# Patient Record
Sex: FEMALE | Race: OTHER | HISPANIC OR LATINO | Employment: FULL TIME | URBAN - METROPOLITAN AREA
[De-identification: names, ages, dates, MRNs, and addresses within clinical notes are randomized per-mention and may not be internally consistent; named-entity substitution may affect disease eponyms.]

---
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
## 2017-04-26 LAB
EXTERNAL HIV CONFIRMATION: NORMAL
EXTERNAL HIV SCREEN: NORMAL

## 2018-02-18 ENCOUNTER — HOSPITAL ENCOUNTER (EMERGENCY)
Facility: HOSPITAL | Age: 23
Discharge: HOME | End: 2018-02-18
Payer: COMMERCIAL

## 2018-02-18 ENCOUNTER — APPOINTMENT (EMERGENCY)
Dept: RADIOLOGY | Facility: HOSPITAL | Age: 23
End: 2018-02-18
Payer: COMMERCIAL

## 2018-02-18 VITALS
HEART RATE: 101 BPM | RESPIRATION RATE: 18 BRPM | TEMPERATURE: 100.3 F | DIASTOLIC BLOOD PRESSURE: 58 MMHG | SYSTOLIC BLOOD PRESSURE: 116 MMHG | OXYGEN SATURATION: 98 % | WEIGHT: 210 LBS

## 2018-02-18 DIAGNOSIS — J02.0 STREP PHARYNGITIS: Primary | ICD-10-CM

## 2018-02-18 DIAGNOSIS — R05.9 COUGH: ICD-10-CM

## 2018-02-18 LAB
ANION GAP SERPL CALCULATED.3IONS-SCNC: 9 MMOL/L (ref 4–13)
BACTERIA UR QL AUTO: NORMAL /HPF
BASOPHILS # BLD AUTO: 0.01 THOUSANDS/ΜL (ref 0–0.1)
BASOPHILS NFR BLD AUTO: 0 % (ref 0–1)
BILIRUB UR QL STRIP: NEGATIVE
BUN SERPL-MCNC: 15 MG/DL (ref 5–25)
CALCIUM SERPL-MCNC: 9.4 MG/DL (ref 8.3–10.1)
CHLORIDE SERPL-SCNC: 102 MMOL/L (ref 100–108)
CLARITY UR: ABNORMAL
CO2 SERPL-SCNC: 28 MMOL/L (ref 21–32)
COLOR UR: YELLOW
CREAT SERPL-MCNC: 0.98 MG/DL (ref 0.6–1.3)
EOSINOPHIL # BLD AUTO: 0.01 THOUSAND/ΜL (ref 0–0.61)
EOSINOPHIL NFR BLD AUTO: 0 % (ref 0–6)
ERYTHROCYTE [DISTWIDTH] IN BLOOD BY AUTOMATED COUNT: 14.2 % (ref 11.6–15.1)
EXT PREG TEST URINE: NEGATIVE
GFR SERPL CREATININE-BSD FRML MDRD: 83 ML/MIN/1.73SQ M
GLUCOSE SERPL-MCNC: 98 MG/DL (ref 65–140)
GLUCOSE UR STRIP-MCNC: NEGATIVE MG/DL
HCT VFR BLD AUTO: 43.3 % (ref 34.8–46.1)
HGB BLD-MCNC: 14.2 G/DL (ref 11.5–15.4)
HGB UR QL STRIP.AUTO: NEGATIVE
KETONES UR STRIP-MCNC: NEGATIVE MG/DL
LACTATE SERPL-SCNC: 0.8 MMOL/L (ref 0.5–2)
LEUKOCYTE ESTERASE UR QL STRIP: NEGATIVE
LYMPHOCYTES # BLD AUTO: 1.19 THOUSANDS/ΜL (ref 0.6–4.47)
LYMPHOCYTES NFR BLD AUTO: 12 % (ref 14–44)
MCH RBC QN AUTO: 27.3 PG (ref 26.8–34.3)
MCHC RBC AUTO-ENTMCNC: 32.8 G/DL (ref 31.4–37.4)
MCV RBC AUTO: 83 FL (ref 82–98)
MONOCYTES # BLD AUTO: 0.57 THOUSAND/ΜL (ref 0.17–1.22)
MONOCYTES NFR BLD AUTO: 6 % (ref 4–12)
NEUTROPHILS # BLD AUTO: 8.6 THOUSANDS/ΜL (ref 1.85–7.62)
NEUTS SEG NFR BLD AUTO: 82 % (ref 43–75)
NITRITE UR QL STRIP: NEGATIVE
NON-SQ EPI CELLS URNS QL MICRO: NORMAL /HPF
NRBC BLD AUTO-RTO: 0 /100 WBCS
PH UR STRIP.AUTO: 8.5 [PH] (ref 4.5–8)
PLATELET # BLD AUTO: 230 THOUSANDS/UL (ref 149–390)
PMV BLD AUTO: 10.8 FL (ref 8.9–12.7)
POTASSIUM SERPL-SCNC: 4 MMOL/L (ref 3.5–5.3)
PROT UR STRIP-MCNC: ABNORMAL MG/DL
RBC # BLD AUTO: 5.21 MILLION/UL (ref 3.81–5.12)
RBC #/AREA URNS AUTO: NORMAL /HPF
S PYO AG THROAT QL: POSITIVE
SODIUM SERPL-SCNC: 139 MMOL/L (ref 136–145)
SP GR UR STRIP.AUTO: 1.02 (ref 1–1.03)
UROBILINOGEN UR QL STRIP.AUTO: 1 E.U./DL
WBC # BLD AUTO: 10.38 THOUSAND/UL (ref 4.31–10.16)
WBC #/AREA URNS AUTO: NORMAL /HPF

## 2018-02-18 PROCEDURE — 87430 STREP A AG IA: CPT | Performed by: PHYSICIAN ASSISTANT

## 2018-02-18 PROCEDURE — 85025 COMPLETE CBC W/AUTO DIFF WBC: CPT | Performed by: PHYSICIAN ASSISTANT

## 2018-02-18 PROCEDURE — 83605 ASSAY OF LACTIC ACID: CPT | Performed by: PHYSICIAN ASSISTANT

## 2018-02-18 PROCEDURE — 81001 URINALYSIS AUTO W/SCOPE: CPT

## 2018-02-18 PROCEDURE — 36415 COLL VENOUS BLD VENIPUNCTURE: CPT | Performed by: PHYSICIAN ASSISTANT

## 2018-02-18 PROCEDURE — 71046 X-RAY EXAM CHEST 2 VIEWS: CPT

## 2018-02-18 PROCEDURE — 80048 BASIC METABOLIC PNL TOTAL CA: CPT | Performed by: PHYSICIAN ASSISTANT

## 2018-02-18 PROCEDURE — 96360 HYDRATION IV INFUSION INIT: CPT

## 2018-02-18 PROCEDURE — 81025 URINE PREGNANCY TEST: CPT | Performed by: PHYSICIAN ASSISTANT

## 2018-02-18 PROCEDURE — 99284 EMERGENCY DEPT VISIT MOD MDM: CPT

## 2018-02-18 RX ORDER — BENZONATATE 100 MG/1
100 CAPSULE ORAL 3 TIMES DAILY PRN
Qty: 30 CAPSULE | Refills: 0 | Status: SHIPPED | OUTPATIENT
Start: 2018-02-18 | End: 2018-02-22

## 2018-02-18 RX ORDER — ACETAMINOPHEN 325 MG/1
650 TABLET ORAL ONCE
Status: COMPLETED | OUTPATIENT
Start: 2018-02-18 | End: 2018-02-18

## 2018-02-18 RX ORDER — AMOXICILLIN 500 MG/1
500 TABLET, FILM COATED ORAL 2 TIMES DAILY
Qty: 20 TABLET | Refills: 0 | Status: SHIPPED | OUTPATIENT
Start: 2018-02-18 | End: 2018-02-28

## 2018-02-18 RX ORDER — ONDANSETRON 4 MG/1
4 TABLET, ORALLY DISINTEGRATING ORAL ONCE
Status: COMPLETED | OUTPATIENT
Start: 2018-02-18 | End: 2018-02-18

## 2018-02-18 RX ADMIN — ACETAMINOPHEN 650 MG: 325 TABLET, FILM COATED ORAL at 09:36

## 2018-02-18 RX ADMIN — SODIUM CHLORIDE 1000 ML: 0.9 INJECTION, SOLUTION INTRAVENOUS at 09:49

## 2018-02-18 RX ADMIN — ONDANSETRON 4 MG: 4 TABLET, ORALLY DISINTEGRATING ORAL at 09:36

## 2018-02-18 NOTE — DISCHARGE INSTRUCTIONS
Acute Cough   WHAT YOU NEED TO KNOW:   An acute cough can last up to 3 weeks  Common causes of an acute cough include a cold, allergies, or a lung infection  DISCHARGE INSTRUCTIONS:   Return to the emergency department if:   · You have trouble breathing or feel short of breath  · You cough up blood, or you see blood in your mucus  · You faint or feel weak or dizzy  · You have chest pain when you cough or take a deep breath  · You have new wheezing  Contact your healthcare provider if:   · You have a fever  · Your cough lasts longer than 4 weeks  · Your symptoms do not improve with treatment  · You have questions or concerns about your condition or care  Medicines:   · Medicines  may be needed to stop the cough, decrease swelling in your airways, or help open your airways  Medicine may also be given to help you cough up mucus  Ask your healthcare provider what over-the-counter medicines you can take  If you have an infection caused by bacteria, you may need antibiotics  · Take your medicine as directed  Contact your healthcare provider if you think your medicine is not helping or if you have side effects  Tell him or her if you are allergic to any medicine  Keep a list of the medicines, vitamins, and herbs you take  Include the amounts, and when and why you take them  Bring the list or the pill bottles to follow-up visits  Carry your medicine list with you in case of an emergency  Manage your symptoms:   · Do not smoke and stay away from others who smoke  Nicotine and other chemicals in cigarettes and cigars can cause lung damage and make your cough worse  Ask your healthcare provider for information if you currently smoke and need help to quit  E-cigarettes or smokeless tobacco still contain nicotine  Talk to your healthcare provider before you use these products  · Drink extra liquids as directed  Liquids will help thin and loosen mucus so you can cough it up   Liquids will also help prevent dehydration  Examples of good liquids to drink include water, fruit juice, and broth  Do not drink liquids that contain caffeine  Caffeine can increase your risk for dehydration  Ask your healthcare provider how much liquid to drink each day  · Rest as directed  Do not do activities that make your cough worse, such as exercise  · Use a humidifier or vaporizer  Use a cool mist humidifier or a vaporizer to increase air moisture in your home  This may make it easier for you to breathe and help decrease your cough  · Eat 2 to 5 mL of honey 2 times each day  Honey can help thin mucus and decrease your cough  · Use cough drops or lozenges  These can help decrease throat irritation and your cough  Follow up with your healthcare provider as directed:  Write down your questions so you remember to ask them during your visits  © 2017 [address] [employer] Information is for End User's use only and may not be sold, redistributed or otherwise used for commercial purposes  All illustrations and images included in CareNotes® are the copyrighted property of A D [employer]  or [employer]él[employer]  The above information is an  only  It is not intended as medical advice for individual conditions or treatments  Talk to your doctor, nurse or pharmacist before following any medical regimen to see if it is safe and effective for you  Strep Throat   WHAT YOU NEED TO KNOW:   Strep throat is a throat infection caused by bacteria  It is easily spread from person to person  DISCHARGE INSTRUCTIONS:   Call 911 for any of the following:   · You have trouble breathing  Return to the emergency department if:   · You have new symptoms like a bad headache, stiff neck, chest pain, or vomiting  · You are drooling because you cannot swallow your spit  Contact your healthcare provider if:   · You have a fever  · You have a rash or ear pain      · You have green, yellow-brown, or bloody mucus when you cough or blow your nose  · You are unable to drink anything  · You have questions or concerns about your condition or care  Medicines:   · Antibiotics  help treat your strep throat  You should feel better within 2 to 3 days after you start antibiotics  · Take your medicine as directed  Contact your healthcare provider if you think your medicine is not helping or if you have side effects  Tell him or her if you are allergic to any medicine  Keep a list of the medicines, vitamins, and herbs you take  Include the amounts, and when and why you take them  Bring the list or the pill bottles to follow-up visits  Carry your medicine list with you in case of an emergency  Manage your symptoms:   · Use lozenges, ice, soft foods, or popsicles  to soothe your throat  · Drink juice, milk shakes, or soup  if your throat is too sore to eat solid food  Drinking liquids can also help prevent dehydration  · Gargle with salt water  Mix ¼ teaspoon salt in a glass of warm water and gargle  This may help reduce swelling in your throat  · Do not smoke  Nicotine and other chemicals in cigarettes and cigars can cause lung damage and make your symptoms worse  Ask your healthcare provider for information if you currently smoke and need help to quit  E-cigarettes or smokeless tobacco still contain nicotine  Talk to your healthcare provider before you use these products  Return to work or school  24 hours after you start antibiotic medicine  Prevent the spread of strep throat:   · Wash your hands often  Use soap and water  Wash your hands after you use the bathroom, change a child's diapers, or sneeze  Wash your hands before you prepare or eat food  · Do not share food or drinks  Replace your toothbrush after you have taken antibiotics for 24 hours  Follow up with your healthcare provider as directed:  Write down your questions so you remember to ask them during your visits  © 2017 [facility] [facility] Information is for End User's use only and may not be sold, redistributed or otherwise used for commercial purposes  All illustrations and images included in CareNotes® are the copyrighted property of [employer]  or [address]  The above information is an  only  It is not intended as medical advice for individual conditions or treatments  Talk to your doctor, nurse or pharmacist before following any medical regimen to see if it is safe and effective for you  DISCHARGE INSTRUCTIONS:    FOLLOW UP WITH YOUR PRIMARY CARE PROVIDER OR THE [address] [place] [address]  MAKE AN APPOINTMENT TO BE SEEN  TAKE TYLENOL OR MOTRIN FOR PAIN OR FEVER  TAKE AMOXICILLIN AS PRESCRIBED FOR STREP THROAT  IF RASH, SHORTNESS OF BREATH OR TROUBLE SWALLOWING, STOP TAKING THE MEDICATION AND BE SEEN  TAKE TESSALON AS PRESCRIBED FOR COUGH  IF RASH, SHORTNESS OF BREATH OR TROUBLE SWALLOWING, STOP TAKING THE MEDICATION AND BE SEEN  REST AND DRINK PLENTY OF FLUIDS  IF SYMPTOMS WORSEN OR NEW SYMPTOMS ARISE, RETURN TO THE ER TO BE SEEN

## 2018-02-18 NOTE — ED NOTES
Patient reports taking thera flu and Vitamin C at home, last dose at [facility]  02/18/18 3797

## 2018-02-18 NOTE — ED PROVIDER NOTES
History  Chief Complaint   Patient presents with    Fever - 9 weeks to 74 years     Patient states that she has been sick for 1 week with URI symptoms  Patient states she also feels weakness and fatigue  L ear bothering patient   Patient reports having fevers highest 102  Patient c/o nausea      22y  o female with no significant PMH presents to the ER for cough, left ear pain, fever (max 102), sore throat, rhinorrhea and nausea for 4 days  Patient has been taking Theraflu, Vitamin C and Tylenol for symptoms  She describes her cough as dry and sore throat as sharp  Patient denies radiation of pain  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 Taking? UNKNOWN TO PATIENT   Yes Yes   Sig: Take 1 tablet by mouth daily Patient on birth control, unsure of name      Facility-Administered Medications: None       Past Medical History:   Diagnosis Date    Seasonal allergies        History reviewed  No pertinent surgical history  History reviewed  No pertinent family history  I have reviewed and agree with the history as documented  Social History   Substance Use Topics    Smoking status: Never Smoker    Smokeless tobacco: Never Used    Alcohol use Yes      Comment: occasional        Review of Systems   Constitutional: Positive for fever  Negative for activity change, appetite change and chills  HENT: Positive for congestion, ear pain, rhinorrhea and sore throat  Negative for drooling, ear discharge and facial swelling  Eyes: Negative for redness  Respiratory: Positive for cough  Negative for shortness of breath  Cardiovascular: Negative for chest pain  Gastrointestinal: Positive for nausea   Negative for abdominal pain, diarrhea and vomiting  Musculoskeletal: Negative for neck stiffness  Skin: Negative for rash  Allergic/Immunologic: Negative for food allergies  Neurological: Negative for weakness and numbness  Physical Exam  ED Triage Vitals [02/18/18 0849]   Temperature Pulse Respirations Blood Pressure SpO2   (!) 101 7 °F (38 7 °C) (!) 130 18 119/57 97 %      Temp Source Heart Rate Source Patient Position - Orthostatic VS BP Location FiO2 (%)   Oral Monitor Sitting Right arm --      Pain Score       5           Orthostatic Vital Signs  Vitals:    02/18/18 0849 02/18/18 1026   BP: 119/57 116/58   Pulse: (!) 130 101   Patient Position - Orthostatic VS: Sitting Lying       Physical Exam   Constitutional:  Non-toxic appearance  No distress  HENT:   Head: Normocephalic and atraumatic  Right Ear: Tympanic membrane, external ear and ear canal normal  No drainage, swelling or tenderness  No foreign bodies  Tympanic membrane is not erythematous  No hemotympanum  Left Ear: Tympanic membrane, external ear and ear canal normal  No drainage, swelling or tenderness  No foreign bodies  Tympanic membrane is not erythematous  No hemotympanum  Nose: Nose normal    Mouth/Throat: Uvula is midline, oropharynx is clear and moist and mucous membranes are normal  No uvula swelling  No posterior oropharyngeal edema, posterior oropharyngeal erythema or tonsillar abscesses  No tonsillar exudate  Neck: Normal range of motion and phonation normal  Neck supple  No tracheal deviation present  Cardiovascular: Normal rate, regular rhythm, S1 normal, S2 normal and normal heart sounds  Exam reveals no gallop and no friction rub  No murmur heard  Pulmonary/Chest: Effort normal and breath sounds normal  No respiratory distress  She has no decreased breath sounds  She has no wheezes  She has no rhonchi  She has no rales  She exhibits no tenderness  Abdominal: Soft  Bowel sounds are normal  There is no tenderness  There is no rebound and no guarding  Neurological: She is alert  GCS eye subscore is 4  GCS verbal subscore is 5  GCS motor subscore is 6  Skin: Skin is warm and dry  No rash noted  Psychiatric: She has a normal mood and affect  Nursing note and vitals reviewed  ED Medications  Medications   acetaminophen (TYLENOL) tablet 650 mg (650 mg Oral Given 2/18/18 0936)   sodium chloride 0 9 % bolus 1,000 mL (0 mL Intravenous Stopped 2/18/18 1106)   ondansetron (ZOFRAN-ODT) dispersible tablet 4 mg (4 mg Oral Given 2/18/18 0936)       Diagnostic Studies  Results Reviewed     Procedure Component Value Units Date/Time    Urine Microscopic [[id number]]  (Normal) Collected:  02/18/18 8980    Lab Status:  Final result Specimen:  Urine from Urine, Clean Catch Updated:  02/18/18 1100     RBC, UA None Seen /hpf      WBC, UA None Seen /hpf      Epithelial Cells Occasional /hpf      Bacteria, UA None Seen /hpf     Rapid Beta strep screen [[id number]]  (Abnormal) Collected:  02/18/18 0954    Lab Status:  Final result Specimen:  Throat from Throat Updated:  02/18/18 1025     Rapid Strep A Screen Positive (A)    Lactic acid, plasma [[id number]]  (Normal) Collected:  02/18/18 0949    Lab Status:  Final result Specimen:  Blood from Arm, Right Updated:  02/18/18 1021     LACTIC ACID 0 8 mmol/L     Narrative:         Result may be elevated if tourniquet was used during collection      Basic metabolic panel [[id number]] Collected:  02/18/18 0949    Lab Status:  Final result Specimen:  Blood from Arm, Right Updated:  02/18/18 1011     Sodium 139 mmol/L      Potassium 4 0 mmol/L      Chloride 102 mmol/L      CO2 28 mmol/L      Anion Gap 9 mmol/L      BUN 15 mg/dL      Creatinine 0 98 mg/dL      Glucose 98 mg/dL      Calcium 9 4 mg/dL      eGFR 83 ml/min/1 73sq m     Narrative:         National Kidney Disease Education Program recommendations are as follows:  GFR calculation is accurate only with a steady state creatinine  Chronic Kidney disease less than 60 ml/min/1 73 sq  meters  Kidney failure less than 15 ml/min/1 73 sq  meters  CBC and differential [[id number]]  (Abnormal) Collected:  02/18/18 0949    Lab Status:  Final result Specimen:  Blood from Arm, Right Updated:  02/18/18 1001     WBC 10 38 (H) Thousand/uL      RBC 5 21 (H) Million/uL      Hemoglobin 14 2 g/dL      Hematocrit 43 3 %      MCV 83 fL      MCH 27 3 pg      MCHC 32 8 g/dL      RDW 14 2 %      MPV 10 8 fL      Platelets 244 Thousands/uL      nRBC 0 /100 WBCs      Neutrophils Relative 82 (H) %      Lymphocytes Relative 12 (L) %      Monocytes Relative 6 %      Eosinophils Relative 0 %      Basophils Relative 0 %      Neutrophils Absolute 8 60 (H) Thousands/µL      Lymphocytes Absolute 1 19 Thousands/µL      Monocytes Absolute 0 57 Thousand/µL      Eosinophils Absolute 0 01 Thousand/µL      Basophils Absolute 0 01 Thousands/µL     POCT pregnancy, urine [[id number]]  (Normal) Resulted:  02/18/18 0952    Lab Status:  Final result Updated:  02/18/18 0952     EXT PREG TEST UR (Ref: Negative) NEGATIVE    ED Urine Macroscopic [[id number]]  (Abnormal) Collected:  02/18/18 0937    Lab Status:  Final result Specimen:  Urine Updated:  02/18/18 0936     Color, UA Yellow     Clarity, UA Slightly Cloudy     pH, UA 8 5 (H)     Leukocytes, UA Negative     Nitrite, UA Negative     Protein, UA 30 (1+) (A) mg/dl      Glucose, UA Negative mg/dl      Ketones, UA Negative mg/dl      Urobilinogen, UA 1 0 E U /dl      Bilirubin, UA Negative     Blood, UA Negative     Specific Gravity, UA 1 020    Narrative:       CLINITEK RESULT                 XR chest 2 views   ED Interpretation by Tuan Moscoso PA-C (02/18 1027)   No acute consolidation seen by me at this time  Final Result by Sanam Guzman MD (02/18 1012)      No active pulmonary disease           Workstation performed: [id number]                    Procedures  Procedures       Phone Contacts  ED Phone Contact    ED Course  ED Course as of Feb 18 2332   Sun Feb 18, 2018   1056 Informed patient of lab and imaging findings  Will discharge  Initial Sepsis Screening     Row Name 02/18/18 1024                Is the patient's history suggestive of a new or worsening infection? (!)  Yes (Proceed)  -AR        Suspected source of infection suspect infection, source unknown  -AR        Are two or more of the following signs & symptoms of infection both present and new to the patient? (!)  Yes (Proceed)  -AR        Indicate SIRS criteria Hyperthemia > 38 3C (100 9F); Tachycardia > 90 bpm  -AR        If the answer is yes to both questions, suspicion of sepsis is present  --        If severe sepsis is present AND tissue hypoperfusion perists in the hour after fluid resuscitation or lactate > 4, the patient meets criteria for SEPTIC SHOCK  --        Are any of the following organ dysfunction criteria present within 6 hours of suspected infection and SIRS criteria that are NOT considered to be chronic conditions? (!)  Yes  -AR        Organ dysfunction  --        Date of presentation of severe sepsis  --        Time of presentation of severe sepsis  --        Tissue hypoperfusion persists in the hour after crystalloid fluid administration, evidenced, by either:  --        Was hypotension present within one hour of the conclusion of crystalloid fluid administration?  --        Date of presentation of septic shock  --        Time of presentation of septic shock  --          User Key  (r) = Recorded By, (t) = Taken By, (c) = Cosigned By    [address] Name Provider Type    SHAE Varghese PA-C Physician Assistant                  MDM  Number of Diagnoses or Management Options  Cough: new and requires workup  Strep pharyngitis: new and requires workup  Diagnosis management comments: DDX consists of but not limited to: viral syndrome, flu, pneumonia, bronchitis, otitis media, otitis externa, strep, abscess, mono, sepsis    Will check CXR and strep  Will check CBC, BMP, pregnancy and lactic  Will give fluids, zofran and Tylenol  At discharge, I instructed the patient to:  -follow up with pcp  -take Tylenol or Motrin for pain or fever  -take Amoxicillin as prescribed for strep throat  -take Tessalon as prescribed for cough  -rest and drink plenty of fluids  -return to the ER if symptoms worsened or new symptoms arose  Patient agreed to this plan and was stable at time of discharge  Amount and/or Complexity of Data Reviewed  Clinical lab tests: ordered and reviewed  Tests in the radiology section of CPT®: ordered and reviewed  Independent visualization of images, tracings, or specimens: yes    Patient Progress  Patient progress: stable    CritCare Time    Disposition  Final diagnoses:   Strep pharyngitis   Cough     Time reflects when diagnosis was documented in both MDM as applicable and the Disposition within this note     Time User Action Codes Description Comment    2/18/2018 10:57 AM Morene Quill A Add [J02 0] Strep pharyngitis     2/18/2018 10:57 AM Morene Quill A Add [R05] Cough       ED Disposition     ED Disposition Condition Comment    Discharge  Yvon Ruth discharge to home/self care      Condition at discharge: Stable        Follow-up Information     Follow up With Specialties Details Why [facility] Schedule an appointment as soon as possible for a visit in 1 day  [address] [place] [address]  [id number] [phone]        Discharge Medication List as of 2/18/2018 11:00 AM      START taking these medications    Details   amoxicillin (AMOXIL) 500 MG tablet Take 1 tablet (500 mg total) by mouth 2 (two) times a day for 10 days, Starting Sun 2/18/2018, Until Wed 2/28/2018, Print      benzonatate (TESSALON PERLES) 100 mg capsule Take 1 capsule (100 mg total) by mouth 3 (three) times a day as needed for cough, Starting Sun 2/18/2018, Print         CONTINUE these medications which have NOT CHANGED    Details   UNKNOWN TO PATIENT Take 1 tablet by mouth daily Patient on birth control, unsure of name, Historical Med           No discharge procedures on file      ED Provider  Electronically Signed by           Sonu Dejesus PA-C  02/18/18 4002

## 2018-02-18 NOTE — ED NOTES
Galina aware of vitals at triage  Vitals trending down s/p fluids and Tylenol  Lactic drawn results WNL, no sepsis protocol at this time       [address] Kieran, RN  02/18/18 1035

## 2018-02-22 ENCOUNTER — OFFICE VISIT (OUTPATIENT)
Dept: FAMILY MEDICINE CLINIC | Facility: CLINIC | Age: 23
End: 2018-02-22
Payer: COMMERCIAL

## 2018-02-22 VITALS
SYSTOLIC BLOOD PRESSURE: 104 MMHG | DIASTOLIC BLOOD PRESSURE: 68 MMHG | RESPIRATION RATE: 18 BRPM | WEIGHT: 212.9 LBS | OXYGEN SATURATION: 96 % | TEMPERATURE: 97.5 F | BODY MASS INDEX: 39.18 KG/M2 | HEIGHT: 62 IN | HEART RATE: 109 BPM

## 2018-02-22 DIAGNOSIS — J02.0 STREP PHARYNGITIS: ICD-10-CM

## 2018-02-22 DIAGNOSIS — K29.50 CHRONIC GASTRITIS, PRESENCE OF BLEEDING UNSPECIFIED, UNSPECIFIED GASTRITIS TYPE: Primary | ICD-10-CM

## 2018-02-22 DIAGNOSIS — B37.3 VAGINAL CANDIDIASIS: ICD-10-CM

## 2018-02-22 PROBLEM — K29.70 GASTRITIS: Status: ACTIVE | Noted: 2018-02-22

## 2018-02-22 PROBLEM — K42.9 UMBILICAL HERNIA WITHOUT OBSTRUCTION AND WITHOUT GANGRENE: Status: ACTIVE | Noted: 2018-02-22

## 2018-02-22 PROBLEM — B37.31 VAGINAL CANDIDIASIS: Status: ACTIVE | Noted: 2018-02-22

## 2018-02-22 PROBLEM — Z91.013 SHELLFISH ALLERGY: Status: ACTIVE | Noted: 2018-02-22

## 2018-02-22 PROBLEM — N28.1 ACQUIRED RENAL CYST OF LEFT KIDNEY: Status: ACTIVE | Noted: 2018-02-22

## 2018-02-22 PROCEDURE — 99204 OFFICE O/P NEW MOD 45 MIN: CPT | Performed by: PHYSICIAN ASSISTANT

## 2018-02-22 RX ORDER — FEXOFENADINE HCL 180 MG/1
TABLET ORAL AS NEEDED
COMMUNITY
Start: 2017-09-15 | End: 2022-01-12

## 2018-02-22 RX ORDER — FLUCONAZOLE 150 MG/1
150 TABLET ORAL ONCE
Qty: 1 TABLET | Refills: 0 | Status: SHIPPED | OUTPATIENT
Start: 2018-02-22 | End: 2018-02-22

## 2018-02-22 RX ORDER — NICOTINE POLACRILEX 4 MG/1
1 GUM, CHEWING ORAL
COMMUNITY
Start: 2016-11-02 | End: 2018-02-22 | Stop reason: SDUPTHER

## 2018-02-22 RX ORDER — NORGESTIMATE AND ETHINYL ESTRADIOL 0.25-0.035
1 KIT ORAL
COMMUNITY
Start: 2017-05-10 | End: 2018-03-20 | Stop reason: SDUPTHER

## 2018-02-22 RX ORDER — NICOTINE POLACRILEX 4 MG/1
1 GUM, CHEWING ORAL DAILY
Qty: 30 TABLET | Refills: 1 | Status: SHIPPED | OUTPATIENT
Start: 2018-02-22 | End: 2018-04-26

## 2018-02-22 RX ORDER — EPINEPHRINE 0.3 MG/.3ML
0.3 INJECTION SUBCUTANEOUS
COMMUNITY
End: 2019-03-19 | Stop reason: SDUPTHER

## 2018-02-22 NOTE — PATIENT INSTRUCTIONS
Take omeprazole as needed on an empty stomach which she stated only once or twice a week  Follow up if any symptoms increase  Finish course of amoxicillin for strep throat recently treated by the urgent care center  Fluconazole at the end of the course of the antibiotic for vaginal candidiasis  Follow up with your gynecologist as scheduled in 2 weeks

## 2018-02-22 NOTE — PROGRESS NOTES
Assessment/Plan:    Patient Instructions   Take omeprazole as needed on an empty stomach which she stated only once or twice a week  Follow up if any symptoms increase  Finish course of amoxicillin for strep throat recently treated by the urgent care center  Fluconazole at the end of the course of the antibiotic for vaginal candidiasis  Follow up with your gynecologist as scheduled in 2 weeks  Diagnoses and all orders for this visit:    Chronic gastritis, presence of bleeding unspecified, unspecified gastritis type    Vaginal candidiasis    Strep pharyngitis    Other orders  -     fexofenadine (ALLEGRA) 180 MG tablet; Take one tab po daily  -     EPINEPHrine (EPIPEN) 0 3 mg/0 3 mL SOAJ; Inject 0 3 mg into the shoulder, thigh, or buttocks  -     norgestimate-ethinyl estradiol (ORTHO-CYCLEN) 0 25-35 MG-MCG per tablet; Take 1 tablet by mouth  -     Omeprazole 20 MG TBEC; Take 1 tablet by mouth          Subjective:      Patient ID: Niki Landis is a 25 y o  female  Patient presents today for new patient establishment  She states she does have a history of gastritis over the last 2 years which is only intermittent at this time  She was told to take omeprazole 20 mg daily but she does not have the medication  She states she only has symptoms maybe twice a week depending on what she eats  She does have some nausea at this time but she is currently being treated for strep throat and symptoms overall are improving  She denies any vomiting or blood in her stools  She does notice intermittent constipation  She does try to drink a lot a water  She also states that since she started the amoxicillin for strep throat she has been getting some vaginal itching  Denies any discharge  She does have an appointment with a gynecologist in 2 weeks  The following portions of the patient's history were reviewed and updated as appropriate:   She  has a past medical history of Seasonal allergies    She Patient Active Problem List    Diagnosis Date Noted    Gastritis 02/22/2018    Shellfish allergy 67/06/9406    Umbilical hernia without obstruction and without gangrene 02/22/2018    Acquired renal cyst of left kidney 02/22/2018    Vaginal candidiasis 02/22/2018    Strep pharyngitis 02/22/2018    Acquired acanthosis nigricans 07/15/2013    Obesity 04/26/2013     She  has no past surgical history on file  Her family history includes Allergic rhinitis in her mother; Breast cancer in her mother; Diabetes in her mother; Hypertension in her father; Lung cancer in her paternal grandfather  She  reports that she has never smoked  She has never used smokeless tobacco  She reports that she drinks alcohol  She reports that she does not use drugs  Current Outpatient Prescriptions   Medication Sig Dispense Refill    amoxicillin (AMOXIL) 500 MG tablet Take 1 tablet (500 mg total) by mouth 2 (two) times a day for 10 days 20 tablet 0    fexofenadine (ALLEGRA) 180 MG tablet Take one tab po daily      norgestimate-ethinyl estradiol (ORTHO-CYCLEN) 0 25-35 MG-MCG per tablet Take 1 tablet by mouth      Omeprazole 20 MG TBEC Take 1 tablet by mouth      EPINEPHrine (EPIPEN) 0 3 mg/0 3 mL SOAJ Inject 0 3 mg into the shoulder, thigh, or buttocks       No current facility-administered medications for this visit  Current Outpatient Prescriptions on File Prior to Visit   Medication Sig    amoxicillin (AMOXIL) 500 MG tablet Take 1 tablet (500 mg total) by mouth 2 (two) times a day for 10 days    [DISCONTINUED] benzonatate (TESSALON PERLES) 100 mg capsule Take 1 capsule (100 mg total) by mouth 3 (three) times a day as needed for cough    [DISCONTINUED] UNKNOWN TO PATIENT Take 1 tablet by mouth daily Patient on birth control, unsure of name     No current facility-administered medications on file prior to visit  She is allergic to contrast [iodinated diagnostic agents] and shellfish-derived products       Review of Systems   Constitutional: Negative  HENT: Positive for sore throat  Negative for ear discharge, ear pain and rhinorrhea  Respiratory: Negative for cough and shortness of breath  Cardiovascular: Negative for chest pain  Gastrointestinal:        As stated in HPI   Genitourinary:        As stated in HPI   Musculoskeletal: Negative  Skin: Negative  Allergic/Immunologic:        Patient does have show fish allergies and has EpiPen to utilize if needed   Neurological: Negative  Hematological: Negative  Psychiatric/Behavioral: Negative  Objective:      /68 (BP Location: Right arm, Patient Position: Sitting, Cuff Size: Large)   Pulse (!) 109   Temp 97 5 °F (36 4 °C) (Tympanic)   Resp 18   Ht 5' 2" (1 575 m)   Wt 96 6 kg (212 lb 14 4 oz)   LMP 02/15/2018 (Exact Date)   SpO2 96%   Breastfeeding? No   BMI 38 94 kg/m²          Physical Exam   Constitutional: She appears well-developed and well-nourished  HENT:   Head: Normocephalic and atraumatic  Right Ear: External ear normal    Left Ear: External ear normal    Mouth/Throat: Oropharynx is clear and moist  No oropharyngeal exudate  Neck: Normal range of motion  Neck supple  No thyromegaly present  Cardiovascular: Normal rate, regular rhythm and normal heart sounds  No murmur heard  Pulmonary/Chest: Effort normal and breath sounds normal  She has no wheezes  She has no rales  Abdominal: Soft  Bowel sounds are normal  There is no tenderness  Musculoskeletal: She exhibits no edema  Lymphadenopathy:     She has no cervical adenopathy  Neurological: She is alert  Skin: Skin is warm  Psychiatric: She has a normal mood and affect

## 2018-03-20 ENCOUNTER — OFFICE VISIT (OUTPATIENT)
Dept: OBGYN CLINIC | Facility: MEDICAL CENTER | Age: 23
End: 2018-03-20
Payer: COMMERCIAL

## 2018-03-20 VITALS — DIASTOLIC BLOOD PRESSURE: 78 MMHG | SYSTOLIC BLOOD PRESSURE: 112 MMHG | BODY MASS INDEX: 39.56 KG/M2 | WEIGHT: 216.3 LBS

## 2018-03-20 DIAGNOSIS — Z30.41 USES ORAL CONTRACEPTION: ICD-10-CM

## 2018-03-20 DIAGNOSIS — Z01.419 ENCOUNTER FOR GYNECOLOGICAL EXAMINATION WITH PAPANICOLAOU SMEAR OF CERVIX: Primary | ICD-10-CM

## 2018-03-20 DIAGNOSIS — Z32.02 PREGNANCY EXAMINATION OR TEST, NEGATIVE RESULT: ICD-10-CM

## 2018-03-20 LAB — SL AMB POCT URINE HCG: NEGATIVE

## 2018-03-20 PROCEDURE — 99385 PREV VISIT NEW AGE 18-39: CPT | Performed by: OBSTETRICS & GYNECOLOGY

## 2018-03-20 PROCEDURE — 81025 URINE PREGNANCY TEST: CPT | Performed by: OBSTETRICS & GYNECOLOGY

## 2018-03-20 RX ORDER — NORGESTIMATE AND ETHINYL ESTRADIOL 0.25-0.035
1 KIT ORAL DAILY
Qty: 28 TABLET | Refills: 12 | Status: SHIPPED | OUTPATIENT
Start: 2018-03-20 | End: 2019-04-15 | Stop reason: SDUPTHER

## 2018-03-20 NOTE — PROGRESS NOTES
ASSESSMENT & PLAN: Payton Rand is a 25 y o  [place] with normal gynecologic exam     1   Routine well woman exam done today  2  Pap:  The patient's Pap was done today  Current ASCCP Guidelines reviewed  3   STD testing  was not done   4  The following were reviewed in today's visit: breast self exam/OCP/family planning choices   5  refill of OCP sent   CC:  Annual Gynecologic Examination    HPI: Payton Rand is a 25 y o  [place] who presents for annual gynecologic examination  She has the following concerns:  Some moodiness with cycles / states missed last period but has not missed OCP any day     Health Maintenance:    She wears her seatbelt routinely  She does not perform regular monthly self breast exams  She feels safe at home  Past Medical History:   Diagnosis Date    Gastritis     Migraine     Seasonal allergies        History reviewed  No pertinent surgical history  OB/Gyn History:        OB History      Para Term  AB Living    0 0 0 0 0 0    SAB TAB Ectopic Multiple Live Births    0 0 0 0 0          Family History   Problem Relation Age of Onset    Diabetes Mother    Erlin Hager Breast cancer Mother     Allergic rhinitis Mother     Hypertension Father     Lung cancer Paternal Grandfather        Social History:  Social History     Social History    Marital status: /Civil Union     Spouse name: N/A    Number of children: N/A    Years of education: N/A     Occupational History    Not on file       Social History Main Topics    Smoking status: Never Smoker    Smokeless tobacco: Never Used    Alcohol use Yes      Comment: occasional    Drug use: No    Sexual activity: Yes     Partners: Male     Birth control/ protection: OCP     Other Topics Concern    Not on file     Social History Narrative    No narrative on file       Allergies   Allergen Reactions    Contrast [Iodinated Diagnostic Agents] Anaphylaxis     throat closure    Shellfish-Derived Products Current Outpatient Prescriptions:     EPINEPHrine (EPIPEN) 0 3 mg/0 3 mL SOAJ, Inject 0 3 mg into the shoulder, thigh, or buttocks, Disp: , Rfl:     fexofenadine (ALLEGRA) 180 MG tablet, Take one tab po daily, Disp: , Rfl:     norgestimate-ethinyl estradiol (ORTHO-CYCLEN) 0 25-35 MG-MCG per tablet, Take 1 tablet by mouth daily, Disp: 28 tablet, Rfl: 12    Omeprazole 20 MG TBEC, Take 1 tablet (20 mg total) by mouth daily, Disp: 30 tablet, Rfl: 1    Review of Systems:  Constitutional :no fever, feels well, no tiredness, no recent weight gain or loss  ENT: no ear ache, no loss of hearing, no nosebleeds or nasal discharge, no sore throat or hoarseness  Cardiovascular: no complaints of slow or fast heart beat, no chest pain, no palpitations, no leg claudication or lower extremity edema  Respiratory: no complaints of shortness of shortness of breath, no MENESES  Breasts:no complaints of breast pain, breast lump, or nipple discharge  Gastrointestinal: no complaints of abdominal pain, constipation,nausea, vomiting, or diarrhea or bloody stools  Genitourinary : no complaints of dysuria, incontinence, pelvic pain, no dysmenorrhea, vaginal discharge or abnormal vaginal bleeding and as noted in HPI  Integumentary: no complaints of skin rash or lesion, itching or dry skin  Neurological: no complaints of headache, no confusion, no numbness or tingling, no dizziness or fainting    Objective        /78   Wt 98 1 kg (216 lb 4 8 oz)   LMP 02/20/2018 (Exact Date)   Breastfeeding?  No   BMI 39 56 kg/m²   General:   appears stated age, cooperative, alert normal mood and affect   Neck: normal, supple,trachea midline, no masses   Heart: regular rate and rhythm, S1, S2 normal, no murmur, click, rub or gallop   Lungs: clear to auscultation bilaterally   Breasts: normal appearance, no masses or tenderness, Normal to palpation without dominant masses   Abdomen: soft, non-tender, without masses or organomegaly   Vulva: normal   Vagina: normal vagina, no discharge, exudate, lesion, or erythema   Urethra: normal   Cervix: Normal, no discharge  Nontender     Uterus: normal size, contour, position, consistency, mobility, non-tender   Adnexa: no mass, fullness, tenderness

## 2018-03-23 LAB
CYTOLOGIST CVX/VAG CYTO: NORMAL
DX ICD CODE: NORMAL
OTHER STN SPEC: NORMAL
OTHER STN SPEC: NORMAL
PATH REPORT.FINAL DX SPEC: NORMAL
SL AMB NOTE:: NORMAL
SL AMB SPECIMEN ADEQUACY: NORMAL
SL AMB TEST METHODOLOGY: NORMAL

## 2018-04-15 LAB
ABSOL LYMPHOCYTES (HISTORICAL): 3.6 K/UL (ref 0.5–4)
ALBUMIN SERPL BCP-MCNC: 4 G/DL (ref 3–5.2)
ALP SERPL-CCNC: 106 U/L (ref 43–122)
ALT SERPL W P-5'-P-CCNC: 29 U/L (ref 9–52)
ANION GAP SERPL CALCULATED.3IONS-SCNC: 10 MMOL/L (ref 5–14)
AST SERPL W P-5'-P-CCNC: 22 U/L (ref 14–36)
BASOPHILS # BLD AUTO: 0.1 K/UL (ref 0–0.1)
BASOPHILS # BLD AUTO: 1 % (ref 0–1)
BILIRUB SERPL-MCNC: 0.2 MG/DL
BILIRUB UR QL STRIP: NEGATIVE MG/DL
BUN SERPL-MCNC: 17 MG/DL (ref 5–25)
CALCIUM SERPL-MCNC: 9.4 MG/DL (ref 8.4–10.2)
CHLORIDE SERPL-SCNC: 102 MEQ/L (ref 97–108)
CLARITY UR: ABNORMAL
CO2 SERPL-SCNC: 26 MMOL/L (ref 22–30)
COLOR UR: YELLOW
COMMENT (HISTORICAL): ABNORMAL
CREATINE, SERUM (HISTORICAL): 0.72 MG/DL (ref 0.6–1.2)
DEPRECATED RDW RBC AUTO: 13.7 %
EGFR (HISTORICAL): >60 ML/MIN/1.73 M2
EOSINOPHIL # BLD AUTO: 0.3 K/UL (ref 0–0.4)
EOSINOPHIL NFR BLD AUTO: 2 % (ref 0–6)
GLUCOSE SERPL-MCNC: 79 MG/DL (ref 70–99)
GLUCOSE UR STRIP-MCNC: NEGATIVE MG/DL
HCT VFR BLD AUTO: 37.9 % (ref 36–46)
HGB BLD-MCNC: 12.3 G/DL (ref 12–16)
HGB UR QL STRIP.AUTO: ABNORMAL
KETONES UR STRIP-MCNC: NEGATIVE MG/DL
LEUKOCYTE ESTERASE UR QL STRIP: NEGATIVE
LYMPHOCYTES NFR BLD AUTO: 27 % (ref 25–45)
MCH RBC QN AUTO: 26 PG (ref 26–34)
MCHC RBC AUTO-ENTMCNC: 32.3 % (ref 31–36)
MCV RBC AUTO: 80 FL (ref 80–100)
MONOCYTES # BLD AUTO: 0.7 K/UL (ref 0.2–0.9)
MONOCYTES NFR BLD AUTO: 5 % (ref 1–10)
NEUTROPHILS ABS COUNT (HISTORICAL): 8.7 K/UL (ref 1.8–7.8)
NEUTS SEG NFR BLD AUTO: 65 % (ref 45–65)
NITRITE UR QL STRIP: NEGATIVE
PH UR STRIP.AUTO: 7 [PH] (ref 4.5–8)
PLATELET # BLD AUTO: 237 K/MCL (ref 150–450)
POTASSIUM SERPL-SCNC: 3.9 MEQ/L (ref 3.6–5)
PREGNANCY TEST URINE (HISTORICAL): NEGATIVE
PROT UR STRIP-MCNC: NEGATIVE MG/DL
RBC # BLD AUTO: 4.72 M/MCL (ref 4–5.2)
SODIUM SERPL-SCNC: 138 MEQ/L (ref 137–147)
SP GR UR STRIP.AUTO: 1.01 (ref 1–1.04)
TOTAL PROTEIN (HISTORICAL): 7 G/DL (ref 5.9–8.4)
UROBILINOGEN UR QL STRIP.AUTO: NEGATIVE MG/DL (ref 0–1)
WBC # BLD AUTO: 13.3 K/MCL (ref 4.5–11)

## 2018-04-23 ENCOUNTER — TELEPHONE (OUTPATIENT)
Dept: FAMILY MEDICINE CLINIC | Facility: CLINIC | Age: 23
End: 2018-04-23

## 2018-04-24 DIAGNOSIS — K29.50 CHRONIC GASTRITIS, PRESENCE OF BLEEDING UNSPECIFIED, UNSPECIFIED GASTRITIS TYPE: Primary | ICD-10-CM

## 2018-04-26 ENCOUNTER — OFFICE VISIT (OUTPATIENT)
Dept: GASTROENTEROLOGY | Facility: MEDICAL CENTER | Age: 23
End: 2018-04-26
Payer: COMMERCIAL

## 2018-04-26 VITALS
HEART RATE: 96 BPM | BODY MASS INDEX: 39.56 KG/M2 | WEIGHT: 215 LBS | SYSTOLIC BLOOD PRESSURE: 116 MMHG | DIASTOLIC BLOOD PRESSURE: 78 MMHG | HEIGHT: 62 IN | TEMPERATURE: 97.7 F

## 2018-04-26 DIAGNOSIS — R14.0 BLOATING: ICD-10-CM

## 2018-04-26 DIAGNOSIS — R10.30 LOWER ABDOMINAL PAIN: Primary | ICD-10-CM

## 2018-04-26 DIAGNOSIS — R11.0 NAUSEA: ICD-10-CM

## 2018-04-26 DIAGNOSIS — R19.8 ALTERNATING CONSTIPATION AND DIARRHEA: ICD-10-CM

## 2018-04-26 DIAGNOSIS — R10.13 EPIGASTRIC PAIN: ICD-10-CM

## 2018-04-26 PROCEDURE — 99244 OFF/OP CNSLTJ NEW/EST MOD 40: CPT | Performed by: INTERNAL MEDICINE

## 2018-04-26 RX ORDER — ONDANSETRON 4 MG/1
TABLET, ORALLY DISINTEGRATING ORAL
Refills: 0 | COMMUNITY
Start: 2018-04-15 | End: 2019-08-19

## 2018-04-26 RX ORDER — PANTOPRAZOLE SODIUM 40 MG/1
40 TABLET, DELAYED RELEASE ORAL 2 TIMES DAILY
Qty: 60 TABLET | Refills: 3 | Status: SHIPPED | OUTPATIENT
Start: 2018-04-26 | End: 2019-08-19

## 2018-04-26 RX ORDER — ACETAMINOPHEN 500 MG
500 TABLET ORAL EVERY 6 HOURS PRN
COMMUNITY

## 2018-04-26 RX ORDER — PANTOPRAZOLE SODIUM 40 MG/1
40 TABLET, DELAYED RELEASE ORAL DAILY
Refills: 0 | COMMUNITY
Start: 2018-04-15 | End: 2018-04-26 | Stop reason: SDUPTHER

## 2018-04-26 NOTE — PROGRESS NOTES
[facility] - Outpatient Consultation  Leon Chahal 25 y o  female MRN: [id number]  Encounter: [id number]      PCP: Denis Jovel MD  Referring: Denis Jovel MD  [facility]  [facility], [address] [place] [address] Blvd:      1  Epigastric pain  I did review her extensive treatment with [facility]  Most recently, she was seen in July 2017  She underwent an EGD with biopsies negative for celiac and H pylori  + mild erosive gastritis with mild, chronic gastritis on pathology seen  Differential for her chronic epigastric pain includes GERD vs functional esophageal pain - postprandial distress syndrome  Given her refractory symptoms to max dosing of PPI, I suspect functional esophageal or gastroduodenal disorders  She should discontinue her prilosec  She has found more benefit from protonix, so will trial 40 mg BID max dosing  I encouraged weight loss and discussed her abdominal obesity contribution to her pathology  She should avoid her dietary triggers, I have given her an educational handout on GERD/common dietary triggers  She should avoid lying down after meals and keep the head of her bed elevated  If she does not respond to these measures, I will plan for pH/imepdance testing to evaluate for functional symptoms  I suspect she may only respond to a central neuromodulator   - pantoprazole (PROTONIX) 40 mg tablet; Take 1 tablet (40 mg total) by mouth 2 (two) times a day  Dispense: 60 tablet; Refill: 3    2  Lower abdominal pain  3  Alternating constipation and diarrhea  She has long standing symptoms of IBS-M with alternating constipation and diarrhea  I discussed limited treatment options as side effects for medications cause the alternative  I suggest she start by increasing her fiber supplementation as this will treat both  She should attempt metamucil 2 tbsp daily and titrate up every 3 days   I suggest a 7 day trial  If she does not respond, she mentions she suffers more from constipation  Therefore, I have offered her several different samples of linzess including 72, 145, and 290 mcg  I gave her instructions to start at the lowest dose and try higher doses if she is not responding  She should go to the lower dose or alternate days if she experiences diarrhea  I also encouraged her to try to recognize her symptoms prior to diarrhea onset and discontinue her laxatives  She should also start a probiotic for bloating, I gave her samples of Culturelle  Lastly, if her symptoms do not improve with this regimen, I will add an antispasmodic   - Ambulatory referral to Gastroenterology    4  Nausea  5  Bloating  Secondary to symptoms of reflux vs IBS-C  See management as above     ______________________________________________________________________    HPI:      Patient is a 56-year-old female referred as second opinion for evaluation of epigastric abdominal pain, periumbilical abdominal pain, alternating constipation and diarrhea  She reports a three year history of epigastric pain  Symptoms worsen shortly after eating, are associated with nausea, and remain constant  She has recognized worsening symptoms with trigger foods which include tomato, fatty foods, lactose containing foods, avocados  They are mildly relieved with max dosing of Protonix and omeprazole  She has undergone several EGDs in the past with [facility] which have demonstrated only gastritis  Additionally she complains of alternating constipation and diarrhea  Her constipation lasts for a week approximately at a time where she has one bowel movement every other day she describes [place] stool scale type 3  Constipation is associated with urgency, straining, incomplete evacuation  The symptoms then alternate with weeks of diarrhea, where she eats something and then has increased fecal urgency prompting a loose bowel movement  She describes [place] stool 6/7 at that time    Her abnormal bowel habits are associated with increased gas and bloating, symptoms are worse after dinner time  She takes Zofran as needed but prefers not to take it every day  She has tried Metamucil one tsp daily without benefit  REVIEW OF SYSTEMS:    CONSTITUTIONAL: Denies any fever, chills, rigors, and weight loss  HEENT: No earache or tinnitus  Denies hearing loss or visual disturbances  CARDIOVASCULAR: No chest pain or palpitations  RESPIRATORY: Denies any cough, hemoptysis, shortness of breath or dyspnea on exertion  GASTROINTESTINAL: As noted in the History of Present Illness  GENITOURINARY: No problems with urination  Denies any hematuria or dysuria  NEUROLOGIC: No dizziness or vertigo, denies headaches  MUSCULOSKELETAL: Denies any muscle or joint pain  SKIN: Denies skin rashes or itching  ENDOCRINE: Denies excessive thirst  Denies intolerance to heat or cold  PSYCHOSOCIAL: Denies depression or anxiety  Denies any recent memory loss  Historical Information   Past Medical History:   Diagnosis Date    Gastritis     Migraine     Seasonal allergies      No past surgical history on file    Social History   History   Alcohol Use    Yes     Comment: occasional     History   Drug Use No     History   Smoking Status    Never Smoker   Smokeless Tobacco    Never Used     Family History   Problem Relation Age of Onset    Diabetes Mother     Breast cancer Mother     Allergic rhinitis Mother     Hypertension Father     Lung cancer Paternal Grandfather        Meds/Allergies       Current Outpatient Prescriptions:     acetaminophen (TYLENOL) 500 mg tablet    EPINEPHrine (EPIPEN) 0 3 mg/0 3 mL SOAJ    fexofenadine (ALLEGRA) 180 MG tablet    norgestimate-ethinyl estradiol (ORTHO-CYCLEN) 0 25-35 MG-MCG per tablet    Omeprazole 20 MG TBEC    ondansetron (ZOFRAN-ODT) 4 mg disintegrating tablet    pantoprazole (PROTONIX) 40 mg tablet    Allergies   Allergen Reactions    Contrast [Iodinated Diagnostic Agents] Anaphylaxis     throat closure    Shellfish-Derived Products            Objective     Blood pressure 116/78, pulse 96, temperature 97 7 °F (36 5 °C), temperature source Tympanic, height 5' 2" (1 575 m), weight 97 5 kg (215 lb), not currently breastfeeding  Body mass index is 39 32 kg/m²  PHYSICAL EXAM:      General Appearance:   Alert, cooperative, no distress   HEENT:   Normocephalic, atraumatic, anicteric      Neck:  Supple, symmetrical, trachea midline   Lungs:   Clear to auscultation bilaterally; no rales, rhonchi or wheezing; respirations unlabored    Heart[de-identified]   Regular rate and rhythm; no murmur, rub, or gallop  Abdomen:   Soft, + epigastric and lower abdominal tenderness to palpation, non-distended; normal bowel sounds; no masses, no organomegaly    Genitalia:   Deferred    Rectal:   Deferred    Extremities:  No cyanosis, clubbing or edema    Pulses:  2+ and symmetric    Skin:  No jaundice, rashes, or lesions    Lymph nodes:  No palpable cervical lymphadenopathy        Lab Results:     Lab Results   Component Value Date    WBC 10 38 (H) 02/18/2018    HGB 14 2 02/18/2018    HCT 43 3 02/18/2018    MCV 83 02/18/2018     02/18/2018       Lab Results   Component Value Date     02/18/2018    K 4 0 02/18/2018     02/18/2018    CO2 28 02/18/2018    ANIONGAP 9 02/18/2018    BUN 15 02/18/2018    CREATININE 0 98 02/18/2018    GLUCOSE 98 02/18/2018    CALCIUM 9 4 02/18/2018    EGFR 83 02/18/2018       No results found for: INR, PROTIME      Radiology Results:   No results found

## 2018-04-26 NOTE — LETTER
April 28, 2018     Denis Jovel MD  [address]  [address]    Patient: Leon Chahal   YOB: 1995   Date of Visit: 4/26/2018       Dear Dr Verna Saldana:    Thank you for referring Leon Chahal to me for evaluation  Below are my notes for this consultation  If you have questions, please do not hesitate to call me  I look forward to following your patient along with you  Sincerely,      GÉNESIS Berrios  [facility]  Mobile: [phone]  Available on [email or web address]           CC: No Recipients  Nicholas Kimbrough MD  4/28/2018 10:40 PM  Sign at close encounter  [facility] - Outpatient Consultation  Leon Chahal 25 y o  female MRN: [id number]  Encounter: [id number]      PCP: Denis Jovel MD  Referring: Denis Jovel MD  [address], [address] [place] [address] Street:      1  Epigastric pain  I did review her extensive treatment with [facility]  Most recently, she was seen in July 2017  She underwent an EGD with biopsies negative for celiac and H pylori  + mild erosive gastritis with mild, chronic gastritis on pathology seen  Differential for her chronic epigastric pain includes GERD vs functional esophageal pain - postprandial distress syndrome  Given her refractory symptoms to max dosing of PPI, I suspect functional esophageal or gastroduodenal disorders  She should discontinue her prilosec  She has found more benefit from protonix, so will trial 40 mg BID max dosing  I encouraged weight loss and discussed her abdominal obesity contribution to her pathology  She should avoid her dietary triggers, I have given her an educational handout on GERD/common dietary triggers  She should avoid lying down after meals and keep the head of her bed elevated  If she does not respond to these measures, I will plan for pH/imepdance testing to evaluate for functional symptoms   I suspect she may only respond to a central neuromodulator   - pantoprazole (PROTONIX) 40 mg tablet; Take 1 tablet (40 mg total) by mouth 2 (two) times a day  Dispense: 60 tablet; Refill: 3    2  Lower abdominal pain  3  Alternating constipation and diarrhea  She has long standing symptoms of IBS-M with alternating constipation and diarrhea  I discussed limited treatment options as side effects for medications cause the alternative  I suggest she start by increasing her fiber supplementation as this will treat both  She should attempt metamucil 2 tbsp daily and titrate up every 3 days  I suggest a 7 day trial  If she does not respond, she mentions she suffers more from constipation  Therefore, I have offered her several different samples of linzess including 72, 145, and 290 mcg  I gave her instructions to start at the lowest dose and try higher doses if she is not responding  She should go to the lower dose or alternate days if she experiences diarrhea  I also encouraged her to try to recognize her symptoms prior to diarrhea onset and discontinue her laxatives  She should also start a probiotic for bloating, I gave her samples of Culturelle  Lastly, if her symptoms do not improve with this regimen, I will add an antispasmodic   - Ambulatory referral to Gastroenterology    4  Nausea  5  Bloating  Secondary to symptoms of reflux vs IBS-C  See management as above     ______________________________________________________________________    HPI:      Patient is a 52-year-old female referred as second opinion for evaluation of epigastric abdominal pain, periumbilical abdominal pain, alternating constipation and diarrhea  She reports a three year history of epigastric pain  Symptoms worsen shortly after eating, are associated with nausea, and remain constant  She has recognized worsening symptoms with trigger foods which include tomato, fatty foods, lactose containing foods, avocados   They are mildly relieved with max dosing of Protonix and omeprazole  She has undergone several EGDs in the past with [facility] which have demonstrated only gastritis  Additionally she complains of alternating constipation and diarrhea  Her constipation lasts for a week approximately at a time where she has one bowel movement every other day she describes [place] stool scale type 3  Constipation is associated with urgency, straining, incomplete evacuation  The symptoms then alternate with weeks of diarrhea, where she eats something and then has increased fecal urgency prompting a loose bowel movement  She describes [place] stool 6/7 at that time  Her abnormal bowel habits are associated with increased gas and bloating, symptoms are worse after dinner time  She takes Zofran as needed but prefers not to take it every day  She has tried Metamucil one tsp daily without benefit  REVIEW OF SYSTEMS:    CONSTITUTIONAL: Denies any fever, chills, rigors, and weight loss  HEENT: No earache or tinnitus  Denies hearing loss or visual disturbances  CARDIOVASCULAR: No chest pain or palpitations  RESPIRATORY: Denies any cough, hemoptysis, shortness of breath or dyspnea on exertion  GASTROINTESTINAL: As noted in the History of Present Illness  GENITOURINARY: No problems with urination  Denies any hematuria or dysuria  NEUROLOGIC: No dizziness or vertigo, denies headaches  MUSCULOSKELETAL: Denies any muscle or joint pain  SKIN: Denies skin rashes or itching  ENDOCRINE: Denies excessive thirst  Denies intolerance to heat or cold  PSYCHOSOCIAL: Denies depression or anxiety  Denies any recent memory loss  Historical Information   Past Medical History:   Diagnosis Date    Gastritis     Migraine     Seasonal allergies      No past surgical history on file    Social History   History   Alcohol Use    Yes     Comment: occasional     History   Drug Use No     History   Smoking Status    Never Smoker   Smokeless Tobacco    Never Used Family History   Problem Relation Age of Onset    Diabetes Mother     Breast cancer Mother     Allergic rhinitis Mother     Hypertension Father     Lung cancer Paternal Grandfather        Meds/Allergies       Current Outpatient Prescriptions:     acetaminophen (TYLENOL) 500 mg tablet    EPINEPHrine (EPIPEN) 0 3 mg/0 3 mL SOAJ    fexofenadine (ALLEGRA) 180 MG tablet    norgestimate-ethinyl estradiol (ORTHO-CYCLEN) 0 25-35 MG-MCG per tablet    Omeprazole 20 MG TBEC    ondansetron (ZOFRAN-ODT) 4 mg disintegrating tablet    pantoprazole (PROTONIX) 40 mg tablet    Allergies   Allergen Reactions    Contrast [Iodinated Diagnostic Agents] Anaphylaxis     throat closure    Shellfish-Derived Products            Objective     Blood pressure 116/78, pulse 96, temperature 97 7 °F (36 5 °C), temperature source Tympanic, height 5' 2" (1 575 m), weight 97 5 kg (215 lb), not currently breastfeeding  Body mass index is 39 32 kg/m²  PHYSICAL EXAM:      General Appearance:   Alert, cooperative, no distress   HEENT:   Normocephalic, atraumatic, anicteric      Neck:  Supple, symmetrical, trachea midline   Lungs:   Clear to auscultation bilaterally; no rales, rhonchi or wheezing; respirations unlabored    Heart[de-identified]   Regular rate and rhythm; no murmur, rub, or gallop     Abdomen:   Soft, + epigastric and lower abdominal tenderness to palpation, non-distended; normal bowel sounds; no masses, no organomegaly    Genitalia:   Deferred    Rectal:   Deferred    Extremities:  No cyanosis, clubbing or edema    Pulses:  2+ and symmetric    Skin:  No jaundice, rashes, or lesions    Lymph nodes:  No palpable cervical lymphadenopathy        Lab Results:     Lab Results   Component Value Date    WBC 10 38 (H) 02/18/2018    HGB 14 2 02/18/2018    HCT 43 3 02/18/2018    MCV 83 02/18/2018     02/18/2018       Lab Results   Component Value Date     02/18/2018    K 4 0 02/18/2018     02/18/2018    CO2 28 02/18/2018 ANIONGAP 9 02/18/2018    BUN 15 02/18/2018    CREATININE 0 98 02/18/2018    GLUCOSE 98 02/18/2018    CALCIUM 9 4 02/18/2018    EGFR 83 02/18/2018       No results found for: INR, PROTIME      Radiology Results:   No results found

## 2018-08-21 ENCOUNTER — APPOINTMENT (OUTPATIENT)
Dept: LAB | Facility: MEDICAL CENTER | Age: 23
End: 2018-08-21

## 2018-08-21 ENCOUNTER — APPOINTMENT (OUTPATIENT)
Dept: URGENT CARE | Facility: MEDICAL CENTER | Age: 23
End: 2018-08-21

## 2018-08-21 ENCOUNTER — TRANSCRIBE ORDERS (OUTPATIENT)
Dept: URGENT CARE | Facility: MEDICAL CENTER | Age: 23
End: 2018-08-21

## 2018-08-21 DIAGNOSIS — Z02.1 PRE-EMPLOYMENT EXAMINATION: Primary | ICD-10-CM

## 2018-08-21 DIAGNOSIS — Z02.1 PRE-EMPLOYMENT EXAMINATION: ICD-10-CM

## 2018-08-21 PROCEDURE — 36415 COLL VENOUS BLD VENIPUNCTURE: CPT

## 2018-08-21 PROCEDURE — 86480 TB TEST CELL IMMUN MEASURE: CPT

## 2018-08-21 PROCEDURE — 86787 VARICELLA-ZOSTER ANTIBODY: CPT

## 2018-08-21 PROCEDURE — 90715 TDAP VACCINE 7 YRS/> IM: CPT

## 2018-08-23 LAB — VZV IGG SER IA-ACNC: NORMAL

## 2018-08-24 LAB
ANNOTATION COMMENT IMP: NORMAL
GAMMA INTERFERON BACKGROUND BLD IA-ACNC: 0.14 IU/ML
M TB IFN-G BLD-IMP: NEGATIVE
M TB IFN-G CD4+ BCKGRND COR BLD-ACNC: 0.07 IU/ML
M TB IFN-G CD4+ T-CELLS BLD-ACNC: 0.21 IU/ML
MITOGEN IGNF BLD-ACNC: 7.93 IU/ML
QUANTIFERON-TB GOLD IN TUBE: NORMAL
SERVICE CMNT-IMP: NORMAL

## 2019-02-05 ENCOUNTER — OFFICE VISIT (OUTPATIENT)
Dept: FAMILY MEDICINE CLINIC | Facility: CLINIC | Age: 24
End: 2019-02-05
Payer: COMMERCIAL

## 2019-02-05 VITALS
HEART RATE: 101 BPM | OXYGEN SATURATION: 99 % | SYSTOLIC BLOOD PRESSURE: 114 MMHG | RESPIRATION RATE: 12 BRPM | BODY MASS INDEX: 42.97 KG/M2 | WEIGHT: 227.6 LBS | HEIGHT: 61 IN | DIASTOLIC BLOOD PRESSURE: 80 MMHG

## 2019-02-05 DIAGNOSIS — E66.01 CLASS 3 SEVERE OBESITY WITHOUT SERIOUS COMORBIDITY WITH BODY MASS INDEX (BMI) OF 40.0 TO 44.9 IN ADULT, UNSPECIFIED OBESITY TYPE (HCC): Primary | Chronic | ICD-10-CM

## 2019-02-05 DIAGNOSIS — K29.70 GASTRITIS WITHOUT BLEEDING, UNSPECIFIED CHRONICITY, UNSPECIFIED GASTRITIS TYPE: Chronic | ICD-10-CM

## 2019-02-05 DIAGNOSIS — L83 ACQUIRED ACANTHOSIS NIGRICANS: Chronic | ICD-10-CM

## 2019-02-05 LAB — SL AMB POCT URINE HCG: NEGATIVE

## 2019-02-05 PROCEDURE — 99204 OFFICE O/P NEW MOD 45 MIN: CPT | Performed by: FAMILY MEDICINE

## 2019-02-05 PROCEDURE — 81025 URINE PREGNANCY TEST: CPT | Performed by: FAMILY MEDICINE

## 2019-02-05 PROCEDURE — 1036F TOBACCO NON-USER: CPT | Performed by: FAMILY MEDICINE

## 2019-02-05 PROCEDURE — 3008F BODY MASS INDEX DOCD: CPT | Performed by: FAMILY MEDICINE

## 2019-02-05 NOTE — PROGRESS NOTES
Assessment/Plan:    No problem-specific Assessment & Plan notes found for this encounter  Diagnoses and all orders for this visit:    Class 3 severe obesity without serious comorbidity with body mass index (BMI) of 40 0 to 44 9 in adult, unspecified obesity type ([facility] [address] )  Comments:  Sees detailed discussion below; pt stable on exam   Focus on a lower carb diet, eating out less, regular exercise  Nutrition Services referral and FBW ordered  Orders:  -     CBC and differential; Future  -     Lipid Panel with Direct LDL reflex; Future  -     Comprehensive metabolic panel; Future  -     HEMOGLOBIN A1C W/ EAG ESTIMATION; Future  -     TSH, 3rd generation with Free T4 reflex; Future  -     Ambulatory referral to Nutrition Services; Future  -     POCT urine HCG    Acquired acanthosis nigricans  Comments:  As above  A1c incl with FBW  Orders:  -     CBC and differential; Future  -     Lipid Panel with Direct LDL reflex; Future  -     Comprehensive metabolic panel; Future  -     HEMOGLOBIN A1C W/ EAG ESTIMATION; Future  -     TSH, 3rd generation with Free T4 reflex; Future  -     POCT urine HCG    Gastritis without bleeding, unspecified chronicity, unspecified gastritis type  Comments:  Stable; hoping improved diet and weight loss help here too  Continue f/u with GI  Orders:  -     CBC and differential; Future          Subjective:      Patient ID: Marleny Banks is a 26 y o  female  Harrison Bloodgood is new to the clinic  She has struggled with her weight for most of her life  She does note that she and her  do eat out often  Not really following a dedicated diet plan  Some exercise  Pt continues f/u with GI, and is UTD with her GYN  Urine POCT HCG screen today in clinic was NEGATIVE      We discussed today numerous options at length for assistance with weight loss (and potential assoc R/SE):  Saxenda, Contrave, Phentermine, Topamax, loer carb diet plan, regular exercise, Nutrition Services consult, Bariatric Weight Management consult, Bariatric Surgery consult, etc   We discussed checking FBW (with TSH included), and some concerns I have for the possibility of PCOS given her hx, etc             The following portions of the patient's history were reviewed and updated as appropriate: allergies, current medications, past family history, past social history, past surgical history and problem list     Past Medical History:   Diagnosis Date    Gastritis     Migraine     Seasonal allergies      No past surgical history on file  Current Outpatient Prescriptions:     acetaminophen (TYLENOL) 500 mg tablet, Take 500 mg by mouth every 6 (six) hours as needed for mild pain, Disp: , Rfl:     EPINEPHrine (EPIPEN) 0 3 mg/0 3 mL SOAJ, Inject 0 3 mg into the shoulder, thigh, or buttocks, Disp: , Rfl:     fexofenadine (ALLEGRA) 180 MG tablet, Take one tab po daily, Disp: , Rfl:     norgestimate-ethinyl estradiol (ORTHO-CYCLEN) 0 25-35 MG-MCG per tablet, Take 1 tablet by mouth daily, Disp: 28 tablet, Rfl: 12    ondansetron (ZOFRAN-ODT) 4 mg disintegrating tablet, 1 TABLET EVERY 6 HOURS, MAY DISSOLVE ON TONGUE, IF NEEDED F9R NAUSEAA/VOMITING, Disp: , Rfl: 0    pantoprazole (PROTONIX) 40 mg tablet, Take 1 tablet (40 mg total) by mouth 2 (two) times a day, Disp: 60 tablet, Rfl: 3    Allergies   Allergen Reactions    Contrast [Iodinated Diagnostic Agents] Anaphylaxis     throat closure    Fish-Derived Products Anaphylaxis     Anaphylaxis    Pollen Extract Other (See Comments)     hives, throat closure    Shellfish-Derived Products      Family History   Problem Relation Age of Onset    Diabetes Mother     Breast cancer Mother     Allergic rhinitis Mother     Hypertension Father     Lung cancer Paternal Grandfather      Social History   Substance Use Topics    Smoking status: Never Smoker    Smokeless tobacco: Never Used    Alcohol use Yes      Comment: occasional   ; no children as of yet    Employee of [employer] - works in [employer]  [employer]  Review of Systems   Constitutional: Negative for activity change  Chronic difficulty losing weight - reports that she is up approx 20 more labs in the last 2+ months  Respiratory: Negative for shortness of breath  Cardiovascular: Negative for chest pain  Gastrointestinal:        +Chronic gastritis - sees GI  Endocrine:        No hair loss  Genitourinary: Positive for menstrual problem  +Chronic menstrual irregularity  Reports some chronic urinary frequency at night  Objective:      /80 (BP Location: Left arm, Patient Position: Sitting, Cuff Size: Standard)   Pulse 101   Resp 12   Ht 5' 1" (1 549 m)   Wt 103 kg (227 lb 9 6 oz)   SpO2 99%   BMI 43 00 kg/m²          Physical Exam   Constitutional: She is oriented to person, place, and time  She appears well-developed and well-nourished  No distress  HENT:   Head: Normocephalic and atraumatic  Right Ear: Hearing, tympanic membrane, external ear and ear canal normal    Left Ear: Hearing, tympanic membrane, external ear and ear canal normal    Mouth/Throat: Oropharynx is clear and moist  No oropharyngeal exudate  Eyes: Conjunctivae are normal    Neck: Normal range of motion  Neck supple  No thyromegaly present  Mild acanthosis nigricans to the posterior, lower neck region  Cardiovascular: Normal rate, regular rhythm and normal heart sounds  Exam reveals no gallop and no friction rub  No murmur heard  Pulmonary/Chest: Effort normal and breath sounds normal  No respiratory distress  She has no wheezes  She has no rales  Lymphadenopathy:     She has no cervical adenopathy  Neurological: She is alert and oriented to person, place, and time  Skin: She is not diaphoretic  Psychiatric: She has a normal mood and affect  Her behavior is normal  Judgment and thought content normal    Nursing note and vitals reviewed

## 2019-02-07 ENCOUNTER — LAB (OUTPATIENT)
Dept: LAB | Facility: HOSPITAL | Age: 24
End: 2019-02-07
Payer: COMMERCIAL

## 2019-02-07 DIAGNOSIS — E66.01 CLASS 3 SEVERE OBESITY WITHOUT SERIOUS COMORBIDITY WITH BODY MASS INDEX (BMI) OF 40.0 TO 44.9 IN ADULT, UNSPECIFIED OBESITY TYPE (HCC): Chronic | ICD-10-CM

## 2019-02-07 DIAGNOSIS — L83 ACQUIRED ACANTHOSIS NIGRICANS: Chronic | ICD-10-CM

## 2019-02-07 DIAGNOSIS — K29.70 GASTRITIS WITHOUT BLEEDING, UNSPECIFIED CHRONICITY, UNSPECIFIED GASTRITIS TYPE: Chronic | ICD-10-CM

## 2019-02-07 LAB
ALBUMIN SERPL BCP-MCNC: 3.3 G/DL (ref 3.5–5)
ALP SERPL-CCNC: 73 U/L (ref 46–116)
ALT SERPL W P-5'-P-CCNC: 21 U/L (ref 12–78)
ANION GAP SERPL CALCULATED.3IONS-SCNC: 8 MMOL/L (ref 4–13)
AST SERPL W P-5'-P-CCNC: 14 U/L (ref 5–45)
BASOPHILS # BLD AUTO: 0.04 THOUSANDS/ΜL (ref 0–0.1)
BASOPHILS NFR BLD AUTO: 0 % (ref 0–1)
BILIRUB SERPL-MCNC: 0.39 MG/DL (ref 0.2–1)
BUN SERPL-MCNC: 14 MG/DL (ref 5–25)
CALCIUM SERPL-MCNC: 9 MG/DL (ref 8.3–10.1)
CHLORIDE SERPL-SCNC: 105 MMOL/L (ref 100–108)
CHOLEST SERPL-MCNC: 181 MG/DL (ref 50–200)
CO2 SERPL-SCNC: 22 MMOL/L (ref 21–32)
CREAT SERPL-MCNC: 0.91 MG/DL (ref 0.6–1.3)
EOSINOPHIL # BLD AUTO: 0.22 THOUSAND/ΜL (ref 0–0.61)
EOSINOPHIL NFR BLD AUTO: 2 % (ref 0–6)
ERYTHROCYTE [DISTWIDTH] IN BLOOD BY AUTOMATED COUNT: 14 % (ref 11.6–15.1)
EST. AVERAGE GLUCOSE BLD GHB EST-MCNC: 111 MG/DL
GFR SERPL CREATININE-BSD FRML MDRD: 90 ML/MIN/1.73SQ M
GLUCOSE P FAST SERPL-MCNC: 87 MG/DL (ref 65–99)
HBA1C MFR BLD: 5.5 % (ref 4.2–6.3)
HCT VFR BLD AUTO: 39 % (ref 34.8–46.1)
HDLC SERPL-MCNC: 76 MG/DL (ref 40–60)
HGB BLD-MCNC: 12.1 G/DL (ref 11.5–15.4)
IMM GRANULOCYTES # BLD AUTO: 0.02 THOUSAND/UL (ref 0–0.2)
IMM GRANULOCYTES NFR BLD AUTO: 0 % (ref 0–2)
LDLC SERPL CALC-MCNC: 89 MG/DL (ref 0–100)
LYMPHOCYTES # BLD AUTO: 3.74 THOUSANDS/ΜL (ref 0.6–4.47)
LYMPHOCYTES NFR BLD AUTO: 35 % (ref 14–44)
MCH RBC QN AUTO: 26.1 PG (ref 26.8–34.3)
MCHC RBC AUTO-ENTMCNC: 31 G/DL (ref 31.4–37.4)
MCV RBC AUTO: 84 FL (ref 82–98)
MONOCYTES # BLD AUTO: 0.57 THOUSAND/ΜL (ref 0.17–1.22)
MONOCYTES NFR BLD AUTO: 5 % (ref 4–12)
NEUTROPHILS # BLD AUTO: 6.17 THOUSANDS/ΜL (ref 1.85–7.62)
NEUTS SEG NFR BLD AUTO: 58 % (ref 43–75)
NRBC BLD AUTO-RTO: 0 /100 WBCS
PLATELET # BLD AUTO: 249 THOUSANDS/UL (ref 149–390)
PMV BLD AUTO: 10.4 FL (ref 8.9–12.7)
POTASSIUM SERPL-SCNC: 3.8 MMOL/L (ref 3.5–5.3)
PROT SERPL-MCNC: 7.8 G/DL (ref 6.4–8.2)
RBC # BLD AUTO: 4.64 MILLION/UL (ref 3.81–5.12)
SODIUM SERPL-SCNC: 135 MMOL/L (ref 136–145)
TRIGL SERPL-MCNC: 82 MG/DL
TSH SERPL DL<=0.05 MIU/L-ACNC: 2.93 UIU/ML (ref 0.36–3.74)
WBC # BLD AUTO: 10.76 THOUSAND/UL (ref 4.31–10.16)

## 2019-02-07 PROCEDURE — 83036 HEMOGLOBIN GLYCOSYLATED A1C: CPT

## 2019-02-07 PROCEDURE — 84443 ASSAY THYROID STIM HORMONE: CPT

## 2019-02-07 PROCEDURE — 36415 COLL VENOUS BLD VENIPUNCTURE: CPT

## 2019-02-07 PROCEDURE — 80061 LIPID PANEL: CPT

## 2019-02-07 PROCEDURE — 80053 COMPREHEN METABOLIC PANEL: CPT

## 2019-02-07 PROCEDURE — 85025 COMPLETE CBC W/AUTO DIFF WBC: CPT

## 2019-02-07 NOTE — PROGRESS NOTES
Please let the patient know that their bloodwork was normal in general - incl her cholesterol and glucose numbers  Her white blood cell count was just mildly up - but this appears chronic for her, going with her chronic GI issues  She should though watch for any signs of infection, fevers, etc   Thanks     Dr Flor Dawn

## 2019-03-13 ENCOUNTER — CLINICAL SUPPORT (OUTPATIENT)
Dept: NUTRITION | Facility: HOSPITAL | Age: 24
End: 2019-03-13
Payer: COMMERCIAL

## 2019-03-13 VITALS — HEIGHT: 62 IN | BODY MASS INDEX: 40.76 KG/M2 | WEIGHT: 221.5 LBS

## 2019-03-13 DIAGNOSIS — E66.01 CLASS 3 SEVERE OBESITY WITHOUT SERIOUS COMORBIDITY WITH BODY MASS INDEX (BMI) OF 40.0 TO 44.9 IN ADULT, UNSPECIFIED OBESITY TYPE (HCC): Chronic | ICD-10-CM

## 2019-03-13 PROCEDURE — 97802 MEDICAL NUTRITION INDIV IN: CPT

## 2019-03-13 NOTE — PATIENT INSTRUCTIONS
1  Food Journal  with myfitnesspal daily  2  Consume 1600 kcal, 75 grm Protein daily  3  Achieve -10% body weight (199lb) as first goal within 6 months  4  Contemplate planning 987 min moderate physical activity weekly  5  Read food labels for fiber and protein and serving size

## 2019-03-13 NOTE — PROGRESS NOTES
Initial Nutrition Assessment Form    Patient Name: Yvon Ruth    YOB: 1995    Sex: Female     Assessment Date: 3/13/2019  Start Time: 2:30pm Stop Time: 3:30pm Total Minutes: 60min     Data:  Present at session: self   Parent Concerns: "I am heavy as I ever been in my life, I have a family history of many health conditions and I want to have a family of my own and need to have a healthy pregnancy"    Medical Dx/Reason for Referral: E66 01   Past Medical History:   Diagnosis Date    Gastritis     Migraine     Seasonal allergies        Current Outpatient Medications   Medication Sig Dispense Refill    acetaminophen (TYLENOL) 500 mg tablet Take 500 mg by mouth every 6 (six) hours as needed for mild pain      EPINEPHrine (EPIPEN) 0 3 mg/0 3 mL SOAJ Inject 0 3 mg into the shoulder, thigh, or buttocks      fexofenadine (ALLEGRA) 180 MG tablet Take one tab po daily      norgestimate-ethinyl estradiol (ORTHO-CYCLEN) 0 25-35 MG-MCG per tablet Take 1 tablet by mouth daily 28 tablet 12    ondansetron (ZOFRAN-ODT) 4 mg disintegrating tablet 1 TABLET EVERY 6 HOURS, MAY DISSOLVE ON TONGUE, IF NEEDED F9R NAUSEAA/VOMITING  0    pantoprazole (PROTONIX) 40 mg tablet Take 1 tablet (40 mg total) by mouth 2 (two) times a day 60 tablet 3     No current facility-administered medications for this visit  Additional Meds/Supplements: reviewed   Special Learning Needs: None   Height: HC Readings from Last 3 Encounters:   No data found for [facility]       Weight: Wt Readings from Last 3 Encounters:   02/05/19 103 kg (227 lb 9 6 oz)   04/26/18 97 5 kg (215 lb)   03/20/18 98 1 kg (216 lb 4 8 oz)     Estimated body mass index is 41 17 kg/m² as calculated from the following:    Height as of this encounter: 5' 1 5" (1 562 m)  Weight as of this encounter: 100 kg (221 lb 8 oz)     To have BMI 25 = 134 2 lb   Recent Weight Change: []Yes     []No  Amount: [x]Other - Lowest body weight 175 lb, Highest weight current weight         Energy Needs: [facility] Equation:  1705 x 1 9=1172 -700 =1687kcal   Allergies   Allergen Reactions    Contrast [Iodinated Diagnostic Agents] Anaphylaxis     throat closure    Fish-Derived Products Anaphylaxis     Anaphylaxis    Pollen Extract Other (See Comments)     hives, throat closure    Shellfish-Derived Products        Social History     Substance and Sexual Activity   Alcohol Use Yes    Comment: occasional       Social History     Tobacco Use   Smoking Status Never Smoker   Smokeless Tobacco Never Used       Who shops? patient   Who cooks? patient   Exercise: Nothing structured   Prior Counseling? [x]Yes     []No  When: Adolescent age   Why: weight        Diet Hx:  Breakfast: 8:30am [place] yogurt  Bernabe Lemon Drink   Lunch: 12:30pm Leftovers form Dinner  Water     Dinner:5:30pm Vegetables sometimes  Meat  Wine       Snacks: AM - tring Cheese  1 Tbsp Peanut Butter and 7 crackers  HS - 5 M&M's        Nutrition Diagnosis:   Overweight/obesity  related to Excess energy intake as evidenced by  BMI more than normative standard for age and sex (obesity-grade III 40+)       Medical Nutrition Therapy Intervention:  [x]Individualized Meal Plan- 1600 kcal, 75 grm Protein daily []Understanding Lab Values   [x]Basic Pathophysiology of Disease []Food/Medication Interactions   [x]Food Diary-myfitnesspal []Exercise   [x]Lifestyle/Behavior Modification Techniques []Medication, Mechanism of Action   [x]Label Reading Serv/fiber/protein []Self Blood Glucose Monitoring   [x]Weight/BMI Goals- -10% (199lb) first goal within 6 months  [x]Other - Lowest body weight 175 lb, Highest weight current weight   Other Notes:        Comprehension: []Excellent  []Very Good  [x]Good  []Fair   []Poor    Receptivity: []Excellent  []Very Good  [x]Good  []Fair   []Poor    Expected Compliance: []Excellent  []Very Good  [x]Good  []Fair   []Poor        Goals: 1  Food Journal  with myfitnesspal daily  Consume 1600 kcal, 75 grm Protein daily   2  Achieve -10% body weight (199lb) as first goal within 6 months  3 Read food labels for fiber and protein and serving size and report at next session            Labs:  CMP  Lab Results   Component Value Date     04/15/2018    K 3 8 02/07/2019     02/07/2019    CO2 22 02/07/2019    ANIONGAP 10 04/15/2018    BUN 14 02/07/2019    CREATININE 0 91 02/07/2019    GLUCOSE 79 04/15/2018    GLUF 87 02/07/2019    CALCIUM 9 0 02/07/2019    AST 14 02/07/2019    ALT 21 02/07/2019    ALKPHOS 73 02/07/2019    PROT 7 0 04/15/2018    BILITOT 0 2 04/15/2018    EGFR 90 02/07/2019       BMP  Lab Results   Component Value Date    GLUCOSE 79 04/15/2018    CALCIUM 9 0 02/07/2019     04/15/2018    K 3 8 02/07/2019    CO2 22 02/07/2019     02/07/2019    BUN 14 02/07/2019    CREATININE 0 91 02/07/2019       Lipids  No results found for: CHOL  Lab Results   Component Value Date    HDL 76 (H) 02/07/2019     Lab Results   Component Value Date    LDLCALC 89 02/07/2019     Lab Results   Component Value Date    TRIG 82 02/07/2019     No results found for: CHOLHDL    Hemoglobin A1C  Lab Results   Component Value Date    HGBA1C 5 5 02/07/2019       Fasting Glucose  Lab Results   Component Value Date    GLUF 87 02/07/2019       Insulin     Thyroid  No results found for: TSH, V0LCQOR, C1NDXKV, THYROIDAB    Hepatic Function Panel  Lab Results   Component Value Date    ALT 21 02/07/2019    AST 14 02/07/2019    ALKPHOS 73 02/07/2019    BILITOT 0 2 04/15/2018       Celiac Disease Antibody Panel  No results found for: ENDOMYSIAL IGA, GLIADIN IGA, GLIADIN IGG, IGA, TISSUE TRANSGLUT AB, TTG IGA   Iron  No results found for: IRON, TIBC, FERRITIN    Vitamins  No results found for: VITAMIN B2   No results found for: NICOTINAMIDE, NICOTINIC ACID   No results found for: VITAMINB6  No results found for: HRDXSBMF37  No results found for: VITB5  No results found for: R0GFRUQL  No results found for: THYROGLB  No results found for: VITAMIN K   No results found for: 25-HYDROXY VIT D   No components found for: [facility] [id number]

## 2019-03-19 ENCOUNTER — OFFICE VISIT (OUTPATIENT)
Dept: FAMILY MEDICINE CLINIC | Facility: CLINIC | Age: 24
End: 2019-03-19
Payer: COMMERCIAL

## 2019-03-19 VITALS
BODY MASS INDEX: 41.55 KG/M2 | SYSTOLIC BLOOD PRESSURE: 122 MMHG | HEIGHT: 62 IN | HEART RATE: 103 BPM | WEIGHT: 225.8 LBS | OXYGEN SATURATION: 98 % | TEMPERATURE: 98.9 F | DIASTOLIC BLOOD PRESSURE: 80 MMHG | RESPIRATION RATE: 16 BRPM

## 2019-03-19 DIAGNOSIS — E66.01 CLASS 3 SEVERE OBESITY WITHOUT SERIOUS COMORBIDITY WITH BODY MASS INDEX (BMI) OF 40.0 TO 44.9 IN ADULT, UNSPECIFIED OBESITY TYPE (HCC): Primary | Chronic | ICD-10-CM

## 2019-03-19 DIAGNOSIS — M54.6 ACUTE BILATERAL THORACIC BACK PAIN: ICD-10-CM

## 2019-03-19 DIAGNOSIS — Z91.013 SHELLFISH ALLERGY: ICD-10-CM

## 2019-03-19 PROCEDURE — 3008F BODY MASS INDEX DOCD: CPT | Performed by: FAMILY MEDICINE

## 2019-03-19 PROCEDURE — 1036F TOBACCO NON-USER: CPT | Performed by: FAMILY MEDICINE

## 2019-03-19 PROCEDURE — 99213 OFFICE O/P EST LOW 20 MIN: CPT | Performed by: FAMILY MEDICINE

## 2019-03-19 RX ORDER — EPINEPHRINE 0.3 MG/.3ML
0.3 INJECTION SUBCUTANEOUS ONCE
Qty: 0.3 ML | Refills: 5 | Status: SHIPPED | OUTPATIENT
Start: 2019-03-19 | End: 2019-08-19

## 2019-03-19 NOTE — PROGRESS NOTES
Assessment/Plan:    No problem-specific Assessment & Plan notes found for this encounter  Diagnoses and all orders for this visit:    Class 3 severe obesity without serious comorbidity with body mass index (BMI) of 40 0 to 44 9 in adult, unspecified obesity type ([facility] [address] )  Comments:  Pt down 2 lbs so far -> she is to continue to work on a healthy, lower carb diet; continue to work with [employer]; and it so get back to reg exercise  Shellfish allergy  Comments:  PRN EpiPen refilled for the pt  Orders:  -     EPINEPHrine (EPIPEN) 0 3 mg/0 3 mL SOAJ; Inject 0 3 mL (0 3 mg total) into a muscle once for 1 dose    Acute bilateral thoracic back pain  Comments:  Likely mild strain -> OTC NSAIDs with food PRN, is to use heat to the region, etc           Subjective:      Patient ID: Ayse Joy is a 26 y o  female  Pt is here today in f/u  She has seen Nutrition Services already  Most recent FBW:  TSH 2 930, WBC 10 7, H/H normal, HDL 76, LDL 89, Gluc 87, A1c 5 5%, Cr/LFTs normal   Is down 2 lbs! She is journaling what she eats, is sticking to a [address] diet  Not really exercising yet          The following portions of the patient's history were reviewed and updated as appropriate: allergies, current medications, past family history, past social history, past surgical history and problem list     Past Medical History:   Diagnosis Date    Gastritis     Migraine     Seasonal allergies        Current Outpatient Medications:     acetaminophen (TYLENOL) 500 mg tablet, Take 500 mg by mouth every 6 (six) hours as needed for mild pain, Disp: , Rfl:     EPINEPHrine (EPIPEN) 0 3 mg/0 3 mL SOAJ, Inject 0 3 mL (0 3 mg total) into a muscle once for 1 dose, Disp: 0 3 mL, Rfl: 5    fexofenadine (ALLEGRA) 180 MG tablet, Take one tab po daily, Disp: , Rfl:     norgestimate-ethinyl estradiol (ORTHO-CYCLEN) 0 25-35 MG-MCG per tablet, Take 1 tablet by mouth daily, Disp: 28 tablet, Rfl: 12    ondansetron (ZOFRAN-ODT) 4 mg disintegrating tablet, 1 TABLET EVERY 6 HOURS, MAY DISSOLVE ON TONGUE, IF NEEDED F9R NAUSEAA/VOMITING, Disp: , Rfl: 0    pantoprazole (PROTONIX) 40 mg tablet, Take 1 tablet (40 mg total) by mouth 2 (two) times a day, Disp: 60 tablet, Rfl: 3    Allergies   Allergen Reactions    Contrast [Iodinated Diagnostic Agents] Anaphylaxis     throat closure    Fish-Derived Products Anaphylaxis     Anaphylaxis    Pollen Extract Other (See Comments)     hives, throat closure    Shellfish-Derived Products      Social History     Tobacco Use    Smoking status: Never Smoker    Smokeless tobacco: Never Used   Substance Use Topics    Alcohol use: Yes     Comment: occasional    Drug use: No       Review of Systems   Constitutional: Negative for activity change  Respiratory: Negative for shortness of breath  Cardiovascular: Negative for chest pain  Musculoskeletal: Positive for back pain  Some mild, recent, thoracic back pain  Objective:      /80 (BP Location: Right arm, Patient Position: Sitting, Cuff Size: Standard)   Pulse 103   Temp 98 9 °F (37 2 °C) (Tympanic)   Resp 16   Ht 5' 1 5" (1 562 m)   Wt 102 kg (225 lb 12 8 oz)   SpO2 98%   BMI 41 97 kg/m²          Physical Exam   Constitutional: She is oriented to person, place, and time  She appears well-developed and well-nourished  No distress  HENT:   Head: Normocephalic and atraumatic  Neck: Normal range of motion  Neck supple  No thyromegaly present  Cardiovascular: Normal rate, regular rhythm and normal heart sounds  Exam reveals no gallop and no friction rub  No murmur heard  Pulmonary/Chest: Effort normal and breath sounds normal  No stridor  No respiratory distress  She has no wheezes  She has no rales  Musculoskeletal:        Back:    Lymphadenopathy:     She has no cervical adenopathy  Neurological: She is alert and oriented to person, place, and time  Skin: She is not diaphoretic     Psychiatric: She has a normal mood and affect  Her behavior is normal  Judgment and thought content normal    Nursing note and vitals reviewed

## 2019-04-15 DIAGNOSIS — Z30.41 USES ORAL CONTRACEPTION: ICD-10-CM

## 2019-04-15 RX ORDER — NORGESTIMATE AND ETHINYL ESTRADIOL 0.25-0.035
1 KIT ORAL DAILY
Qty: 28 TABLET | Refills: 0 | Status: SHIPPED | OUTPATIENT
Start: 2019-04-15 | End: 2019-04-18 | Stop reason: SDUPTHER

## 2019-04-17 ENCOUNTER — CLINICAL SUPPORT (OUTPATIENT)
Dept: NUTRITION | Facility: HOSPITAL | Age: 24
End: 2019-04-17
Payer: COMMERCIAL

## 2019-04-17 VITALS — HEIGHT: 61 IN | BODY MASS INDEX: 43.03 KG/M2 | WEIGHT: 227.9 LBS

## 2019-04-17 DIAGNOSIS — E66.01 CLASS 3 SEVERE OBESITY WITHOUT SERIOUS COMORBIDITY WITH BODY MASS INDEX (BMI) OF 40.0 TO 44.9 IN ADULT, UNSPECIFIED OBESITY TYPE (HCC): ICD-10-CM

## 2019-04-17 PROCEDURE — 97803 MED NUTRITION INDIV SUBSEQ: CPT

## 2019-04-18 ENCOUNTER — ANNUAL EXAM (OUTPATIENT)
Dept: OBGYN CLINIC | Facility: CLINIC | Age: 24
End: 2019-04-18
Payer: COMMERCIAL

## 2019-04-18 VITALS — BODY MASS INDEX: 42.5 KG/M2 | DIASTOLIC BLOOD PRESSURE: 66 MMHG | WEIGHT: 226.8 LBS | SYSTOLIC BLOOD PRESSURE: 122 MMHG

## 2019-04-18 DIAGNOSIS — Z30.41 USES ORAL CONTRACEPTION: ICD-10-CM

## 2019-04-18 DIAGNOSIS — Z01.419 ENCOUNTER FOR GYNECOLOGICAL EXAMINATION (GENERAL) (ROUTINE) WITHOUT ABNORMAL FINDINGS: Primary | ICD-10-CM

## 2019-04-18 DIAGNOSIS — Z30.41 ENCOUNTER FOR BIRTH CONTROL PILLS MAINTENANCE: ICD-10-CM

## 2019-04-18 PROCEDURE — 99385 PREV VISIT NEW AGE 18-39: CPT | Performed by: NURSE PRACTITIONER

## 2019-04-18 RX ORDER — ACETAMINOPHEN, ASPIRIN AND CAFFEINE 250; 250; 65 MG/1; MG/1; MG/1
1 TABLET, FILM COATED ORAL EVERY 6 HOURS PRN
COMMUNITY

## 2019-04-18 RX ORDER — NORGESTIMATE AND ETHINYL ESTRADIOL 0.25-0.035
1 KIT ORAL DAILY
Qty: 90 TABLET | Refills: 3 | Status: SHIPPED | OUTPATIENT
Start: 2019-04-18 | End: 2020-04-02 | Stop reason: SDUPTHER

## 2019-05-20 ENCOUNTER — CLINICAL SUPPORT (OUTPATIENT)
Dept: NUTRITION | Facility: HOSPITAL | Age: 24
End: 2019-05-20
Payer: COMMERCIAL

## 2019-05-20 VITALS — BODY MASS INDEX: 43.23 KG/M2 | WEIGHT: 229 LBS | HEIGHT: 61 IN

## 2019-05-20 DIAGNOSIS — E66.01 CLASS 3 SEVERE OBESITY WITHOUT SERIOUS COMORBIDITY WITH BODY MASS INDEX (BMI) OF 40.0 TO 44.9 IN ADULT, UNSPECIFIED OBESITY TYPE (HCC): ICD-10-CM

## 2019-05-20 PROCEDURE — 97803 MED NUTRITION INDIV SUBSEQ: CPT

## 2019-07-17 ENCOUNTER — TELEPHONE (OUTPATIENT)
Dept: FAMILY MEDICINE CLINIC | Facility: CLINIC | Age: 24
End: 2019-07-17

## 2019-07-17 NOTE — TELEPHONE ENCOUNTER
Patient called and worked with a nutritonist per your suggestion  It did not work for her  You also discussed an appetite suppressant  if the nutritionist route did not work  Patient is interested in trying a suppressant at this time  She wants to know if you want to see her first or if you can send a script?

## 2019-08-19 ENCOUNTER — OFFICE VISIT (OUTPATIENT)
Dept: FAMILY MEDICINE CLINIC | Facility: CLINIC | Age: 24
End: 2019-08-19
Payer: COMMERCIAL

## 2019-08-19 VITALS
DIASTOLIC BLOOD PRESSURE: 64 MMHG | RESPIRATION RATE: 18 BRPM | HEIGHT: 61 IN | WEIGHT: 237.4 LBS | BODY MASS INDEX: 44.82 KG/M2 | SYSTOLIC BLOOD PRESSURE: 114 MMHG | HEART RATE: 99 BPM | OXYGEN SATURATION: 98 %

## 2019-08-19 DIAGNOSIS — E66.01 CLASS 3 SEVERE OBESITY WITHOUT SERIOUS COMORBIDITY WITH BODY MASS INDEX (BMI) OF 40.0 TO 44.9 IN ADULT, UNSPECIFIED OBESITY TYPE (HCC): Primary | ICD-10-CM

## 2019-08-19 PROCEDURE — 3008F BODY MASS INDEX DOCD: CPT | Performed by: FAMILY MEDICINE

## 2019-08-19 PROCEDURE — 1036F TOBACCO NON-USER: CPT | Performed by: FAMILY MEDICINE

## 2019-08-19 PROCEDURE — 99213 OFFICE O/P EST LOW 20 MIN: CPT | Performed by: FAMILY MEDICINE

## 2019-08-19 RX ORDER — PHENTERMINE HYDROCHLORIDE 15 MG/1
15 CAPSULE ORAL EVERY MORNING
Qty: 30 CAPSULE | Refills: 0 | Status: SHIPPED | OUTPATIENT
Start: 2019-08-19 | End: 2019-09-23 | Stop reason: SDUPTHER

## 2019-08-19 NOTE — LETTER
August 19, 2019     Patient: Serenity Muller   YOB: 1995   Date of Visit: 8/19/2019       To Whom it May Concern:    Serenity Muller was seen in my clinic on 8/19/2019  Oswaldo Wyatt     Can return to work 08/20/2019       If you have any questions or concerns, please don't hesitate to call           Sincerely,          Barry Angel, DO

## 2019-08-19 NOTE — PROGRESS NOTES
Assessment/Plan:    No problem-specific Assessment & Plan notes found for this encounter  Diagnoses and all orders for this visit:    Class 3 severe obesity without serious comorbidity with body mass index (BMI) of 40 0 to 44 9 in adult, unspecified obesity type ([facility] [address] )  Comments:  Linda Arechiga is stable on exam   She is to continue a lower carb diet, and regular exercise  Will try pt on a trial of daily Phentermine in the AMs  Orders:  -     phentermine 15 MG capsule; Take 1 capsule (15 mg total) by mouth every morning          Subjective:      Patient ID: Christen Tompkins is a 26 y o  female  Linda Arechiga presents today in f/u on her weight  She had been working with [employer], but has continued to struggle losing weight  Was doing calorie counting, logging what she was eating on her kathi on her phone, etc   Does well early in the day, but is "starving" at night  Had been walking for exercise; this trailed off - trying to get back to it  We discussed multiple options, potential R/SE of meds, etc    Reports that she is not pregnant at this time - notes that she is taking her OCP regularly  Labs from 02/2019:  TSH 2 930, A1c 5 5%, H/H normal, HDL 76, LDL 89, Cr/LFTs normal   [place] PDMP was checked - clear        The following portions of the patient's history were reviewed and updated as appropriate: allergies, current medications, past family history, past social history, past surgical history and problem list     Past Medical History:   Diagnosis Date    Gastritis     Migraine     Obesity     Seasonal allergies        Current Outpatient Medications:     acetaminophen (TYLENOL) 500 mg tablet, Take 500 mg by mouth every 6 (six) hours as needed for mild pain, Disp: , Rfl:     aspirin-acetaminophen-caffeine (EXCEDRIN MIGRAINE) 250-250-65 MG per tablet, Take 1 tablet by mouth every 6 (six) hours as needed for headaches, Disp: , Rfl:     fexofenadine (ALLEGRA) 180 MG tablet, Take one tab po daily, Disp: , Rfl:     norgestimate-ethinyl estradiol (ORTHO-CYCLEN) 0 25-35 MG-MCG per tablet, Take 1 tablet by mouth daily, Disp: 90 tablet, Rfl: 3    phentermine 15 MG capsule, Take 1 capsule (15 mg total) by mouth every morning, Disp: 30 capsule, Rfl: 0    Allergies   Allergen Reactions    Contrast [Iodinated Diagnostic Agents] Anaphylaxis     throat closure    Fish-Derived Products Anaphylaxis     Anaphylaxis    Pollen Extract Other (See Comments)     hives, throat closure    Shellfish-Derived Products      Social History     Tobacco Use    Smoking status: Never Smoker    Smokeless tobacco: Never Used   Substance Use Topics    Alcohol use: Yes     Comment: occasional    Drug use: No       Review of Systems   Constitutional: Negative for activity change  +Chronic trouble losing weight  Respiratory: Negative for shortness of breath  Cardiovascular: Negative for chest pain  Objective:      /64 (BP Location: Right arm, Patient Position: Sitting, Cuff Size: Large)   Pulse 99   Resp 18   Ht 5' 1 25" (1 556 m)   Wt 108 kg (237 lb 6 4 oz)   SpO2 98%   BMI 44 49 kg/m²          Physical Exam   Constitutional: She is oriented to person, place, and time  She appears well-developed and well-nourished  No distress  HENT:   Head: Normocephalic and atraumatic  Eyes: Conjunctivae are normal    Neck: Normal range of motion  Neck supple  No thyromegaly present  Cardiovascular: Normal rate, regular rhythm and normal heart sounds  Exam reveals no friction rub  No murmur heard  Pulmonary/Chest: Effort normal and breath sounds normal  No stridor  No respiratory distress  She has no wheezes  She has no rales  Lymphadenopathy:     She has no cervical adenopathy  Neurological: She is alert and oriented to person, place, and time  Skin: She is not diaphoretic  Psychiatric: She has a normal mood and affect   Her behavior is normal  Judgment and thought content normal    Nursing note and vitals reviewed

## 2019-09-23 ENCOUNTER — OFFICE VISIT (OUTPATIENT)
Dept: FAMILY MEDICINE CLINIC | Facility: CLINIC | Age: 24
End: 2019-09-23
Payer: COMMERCIAL

## 2019-09-23 VITALS
RESPIRATION RATE: 18 BRPM | WEIGHT: 233.6 LBS | SYSTOLIC BLOOD PRESSURE: 98 MMHG | HEIGHT: 62 IN | BODY MASS INDEX: 42.99 KG/M2 | DIASTOLIC BLOOD PRESSURE: 62 MMHG | TEMPERATURE: 98.9 F | HEART RATE: 116 BPM

## 2019-09-23 DIAGNOSIS — E66.01 CLASS 3 SEVERE OBESITY WITHOUT SERIOUS COMORBIDITY WITH BODY MASS INDEX (BMI) OF 40.0 TO 44.9 IN ADULT, UNSPECIFIED OBESITY TYPE (HCC): Primary | ICD-10-CM

## 2019-09-23 DIAGNOSIS — E66.01 CLASS 3 SEVERE OBESITY WITHOUT SERIOUS COMORBIDITY WITH BODY MASS INDEX (BMI) OF 40.0 TO 44.9 IN ADULT, UNSPECIFIED OBESITY TYPE (HCC): ICD-10-CM

## 2019-09-23 PROCEDURE — 99213 OFFICE O/P EST LOW 20 MIN: CPT | Performed by: FAMILY MEDICINE

## 2019-09-23 RX ORDER — PHENTERMINE HYDROCHLORIDE 15 MG/1
15 CAPSULE ORAL EVERY MORNING
Qty: 30 CAPSULE | Refills: 0 | Status: SHIPPED | OUTPATIENT
Start: 2019-09-23 | End: 2022-01-12

## 2019-09-23 NOTE — PROGRESS NOTES
Assessment/Plan:    No problem-specific Assessment & Plan notes found for this encounter  Diagnoses and all orders for this visit:    Class 3 severe obesity without serious comorbidity with body mass index (BMI) of 40 0 to 44 9 in adult, unspecified obesity type ([facility] [address] )  Comments:  Sam Church is stable on exam   She is to continue a lower carb diet, and get regular exercise  Will continue low dose Phentermine QAM   Orders:  -     phentermine 15 MG capsule; Take 1 capsule (15 mg total) by mouth every morning    Class 3 severe obesity without serious comorbidity with body mass index (BMI) of 40 0 to 44 9 in adult, unspecified obesity type ([facility])  -     phentermine 15 MG capsule; Take 1 capsule (15 mg total) by mouth every morning          Subjective:      Patient ID: Lashaun Barillas is a 26 y o  female  Sam Church presents today in f/u  She was started on a trial on Phentermine to assist with appetite suppression approx 1 month ago  She is tolerating the medication well, is down 4 lbs, and tolerating the medication  Still eating the same foods, just less  No regular exercise  [place] PDMP was [place], and is appropriate        The following portions of the patient's history were reviewed and updated as appropriate: allergies, current medications, past family history, past social history, past surgical history and problem list     Past Medical History:   Diagnosis Date    Gastritis     Migraine     Obesity     Seasonal allergies        Current Outpatient Medications:     acetaminophen (TYLENOL) 500 mg tablet, Take 500 mg by mouth every 6 (six) hours as needed for mild pain, Disp: , Rfl:     aspirin-acetaminophen-caffeine (EXCEDRIN MIGRAINE) 250-250-65 MG per tablet, Take 1 tablet by mouth every 6 (six) hours as needed for headaches, Disp: , Rfl:     fexofenadine (ALLEGRA) 180 MG tablet, as needed , Disp: , Rfl:     norgestimate-ethinyl estradiol (ORTHO-CYCLEN) 0 25-35 MG-MCG per tablet, Take 1 tablet by mouth daily, Disp: 90 tablet, Rfl: 3    phentermine 15 MG capsule, Take 1 capsule (15 mg total) by mouth every morning, Disp: 30 capsule, Rfl: 0    Allergies   Allergen Reactions    Contrast [Iodinated Diagnostic Agents] Anaphylaxis     throat closure    Fish-Derived Products Anaphylaxis     Anaphylaxis    Pollen Extract Other (See Comments)     hives, throat closure    Shellfish-Derived Products      Social History     Tobacco Use    Smoking status: Never Smoker    Smokeless tobacco: Never Used   Substance Use Topics    Alcohol use: Yes     Comment: occasional    Drug use: No         Review of Systems   Constitutional: Negative for activity change  Respiratory: Negative for shortness of breath  Cardiovascular: Negative for chest pain and palpitations  Neurological: Negative for headaches  Psychiatric/Behavioral: Negative for dysphoric mood and suicidal ideas  The patient is not nervous/anxious  Objective:      BP 98/62   Pulse (!) 116   Temp 98 9 °F (37 2 °C)   Resp 18   Ht 5' 1 89" (1 572 m)   Wt 106 kg (233 lb 9 6 oz)   BMI 42 88 kg/m²          Physical Exam   Constitutional: She is oriented to person, place, and time  She appears well-developed and well-nourished  No distress  HENT:   Head: Normocephalic and atraumatic  Eyes: Conjunctivae are normal    Neck: Normal range of motion  Neck supple  No thyromegaly present  Cardiovascular: Normal rate, regular rhythm and normal heart sounds  Exam reveals no gallop and no friction rub  No murmur heard  Pulmonary/Chest: Effort normal and breath sounds normal  No stridor  No respiratory distress  She has no wheezes  She has no rales  Lymphadenopathy:     She has no cervical adenopathy  Neurological: She is alert and oriented to person, place, and time  Skin: She is not diaphoretic  Psychiatric: She has a normal mood and affect   Her behavior is normal  Judgment and thought content normal    Nursing note and vitals reviewed

## 2019-10-10 ENCOUNTER — OFFICE VISIT (OUTPATIENT)
Dept: FAMILY MEDICINE CLINIC | Facility: CLINIC | Age: 24
End: 2019-10-10
Payer: COMMERCIAL

## 2019-10-10 VITALS
OXYGEN SATURATION: 98 % | WEIGHT: 235.8 LBS | HEIGHT: 62 IN | DIASTOLIC BLOOD PRESSURE: 82 MMHG | RESPIRATION RATE: 16 BRPM | BODY MASS INDEX: 43.39 KG/M2 | TEMPERATURE: 98.8 F | SYSTOLIC BLOOD PRESSURE: 142 MMHG | HEART RATE: 111 BPM

## 2019-10-10 DIAGNOSIS — J02.9 SORE THROAT: ICD-10-CM

## 2019-10-10 DIAGNOSIS — J06.9 VIRAL URI: Primary | ICD-10-CM

## 2019-10-10 LAB — S PYO AG THROAT QL: NEGATIVE

## 2019-10-10 PROCEDURE — 87880 STREP A ASSAY W/OPTIC: CPT | Performed by: FAMILY MEDICINE

## 2019-10-10 PROCEDURE — 87070 CULTURE OTHR SPECIMN AEROBIC: CPT | Performed by: FAMILY MEDICINE

## 2019-10-10 PROCEDURE — 3008F BODY MASS INDEX DOCD: CPT | Performed by: FAMILY MEDICINE

## 2019-10-10 PROCEDURE — 99213 OFFICE O/P EST LOW 20 MIN: CPT | Performed by: FAMILY MEDICINE

## 2019-10-10 NOTE — PROGRESS NOTES
Assessment/Plan:    No problem-specific Assessment & Plan notes found for this encounter  Diagnoses and all orders for this visit:    Viral URI  Comments:  Pt stable on exam; viral URI  Treating supportively with warm salt water gargles, OTC Cough/Cold Preps PRN, rest, and good PO hydration  Note for work  Sore throat  Comments:  Secondary to pnd; as above  BP up initially here today - will reassess at her scheduled f/u visit in 12/2019  Orders:  -     POCT rapid strepA  -     Throat culture; Future  -     Throat culture          Subjective:      Patient ID: No Tinoco is a 26 y o  female  Sick now x 2 - 3 days  Rapid Strep testing today was negative; a Throat Cx is pending  Pt is not pregnant at this time  The following portions of the patient's history were reviewed and updated as appropriate: allergies, current medications, past family history, past social history, past surgical history and problem list     Past Medical History:   Diagnosis Date    Gastritis     Migraine     Obesity     Seasonal allergies      History reviewed  No pertinent surgical history        Current Outpatient Medications:     acetaminophen (TYLENOL) 500 mg tablet, Take 500 mg by mouth every 6 (six) hours as needed for mild pain, Disp: , Rfl:     aspirin-acetaminophen-caffeine (EXCEDRIN MIGRAINE) 250-250-65 MG per tablet, Take 1 tablet by mouth every 6 (six) hours as needed for headaches, Disp: , Rfl:     fexofenadine (ALLEGRA) 180 MG tablet, as needed , Disp: , Rfl:     norgestimate-ethinyl estradiol (ORTHO-CYCLEN) 0 25-35 MG-MCG per tablet, Take 1 tablet by mouth daily, Disp: 90 tablet, Rfl: 3    phentermine 15 MG capsule, Take 1 capsule (15 mg total) by mouth every morning, Disp: 30 capsule, Rfl: 0    Allergies   Allergen Reactions    Contrast [Iodinated Diagnostic Agents] Anaphylaxis     throat closure    Fish-Derived Products Anaphylaxis     Anaphylaxis    Pollen Extract Other (See Comments)     hives, throat closure    Shellfish-Derived Products      Social History     Tobacco Use    Smoking status: Never Smoker    Smokeless tobacco: Never Used   Substance Use Topics    Alcohol use: Yes     Comment: occasional    Drug use: No       Review of Systems   Constitutional: Positive for fever  Negative for activity change  HENT: Positive for congestion, rhinorrhea and sore throat  Respiratory: Positive for cough  Objective:      /82   Pulse (!) 111   Temp 98 8 °F (37 1 °C)   Resp 16   Ht 5' 2 32" (1 583 m)   Wt 107 kg (235 lb 12 8 oz)   SpO2 98%   BMI 42 68 kg/m²          Physical Exam   Constitutional: She is oriented to person, place, and time  She appears well-developed and well-nourished  No distress  HENT:   Head: Normocephalic and atraumatic  Right Ear: Hearing, tympanic membrane, external ear and ear canal normal    Left Ear: Hearing, tympanic membrane, external ear and ear canal normal    Nose: Mucosal edema present  Right sinus exhibits no maxillary sinus tenderness and no frontal sinus tenderness  Left sinus exhibits no maxillary sinus tenderness and no frontal sinus tenderness  Mouth/Throat: Uvula is midline, oropharynx is clear and moist and mucous membranes are normal  No oropharyngeal exudate, posterior oropharyngeal edema, posterior oropharyngeal erythema or tonsillar abscesses  Tonsils are 2+ on the right  Tonsils are 2+ on the left  No tonsillar exudate  Eyes: Conjunctivae are normal    Neck: Normal range of motion  Neck supple  No thyromegaly present  Cardiovascular: Normal rate, regular rhythm and normal heart sounds  Exam reveals no gallop and no friction rub  No murmur heard  Pulmonary/Chest: Effort normal and breath sounds normal  No stridor  No respiratory distress  She has no wheezes  She has no rales  Lymphadenopathy:     She has no cervical adenopathy  Neurological: She is alert and oriented to person, place, and time  Skin: She is not diaphoretic  Psychiatric: She has a normal mood and affect  Her behavior is normal  Judgment and thought content normal    Nursing note and vitals reviewed

## 2019-10-12 LAB — BACTERIA THROAT CULT: NORMAL

## 2020-04-02 DIAGNOSIS — Z30.41 USES ORAL CONTRACEPTION: ICD-10-CM

## 2020-04-02 RX ORDER — NORGESTIMATE AND ETHINYL ESTRADIOL 0.25-0.035
1 KIT ORAL DAILY
Qty: 90 TABLET | Refills: 0 | Status: SHIPPED | OUTPATIENT
Start: 2020-04-02 | End: 2020-07-08 | Stop reason: SDUPTHER

## 2020-07-08 ENCOUNTER — TELEPHONE (OUTPATIENT)
Dept: OBGYN CLINIC | Facility: CLINIC | Age: 25
End: 2020-07-08

## 2020-07-08 DIAGNOSIS — Z30.41 USES ORAL CONTRACEPTION: ICD-10-CM

## 2020-07-08 NOTE — TELEPHONE ENCOUNTER
Pt needs refill on BC, ORTHO-CYCLEN  Please send to [facility] in [place]  Pt had annual exam scheduled for 7-  Only have enough until Friday  Thank you!

## 2020-07-08 NOTE — TELEPHONE ENCOUNTER
Call placed to [address] [place] [address] Daiana Bird  Ortho-Cyclen phoned in as requested  Annual exam 7/30/20  3 packs/ O RF  Routed to a provider for co sign

## 2020-07-10 PROBLEM — Z01.419 ENCOUNTER FOR GYNECOLOGICAL EXAMINATION (GENERAL) (ROUTINE) WITHOUT ABNORMAL FINDINGS: Status: RESOLVED | Noted: 2019-04-18 | Resolved: 2020-07-10

## 2020-07-10 PROBLEM — Z30.41 ENCOUNTER FOR BIRTH CONTROL PILLS MAINTENANCE: Status: RESOLVED | Noted: 2019-04-18 | Resolved: 2020-07-10

## 2020-07-10 PROBLEM — J02.0 STREP PHARYNGITIS: Status: RESOLVED | Noted: 2018-02-22 | Resolved: 2020-07-10

## 2020-07-10 PROBLEM — B37.3 VAGINAL CANDIDIASIS: Status: RESOLVED | Noted: 2018-02-22 | Resolved: 2020-07-10

## 2020-07-10 PROBLEM — B37.31 VAGINAL CANDIDIASIS: Status: RESOLVED | Noted: 2018-02-22 | Resolved: 2020-07-10

## 2020-07-10 RX ORDER — NORGESTIMATE AND ETHINYL ESTRADIOL 0.25-0.035
1 KIT ORAL DAILY
Qty: 90 TABLET | Refills: 0 | OUTPATIENT
Start: 2020-07-10 | End: 2020-07-30 | Stop reason: SDUPTHER

## 2020-07-29 ENCOUNTER — OFFICE VISIT (OUTPATIENT)
Dept: FAMILY MEDICINE CLINIC | Facility: CLINIC | Age: 25
End: 2020-07-29
Payer: COMMERCIAL

## 2020-07-29 VITALS
BODY MASS INDEX: 44.27 KG/M2 | TEMPERATURE: 99.8 F | RESPIRATION RATE: 18 BRPM | HEIGHT: 62 IN | OXYGEN SATURATION: 98 % | SYSTOLIC BLOOD PRESSURE: 122 MMHG | DIASTOLIC BLOOD PRESSURE: 68 MMHG | HEART RATE: 110 BPM | WEIGHT: 240.6 LBS

## 2020-07-29 DIAGNOSIS — Z01.84 IMMUNITY TO VARICELLA DETERMINED BY SEROLOGIC TEST: ICD-10-CM

## 2020-07-29 DIAGNOSIS — Z13.6 SCREENING FOR CARDIOVASCULAR CONDITION: ICD-10-CM

## 2020-07-29 DIAGNOSIS — Z01.84 IMMUNITY TO HEPATITIS B VIRUS DEMONSTRATED BY SEROLOGIC TEST: ICD-10-CM

## 2020-07-29 DIAGNOSIS — R53.83 FATIGUE, UNSPECIFIED TYPE: ICD-10-CM

## 2020-07-29 DIAGNOSIS — Z01.84 IMMUNITY TO MEASLES, MUMPS, AND RUBELLA DETERMINED BY SEROLOGIC TEST: ICD-10-CM

## 2020-07-29 DIAGNOSIS — Z13.1 SCREENING FOR DIABETES MELLITUS: ICD-10-CM

## 2020-07-29 DIAGNOSIS — Z11.1 SCREENING FOR TUBERCULOSIS: ICD-10-CM

## 2020-07-29 DIAGNOSIS — Z00.00 ANNUAL PHYSICAL EXAM: Primary | ICD-10-CM

## 2020-07-29 PROCEDURE — 99395 PREV VISIT EST AGE 18-39: CPT | Performed by: FAMILY MEDICINE

## 2020-07-29 NOTE — PATIENT INSTRUCTIONS

## 2020-07-29 NOTE — PROGRESS NOTES
[facility] PRACT[facility]    NAME: Brandy Brown  AGE: 22 y o  SEX: female  : 1995     DATE: 2020     Assessment and Plan:     Problem List Items Addressed This Visit     None      Visit Diagnoses     Annual physical exam    -  Primary    David Leaver is stable on exam   She is to continue a healthy, lower carb diet, and regular exercise  FBW ordered  Forms signed off for her school  Screening for diabetes mellitus        Relevant Orders    Comprehensive metabolic panel    HEMOGLOBIN A1C W/ EAG ESTIMATION    Screening for cardiovascular condition        Relevant Orders    Lipid Panel with Direct LDL reflex    Fatigue, unspecified type        Relevant Orders    TSH, 3rd generation with Free T4 reflex    CBC and differential    Screening for tuberculosis        Quantiferon Gold and viral titer labs (required by her school) added to THE [facility] ordered  Relevant Orders    Quantiferon TB Gold Plus    Immunity to varicella determined by serologic test        Relevant Orders    Varicella zoster antibody, IgG    Immunity to hepatitis B virus demonstrated by serologic test        Relevant Orders    Hepatitis B surface antibody    Immunity to measles, mumps, and rubella determined by serologic test        Relevant Orders    Rubeola antibody IgG    Rubella antibody, IgG    Mumps antibody, IgG          Immunizations and preventive care screenings were discussed with patient today  Appropriate education was printed on patient's after visit summary  Counseling:  Dental Health: discussed importance of regular tooth brushing, flossing, and dental visits  · Exercise: the importance of regular exercise/physical activity was discussed  Recommend exercise 3-5 times per week for at least 30 minutes            Return in 1 year (on 2021) for Annual physical      Chief Complaint:     Chief Complaint   Patient presents with    Annual Exam     Patient here for annual wellness exam       History of Present Illness:     Adult Annual Physical   Patient here for a comprehensive physical exam  The patient reports no problems  Sees GYN tomorrow; last PAP smear was normal in 2018  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  Had Tdap in 2018  Diet and Physical Activity  · Diet/Nutrition: limited junk food  · Exercise: no formal exercise  Depression Screening  PHQ-9 Depression Screening    PHQ-9:    Frequency of the following problems over the past two weeks:       Little interest or pleasure in doing things:  1 - several days  Feeling down, depressed, or hopeless:  0 - not at all  PHQ-2 Score:  1       General Health  · Sleep: sleeps well  · Hearing: normal - bilateral   · Vision: no vision problems  · Dental: regular dental visits  /GYN Health  · Last menstrual period: Menses irregular - just took a negative home pregnancy test     · Contraceptive method: oral contraceptives  · History of STDs?: no      Review of Systems:     Review of Systems   Constitutional: Negative for activity change  Respiratory: Negative for shortness of breath  Cardiovascular: Negative for chest pain  Gastrointestinal: Negative for abdominal pain  Genitourinary: Positive for menstrual problem  No new breast lumps on self examination  Psychiatric/Behavioral: Negative for dysphoric mood  The patient is not nervous/anxious  Past Medical History:     Past Medical History:   Diagnosis Date    Gastritis     Migraine     Obesity     Seasonal allergies       Past Surgical History:     History reviewed  No pertinent surgical history     Social History:        Social History     Socioeconomic History    Marital status: /Civil Union     Spouse name: None    Number of children: None    Years of education: None    Highest education level: None   Occupational History    None   Social Needs    Financial resource strain: None    Food insecurity:     Worry: None     Inability: None    Transportation needs:     Medical: None     Non-medical: None   Tobacco Use    Smoking status: Never Smoker    Smokeless tobacco: Never Used   Substance and Sexual Activity    Alcohol use: Yes     Comment: occasional    Drug use: No    Sexual activity: Yes     Partners: Male     Birth control/protection: OCP   Lifestyle    Physical activity:     Days per week: None     Minutes per session: None    Stress: None   Relationships    Social connections:     Talks on phone: None     Gets together: None     Attends Latter day service: None     Active member of club or organization: None     Attends meetings of clubs or organizations: None     Relationship status: None    Intimate partner violence:     Fear of current or ex partner: None     Emotionally abused: None     Physically abused: None     Forced sexual activity: None   Other Topics Concern    None   Social History Narrative    None      Family History:     Family History   Problem Relation Age of Onset    Diabetes Mother     Breast cancer Mother     Allergic rhinitis Mother     Hypertension Father     Hyperlipidemia Father     Lung cancer Paternal Grandfather     Asthma Paternal Grandfather     Anxiety disorder Maternal Aunt     Depression Maternal Aunt       Current Medications:     Current Outpatient Medications   Medication Sig Dispense Refill    acetaminophen (TYLENOL) 500 mg tablet Take 500 mg by mouth every 6 (six) hours as needed for mild pain      aspirin-acetaminophen-caffeine (EXCEDRIN MIGRAINE) 250-250-65 MG per tablet Take 1 tablet by mouth every 6 (six) hours as needed for headaches      fexofenadine (ALLEGRA) 180 MG tablet as needed       norgestimate-ethinyl estradiol (ORTHO-CYCLEN) 0 25-35 MG-MCG per tablet Take 1 tablet by mouth daily 90 tablet 0    phentermine 15 MG capsule Take 1 capsule (15 mg total) by mouth every morning (Patient not taking: Reported on 7/29/2020) 30 capsule 0     No current facility-administered medications for this visit  Allergies: Allergies   Allergen Reactions    Contrast [Iodinated Diagnostic Agents] Anaphylaxis     throat closure    Fish-Derived Products Anaphylaxis     Anaphylaxis    Pollen Extract Other (See Comments)     hives, throat closure    Shellfish-Derived Products       Physical Exam:     /68   Pulse (!) 110   Temp 99 8 °F (37 7 °C)   Resp 18   Ht 5' 1 89" (1 572 m)   Wt 109 kg (240 lb 9 6 oz)   SpO2 98%   BMI 44 16 kg/m²     Physical Exam   Constitutional: She is oriented to person, place, and time  She appears well-developed and well-nourished  No distress  HENT:   Head: Normocephalic and atraumatic  Eyes: Conjunctivae are normal  No scleral icterus  Neck: Normal range of motion  Neck supple  No thyromegaly present  Cardiovascular: Normal rate, regular rhythm and normal heart sounds  Exam reveals no gallop and no friction rub  No murmur heard  Pulmonary/Chest: Effort normal and breath sounds normal  No stridor  No respiratory distress  She has no wheezes  She has no rales  Abdominal: Soft  Bowel sounds are normal  She exhibits no distension and no mass  There is no tenderness  There is no rebound and no guarding  Lymphadenopathy:     She has no cervical adenopathy  Neurological: She is alert and oriented to person, place, and time  Skin: She is not diaphoretic  Psychiatric: She has a normal mood and affect  Her behavior is normal  Judgment and thought content normal    Nursing note and vitals reviewed        Jose Brown

## 2020-07-30 ENCOUNTER — ANNUAL EXAM (OUTPATIENT)
Dept: OBGYN CLINIC | Facility: CLINIC | Age: 25
End: 2020-07-30
Payer: COMMERCIAL

## 2020-07-30 ENCOUNTER — LAB (OUTPATIENT)
Dept: LAB | Facility: HOSPITAL | Age: 25
End: 2020-07-30
Payer: COMMERCIAL

## 2020-07-30 VITALS — DIASTOLIC BLOOD PRESSURE: 60 MMHG | WEIGHT: 240 LBS | BODY MASS INDEX: 44.05 KG/M2 | SYSTOLIC BLOOD PRESSURE: 108 MMHG

## 2020-07-30 DIAGNOSIS — Z13.1 SCREENING FOR DIABETES MELLITUS: ICD-10-CM

## 2020-07-30 DIAGNOSIS — N91.2 AMENORRHEA: ICD-10-CM

## 2020-07-30 DIAGNOSIS — Z01.419 ENCOUNTER FOR GYNECOLOGICAL EXAMINATION (GENERAL) (ROUTINE) WITHOUT ABNORMAL FINDINGS: Primary | ICD-10-CM

## 2020-07-30 DIAGNOSIS — Z01.84 IMMUNITY TO VARICELLA DETERMINED BY SEROLOGIC TEST: ICD-10-CM

## 2020-07-30 DIAGNOSIS — Z11.1 SCREENING FOR TUBERCULOSIS: ICD-10-CM

## 2020-07-30 DIAGNOSIS — Z30.41 USES ORAL CONTRACEPTION: ICD-10-CM

## 2020-07-30 DIAGNOSIS — Z30.41 ORAL CONTRACEPTIVE PILL SURVEILLANCE: ICD-10-CM

## 2020-07-30 DIAGNOSIS — Z01.84 IMMUNITY TO HEPATITIS B VIRUS DEMONSTRATED BY SEROLOGIC TEST: ICD-10-CM

## 2020-07-30 DIAGNOSIS — Z13.6 SCREENING FOR CARDIOVASCULAR CONDITION: ICD-10-CM

## 2020-07-30 DIAGNOSIS — R53.83 FATIGUE, UNSPECIFIED TYPE: ICD-10-CM

## 2020-07-30 DIAGNOSIS — Z01.84 IMMUNITY TO MEASLES, MUMPS, AND RUBELLA DETERMINED BY SEROLOGIC TEST: ICD-10-CM

## 2020-07-30 LAB
ALBUMIN SERPL BCP-MCNC: 3.4 G/DL (ref 3.5–5)
ALP SERPL-CCNC: 77 U/L (ref 46–116)
ALT SERPL W P-5'-P-CCNC: 19 U/L (ref 12–78)
ANION GAP SERPL CALCULATED.3IONS-SCNC: 7 MMOL/L (ref 4–13)
AST SERPL W P-5'-P-CCNC: 9 U/L (ref 5–45)
BASOPHILS # BLD AUTO: 0.04 THOUSANDS/ΜL (ref 0–0.1)
BASOPHILS NFR BLD AUTO: 0 % (ref 0–1)
BILIRUB SERPL-MCNC: 0.4 MG/DL (ref 0.2–1)
BUN SERPL-MCNC: 13 MG/DL (ref 5–25)
CALCIUM ALBUM COR SERPL-MCNC: 10.7 MG/DL (ref 8.3–10.1)
CALCIUM SERPL-MCNC: 10.2 MG/DL (ref 8.3–10.1)
CHLORIDE SERPL-SCNC: 107 MMOL/L (ref 100–108)
CHOLEST SERPL-MCNC: 208 MG/DL (ref 50–200)
CO2 SERPL-SCNC: 25 MMOL/L (ref 21–32)
CREAT SERPL-MCNC: 0.92 MG/DL (ref 0.6–1.3)
EOSINOPHIL # BLD AUTO: 0.12 THOUSAND/ΜL (ref 0–0.61)
EOSINOPHIL NFR BLD AUTO: 1 % (ref 0–6)
ERYTHROCYTE [DISTWIDTH] IN BLOOD BY AUTOMATED COUNT: 14.2 % (ref 11.6–15.1)
EST. AVERAGE GLUCOSE BLD GHB EST-MCNC: 108 MG/DL
GFR SERPL CREATININE-BSD FRML MDRD: 88 ML/MIN/1.73SQ M
GLUCOSE P FAST SERPL-MCNC: 73 MG/DL (ref 65–99)
HBA1C MFR BLD: 5.4 %
HBV SURFACE AB SER-ACNC: >1000 MIU/ML
HCT VFR BLD AUTO: 41.6 % (ref 34.8–46.1)
HDLC SERPL-MCNC: 76 MG/DL
HGB BLD-MCNC: 13 G/DL (ref 11.5–15.4)
IMM GRANULOCYTES # BLD AUTO: 0.02 THOUSAND/UL (ref 0–0.2)
IMM GRANULOCYTES NFR BLD AUTO: 0 % (ref 0–2)
LDLC SERPL CALC-MCNC: 113 MG/DL (ref 0–100)
LYMPHOCYTES # BLD AUTO: 3.03 THOUSANDS/ΜL (ref 0.6–4.47)
LYMPHOCYTES NFR BLD AUTO: 29 % (ref 14–44)
MCH RBC QN AUTO: 26.2 PG (ref 26.8–34.3)
MCHC RBC AUTO-ENTMCNC: 31.3 G/DL (ref 31.4–37.4)
MCV RBC AUTO: 84 FL (ref 82–98)
MONOCYTES # BLD AUTO: 0.44 THOUSAND/ΜL (ref 0.17–1.22)
MONOCYTES NFR BLD AUTO: 4 % (ref 4–12)
NEUTROPHILS # BLD AUTO: 6.96 THOUSANDS/ΜL (ref 1.85–7.62)
NEUTS SEG NFR BLD AUTO: 66 % (ref 43–75)
NRBC BLD AUTO-RTO: 0 /100 WBCS
PLATELET # BLD AUTO: 284 THOUSANDS/UL (ref 149–390)
PMV BLD AUTO: 11.6 FL (ref 8.9–12.7)
POTASSIUM SERPL-SCNC: 4.2 MMOL/L (ref 3.5–5.3)
PROT SERPL-MCNC: 8 G/DL (ref 6.4–8.2)
RBC # BLD AUTO: 4.96 MILLION/UL (ref 3.81–5.12)
RUBV IGG SERPL IA-ACNC: 78.3 IU/ML
SODIUM SERPL-SCNC: 139 MMOL/L (ref 136–145)
TRIGL SERPL-MCNC: 93 MG/DL
TSH SERPL DL<=0.05 MIU/L-ACNC: 2.05 UIU/ML (ref 0.36–3.74)
WBC # BLD AUTO: 10.61 THOUSAND/UL (ref 4.31–10.16)

## 2020-07-30 PROCEDURE — 85025 COMPLETE CBC W/AUTO DIFF WBC: CPT

## 2020-07-30 PROCEDURE — 80061 LIPID PANEL: CPT

## 2020-07-30 PROCEDURE — 86735 MUMPS ANTIBODY: CPT

## 2020-07-30 PROCEDURE — 86480 TB TEST CELL IMMUN MEASURE: CPT

## 2020-07-30 PROCEDURE — 83036 HEMOGLOBIN GLYCOSYLATED A1C: CPT

## 2020-07-30 PROCEDURE — 86762 RUBELLA ANTIBODY: CPT

## 2020-07-30 PROCEDURE — 99395 PREV VISIT EST AGE 18-39: CPT | Performed by: NURSE PRACTITIONER

## 2020-07-30 PROCEDURE — 86787 VARICELLA-ZOSTER ANTIBODY: CPT

## 2020-07-30 PROCEDURE — 86765 RUBEOLA ANTIBODY: CPT

## 2020-07-30 PROCEDURE — 36415 COLL VENOUS BLD VENIPUNCTURE: CPT

## 2020-07-30 PROCEDURE — 84443 ASSAY THYROID STIM HORMONE: CPT

## 2020-07-30 PROCEDURE — 86706 HEP B SURFACE ANTIBODY: CPT

## 2020-07-30 PROCEDURE — 80053 COMPREHEN METABOLIC PANEL: CPT

## 2020-07-30 RX ORDER — NORGESTIMATE AND ETHINYL ESTRADIOL 0.25-0.035
1 KIT ORAL DAILY
Qty: 90 TABLET | Refills: 3 | OUTPATIENT
Start: 2020-07-30 | End: 2020-10-05

## 2020-07-30 NOTE — PROGRESS NOTES
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   Works in [employer] trials at [employer]      Current Outpatient Medications:     acetaminophen (TYLENOL) 500 mg tablet, Take 500 mg by mouth every 6 (six) hours as needed for mild pain, Disp: , Rfl:     aspirin-acetaminophen-caffeine (EXCEDRIN MIGRAINE) 250-250-65 MG per tablet, Take 1 tablet by mouth every 6 (six) hours as needed for headaches, Disp: , Rfl:     fexofenadine (ALLEGRA) 180 MG tablet, as needed , Disp: , Rfl:     norgestimate-ethinyl estradiol (ORTHO-CYCLEN) 0 25-35 MG-MCG per tablet, Take 1 tablet by mouth daily, Disp: 90 tablet, Rfl: 0    phentermine 15 MG capsule, Take 1 capsule (15 mg total) by mouth every morning (Patient not taking: Reported on 7/29/2020), Disp: 30 capsule, Rfl: 0  Social History     Socioeconomic History    Marital status: /Civil Union     Spouse name: Not on file    Number of children: Not on file    Years of education: Not on file    Highest education level: Not on file   Occupational History    Not on file   Social Needs    Financial resource strain: Not on file    Food insecurity:     Worry: Not on file     Inability: Not on file    Transportation needs:     Medical: Not on file     Non-medical: Not on file   Tobacco Use    Smoking status: Never Smoker    Smokeless tobacco: Never Used   Substance and Sexual Activity    Alcohol use: Yes     Comment: occasional    Drug use: No    Sexual activity: Yes Partners: Male     Birth control/protection: OCP   Lifestyle    Physical activity:     Days per week: Not on file     Minutes per session: Not on file    Stress: Not on file   Relationships    Social connections:     Talks on phone: Not on file     Gets together: Not on file     Attends Yazdanism service: Not on file     Active member of club or organization: Not on file     Attends meetings of clubs or organizations: Not on file     Relationship status: Not on file    Intimate partner violence:     Fear of current or ex partner: Not on file     Emotionally abused: Not on file     Physically abused: Not on file     Forced sexual activity: Not on file   Other Topics Concern    Not on file   Social History Narrative    Not on file     Family History   Problem Relation Age of Onset    Diabetes Mother     Breast cancer Mother     Allergic rhinitis Mother     Hypertension Father     Hyperlipidemia Father     Lung cancer Paternal Grandfather     Asthma Paternal Grandfather     Anxiety disorder Maternal Aunt     Depression Maternal Aunt      Past Medical History:   Diagnosis Date    Gastritis     Migraine     Obesity     Seasonal allergies          O:  not currently breastfeeding  Patient appears well and is not in distress  Neck is supple without masses  Breasts are symmetrical without mass, tenderness, nipple discharge, skin changes or adenopathy  Abdomen is soft and nontender without masses  External genitals are normal without lesions or rashes  Vagina is normal without discharge or bleeding  Cervix is normal without discharge or lesion  Uterus is normal, mobile, nontender without palpable mass  Adnexa are normal, nontender, without palpable mass  A:  Yearly exam      P:    Pap up to date  Pap due 2021  Reviewed ASCCP guidelines with patient  Recommend annual CBE and monthly SBE  Had Gardasil x 3  Continue with current contraception-OCP  Refill provided     Missed menses likely d/t OCP  Cannot provide urine sample today  Slip for HCG given  RTO one year for yearly exam or sooner as needed

## 2020-08-03 LAB
GAMMA INTERFERON BACKGROUND BLD IA-ACNC: 0.13 IU/ML
M TB IFN-G BLD-IMP: NEGATIVE
M TB IFN-G CD4+ BCKGRND COR BLD-ACNC: 0.06 IU/ML
M TB IFN-G CD4+ BCKGRND COR BLD-ACNC: 0.06 IU/ML
MEV IGG SER QL: NORMAL
MITOGEN IGNF BCKGRD COR BLD-ACNC: >10 IU/ML
MUV IGG SER QL: NORMAL

## 2020-08-04 LAB — VZV IGG SER IA-ACNC: NORMAL

## 2020-08-09 NOTE — RESULT ENCOUNTER NOTE
Please let the patient know that their bloodwork was overall normal, and she is still immune on all of the virus panels  Calcium level was just slightly up  If she is taking any supplements with additional Calcium, she may want to back off of this  Thanks    Shai

## 2020-10-02 DIAGNOSIS — Z30.41 USES ORAL CONTRACEPTION: ICD-10-CM

## 2020-10-02 RX ORDER — NORGESTIMATE AND ETHINYL ESTRADIOL 0.25-0.035
1 KIT ORAL DAILY
Qty: 90 TABLET | Refills: 0 | Status: CANCELLED | OUTPATIENT
Start: 2020-10-02

## 2020-10-19 ENCOUNTER — TRANSCRIBE ORDERS (OUTPATIENT)
Dept: RADIOLOGY | Facility: HOSPITAL | Age: 25
End: 2020-10-19

## 2020-10-19 ENCOUNTER — HOSPITAL ENCOUNTER (OUTPATIENT)
Dept: RADIOLOGY | Facility: HOSPITAL | Age: 25
Discharge: HOME | End: 2020-10-19
Payer: COMMERCIAL

## 2020-10-19 ENCOUNTER — OFFICE VISIT (OUTPATIENT)
Dept: FAMILY MEDICINE CLINIC | Facility: CLINIC | Age: 25
End: 2020-10-19
Payer: COMMERCIAL

## 2020-10-19 VITALS
HEART RATE: 89 BPM | OXYGEN SATURATION: 100 % | SYSTOLIC BLOOD PRESSURE: 118 MMHG | DIASTOLIC BLOOD PRESSURE: 72 MMHG | BODY MASS INDEX: 45.69 KG/M2 | HEIGHT: 61 IN | TEMPERATURE: 99 F | WEIGHT: 242 LBS | RESPIRATION RATE: 16 BRPM

## 2020-10-19 DIAGNOSIS — R10.30 LOWER ABDOMINAL PAIN: ICD-10-CM

## 2020-10-19 DIAGNOSIS — R10.30 LOWER ABDOMINAL PAIN: Primary | ICD-10-CM

## 2020-10-19 DIAGNOSIS — N83.201 CYST OF RIGHT OVARY: Primary | ICD-10-CM

## 2020-10-19 LAB
BILIRUB UR QL STRIP: NEGATIVE
CLARITY UR: NORMAL
COLOR UR: YELLOW
GLUCOSE UR STRIP-MCNC: NEGATIVE MG/DL
HGB UR QL STRIP.AUTO: NEGATIVE
KETONES UR STRIP-MCNC: NEGATIVE MG/DL
LEUKOCYTE ESTERASE UR QL STRIP: NEGATIVE
NITRITE UR QL STRIP: NEGATIVE
PH UR STRIP.AUTO: 6.5 [PH]
PROT UR STRIP-MCNC: NEGATIVE MG/DL
SL AMB  POCT GLUCOSE, UA: NORMAL
SL AMB LEUKOCYTE ESTERASE,UA: NORMAL
SL AMB POCT BILIRUBIN,UA: NORMAL
SL AMB POCT BLOOD,UA: NORMAL
SL AMB POCT CLARITY,UA: CLEAR
SL AMB POCT COLOR,UA: YELLOW
SL AMB POCT KETONES,UA: NORMAL
SL AMB POCT NITRITE,UA: NORMAL
SL AMB POCT URINE PROTEIN: NORMAL
SP GR UR STRIP.AUTO: 1.02 (ref 1–1.03)
UROBILINOGEN UR QL STRIP.AUTO: 1 E.U./DL

## 2020-10-19 PROCEDURE — 87086 URINE CULTURE/COLONY COUNT: CPT | Performed by: FAMILY MEDICINE

## 2020-10-19 PROCEDURE — 74176 CT ABD & PELVIS W/O CONTRAST: CPT

## 2020-10-19 PROCEDURE — 99214 OFFICE O/P EST MOD 30 MIN: CPT | Performed by: FAMILY MEDICINE

## 2020-10-19 PROCEDURE — 81003 URINALYSIS AUTO W/O SCOPE: CPT | Performed by: FAMILY MEDICINE

## 2020-10-19 PROCEDURE — G1004 CDSM NDSC: HCPCS

## 2020-10-19 RX ORDER — NAPROXEN 500 MG/1
500 TABLET ORAL 2 TIMES DAILY WITH MEALS
Qty: 40 TABLET | Refills: 1 | Status: SHIPPED | OUTPATIENT
Start: 2020-10-19 | End: 2022-01-12

## 2020-10-20 LAB — BACTERIA UR CULT: NORMAL

## 2020-11-27 ENCOUNTER — TELEPHONE (OUTPATIENT)
Dept: FAMILY MEDICINE CLINIC | Facility: CLINIC | Age: 25
End: 2020-11-27

## 2020-11-27 DIAGNOSIS — Z20.822 EXPOSURE TO COVID-19 VIRUS: ICD-10-CM

## 2020-11-27 DIAGNOSIS — Z20.822 EXPOSURE TO COVID-19 VIRUS: Primary | ICD-10-CM

## 2020-11-27 PROCEDURE — U0003 INFECTIOUS AGENT DETECTION BY NUCLEIC ACID (DNA OR RNA); SEVERE ACUTE RESPIRATORY SYNDROME CORONAVIRUS 2 (SARS-COV-2) (CORONAVIRUS DISEASE [COVID-19]), AMPLIFIED PROBE TECHNIQUE, MAKING USE OF HIGH THROUGHPUT TECHNOLOGIES AS DESCRIBED BY CMS-2020-01-R: HCPCS | Performed by: FAMILY MEDICINE

## 2020-11-28 LAB — SARS-COV-2 RNA SPEC QL NAA+PROBE: NOT DETECTED

## 2020-12-28 DIAGNOSIS — Z30.41 USES ORAL CONTRACEPTION: ICD-10-CM

## 2020-12-28 RX ORDER — NORGESTIMATE AND ETHINYL ESTRADIOL 0.25-0.035
1 KIT ORAL DAILY
Qty: 84 TABLET | Refills: 2 | Status: SHIPPED | OUTPATIENT
Start: 2020-12-28 | End: 2021-08-02

## 2021-01-13 ENCOUNTER — IMMUNIZATIONS (OUTPATIENT)
Dept: FAMILY MEDICINE CLINIC | Facility: HOSPITAL | Age: 26
End: 2021-01-13

## 2021-01-13 DIAGNOSIS — Z23 ENCOUNTER FOR IMMUNIZATION: ICD-10-CM

## 2021-01-13 PROCEDURE — 91301 SARS-COV-2 / COVID-19 MRNA VACCINE (MODERNA) 100 MCG: CPT

## 2021-01-13 PROCEDURE — 0011A SARS-COV-2 / COVID-19 MRNA VACCINE (MODERNA) 100 MCG: CPT

## 2021-02-12 ENCOUNTER — IMMUNIZATIONS (OUTPATIENT)
Dept: FAMILY MEDICINE CLINIC | Facility: HOSPITAL | Age: 26
End: 2021-02-12

## 2021-02-12 DIAGNOSIS — Z23 ENCOUNTER FOR IMMUNIZATION: Primary | ICD-10-CM

## 2021-02-12 PROCEDURE — 0012A SARS-COV-2 / COVID-19 MRNA VACCINE (MODERNA) 100 MCG: CPT

## 2021-02-12 PROCEDURE — 91301 SARS-COV-2 / COVID-19 MRNA VACCINE (MODERNA) 100 MCG: CPT

## 2021-04-02 ENCOUNTER — OFFICE VISIT (OUTPATIENT)
Dept: FAMILY MEDICINE CLINIC | Facility: CLINIC | Age: 26
End: 2021-04-02
Payer: COMMERCIAL

## 2021-04-02 ENCOUNTER — APPOINTMENT (OUTPATIENT)
Dept: LAB | Facility: HOSPITAL | Age: 26
End: 2021-04-02
Payer: COMMERCIAL

## 2021-04-02 VITALS
RESPIRATION RATE: 16 BRPM | TEMPERATURE: 97.5 F | OXYGEN SATURATION: 96 % | WEIGHT: 251.6 LBS | BODY MASS INDEX: 47.54 KG/M2 | SYSTOLIC BLOOD PRESSURE: 116 MMHG | DIASTOLIC BLOOD PRESSURE: 64 MMHG | HEART RATE: 96 BPM

## 2021-04-02 DIAGNOSIS — R42 LIGHT HEADED: ICD-10-CM

## 2021-04-02 DIAGNOSIS — R51.9 FREQUENT HEADACHES: ICD-10-CM

## 2021-04-02 DIAGNOSIS — R53.83 FATIGUE, UNSPECIFIED TYPE: ICD-10-CM

## 2021-04-02 DIAGNOSIS — R51.9 FREQUENT HEADACHES: Primary | ICD-10-CM

## 2021-04-02 LAB
ANION GAP SERPL CALCULATED.3IONS-SCNC: 5 MMOL/L (ref 4–13)
BASOPHILS # BLD AUTO: 0.03 THOUSANDS/ΜL (ref 0–0.1)
BASOPHILS NFR BLD AUTO: 0 % (ref 0–1)
BUN SERPL-MCNC: 12 MG/DL (ref 5–25)
CALCIUM SERPL-MCNC: 9.1 MG/DL (ref 8.3–10.1)
CHLORIDE SERPL-SCNC: 110 MMOL/L (ref 100–108)
CO2 SERPL-SCNC: 25 MMOL/L (ref 21–32)
CREAT SERPL-MCNC: 0.92 MG/DL (ref 0.6–1.3)
EOSINOPHIL # BLD AUTO: 0.13 THOUSAND/ΜL (ref 0–0.61)
EOSINOPHIL NFR BLD AUTO: 1 % (ref 0–6)
ERYTHROCYTE [DISTWIDTH] IN BLOOD BY AUTOMATED COUNT: 14.1 % (ref 11.6–15.1)
ERYTHROCYTE [SEDIMENTATION RATE] IN BLOOD: 52 MM/HOUR (ref 0–19)
GFR SERPL CREATININE-BSD FRML MDRD: 87 ML/MIN/1.73SQ M
GLUCOSE P FAST SERPL-MCNC: 83 MG/DL (ref 65–99)
HCT VFR BLD AUTO: 40.5 % (ref 34.8–46.1)
HGB BLD-MCNC: 12.6 G/DL (ref 11.5–15.4)
IMM GRANULOCYTES # BLD AUTO: 0.03 THOUSAND/UL (ref 0–0.2)
IMM GRANULOCYTES NFR BLD AUTO: 0 % (ref 0–2)
LYMPHOCYTES # BLD AUTO: 3.18 THOUSANDS/ΜL (ref 0.6–4.47)
LYMPHOCYTES NFR BLD AUTO: 27 % (ref 14–44)
MCH RBC QN AUTO: 26.3 PG (ref 26.8–34.3)
MCHC RBC AUTO-ENTMCNC: 31.1 G/DL (ref 31.4–37.4)
MCV RBC AUTO: 85 FL (ref 82–98)
MONOCYTES # BLD AUTO: 0.48 THOUSAND/ΜL (ref 0.17–1.22)
MONOCYTES NFR BLD AUTO: 4 % (ref 4–12)
NEUTROPHILS # BLD AUTO: 7.89 THOUSANDS/ΜL (ref 1.85–7.62)
NEUTS SEG NFR BLD AUTO: 68 % (ref 43–75)
NRBC BLD AUTO-RTO: 0 /100 WBCS
PLATELET # BLD AUTO: 280 THOUSANDS/UL (ref 149–390)
PMV BLD AUTO: 10.9 FL (ref 8.9–12.7)
POTASSIUM SERPL-SCNC: 3.9 MMOL/L (ref 3.5–5.3)
RBC # BLD AUTO: 4.79 MILLION/UL (ref 3.81–5.12)
SODIUM SERPL-SCNC: 140 MMOL/L (ref 136–145)
TSH SERPL DL<=0.05 MIU/L-ACNC: 1.54 UIU/ML (ref 0.36–3.74)
WBC # BLD AUTO: 11.74 THOUSAND/UL (ref 4.31–10.16)

## 2021-04-02 PROCEDURE — 80048 BASIC METABOLIC PNL TOTAL CA: CPT

## 2021-04-02 PROCEDURE — 85025 COMPLETE CBC W/AUTO DIFF WBC: CPT

## 2021-04-02 PROCEDURE — 36415 COLL VENOUS BLD VENIPUNCTURE: CPT

## 2021-04-02 PROCEDURE — 84443 ASSAY THYROID STIM HORMONE: CPT

## 2021-04-02 PROCEDURE — 99214 OFFICE O/P EST MOD 30 MIN: CPT | Performed by: FAMILY MEDICINE

## 2021-04-02 PROCEDURE — 85652 RBC SED RATE AUTOMATED: CPT

## 2021-04-02 NOTE — PROGRESS NOTES
FAMILY PRACTICE OFFICE VISIT       NAME: Antelmo Quevedo  AGE: 26 y o  SEX: female       : 1995        MRN: [id number]    DATE: 2021  TIME: 12:15 PM    Assessment and Plan   1  Frequent headaches  Comments:  Pt stable on exam  Pt to try to get proper rest, hydrate well, & can try OTC Vit B2/Mg 400mg QD for prophylaxis  OTC Tylenol/NSAIDs with food PRN  Orders:  -     CBC and differential; Future  -     Sedimentation rate, automated; Future  -     Basic metabolic panel; Future  -     TSH, 3rd generation with Free T4 reflex; Future    2  Light headed  Comments:  As above - symptoms do appear to be migrainous in nature  Neuro exam reassuring  Pt was urged to hydrate well  Orders:  -     CBC and differential; Future  -     Sedimentation rate, automated; Future  -     Basic metabolic panel; Future  -     TSH, 3rd generation with Free T4 reflex; Future    3  Fatigue, unspecified type  -     CBC and differential; Future  -     Sedimentation rate, automated; Future  -     Basic metabolic panel; Future  -     TSH, 3rd generation with Free T4 reflex; Future         There are no Patient Instructions on file for this visit  Chief Complaint     Chief Complaint   Patient presents with    Follow-up     Patient is here due to headache and dizziness       History of Present Illness   Antelmo Quevedo is a 26y o -year-old female who has had intermittent headaches x 3 weeks, and some lightheadedness intermittently in the last week  Hx of migraines, but does not feel like typical migraines for her (bitemporal)  Some photophobia / aversion to noise with some  These headaches are more occipital in nature  Usually, massage helps her headaches - did not help this time  No No diet changes / med changes - she is in Nursing School  Had a recent negative Pregnancy Test, and started her menses this week  Review of Systems   Review of Systems   Constitutional: Negative for activity change and fever  HENT: Negative for congestion and rhinorrhea  Eyes: Positive for photophobia  Respiratory: Negative for cough and shortness of breath  Cardiovascular: Negative for chest pain and palpitations  Gastrointestinal: Negative for blood in stool and nausea  Genitourinary: Positive for menstrual problem  Menses chronically irregular - sees OB/GYN  Neurological: Positive for light-headedness and headaches         Active Problem List     Patient Active Problem List   Diagnosis    Gastritis    Acquired acanthosis nigricans    Obesity    Shellfish allergy    Umbilical hernia without obstruction and without gangrene    Acquired renal cyst of left kidney    Encounter for gynecological examination (general) (routine) without abnormal findings    Oral contraceptive pill surveillance         Past Medical History:  Past Medical History:   Diagnosis Date    Gastritis     Migraine     Obesity     Seasonal allergies        Past Surgical History:  Past Surgical History:   Procedure Laterality Date    WISDOM TOOTH EXTRACTION Bilateral 03/2020       Family History:  Family History   Problem Relation Age of Onset    Diabetes Mother     Breast cancer Mother     Allergic rhinitis Mother     Hypertension Father     Hyperlipidemia Father     Lung cancer Paternal Grandfather     Asthma Paternal Grandfather     Anxiety disorder Maternal Aunt     Depression Maternal Aunt        Social History:  Social History     Socioeconomic History    Marital status: /Civil Union     Spouse name: Not on file    Number of children: Not on file    Years of education: Not on file    Highest education level: Not on file   Occupational History    Not on file   Social Needs    Financial resource strain: Not on file    Food insecurity     Worry: Not on file     Inability: Not on file    Transportation needs     Medical: Not on file     Non-medical: Not on file   Tobacco Use    Smoking status: Never Smoker    Smokeless tobacco: Never Used   Substance and Sexual Activity    Alcohol use: Yes     Comment: occasional    Drug use: No    Sexual activity: Yes     Partners: Male     Birth control/protection: OCP   Lifestyle    Physical activity     Days per week: Not on file     Minutes per session: Not on file    Stress: Not on file   Relationships    Social connections     Talks on phone: Not on file     Gets together: Not on file     Attends Moravian service: Not on file     Active member of club or organization: Not on file     Attends meetings of clubs or organizations: Not on file     Relationship status: Not on file    Intimate partner violence     Fear of current or ex partner: Not on file     Emotionally abused: Not on file     Physically abused: Not on file     Forced sexual activity: Not on file   Other Topics Concern    Not on file   Social History Narrative    Not on file       Objective     Vitals:    04/02/21 1017   BP: 116/64   Pulse: 96   Resp: 16   Temp: 97 5 °F (36 4 °C)   SpO2: 96%     Wt Readings from Last 3 Encounters:   04/02/21 114 kg (251 lb 9 6 oz)   10/19/20 110 kg (242 lb)   07/30/20 109 kg (240 lb)       Physical Exam  Vitals signs and nursing note reviewed  Constitutional:       General: She is not in acute distress  Appearance: Normal appearance  She is not ill-appearing, toxic-appearing or diaphoretic  HENT:      Head: Normocephalic and atraumatic  Eyes:      General: No scleral icterus  Extraocular Movements: Extraocular movements intact  Conjunctiva/sclera: Conjunctivae normal       Pupils: Pupils are equal, round, and reactive to light  Neck:      Musculoskeletal: Normal range of motion and neck supple  No neck rigidity or muscular tenderness  Cardiovascular:      Rate and Rhythm: Normal rate and regular rhythm  Heart sounds: Normal heart sounds  No murmur  No friction rub  No gallop      Pulmonary:      Effort: Pulmonary effort is normal  No respiratory distress  Breath sounds: Normal breath sounds  No stridor  No wheezing, rhonchi or rales  Lymphadenopathy:      Cervical: No cervical adenopathy  Neurological:      Mental Status: She is alert and oriented to person, place, and time  Cranial Nerves: No cranial nerve deficit  Psychiatric:         Mood and Affect: Mood normal          Behavior: Behavior normal          Thought Content:  Thought content normal          Judgment: Judgment normal          Pertinent Laboratory/Diagnostic Studies:  Lab Results   Component Value Date    GLUCOSE 79 04/15/2018    BUN 13 07/30/2020    CREATININE 0 92 07/30/2020    CALCIUM 10 2 (H) 07/30/2020     04/15/2018    K 4 2 07/30/2020    CO2 25 07/30/2020     07/30/2020     Lab Results   Component Value Date    ALT 19 07/30/2020    AST 9 07/30/2020    ALKPHOS 77 07/30/2020    BILITOT 0 2 04/15/2018       Lab Results   Component Value Date    WBC 11 74 (H) 04/02/2021    HGB 12 6 04/02/2021    HCT 40 5 04/02/2021    MCV 85 04/02/2021     04/02/2021       No results found for: TSH    No results found for: CHOL  Lab Results   Component Value Date    TRIG 93 07/30/2020     Lab Results   Component Value Date    HDL 76 07/30/2020     Lab Results   Component Value Date    LDLCALC 113 (H) 07/30/2020     Lab Results   Component Value Date    HGBA1C 5 4 07/30/2020       Results for orders placed or performed in visit on 11/27/20   Novel Coronavirus (COVID-19), PCR [facility]    Specimen: Nose; Nares   Result Value Ref Range    SARS-CoV-2  Not Detected Not Detected       Orders Placed This Encounter   Procedures    CBC and differential    Sedimentation rate, automated    Basic metabolic panel    TSH, 3rd generation with Free T4 reflex       ALLERGIES:  Allergies   Allergen Reactions    Contrast [Iodinated Diagnostic Agents] Anaphylaxis     throat closure    Fish-Derived Products - Food Allergy Anaphylaxis     Anaphylaxis  Pollen Extract Other (See Comments)     hives, throat closure    Shellfish-Derived Products - Food Allergy        Current Medications     Current Outpatient Medications   Medication Sig Dispense Refill    norgestimate-ethinyl estradiol (Sprintec 28) 0 25-35 MG-MCG per tablet Take 1 tablet by mouth daily 84 tablet 2    acetaminophen (TYLENOL) 500 mg tablet Take 500 mg by mouth every 6 (six) hours as needed for mild pain      aspirin-acetaminophen-caffeine (EXCEDRIN MIGRAINE) 250-250-65 MG per tablet Take 1 tablet by mouth every 6 (six) hours as needed for headaches      fexofenadine (ALLEGRA) 180 MG tablet as needed       naproxen (NAPROSYN) 500 mg tablet Take 1 tablet (500 mg total) by mouth 2 (two) times a day with meals as needed  (Patient not taking: Reported on 4/2/2021) 40 tablet 1    phentermine 15 MG capsule Take 1 capsule (15 mg total) by mouth every morning (Patient not taking: Reported on 7/29/2020) 30 capsule 0     No current facility-administered medications for this visit            Health Maintenance     Health Maintenance   Topic Date Due    BMI: Followup Plan  Never done    Cervical Cancer Screening  03/20/2021    Depression Screening PHQ  07/29/2021    Chlamydia Screening  04/02/2022 (Originally 10/21/2012)    HPV Vaccine (3 - 2-dose series) 04/02/2022 (Originally 1/29/2009)    HIV Screening  04/03/2023 (Originally 10/21/2011)    Annual Physical  07/30/2021    BMI: Adult  04/02/2022    DTaP,Tdap,and Td Vaccines (7 - Td) 08/21/2028    Hepatitis B Vaccine  Completed    Hepatitis A Vaccine  Completed    Meningococcal ACWY Vaccine  Completed    Influenza Vaccine  Completed    COVID-19 Vaccine  Completed    Pneumococcal Vaccine: Pediatrics (0 to 5 Years) and At-Risk Patients (6 to 59 Years)  Aged Out    HIB Vaccine  Aged Out    IPV Vaccine  Aged [employer] History   Administered Date(s) Administered    DTP 01/17/1997, 03/07/1997, 04/07/1997, 12/02/2001    DTaP 08/21/2018    DTaP,unspecified 08/21/2018    HPV 06/01/2008, 08/29/2008, 11/06/2008    HPV Quadrivalent 06/01/2008, 08/29/2008, 11/06/2008    Hep A, ped/adol, 2 dose 06/05/2008, 09/15/2010    Hep B, Adolescent or Pediatric 01/17/1997, 02/17/1997, 08/17/1997, 09/25/2003    Hib (PRP-T) 09/25/2003    INFLUENZA 09/15/2010, 01/13/2016, 10/02/2018, 10/15/2020    IPV 01/17/1997, 03/17/1997, 04/17/1997    MMR 09/25/2003, 03/17/2004    Meningococcal Conjugate (Cristina Pittman) 09/15/2010    Meningococcal MCV4P 09/15/2010, 05/07/2013, 05/07/2013    Pneumococcal Conjugate 13-Valent 01/17/1997, 03/17/1997, 04/17/1997    SARS-CoV-2 / COVID-19 mRNA IM (Moderna) 01/13/2021, 02/12/2021    Td (adult), Unspecified 05/17/2004    Tdap 11/07/2007    Tuberculin Skin Test-PPD Intradermal 03/09/2015    Varicella 09/25/2003, 03/17/2004     BMI Counseling: Body mass index is 47 54 kg/m²  The BMI is above normal  Nutrition recommendations include moderation in carbohydrate intake  Exercise recommendations include exercising 3-5 times per week  No pharmacotherapy was ordered  Patient referred to PCP due to patient being overweight             [address] Juan Marcos DO

## 2021-05-17 ENCOUNTER — OFFICE VISIT (OUTPATIENT)
Dept: FAMILY MEDICINE CLINIC | Facility: CLINIC | Age: 26
End: 2021-05-17
Payer: COMMERCIAL

## 2021-05-17 VITALS
RESPIRATION RATE: 16 BRPM | BODY MASS INDEX: 47.84 KG/M2 | WEIGHT: 253.2 LBS | TEMPERATURE: 97.7 F | SYSTOLIC BLOOD PRESSURE: 104 MMHG | OXYGEN SATURATION: 98 % | HEART RATE: 94 BPM | DIASTOLIC BLOOD PRESSURE: 60 MMHG

## 2021-05-17 DIAGNOSIS — R63.5 WEIGHT GAIN: ICD-10-CM

## 2021-05-17 DIAGNOSIS — Z13.6 SCREENING FOR CARDIOVASCULAR CONDITION: ICD-10-CM

## 2021-05-17 DIAGNOSIS — R51.9 FREQUENT HEADACHES: Primary | ICD-10-CM

## 2021-05-17 DIAGNOSIS — Z13.1 SCREENING FOR DIABETES MELLITUS: ICD-10-CM

## 2021-05-17 PROCEDURE — 99213 OFFICE O/P EST LOW 20 MIN: CPT | Performed by: FAMILY MEDICINE

## 2021-05-17 NOTE — PROGRESS NOTES
FAMILY PRACTICE OFFICE VISIT       NAME: Payton Rand  AGE: 26 y o  SEX: female       : 1995        MRN: [id number]    DATE: 2021  TIME: 7:20 PM    Assessment and Plan   1  Frequent headaches  Comments:  Pt stable; likely migraines - doing much better  Continue OTC Vit B2/Mg, good hydration, getting propper rest, etc       2  Weight gain  Comments:  Pt to continue a lower carb diet and regular exercise  Pt was referred to Weight Loss Management, to help with med recs for the pt  Orders:  -     Ambulatory referral to Weight Management; Future    3  Screening for diabetes mellitus  Comments:  BW ordered for the pt  Orders:  -     HEMOGLOBIN A1C W/ EAG ESTIMATION; Future    4  Screening for cardiovascular condition  -     Lipid Panel with Direct LDL reflex; Future         There are no Patient Instructions on file for this visit  Chief Complaint     Chief Complaint   Patient presents with    Follow-up       History of Present Illness   Payton Rand is a 26y o -year-old female who presents in f/u today from her last OV in early 2021, for more frequent, migrainous type headaches  Labs done ealry last month show:  TSH 1 54, WBC 11 74 (chronic, mild elevation for pt), H/H normal, SED Rate 52 (likely going along with the pt's chronically elevated WBC, and known hx of PCOS), Gluc 83, Cr/GFR normal   Headaches definitely better from last OV - taking B2 daily, and Mg every other day  Review of Systems   Review of Systems   Constitutional: Negative for activity change  Chronic struggle for pt losing weight  Respiratory: Negative for shortness of breath  Cardiovascular: Negative for chest pain  Neurological: Negative for headaches         Active Problem List     Patient Active Problem List   Diagnosis    Gastritis    Acquired acanthosis nigricans    Obesity    Shellfish allergy    Umbilical hernia without obstruction and without gangrene    Acquired renal cyst of left kidney    Encounter for gynecological examination (general) (routine) without abnormal findings    Oral contraceptive pill surveillance         Past Medical History:  Past Medical History:   Diagnosis Date    Gastritis     Migraine     Obesity     Seasonal allergies        Past Surgical History:  Past Surgical History:   Procedure Laterality Date    WISDOM TOOTH EXTRACTION Bilateral 03/2020       Family History:  Family History   Problem Relation Age of Onset    Diabetes Mother     Breast cancer Mother     Allergic rhinitis Mother     Hypertension Father     Hyperlipidemia Father     Lung cancer Paternal Grandfather     Asthma Paternal Grandfather     Anxiety disorder Maternal Aunt     Depression Maternal Aunt        Social History:  Social History     Socioeconomic History    Marital status: /Civil Union     Spouse name: Not on file    Number of children: Not on file    Years of education: Not on file    Highest education level: Not on file   Occupational History    Not on file   Social Needs    Financial resource strain: Not on file    Food insecurity     Worry: Not on file     Inability: Not on file    Transportation needs     Medical: Not on file     Non-medical: Not on file   Tobacco Use    Smoking status: Never Smoker    Smokeless tobacco: Never Used   Substance and Sexual Activity    Alcohol use: Yes     Comment: occasional    Drug use: No    Sexual activity: Yes     Partners: Male     Birth control/protection: OCP   Lifestyle    Physical activity     Days per week: Not on file     Minutes per session: Not on file    Stress: Not on file   Relationships    Social connections     Talks on phone: Not on file     Gets together: Not on file     Attends Zoroastrianism service: Not on file     Active member of club or organization: Not on file     Attends meetings of clubs or organizations: Not on file     Relationship status: Not on file    Intimate partner violence     Fear of current or ex partner: Not on file     Emotionally abused: Not on file     Physically abused: Not on file     Forced sexual activity: Not on file   Other Topics Concern    Not on file   Social History Narrative    Not on file       Objective     Vitals:    05/17/21 1850   BP: 104/60   Pulse: 94   Resp: 16   Temp: 97 7 °F (36 5 °C)   SpO2: 98%     Wt Readings from Last 3 Encounters:   05/17/21 115 kg (253 lb 3 2 oz)   04/02/21 114 kg (251 lb 9 6 oz)   10/19/20 110 kg (242 lb)       Physical Exam  Vitals signs and nursing note reviewed  Constitutional:       General: She is not in acute distress  Appearance: Normal appearance  She is not ill-appearing, toxic-appearing or diaphoretic  HENT:      Head: Normocephalic and atraumatic  Eyes:      General: No scleral icterus  Extraocular Movements: Extraocular movements intact  Conjunctiva/sclera: Conjunctivae normal       Pupils: Pupils are equal, round, and reactive to light  Neck:      Musculoskeletal: Normal range of motion and neck supple  No neck rigidity or muscular tenderness  Cardiovascular:      Rate and Rhythm: Normal rate and regular rhythm  Heart sounds: Normal heart sounds  No murmur  No friction rub  No gallop  Pulmonary:      Effort: Pulmonary effort is normal  No respiratory distress  Breath sounds: Normal breath sounds  No stridor  No wheezing, rhonchi or rales  Lymphadenopathy:      Cervical: No cervical adenopathy  Neurological:      Mental Status: She is alert and oriented to person, place, and time  Psychiatric:         Mood and Affect: Mood normal          Behavior: Behavior normal          Thought Content:  Thought content normal          Judgment: Judgment normal          Pertinent Laboratory/Diagnostic Studies:  Lab Results   Component Value Date    GLUCOSE 79 04/15/2018    BUN 12 04/02/2021    CREATININE 0 92 04/02/2021    CALCIUM 9 1 04/02/2021     04/15/2018    K 3 9 04/02/2021    CO2 25 04/02/2021     (H) 04/02/2021     Lab Results   Component Value Date    ALT 19 07/30/2020    AST 9 07/30/2020    ALKPHOS 77 07/30/2020    BILITOT 0 2 04/15/2018       Lab Results   Component Value Date    WBC 11 74 (H) 04/02/2021    HGB 12 6 04/02/2021    HCT 40 5 04/02/2021    MCV 85 04/02/2021     04/02/2021       No results found for: TSH    No results found for: CHOL  Lab Results   Component Value Date    TRIG 93 07/30/2020     Lab Results   Component Value Date    HDL 76 07/30/2020     Lab Results   Component Value Date    LDLCALC 113 (H) 07/30/2020     Lab Results   Component Value Date    HGBA1C 5 4 07/30/2020       Results for orders placed or performed in visit on 04/02/21   CBC and differential   Result Value Ref Range    WBC 11 74 (H) 4 31 - 10 16 Thousand/uL    RBC 4 79 3 81 - 5 12 Million/uL    Hemoglobin 12 6 11 5 - 15 4 g/dL    Hematocrit 40 5 34 8 - 46 1 %    MCV 85 82 - 98 fL    MCH 26 3 (L) 26 8 - 34 3 pg    MCHC 31 1 (L) 31 4 - 37 4 g/dL    RDW 14 1 11 6 - 15 1 %    MPV 10 9 8 9 - 12 7 fL    Platelets 275 334 - 444 Thousands/uL    nRBC 0 /100 WBCs    Neutrophils Relative 68 43 - 75 %    Immat GRANS % 0 0 - 2 %    Lymphocytes Relative 27 14 - 44 %    Monocytes Relative 4 4 - 12 %    Eosinophils Relative 1 0 - 6 %    Basophils Relative 0 0 - 1 %    Neutrophils Absolute 7 89 (H) 1 85 - 7 62 Thousands/µL    Immature Grans Absolute 0 03 0 00 - 0 20 Thousand/uL    Lymphocytes Absolute 3 18 0 60 - 4 47 Thousands/µL    Monocytes Absolute 0 48 0 17 - 1 22 Thousand/µL    Eosinophils Absolute 0 13 0 00 - 0 61 Thousand/µL    Basophils Absolute 0 03 0 00 - 0 10 Thousands/µL   Sedimentation rate, automated   Result Value Ref Range    Sed Rate 52 (H) 0 - 19 mm/hour   Basic metabolic panel   Result Value Ref Range    Sodium 140 136 - 145 mmol/L    Potassium 3 9 3 5 - 5 3 mmol/L    Chloride 110 (H) 100 - 108 mmol/L    CO2 25 21 - 32 mmol/L    ANION GAP 5 4 - 13 mmol/L    BUN 12 5 - 25 mg/dL Creatinine 0 92 0 60 - 1 30 mg/dL    Glucose, Fasting 83 65 - 99 mg/dL    Calcium 9 1 8 3 - 10 1 mg/dL    eGFR 87 ml/min/1 73sq m   TSH, 3rd generation with Free T4 reflex   Result Value Ref Range    TSH 3RD GENERATON 1 540 0 358 - 3 740 uIU/mL       Orders Placed This Encounter   Procedures    HEMOGLOBIN A1C W/ EAG ESTIMATION    Lipid Panel with Direct LDL reflex    Ambulatory referral to Weight Management       ALLERGIES:  Allergies   Allergen Reactions    Contrast [Iodinated Diagnostic Agents] Anaphylaxis     throat closure    Fish-Derived Products - Food Allergy Anaphylaxis     Anaphylaxis    Pollen Extract Other (See Comments)     hives, throat closure    Shellfish-Derived Products - Food Allergy        Current Medications     Current Outpatient Medications   Medication Sig Dispense Refill    acetaminophen (TYLENOL) 500 mg tablet Take 500 mg by mouth every 6 (six) hours as needed for mild pain      aspirin-acetaminophen-caffeine (EXCEDRIN MIGRAINE) 250-250-65 MG per tablet Take 1 tablet by mouth every 6 (six) hours as needed for headaches      fexofenadine (ALLEGRA) 180 MG tablet as needed       norgestimate-ethinyl estradiol (Sprintec 28) 0 25-35 MG-MCG per tablet Take 1 tablet by mouth daily 84 tablet 2    naproxen (NAPROSYN) 500 mg tablet Take 1 tablet (500 mg total) by mouth 2 (two) times a day with meals as needed  (Patient not taking: Reported on 4/2/2021) 40 tablet 1    phentermine 15 MG capsule Take 1 capsule (15 mg total) by mouth every morning (Patient not taking: Reported on 7/29/2020) 30 capsule 0     No current facility-administered medications for this visit            Health Maintenance     Health Maintenance   Topic Date Due    Cervical Cancer Screening  03/20/2021    Depression Screening PHQ  07/29/2021    Annual Physical  07/30/2021    Chlamydia Screening  04/02/2022 (Originally 10/21/2012)    HPV Vaccine (3 - 2-dose series) 04/02/2022 (Originally 1/29/2009)    HIV Screening 04/03/2023 (Originally 10/21/2011)    BMI: Followup Plan  04/02/2022    BMI: Adult  05/17/2022    DTaP,Tdap,and Td Vaccines (7 - Td) 08/21/2028    Hepatitis B Vaccine  Completed    Hepatitis A Vaccine  Completed    Meningococcal ACWY Vaccine  Completed    Influenza Vaccine  Completed    COVID-19 Vaccine  Completed    Pneumococcal Vaccine: Pediatrics (0 to 5 Years) and At-Risk Patients (6 to 59 Years)  Aged Out    HIB Vaccine  Aged Out    IPV Vaccine  Aged [employer] History   Administered Date(s) Administered    DTP 01/17/1997, 03/07/1997, 04/07/1997, 12/02/2001    DTaP 08/21/2018    DTaP,unspecified 08/21/2018    HPV 06/01/2008, 08/29/2008, 11/06/2008    HPV Quadrivalent 06/01/2008, 08/29/2008, 11/06/2008    Hep A, ped/adol, 2 dose 06/05/2008, 09/15/2010    Hep B, Adolescent or Pediatric 01/17/1997, 02/17/1997, 08/17/1997, 09/25/2003    Hib (PRP-T) 09/25/2003    INFLUENZA 09/15/2010, 01/13/2016, 10/02/2018, 10/15/2020    IPV 01/17/1997, 03/17/1997, 04/17/1997    MMR 09/25/2003, 03/17/2004    Meningococcal Conjugate (MCV4O) 09/15/2010    Meningococcal MCV4P 09/15/2010, 05/07/2013, 05/07/2013    Pneumococcal Conjugate 13-Valent 01/17/1997, 03/17/1997, 04/17/1997    SARS-CoV-2 / COVID-19 mRNA IM (Moderna) 01/13/2021, 02/12/2021    Td (adult), Unspecified 05/17/2004    Tdap 11/07/2007    Tuberculin Skin Test-PPD Intradermal 03/09/2015    Varicella 09/25/2003, 03/17/2004          Mahamed Mariano DO

## 2021-07-28 NOTE — PROGRESS NOTES
Patient's Lab: [facility]    Last Yearly: 7/3/20  Last Pap: 3/20/18 Pap Today  Results: -  BC: Sprintec   Are you taking consistently: Yes  LMP: June for a day or 2  Menses flow: Light spotty and irregular with moderate cramping   S/P HPV Series: Complete  S/P Covid Shot: Complete  Sexually Active?: Yes, monogamous relationship for 3 5years with       Fam  Hx: Breast Cancer MGM   No HX of ovarian or colon cancer    Q's/Concerns: Discuss HX of ovarian cysts, missed menses and how this could affect her trying to conceive next year

## 2021-08-02 ENCOUNTER — ANNUAL EXAM (OUTPATIENT)
Dept: OBGYN CLINIC | Facility: CLINIC | Age: 26
End: 2021-08-02
Payer: COMMERCIAL

## 2021-08-02 VITALS
WEIGHT: 250.2 LBS | HEIGHT: 61 IN | DIASTOLIC BLOOD PRESSURE: 64 MMHG | BODY MASS INDEX: 47.24 KG/M2 | SYSTOLIC BLOOD PRESSURE: 98 MMHG

## 2021-08-02 DIAGNOSIS — R10.2 PELVIC PAIN IN FEMALE: ICD-10-CM

## 2021-08-02 DIAGNOSIS — Z11.3 SCREENING FOR STDS (SEXUALLY TRANSMITTED DISEASES): ICD-10-CM

## 2021-08-02 DIAGNOSIS — Z12.4 ENCOUNTER FOR PAPANICOLAOU SMEAR FOR CERVICAL CANCER SCREENING: ICD-10-CM

## 2021-08-02 DIAGNOSIS — Z30.41 USES ORAL CONTRACEPTION: ICD-10-CM

## 2021-08-02 DIAGNOSIS — Z01.419 ENCOUNTER FOR GYNECOLOGICAL EXAMINATION (GENERAL) (ROUTINE) WITHOUT ABNORMAL FINDINGS: Primary | ICD-10-CM

## 2021-08-02 PROCEDURE — 87491 CHLMYD TRACH DNA AMP PROBE: CPT | Performed by: NURSE PRACTITIONER

## 2021-08-02 PROCEDURE — G0145 SCR C/V CYTO,THINLAYER,RESCR: HCPCS | Performed by: NURSE PRACTITIONER

## 2021-08-02 PROCEDURE — 99395 PREV VISIT EST AGE 18-39: CPT | Performed by: NURSE PRACTITIONER

## 2021-08-02 PROCEDURE — 87591 N.GONORRHOEAE DNA AMP PROB: CPT | Performed by: NURSE PRACTITIONER

## 2021-08-02 RX ORDER — NORGESTIMATE AND ETHINYL ESTRADIOL 0.25-0.035
1 KIT ORAL DAILY
Qty: 84 TABLET | Refills: 3 | Status: SHIPPED | OUTPATIENT
Start: 2021-08-02 | End: 2022-01-12

## 2021-08-02 NOTE — PROGRESS NOTES
Assessment/Plan:    Pap every 3 years if normal-done, STI testing as indicated-GC/CT requested today  Exercise most days of week-minimum of 150 minutes per week  Obtain appropriate diet and hydration  Calcium 1000mg + 600 vit D daily  Birth control as directed (ACHES reviewed)  Benefits, risks and alternatives discussed/reviewed  Condom use when sexually active for sexually transmitted infection prevention  HPV vaccine series completed  Annual mammogram starting at age 36, monthly breast self exam    Start prenatal vitamin daily  Pelvic US ordered  Diagnoses and all orders for this visit:    Encounter for gynecological examination (general) (routine) without abnormal findings  -     Liquid-based pap, screening    Encounter for Papanicolaou smear for cervical cancer screening  -     Liquid-based pap, screening    Pelvic pain in female  -     US pelvis complete w transvaginal; Future    Uses oral contraception  -     norgestimate-ethinyl estradiol (Sprintec 28) 0 25-35 MG-MCG per tablet; Take 1 tablet by mouth daily    Screening for STDs (sexually transmitted diseases)  -     Chlamydia/GC amplified DNA by PCR    BMI 45 0-49 9, adult ([facility] [address] )          Subjective:      Patient ID: Rachna Diop is a 26 y o  female  Rachna Diop is a 26 y o  female who is here today for her annual visit  No health concerns  Irregular menses every other months bleeding x 2 days with light flow while on her JAIR  Menses is acceptable  C/o intermittent lower right pelvic pain that worsens just before her menses  Rates pain 8/10  Tylenol taken and warm compresses lessens the pain  Activity worsens the pain  She is walking her new puppy for exercise 30 minutes per day  (Anthony)  Rcahna Diop is sexually active with male partner of 3 5 years  HPV and covid vaccine are up to date  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  Her goal is to be done by 2023   She may consider a pregnancy in one year but would like a refill on her JAIR until tehn  The following portions of the patient's history were reviewed and updated as appropriate: allergies, current medications, past family history, past medical history, past social history, past surgical history and problem list     Review of Systems   Constitutional: Negative  Negative for activity change, appetite change, chills, diaphoresis, fatigue, fever and unexpected weight change  HENT: Negative for congestion, dental problem, sneezing, sore throat and trouble swallowing  Eyes: Negative for visual disturbance  Respiratory: Negative for chest tightness and shortness of breath  Cardiovascular: Negative for chest pain and leg swelling  Gastrointestinal: Positive for constipation (intermittent)  Negative for abdominal pain, diarrhea, nausea and vomiting  Genitourinary: Positive for menstrual problem and pelvic pain  Negative for difficulty urinating, dyspareunia, dysuria, frequency, hematuria, urgency, vaginal bleeding, vaginal discharge and vaginal pain  Musculoskeletal: Negative for back pain and neck pain  Skin: Negative  Allergic/Immunologic: Negative  Neurological: Negative for weakness and headaches  Hematological: Negative for adenopathy  Psychiatric/Behavioral: Negative  Objective:      BP 98/64 (BP Location: Left arm, Patient Position: Sitting, Cuff Size: Large)   Ht 5' 1" (1 549 m)   Wt 113 kg (250 lb 3 2 oz)   LMP  (LMP Unknown)   BMI 47 27 kg/m²          Physical Exam  Vitals and nursing note reviewed  Constitutional:       Appearance: Normal appearance  She is well-developed  She is obese  HENT:      Head: Normocephalic and atraumatic  Eyes:      General:         Right eye: No discharge  Left eye: No discharge  Neck:      Thyroid: No thyromegaly  Trachea: Trachea normal    Cardiovascular:      Rate and Rhythm: Normal rate and regular rhythm        Heart sounds: Normal heart sounds  Pulmonary:      Effort: Pulmonary effort is normal       Breath sounds: Normal breath sounds  Chest:      Breasts: Breasts are symmetrical          Right: Normal  No inverted nipple, mass, nipple discharge, skin change or tenderness  Left: Normal  No inverted nipple, mass, nipple discharge, skin change or tenderness  Abdominal:      Palpations: Abdomen is soft  Genitourinary:     General: Normal vulva  Exam position: Lithotomy position  Labia:         Right: No rash, tenderness, lesion or injury  Left: No rash, tenderness, lesion or injury  Urethra: No prolapse, urethral pain, urethral swelling or urethral lesion  Vagina: Normal  No signs of injury and foreign body  No vaginal discharge, erythema, tenderness or bleeding  Cervix: Normal       Uterus: Normal        Adnexa: Right adnexa normal and left adnexa normal         Right: No mass, tenderness or fullness  Left: No mass, tenderness or fullness  Rectum: No external hemorrhoid  Comments: Physical exam limited by habitus  Musculoskeletal:         General: Normal range of motion  Cervical back: Normal range of motion and neck supple  Lymphadenopathy:      Head:      Right side of head: No submental, submandibular or tonsillar adenopathy  Left side of head: No submental, submandibular or tonsillar adenopathy  Cervical: No cervical adenopathy  Upper Body:      Right upper body: No supraclavicular or axillary adenopathy  Left upper body: No supraclavicular or axillary adenopathy  Lower Body: No right inguinal adenopathy  No left inguinal adenopathy  Skin:     General: Skin is warm and dry  Neurological:      Mental Status: She is alert and oriented to person, place, and time     Psychiatric:         Mood and Affect: Mood normal          Behavior: Behavior normal

## 2021-08-02 NOTE — PATIENT INSTRUCTIONS
Pap every 3 years if normal-done, STI testing as indicated-GC/CT requested today  Exercise most days of week-minimum of 150 minutes per week  Obtain appropriate diet and hydration  Calcium 1000mg + 600 vit D daily  Birth control as directed (ACHES reviewed)  Benefits, risks and alternatives discussed/reviewed  Condom use when sexually active for sexually transmitted infection prevention  HPV vaccine series completed  Annual mammogram starting at age 36, monthly breast self exam    Start prenatal vitamin daily  Pelvic US ordered

## 2021-08-03 LAB
C TRACH DNA SPEC QL NAA+PROBE: NEGATIVE
N GONORRHOEA DNA SPEC QL NAA+PROBE: NEGATIVE

## 2021-08-05 LAB
LAB AP GYN PRIMARY INTERPRETATION: NORMAL
Lab: NORMAL

## 2022-01-12 ENCOUNTER — OFFICE VISIT (OUTPATIENT)
Dept: OBGYN CLINIC | Facility: CLINIC | Age: 27
End: 2022-01-12
Payer: COMMERCIAL

## 2022-01-12 VITALS — SYSTOLIC BLOOD PRESSURE: 104 MMHG | WEIGHT: 256 LBS | BODY MASS INDEX: 48.37 KG/M2 | DIASTOLIC BLOOD PRESSURE: 54 MMHG

## 2022-01-12 DIAGNOSIS — Z32.02 NEGATIVE PREGNANCY TEST: ICD-10-CM

## 2022-01-12 DIAGNOSIS — R82.90 FOUL SMELLING URINE: ICD-10-CM

## 2022-01-12 DIAGNOSIS — R10.2 PELVIC PAIN: Primary | ICD-10-CM

## 2022-01-12 LAB
SL AMB LEUKOCYTE ESTERASE,UA: NEGATIVE
SL AMB POCT BLOOD,UA: ABNORMAL
SL AMB POCT KETONES,UA: NEGATIVE
SL AMB POCT URINE HCG: NORMAL
SL AMB POCT URINE PROTEIN: NEGATIVE

## 2022-01-12 PROCEDURE — 87086 URINE CULTURE/COLONY COUNT: CPT | Performed by: NURSE PRACTITIONER

## 2022-01-12 PROCEDURE — 81002 URINALYSIS NONAUTO W/O SCOPE: CPT | Performed by: NURSE PRACTITIONER

## 2022-01-12 PROCEDURE — 81025 URINE PREGNANCY TEST: CPT | Performed by: NURSE PRACTITIONER

## 2022-01-12 PROCEDURE — 99213 OFFICE O/P EST LOW 20 MIN: CPT | Performed by: NURSE PRACTITIONER

## 2022-01-12 PROCEDURE — 81001 URINALYSIS AUTO W/SCOPE: CPT | Performed by: NURSE PRACTITIONER

## 2022-01-12 NOTE — ASSESSMENT & PLAN NOTE
Normal pelvic exam   UPT negative  Urine dip moderate amount of blood (+ blood in vagina) but rest negative  UA with C&S sent  Order for pelvic U/S reordered (ordered by AM 9/21 for pelvic pain but she never went)  Declines STI testing  Attempting pregnancy  Advised NSAIDs

## 2022-01-12 NOTE — PROGRESS NOTES
Assessment/Plan:    Pelvic pain  Normal pelvic exam   UPT negative  Urine dip moderate amount of blood (+ blood in vagina) but rest negative  UA with C&S sent  Order for pelvic U/S reordered (ordered by AM 9/21 for pelvic pain but she never went)  Declines STI testing  Attempting pregnancy  Advised NSAIDs  Diagnoses and all orders for this visit:    Pelvic pain  -     Urinalysis with microscopic  -     Urine culture  -     POCT urine dip  -     US pelvis complete w transvaginal; Future    Negative pregnancy test  -     POCT urine HCG    Foul smelling urine  -     Urinalysis with microscopic  -     POCT urine dip        Subjective:      Patient ID: Neelima Chaves is a 23 y o  female  Omer Pizarro is a 23year old G0 who presents with complaint of pelvic pain x 2 months and smelly urine without burning or frequency  Pain is intermittent, sharp, lasts up to a few minutes and worse when exercising  4/10 on scale 1-10  [place] She denies abdominal, UTI symptoms or unusual vaginal discharge, itching or odor  She is sexually active with one male partner and is attempting pregnancy x 2 months  Day 1 this evening  The following portions of the patient's history were reviewed and updated as appropriate: allergies, current medications, past family history, past medical history, past social history, past surgical history and problem list     Review of Systems   Constitutional: Negative  HENT: Negative  Gastrointestinal: Positive for constipation  Negative for abdominal pain  Genitourinary: Positive for pelvic pain  Negative for difficulty urinating, dyspareunia, dysuria, flank pain, frequency, menstrual problem, vaginal bleeding and vaginal discharge  + urine odor   Musculoskeletal: Negative for back pain  Skin: Negative  Neurological: Negative  Psychiatric/Behavioral: Negative            Objective:      /54 (BP Location: Right arm, Patient Position: Sitting, Cuff Size: Large)   Wt 116 kg (256 lb)   LMP 12/02/2021   BMI 48 37 kg/m²          Physical Exam  Constitutional:       Appearance: Normal appearance  She is obese  Pulmonary:      Effort: Pulmonary effort is normal    Abdominal:      Tenderness: There is no abdominal tenderness  There is no right CVA tenderness or left CVA tenderness  Genitourinary:     Labia:         Right: No rash, tenderness or lesion  Left: No rash, tenderness or lesion  Vagina: No signs of injury and foreign body  Bleeding present  No vaginal discharge, erythema or tenderness  Cervix: Cervical bleeding present  No cervical motion tenderness, discharge, friability, lesion or erythema  Uterus: Normal  Tender  Not deviated, not enlarged and not fixed  Adnexa:         Right: No mass, tenderness or fullness  Left: No mass, tenderness or fullness  Comments: + menses today  Mild tenderness on deep palpation  Exam limited by body habitus  Skin:     General: Skin is warm and dry  Capillary Refill: Capillary refill takes less than 2 seconds  Neurological:      Mental Status: She is alert and oriented to person, place, and time     Psychiatric:         Mood and Affect: Mood normal          Behavior: Behavior normal

## 2022-01-13 LAB
BACTERIA UR QL AUTO: ABNORMAL /HPF
BILIRUB UR QL STRIP: NEGATIVE
CLARITY UR: ABNORMAL
COLOR UR: YELLOW
GLUCOSE UR STRIP-MCNC: NEGATIVE MG/DL
HGB UR QL STRIP.AUTO: ABNORMAL
HYALINE CASTS #/AREA URNS LPF: ABNORMAL /LPF
KETONES UR STRIP-MCNC: NEGATIVE MG/DL
LEUKOCYTE ESTERASE UR QL STRIP: NEGATIVE
NITRITE UR QL STRIP: NEGATIVE
NON-SQ EPI CELLS URNS QL MICRO: ABNORMAL /HPF
PH UR STRIP.AUTO: 6 [PH]
PROT UR STRIP-MCNC: NEGATIVE MG/DL
RBC #/AREA URNS AUTO: ABNORMAL /HPF
SP GR UR STRIP.AUTO: >=1.03 (ref 1–1.03)
UROBILINOGEN UR QL STRIP.AUTO: 0.2 E.U./DL
WBC #/AREA URNS AUTO: ABNORMAL /HPF

## 2022-01-14 LAB — BACTERIA UR CULT: NORMAL

## 2022-01-17 ENCOUNTER — TELEPHONE (OUTPATIENT)
Dept: OBGYN CLINIC | Facility: MEDICAL CENTER | Age: 27
End: 2022-01-17

## 2022-01-19 ENCOUNTER — HOSPITAL ENCOUNTER (OUTPATIENT)
Dept: RADIOLOGY | Facility: HOSPITAL | Age: 27
Discharge: HOME | End: 2022-01-19
Payer: COMMERCIAL

## 2022-01-19 DIAGNOSIS — R10.2 PELVIC PAIN: ICD-10-CM

## 2022-01-19 PROCEDURE — 76856 US EXAM PELVIC COMPLETE: CPT

## 2022-01-19 PROCEDURE — 76830 TRANSVAGINAL US NON-OB: CPT

## 2022-01-20 ENCOUNTER — TELEPHONE (OUTPATIENT)
Dept: OBGYN CLINIC | Facility: CLINIC | Age: 27
End: 2022-01-20

## 2022-01-20 NOTE — TELEPHONE ENCOUNTER
Totally normal pelvic U/S  No cause of her pelvic pain identified  She can see PCP to R/O GI causes

## 2022-01-21 ENCOUNTER — TELEPHONE (OUTPATIENT)
Dept: OBGYN CLINIC | Facility: CLINIC | Age: 27
End: 2022-01-21

## 2022-01-21 NOTE — TELEPHONE ENCOUNTER
----- Message from Gabby Palacios MD sent at 1/20/2022  5:31 PM EST -----  Results are normal  Please notify patient

## 2022-04-13 ENCOUNTER — CONSULT (OUTPATIENT)
Dept: BARIATRICS | Facility: CLINIC | Age: 27
End: 2022-04-13
Payer: COMMERCIAL

## 2022-04-13 VITALS
WEIGHT: 257.7 LBS | HEART RATE: 112 BPM | RESPIRATION RATE: 16 BRPM | BODY MASS INDEX: 47.42 KG/M2 | HEIGHT: 62 IN | DIASTOLIC BLOOD PRESSURE: 68 MMHG | TEMPERATURE: 100 F | SYSTOLIC BLOOD PRESSURE: 132 MMHG

## 2022-04-13 DIAGNOSIS — K29.70 GASTRITIS WITHOUT BLEEDING, UNSPECIFIED CHRONICITY, UNSPECIFIED GASTRITIS TYPE: ICD-10-CM

## 2022-04-13 DIAGNOSIS — R63.5 WEIGHT GAIN: ICD-10-CM

## 2022-04-13 DIAGNOSIS — E66.01 CLASS 3 SEVERE OBESITY WITHOUT SERIOUS COMORBIDITY WITH BODY MASS INDEX (BMI) OF 40.0 TO 44.9 IN ADULT, UNSPECIFIED OBESITY TYPE (HCC): Primary | ICD-10-CM

## 2022-04-13 PROCEDURE — 99244 OFF/OP CNSLTJ NEW/EST MOD 40: CPT | Performed by: PHYSICIAN ASSISTANT

## 2022-04-13 NOTE — ASSESSMENT & PLAN NOTE
-Discussed options of [facility]-Intensive Lifestyle Intervention Program, Very Low Calorie Diet-VLCD, Conservative Program, Mely-En-Y Gastric Bypass and Vertical Sleeve Gastrectomy and the role of weight loss medications   -Initial weight loss goal of 5-10% weight loss for improved health  -Labs reviewed 4/2/21 and wnl  - STOP BANG-2/8    -Patient is interested in pursuing surgical options  Discussed 30/60 rule, diet progression after surgery, avoiding alcohol/NSAIDs  Discussed avoiding pregnancy after surgery  Patient would like to proceed with surgery consult

## 2022-04-13 NOTE — PROGRESS NOTES
Assessment/Plan:    Obesity  -Discussed options of [facility]-Intensive Lifestyle Intervention Program, Very Low Calorie Diet-VLCD, Conservative Program, Mely-En-Y Gastric Bypass and Vertical Sleeve Gastrectomy and the role of weight loss medications   -Initial weight loss goal of 5-10% weight loss for improved health  -Labs reviewed 4/2/21 and wnl  - STOP BANG-2/8    -Patient is interested in pursuing surgical options  Discussed 30/60 rule, diet progression after surgery, avoiding alcohol/NSAIDs  Discussed avoiding pregnancy after surgery  Patient would like to proceed with surgery consult  Gastritis  Controlled with diet      Follow up in approximately 2 weeks with Surgical Dietician, Surgical  and Surgical   Diagnoses and all orders for this visit:    Class 3 severe obesity without serious comorbidity with body mass index (BMI) of 40 0 to 44 9 in adult, unspecified obesity type ([address] )    Weight gain  Comments:  Pt to continue a lower carb diet and regular exercise  Pt was referred to Weight Loss Management, to help with med recs for the pt  Orders:  -     Ambulatory referral to Weight Management    Gastritis without bleeding, unspecified chronicity, unspecified gastritis type          Subjective:   Chief Complaint   Patient presents with    Consult     MWM consult       Patient ID: James Armstrong  is a 23 y o  female with excess weight/obesity here to pursue weight management  She was exercising years ago after a breakup and did about 2 hours exercise a day and got to ideal weight but has gained back  She has been trying to lose weight since she was a child and went to "fat camp" then to learn about nutrition  She has tried multiple diets and phentermine 15mg (was giving her headaches)    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     She stopped birth control in august and has missed her period last     Past Medical History:   Diagnosis Date  Gastritis     Migraine     Obesity     Seasonal allergies        HPI:  Obesity/Excess Weight:  Severity: Very Severe  Onset:  Childhood-went to weight loss camp    Modifiers: Diet and Exercise, Commercial Weight Loss Programs-ie  Weight Watchers, [employer] [place], Nutrisystem, etc  and Prescription Weight Loss Medications  She tried  Contributing factors: Medications  Associated symptoms: fatigue and increased joint pain    Goals:180  Hydration:24 ounces water,coffee 8 ounces starbucks skim/oat milk, ice tea about 2 times a week  Alcohol: 1 time a week   Exercise:2 times a week  Walking for occupation about 1 hour  [place]    Colonoscopy-Not applicable    The following portions of the patient's history were reviewed and updated as appropriate:   She  has a past medical history of Gastritis, Migraine, Obesity, and Seasonal allergies  She   Patient Active Problem List    Diagnosis Date Noted    Pelvic pain 01/12/2022    Encounter for gynecological examination (general) (routine) without abnormal findings 07/30/2020    Oral contraceptive pill surveillance 07/30/2020    Gastritis 02/22/2018    Shellfish allergy 62/46/2683    Umbilical hernia without obstruction and without gangrene 02/22/2018    Acquired renal cyst of left kidney 02/22/2018    Acquired acanthosis nigricans 07/15/2013    Obesity 04/26/2013     She  has a past surgical history that includes [place] tooth extraction (Bilateral, 03/2020)  Her family history includes Allergic rhinitis in her mother; Anxiety disorder in her maternal aunt; Asthma in her brother and paternal grandfather; Breast cancer in her maternal grandmother; Depression in her maternal aunt; Diabetes in her mother; Hyperlipidemia in her father; Hypertension in her father and mother; Lung cancer in her paternal grandfather; No Known Problems in her brother, brother, brother, brother, sister, and sister  She  reports that she has never smoked   She has never used smokeless tobacco  She reports current alcohol use  She reports that she does not use drugs  Current Outpatient Medications   Medication Sig Dispense Refill    acetaminophen (TYLENOL) 500 mg tablet Take 500 mg by mouth every 6 (six) hours as needed for mild pain      aspirin-acetaminophen-caffeine (EXCEDRIN MIGRAINE) 250-250-65 MG per tablet Take 1 tablet by mouth every 6 (six) hours as needed for headaches       No current facility-administered medications for this visit  Current Outpatient Medications on File Prior to Visit   Medication Sig    acetaminophen (TYLENOL) 500 mg tablet Take 500 mg by mouth every 6 (six) hours as needed for mild pain    aspirin-acetaminophen-caffeine (EXCEDRIN MIGRAINE) 250-250-65 MG per tablet Take 1 tablet by mouth every 6 (six) hours as needed for headaches     No current facility-administered medications on file prior to visit  She is allergic to contrast [iodinated diagnostic agents], fish-derived products - food allergy, pollen extract, and shellfish-derived products - food allergy       Review of Systems   Constitutional: Negative for chills and fever  Respiratory: Negative for shortness of breath  Cardiovascular: Negative for chest pain and palpitations  Gastrointestinal: Positive for constipation (usually with low fiber in her diet)  Negative for abdominal pain, diarrhea and vomiting  +GERD-controlled with diet   Genitourinary: Negative for difficulty urinating  Musculoskeletal: Positive for arthralgias (achiness)  Negative for back pain  Skin: Negative for rash  Neurological: Negative for dizziness and headaches  Psychiatric/Behavioral: Negative for dysphoric mood  The patient is nervous/anxious (sometimes but nothing uncontrollable)          Objective:    /68 (BP Location: Left arm, Patient Position: Sitting, Cuff Size: Adult)   Pulse (!) 112   Temp 100 °F (37 8 °C) (Tympanic)   Resp 16   Ht 5' 1 5" (1 562 m)   Wt 117 kg (257 lb 11 2 oz)   BMI 47 90 kg/m²     Physical Exam  Vitals and nursing note reviewed  Constitutional:       General: She is not in acute distress  Appearance: She is well-developed  She is obese  HENT:      Head: Normocephalic and atraumatic  Eyes:      Conjunctiva/sclera: Conjunctivae normal    Neck:      Thyroid: No thyromegaly  Pulmonary:      Effort: Pulmonary effort is normal  No respiratory distress  Skin:     Findings: No rash (visible)  Neurological:      Mental Status: She is alert and oriented to person, place, and time     Psychiatric:         Behavior: Behavior normal

## 2022-05-12 NOTE — PROGRESS NOTES
Bariatric Behavioral Health Evaluation    Presenting Problem:  Zayra Loo  is a 23 y o    female    :  1995   Patient presented with overall concerns of obesity  Stated that weight has impacted quality of life and concerned with lack of mobility, chronic pain, and overall health  Has attempted various weight loss plans in the past   Patient is Interested in exploring bariatric surgery as an option for weight loss goals to improve health, increase activity and prevent family disease  The patient is seeking surgery because she has been battling with his weight since a very young age  She has tried the  nutritionist weight plans,  personal trainers, Herbal Life, fasting and reported  everything she has tried has not worked     Is the patient seeking Bariatric Surgery Eval? Yes  decided to seek surgery because about a year ago after going to the gym for a while and not seeing success and her weight fluctuating she decided to try a lifestyle change  The patient heard about the surgery from her PCP who recommended her about a year ago, but she reported not being ready  The patient niece had the surgery two years ago and aunt  The patient also reported she did some research on-line to understand the benefits and risk in having the surgery,        Realizes Post- Op Requirements? Yes  Pre-morbid level of function and history of present illness: Patient has health issues  Psychiatric/Psychological Treatment Diagnosis: Patient reports no  Mental Health diagnosis and no prescribed medications at this time  Symptoms are stable at this time  Encouraged to discuss medication with practitioner post surgery  Outpatient Counselor no     Psychiatrist no    Have you had Inpatient Treatment? no    Risk Assessment: Patient denies any SI/HI, no audio or visual hallucination    Observation: This interview only       Access to weapons : no    Drug and/or Alcohol treatment history:  Tobacco History: no      Domestic Violence No    Abuse History: no Sexual abuse, Physical abuse, Emotional abuse in history  Physical/Psychological Assessment:     Appearance: appropriate  Sociability: average  Affect: appropriate  Mood: calm  Thought Process: coherent  Speech: normal  Content: no impairment  Orientation: person  Yes, place  Yes, time  Yes, normal attention span  Yes, normal memory  Yes   and normal judgement  Yes   Insight: emotional  good       Note :  Patient presented for behavioral health evaluation for the bariatric program  Patient denies Axis I diagnosis and treatment  Patient educated regarding the impact of nicotine and alcohol on the post bariatric surgery patient  Discussed preventing pregnancy for 18 months after bariatric surgery  Patient has a positive family history of obesity  Workflow reviewed  Patient meets criteria for surgery at this program and is referred to the physician  Family constellation: patient resides with her  spouse and dog  The patient  does the cooking tries to incorporate healthy eating, but at times uses carb's  Skips meals and when she does eat she is starving and will over eat also  eats late  Family hx of MH/Substance abuse/medical :Her family history includes Allergic/ bi rhinitis in her mother; Anxiety disorder bipolar 2 in her maternal aunt; Asthma in her brother and paternal grandfather from dads side passed away from COPD's; Breast cancer in her maternal grandmother; Depression in her maternal aunt; pre Diabetic  in her mother; Hyperlipidemia in her father; Hypertension in her father and mother; Lung cancer in her paternal grandfather; No Known Problems in her brother, brother, brother, brother, sister, and sister  Grandmother from dads side had major depression      Work and work hours: Clinical Research  8 hours M-F     Activity: walk her dog 3x a week, gym 45 minutes 2 times a week     Additional comments/stressors 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, finances, shopping is stressful feeling uncomfortable          Goal:1-  Mindful eating 2-maintain exercise routine    Xochitl Alfaro M S W   L S W   _____________________________      BARIATRIC SURGERY EDUCATION CHECKLIST     I have received education related to my bariatric surgery process and understand:     Patients may be required to complete a psychiatric evaluation and receive clearance for surgery from their psychiatrist      Patients who undergo weight loss surgery are at higher risk of increased mental health concerns and suicide attempts  Patients may be required to complete a full substance abuse evaluation and then complete all treatment recommendations prior to surgery  If diagnosis of abuse/dependence results, patient may be required to remain sober for one (1) year before having bariatric surgery  Patients on psychiatric medications should check with their provider to discuss psychiatric medications and the changes in absorption  Patient should discuss all time release medications with provider and take all medications as prescribed  The recommendation is that there is no use of  any tobacco products, Hookah or  vapes for the bariatric post-operation patient  Bariatric surgery patients should not consume alcohol as a post-operative patient as it may increase risk of numerous health conditions including but not limited to alcohol abuse and ulcers  There is a possibility of weight regain if patient does not follow all program guidelines and recommendations  Bariatric surgery patients should exercise thirty (30) to sixty (60) minutes per day to maintain post-surgical weight loss  Research indicates that bariatric patients are more successful when they see a therapist for up to two (2) years post-op  Patients will follow all medical and dietary recommendations provided       Patient will keep all scheduled appointments and follow up with their physician for a minimum of five (5) years  Patient will take all vitamins as recommended  Post-operative vitamins are life-long  Patient reviewed Bariatric Surgery Education Checklist and agrees they have received education on these issues

## 2022-05-16 ENCOUNTER — OFFICE VISIT (OUTPATIENT)
Dept: BARIATRICS | Facility: CLINIC | Age: 27
End: 2022-05-16

## 2022-05-16 VITALS
WEIGHT: 261.2 LBS | TEMPERATURE: 98.8 F | HEART RATE: 92 BPM | SYSTOLIC BLOOD PRESSURE: 118 MMHG | BODY MASS INDEX: 48.07 KG/M2 | HEIGHT: 62 IN | DIASTOLIC BLOOD PRESSURE: 70 MMHG

## 2022-05-16 DIAGNOSIS — E66.01 OBESITY, CLASS III, BMI 40-49.9 (MORBID OBESITY) (HCC): ICD-10-CM

## 2022-05-16 DIAGNOSIS — L83 ACQUIRED ACANTHOSIS NIGRICANS: ICD-10-CM

## 2022-05-16 DIAGNOSIS — R63.5 WEIGHT GAIN: ICD-10-CM

## 2022-05-16 DIAGNOSIS — E66.01 MORBID OBESITY (HCC): Primary | ICD-10-CM

## 2022-05-16 DIAGNOSIS — Z01.818 PRE-OPERATIVE CLEARANCE: Primary | ICD-10-CM

## 2022-05-16 PROCEDURE — RECHECK

## 2022-05-16 NOTE — PROGRESS NOTES
Bariatric Nutrition Assessment Note    Type of surgery    Preop  Surgery Date: TBD- no program requirement     Surgeon: Dr Teresa Huddleston  23 y o   female     Wt with BMI of 25: 134 1 #  Pre-Op Excess Wt: 128#  Ht 5' 1 5" (1 562 m)   Wt 118 kg (261 lb 3 2 oz)   BMI 48 55 kg/m²     [place] [facility]  [place] Equation:  1875 kcal per day   Estimated calories for weight loss 6037-6408 kcal per day  ( 1-2# per wk wt loss - sedentary )  Estimated protein needs  (1 0-1 2 gms/kg IBW )   Estimated fluid needs 70 ounces per day (35 ml/kg IBW )      Weight History   Onset of Obesity: Childhood  Family history of obesity: Yes- several members of family had weight loss surgery - Grandmother, Aunt and Niece   Wt Loss Attempts: Counseling with  MD  Exercise  Nutrition Counseling with RD  Self Created Diets (Portion Control, Healthy Food Choices, etc )  Meal prepping, intermittent fasting, herbal life, meal replacements,   Ordered prescription from PCP - thinks was Phenteramine but had to stop due to side effects  OTC supplements , fad diets , went to weight loss camp as a child ( age 9 )   Patient has tried the above for 6 months or more with insufficient weight loss or weight regain, which is why patient has requested to be evaluated for weight loss surgery today  Maximum Wt Lost: lost 30 pounds - worked out at [employer] and was mindful concerning her food intake       Review of History and Medications   Past Medical History:   Diagnosis Date    Gastritis     Migraine     Obesity     Seasonal allergies      Past Surgical History:   Procedure Laterality Date    WISDOM TOOTH EXTRACTION Bilateral 03/2020     Social History     Socioeconomic History    Marital status: /Civil Union     Spouse name: Not on file    Number of children: Not on file    Years of education: Not on file    Highest education level: Not on file   Occupational History    Not on file   Tobacco Use    Smoking status: Never Smoker    Smokeless tobacco: Never Used   Vaping Use    Vaping Use: Never used   Substance and Sexual Activity    Alcohol use: Yes     Comment: occasional    Drug use: No    Sexual activity: Yes     Partners: Male     Comment: with    Other Topics Concern    Not on file   Social History Narrative    Not on file     Social Determinants of Health     Financial Resource Strain: Not on file   Food Insecurity: Not on file   Transportation Needs: Not on file   Physical Activity: Not on file   Stress: Not on file   Social Connections: Not on file   Intimate Partner Violence: Not on file   Housing Stability: Not on file       Current Outpatient Medications:     acetaminophen (TYLENOL) 500 mg tablet, Take 500 mg by mouth every 6 (six) hours as needed for mild pain, Disp: , Rfl:     aspirin-acetaminophen-caffeine (EXCEDRIN MIGRAINE) 250-250-65 MG per tablet, Take 1 tablet by mouth every 6 (six) hours as needed for headaches, Disp: , Rfl:      Food Intake and Lifestyle Assessment   Food Intake Assessment completed via usual diet recall  Breakfast: 9 am - coffee - creamer and sugar with cheesy eggs with turkey ( if at the hospital )   Three days per week, if at home - coffee and high protein pancakes or oatmeal with protein and egg   Snack: 0   Lunch: 2 pm - anything - usually will eat three times per week   From cafeteria or left overs from home   Snack: 0  Dinner: 6 pm - cooks   Protein, starch and vegetable ( starch is usually the largest portion )   Snack: not usually   Beverage intake: water, juice, regular soda and coffee/tea  Protein supplement: none currently   Estimated protein intake per day: 40-50 grams   Estimated fluid intake per day: Regular sprite or gingerale regular two to three times per week  About 48 ounces of water per day   Juice - 12 ounces or orange juice or passion fruit   Meals eaten away from home: three times per week, 2-3 times at cafe at work   Typical meal pattern: 2-3 meals per day and 0-1 snacks per day  Eating Behaviors: Large portion sizes and does not eat when she emotional , sometimes skips meals due to lack of hunger     Food allergies or intolerances:   Mild milk intolerance  Allergies   Allergen Reactions    Contrast [Iodinated Diagnostic Agents] Anaphylaxis     throat closure    Fish-Derived Products - Food Allergy Anaphylaxis     Anaphylaxis    Pollen Extract Other (See Comments)     hives, throat closure    Shellfish-Derived Products - Food Allergy      Cultural or Sabianism considerations: N/A    Physical Assessment  Physical Activity  Types of exercise: gym - twice per week   Metabolic class   Weight lifting   Walks dog also   Current physical limitations: knee pain     Psychosocial Assessment   Support systems: spouse and family members   Socioeconomic factors: works full time - 2 days from home , 3 days at work   Lives with spouse and dog     Nutrition Diagnosis  Diagnosis: Overweight / Obesity (NC-3 3)  Related to: Physical inactivity and Excessive energy intake  As Evidenced by: BMI >25 and Unintentional weight gain     Nutrition Prescription: Recommend the following diet  1500 kcal/ 75 grams of protein, 170 grams carb, 60 grams of fat, 15 grams of sugar  70 ounces of calorie free beverages     Interventions and Teaching   Discussed pre-op and post-op nutrition guidelines  Patient educated and handouts provided    Surgical changes to stomach / GI  Capacity of post-surgery stomach  Diet progression  Adequate hydration  Sugar and fat restriction to decrease "dumping syndrome"  Fat restriction to decrease steatorrhea  Expected weight loss  Weight loss plateaus/ possibility of weight regain  Exercise  Suggestions for pre-op diet  Nutrition considerations after surgery  Protein supplements  Meal planning and preparation  Appropriate carbohydrate, protein, and fat intake, and food/fluid choices to maximize safe weight loss, nutrient intake, and tolerance Dietary and lifestyle changes  Possible problems with poor eating habits  Intuitive eating  Techniques for self monitoring and keeping daily food journal  Potential for food intolerance after surgery, and ways to deal with them including: lactose intolerance, nausea, reflux, vomiting, diarrhea, food intolerance, appetite changes, gas  Vitamin / Mineral supplementation of Multivitamin with minerals, Calcium, Vitamin B12, Iron, Fat Soluble vitamins and Vitamin   Post-operative pregnancy guidelines  Patient is not currently pregnant and doesn't desire to become pregnant a minimum of one year post-op- Reviewed importance of waiting 12 months to attempt to conceive  Patient and  are planning to start a family in 1-2 years  Recommend that patient discuss changing contraception method as OBP is not effective after weight loss surgery, unless a barrier method is also used  Education provided to: patient    Barriers to learning: No barriers identified    Readiness to change: preparation    Prior research on procedure: discussed with provider, pre-op class and friends or family    Comprehension: verbalizes understanding     Expected Compliance: good  Recommendations  Pt is an appropriate candidate for surgery   Yes    Evaluation / Monitoring  Dietitian to Monitor: Eating pattern as discussed Tolerance of nutrition prescription Body weight Lab values Physical activity Bowel pattern    Goals  Eliminate sugar sweetened beverages, Food journal, Exercise 30 minutes 5 times per week, Complete lession plans 1-6 and Eat 3 meals per day   Develop routine and eat 3  Meals per day ( stop skipping lunch )  Recommend an adult multivitamin and mineral( pt prefers prenatal )  and 2000 IU of vitamin d3 per day  Switch to diet juices   Discuss contraception with gyn     Time Spent:   1 Hour

## 2022-05-17 ENCOUNTER — TELEPHONE (OUTPATIENT)
Dept: OTHER | Facility: OTHER | Age: 27
End: 2022-05-17

## 2022-05-18 NOTE — TELEPHONE ENCOUNTER
Patient was scheduled on 6-21 with Dr Socorro Vasquez in [address] [place] [address]

## 2022-06-12 NOTE — PROGRESS NOTES
Cardiology Consultation     Marily Brenner  [id number]  1995  [facility] [address] [facility] [address] [phone]  1  Preoperative cardiovascular examination  POCT ECG   2  Morbid obesity ([address] )  Ambulatory referral to Cardiology     Patient Active Problem List   Diagnosis    Gastritis    Acquired acanthosis nigricans    Obesity    Shellfish allergy    Umbilical hernia without obstruction and without gangrene    Acquired renal cyst of left kidney    Encounter for gynecological examination (general) (routine) without abnormal findings    Oral contraceptive pill surveillance    Pelvic pain       HPI patient is here for cardiology evaluation and preoperative clearance in the setting of need for bariatric surgery for obesity  Patient has no cardiac history  She has had no symptoms of chest pain or significant dyspnea  EKG today demonstrates sinus rhythm and is a normal tracing  There is no family history of CAD  There is history of hyperlipidemia and hypertension  Patient is a nonsmoker      PMH-  Past Medical History:   Diagnosis Date    Gastritis     Migraine     Obesity     Seasonal allergies         SOCIAL HISTORY-  Social History     Socioeconomic History    Marital status: /Civil Union     Spouse name: Not on file    Number of children: Not on file    Years of education: Not on file    Highest education level: Not on file   Occupational History    Not on file   Tobacco Use    Smoking status: Never Smoker    Smokeless tobacco: Never Used   Vaping Use    Vaping Use: Never used   Substance and Sexual Activity    Alcohol use: Yes     Comment: occasional    Drug use: No    Sexual activity: Yes     Partners: Male     Comment: with    Other Topics Concern    Not on file   Social History Narrative    Not on file     Social Determinants of Health Financial Resource Strain: Not on file   Food Insecurity: Not on file   Transportation Needs: Not on file   Physical Activity: Not on file   Stress: Not on file   Social Connections: Not on file   Intimate Partner Violence: Not on file   Housing Stability: Not on file        FAMILY HISTORY-  Family History   Problem Relation Age of Onset    Diabetes Mother     Allergic rhinitis Mother     Hypertension Mother     Hypertension Father     Hyperlipidemia Father     No Known Problems Sister     No Known Problems Sister     No Known Problems Brother     No Known Problems Brother     No Known Problems Brother     No Known Problems Brother     Asthma Brother     Breast cancer Maternal Grandmother     Lung cancer Paternal Grandfather     Asthma Paternal Grandfather     Anxiety disorder Maternal Aunt     Depression Maternal Aunt     Ovarian cancer Neg Hx        SURGICAL HISTORY-  Past Surgical History:   Procedure Laterality Date    WISDOM TOOTH EXTRACTION Bilateral 03/2020         Current Outpatient Medications:     acetaminophen (TYLENOL) 500 mg tablet, Take 500 mg by mouth every 6 (six) hours as needed for mild pain, Disp: , Rfl:     aspirin-acetaminophen-caffeine (EXCEDRIN MIGRAINE) 250-250-65 MG per tablet, Take 1 tablet by mouth every 6 (six) hours as needed for headaches, Disp: , Rfl:     Cholecalciferol (D3 ADULT PO), Take by mouth, Disp: , Rfl:     Prenatal-FeFum-FA-DHA w/o A (PRENATAL + DHA PO), Take by mouth Per nutritionist, Disp: , Rfl:   Allergies   Allergen Reactions    Contrast [Iodinated Diagnostic Agents] Anaphylaxis     throat closure    Fish-Derived Products - Food Allergy Anaphylaxis     Anaphylaxis    Pollen Extract Other (See Comments)     hives, throat closure    Shellfish-Derived Products - Food Allergy      Vitals:    06/21/22 1551   BP: 124/64   BP Location: Right arm   Patient Position: Sitting   Cuff Size: Large   Pulse: 95   Weight: 118 kg (260 lb)   Height: 5' 1" (1 549 m)         Review of Systems:  Review of Systems   All other systems reviewed and are negative  Physical Exam:  Physical Exam  Vitals reviewed  Constitutional:       Appearance: She is well-developed  HENT:      Head: Normocephalic and atraumatic  Eyes:      Conjunctiva/sclera: Conjunctivae normal       Pupils: Pupils are equal, round, and reactive to light  Cardiovascular:      Rate and Rhythm: Normal rate  Heart sounds: Normal heart sounds  Pulmonary:      Effort: Pulmonary effort is normal       Breath sounds: Normal breath sounds  Musculoskeletal:      Cervical back: Normal range of motion and neck supple  Skin:     General: Skin is warm and dry  Neurological:      Mental Status: She is alert and oriented to person, place, and time  Discussion/Summary:  Patient is stable from a cardiac point of view  She has no cardiac symptoms  Her EKG is normal   I will clear her for bariatric procedure without further testing  I have asked her to call if there is a problem in the interim otherwise I will see her as needed going forward

## 2022-06-14 ENCOUNTER — TELEPHONE (OUTPATIENT)
Dept: ADMINISTRATIVE | Facility: OTHER | Age: 27
End: 2022-06-14

## 2022-06-14 NOTE — TELEPHONE ENCOUNTER
----- Message from Virginia Davis LPN sent at 1/26/7158  8:40 AM EDT -----  Regarding: care gap request  06/14/22 8:40 AM    Hello, our patient attached above has had Hepatitis C completed/performed  Please assist in updating the patient chart by pulling the Care Everywhere (CE) document  The date of service is 1/14/2015       Thank you,  TRUONG Matos PG

## 2022-06-14 NOTE — TELEPHONE ENCOUNTER
----- Message from Vinod Berg LPN sent at 9/24/6926  8:40 AM EDT -----  Regarding: care gap request  06/14/22 8:40 AM    Hello, our patient attached above has had Hepatitis C completed/performed  Please assist in updating the patient chart by pulling the Care Everywhere (CE) document  The date of service is 1/14/2015       Thank you,  Vinod Berg LPN  [facility]

## 2022-06-15 ENCOUNTER — TELEPHONE (OUTPATIENT)
Dept: OTHER | Facility: OTHER | Age: 27
End: 2022-06-15

## 2022-06-15 ENCOUNTER — OFFICE VISIT (OUTPATIENT)
Dept: FAMILY MEDICINE CLINIC | Facility: CLINIC | Age: 27
End: 2022-06-15
Payer: COMMERCIAL

## 2022-06-15 VITALS
OXYGEN SATURATION: 96 % | SYSTOLIC BLOOD PRESSURE: 122 MMHG | HEART RATE: 87 BPM | BODY MASS INDEX: 47.41 KG/M2 | TEMPERATURE: 98.7 F | HEIGHT: 62 IN | RESPIRATION RATE: 18 BRPM | DIASTOLIC BLOOD PRESSURE: 80 MMHG | WEIGHT: 257.6 LBS

## 2022-06-15 DIAGNOSIS — R05.9 COUGH: ICD-10-CM

## 2022-06-15 DIAGNOSIS — J02.9 SORE THROAT: Primary | ICD-10-CM

## 2022-06-15 LAB — SARS-COV-2 RNA RESP QL NAA+PROBE: NEGATIVE

## 2022-06-15 PROCEDURE — 99213 OFFICE O/P EST LOW 20 MIN: CPT | Performed by: FAMILY MEDICINE

## 2022-06-15 PROCEDURE — U0005 INFEC AGEN DETEC AMPLI PROBE: HCPCS | Performed by: FAMILY MEDICINE

## 2022-06-15 PROCEDURE — U0003 INFECTIOUS AGENT DETECTION BY NUCLEIC ACID (DNA OR RNA); SEVERE ACUTE RESPIRATORY SYNDROME CORONAVIRUS 2 (SARS-COV-2) (CORONAVIRUS DISEASE [COVID-19]), AMPLIFIED PROBE TECHNIQUE, MAKING USE OF HIGH THROUGHPUT TECHNOLOGIES AS DESCRIBED BY CMS-2020-01-R: HCPCS | Performed by: FAMILY MEDICINE

## 2022-06-15 RX ORDER — AMOXICILLIN 500 MG/1
500 CAPSULE ORAL EVERY 12 HOURS SCHEDULED
Qty: 20 CAPSULE | Refills: 0 | Status: SHIPPED | OUTPATIENT
Start: 2022-06-15 | End: 2022-06-21

## 2022-06-15 RX ORDER — AMOXICILLIN 500 MG/1
500 TABLET, FILM COATED ORAL EVERY 8 HOURS
COMMUNITY
End: 2022-06-21

## 2022-06-15 NOTE — TELEPHONE ENCOUNTER
Pt  Has a return to work note from Dr Estrella Joseph and she wants to know if that includes working remote  Please return the Pts call

## 2022-06-15 NOTE — PROGRESS NOTES
FAMILY PRACTICE OFFICE VISIT       NAME: Dontrell Montana  AGE: 23 y o  SEX: female       : 1995        MRN: [id number]    DATE: 6/15/2022  TIME: 12:14 PM    Assessment and Plan   1  Sore throat  Comments:  Pt stable on exam  Continue treating with Amoxicillin, warm salt water gargles, OTC Cough/Cold Preps PRN, rest, and good PO hydration  Note for work given  Orders:  -     amoxicillin (AMOXIL) 500 mg capsule; Take 1 capsule (500 mg total) by mouth every 12 (twelve) hours for 10 days    2  Cough  Comments: Will check formal COV-19 PCR as a precaution  Orders:  -     COVID Only - Office Collect         There are no Patient Instructions on file for this visit  Chief Complaint     Chief Complaint   Patient presents with    Other     Sore throat, fever, loss of taste and smell, cough, congestion-symptoms since Fri COVID test negative at home on Sun-denies all other symptoms       History of Present Illness   Dontrell Montana is a 23y o -year-old female who presents with hx of ST, cough, fevers, loss taste/smell since 6/10/22  Reports 2 home COV_19 tests negative - last on 22  Pt is not pregnant at this time  Is slowly feeling better -> but started PCN that she had at home 2 days ago  Review of Systems   Review of Systems   Constitutional: Negative for activity change and fever  Fevers resolved  HENT: Positive for congestion and sore throat  ST improved  Respiratory: Positive for cough          Active Problem List     Patient Active Problem List   Diagnosis    Gastritis    Acquired acanthosis nigricans    Obesity    Shellfish allergy    Umbilical hernia without obstruction and without gangrene    Acquired renal cyst of left kidney    Encounter for gynecological examination (general) (routine) without abnormal findings    Oral contraceptive pill surveillance    Pelvic pain         Past Medical History:  Past Medical History:   Diagnosis Date    Gastritis  Migraine     Obesity     Seasonal allergies        Past Surgical History:  Past Surgical History:   Procedure Laterality Date    WISDOM TOOTH EXTRACTION Bilateral 03/2020       Family History:  Family History   Problem Relation Age of Onset    Diabetes Mother     Allergic rhinitis Mother     Hypertension Mother     Hypertension Father     Hyperlipidemia Father     No Known Problems Sister     No Known Problems Sister     No Known Problems Brother     No Known Problems Brother     No Known Problems Brother     No Known Problems Brother     Asthma Brother     Breast cancer Maternal Grandmother     Lung cancer Paternal Grandfather     Asthma Paternal Grandfather     Anxiety disorder Maternal Aunt     Depression Maternal Aunt     Ovarian cancer Neg Hx        Social History:  Social History     Socioeconomic History    Marital status: /Civil Union     Spouse name: Not on file    Number of children: Not on file    Years of education: Not on file    Highest education level: Not on file   Occupational History    Not on file   Tobacco Use    Smoking status: Never Smoker    Smokeless tobacco: Never Used   Vaping Use    Vaping Use: Never used   Substance and Sexual Activity    Alcohol use: Yes     Comment: occasional    Drug use: No    Sexual activity: Yes     Partners: Male     Comment: with    Other Topics Concern    Not on file   Social History Narrative    Not on file     Social Determinants of Health     Financial Resource Strain: Not on file   Food Insecurity: Not on file   Transportation Needs: Not on file   Physical Activity: Not on file   Stress: Not on file   Social Connections: Not on file   Intimate Partner Violence: Not on file   Housing Stability: Not on file       Objective     Vitals:    06/15/22 0856   BP: 122/80   Pulse: 87   Resp: 18   Temp: 98 7 °F (37 1 °C)   SpO2: 96%     Wt Readings from Last 3 Encounters:   06/15/22 117 kg (257 lb 9 6 oz)   05/16/22 118 kg (261 lb 3 2 oz)   04/13/22 117 kg (257 lb 11 2 oz)       Physical Exam  Vitals and nursing note reviewed  Constitutional:       General: She is not in acute distress  Appearance: Normal appearance  She is not ill-appearing, toxic-appearing or diaphoretic  HENT:      Head: Normocephalic and atraumatic  Right Ear: Tympanic membrane, ear canal and external ear normal  There is no impacted cerumen  Left Ear: Tympanic membrane, ear canal and external ear normal  There is no impacted cerumen  Nose: Congestion present  Mouth/Throat:      Mouth: Mucous membranes are moist       Pharynx: Oropharynx is clear  No oropharyngeal exudate or posterior oropharyngeal erythema  Eyes:      General: No scleral icterus  Conjunctiva/sclera: Conjunctivae normal    Cardiovascular:      Rate and Rhythm: Normal rate and regular rhythm  Heart sounds: Normal heart sounds  No murmur heard  No friction rub  No gallop  Pulmonary:      Effort: Pulmonary effort is normal  No respiratory distress  Breath sounds: Normal breath sounds  No stridor  No wheezing, rhonchi or rales  Musculoskeletal:      Cervical back: Normal range of motion and neck supple  No rigidity or tenderness  Lymphadenopathy:      Cervical: No cervical adenopathy  Neurological:      Mental Status: She is alert and oriented to person, place, and time  Psychiatric:         Mood and Affect: Mood normal          Behavior: Behavior normal          Thought Content:  Thought content normal          Judgment: Judgment normal          Pertinent Laboratory/Diagnostic Studies:  Lab Results   Component Value Date    GLUCOSE 79 04/15/2018    BUN 12 04/02/2021    CREATININE 0 92 04/02/2021    CALCIUM 9 1 04/02/2021     04/15/2018    K 3 9 04/02/2021    CO2 25 04/02/2021     (H) 04/02/2021     Lab Results   Component Value Date    ALT 19 07/30/2020    AST 9 07/30/2020    ALKPHOS 77 07/30/2020    BILITOT 0 2 04/15/2018 Lab Results   Component Value Date    WBC 11 74 (H) 04/02/2021    HGB 12 6 04/02/2021    HCT 40 5 04/02/2021    MCV 85 04/02/2021     04/02/2021       No results found for: TSH    No results found for: CHOL  Lab Results   Component Value Date    TRIG 93 07/30/2020     Lab Results   Component Value Date    HDL 76 07/30/2020     Lab Results   Component Value Date    LDLCALC 113 (H) 07/30/2020     Lab Results   Component Value Date    HGBA1C 5 4 07/30/2020       Results for orders placed or performed in visit on 06/14/22   Hepatitis C antibody   Result Value Ref Range    HEP C AB negative        Orders Placed This Encounter   Procedures    COVID Only - Office Collect       ALLERGIES:  Allergies   Allergen Reactions    Contrast [Iodinated Diagnostic Agents] Anaphylaxis     throat closure    Fish-Derived Products - Food Allergy Anaphylaxis     Anaphylaxis    Pollen Extract Other (See Comments)     hives, throat closure    Shellfish-Derived Products - Food Allergy        Current Medications     Current Outpatient Medications   Medication Sig Dispense Refill    acetaminophen (TYLENOL) 500 mg tablet Take 500 mg by mouth every 6 (six) hours as needed for mild pain      amoxicillin (AMOXIL) 500 mg capsule Take 1 capsule (500 mg total) by mouth every 12 (twelve) hours for 10 days 20 capsule 0    amoxicillin (AMOXIL) 500 MG tablet Take 500 mg by mouth every 8 (eight) hours      aspirin-acetaminophen-caffeine (EXCEDRIN MIGRAINE) 250-250-65 MG per tablet Take 1 tablet by mouth every 6 (six) hours as needed for headaches       No current facility-administered medications for this visit           Health Maintenance     Health Maintenance   Topic Date Due    HPV Vaccine (3 - 2-dose series) 01/29/2009    COVID-19 Vaccine (3 - Booster for Davene Edinger series) 07/12/2021    Annual Physical  08/02/2022    HIV Screening  04/03/2023 (Originally 10/21/2011)    BMI: Followup Plan  05/16/2023    Depression Screening 06/15/2023    BMI: Adult  06/15/2023    Cervical Cancer Screening  08/02/2024    DTaP,Tdap,and Td Vaccines (7 - Td or Tdap) 08/21/2028    Hepatitis C Screening  Completed    Hepatitis B Vaccine  Completed    Hepatitis A Vaccine  Completed    Meningococcal ACWY Vaccine  Completed    Influenza Vaccine  Completed    Pneumococcal Vaccine: Pediatrics (0 to 5 Years) and At-Risk Patients (6 to 59 Years)  Aged Out    HIB Vaccine  Aged Out    IPV Vaccine  Aged [employer] History   Administered Date(s) Administered    COVID-19 MODERNA VACC 0 5 ML IM 01/13/2021, 02/12/2021    DTP 01/17/1997, 03/07/1997, 04/07/1997, 12/02/2001    DTaP 08/21/2018    DTaP,unspecified 08/21/2018    HPV 06/01/2008, 08/29/2008, 11/06/2008    Hep A, ped/adol, 2 dose 06/05/2008, 09/15/2010    Hep B, Adolescent or Pediatric 01/17/1997, 02/17/1997, 08/17/1997, 09/25/2003    Hib (PRP-T) 09/25/2003    INFLUENZA 09/15/2010, 01/13/2016, 10/02/2018, 10/15/2020, 10/14/2021    IPV 01/17/1997, 03/17/1997, 04/17/1997    MMR 09/25/2003, 03/17/2004    Meningococcal Conjugate (MCV4O) 09/15/2010    Meningococcal MCV4P 09/15/2010, 05/07/2013, 05/07/2013    Pneumococcal Conjugate 13-Valent 01/17/1997, 03/17/1997, 04/17/1997    Td (adult), Unspecified 05/17/2004    Tdap 11/07/2007    Tuberculin Skin Test-PPD Intradermal 03/09/2015    Varicella 09/25/2003, 03/17/2004          Mahamed Mariano DO

## 2022-06-15 NOTE — TELEPHONE ENCOUNTER
Upon review of the In Basket request we were able to locate, review, and update the patient chart as requested for Hepatitis C   Any additional questions or concerns should be emailed to the Practice Liaisons via [email or web address] email, please do not reply via In Basket      Thank you  Charity Jerry MA

## 2022-06-15 NOTE — TELEPHONE ENCOUNTER
Called pt and advised that if she has the opportunity to work remote she can as long as she does not go into the office

## 2022-06-20 ENCOUNTER — APPOINTMENT (OUTPATIENT)
Dept: LAB | Facility: HOSPITAL | Age: 27
End: 2022-06-20

## 2022-06-20 ENCOUNTER — TRANSCRIBE ORDERS (OUTPATIENT)
Dept: LAB | Facility: HOSPITAL | Age: 27
End: 2022-06-20

## 2022-06-20 ENCOUNTER — APPOINTMENT (OUTPATIENT)
Dept: LAB | Facility: HOSPITAL | Age: 27
End: 2022-06-20
Attending: SURGERY
Payer: COMMERCIAL

## 2022-06-20 DIAGNOSIS — E66.01 OBESITY, CLASS III, BMI 40-49.9 (MORBID OBESITY) (HCC): ICD-10-CM

## 2022-06-20 DIAGNOSIS — L83 ACQUIRED ACANTHOSIS NIGRICANS: ICD-10-CM

## 2022-06-20 DIAGNOSIS — Z00.8 HEALTH EXAMINATION IN POPULATION SURVEY: ICD-10-CM

## 2022-06-20 DIAGNOSIS — R63.5 WEIGHT GAIN: ICD-10-CM

## 2022-06-20 DIAGNOSIS — Z01.818 PRE-OPERATIVE CLEARANCE: ICD-10-CM

## 2022-06-20 DIAGNOSIS — Z00.8 HEALTH EXAMINATION IN POPULATION SURVEY: Primary | ICD-10-CM

## 2022-06-20 LAB
ALBUMIN SERPL BCP-MCNC: 3.3 G/DL (ref 3.5–5)
ALP SERPL-CCNC: 83 U/L (ref 46–116)
ALT SERPL W P-5'-P-CCNC: 30 U/L (ref 12–78)
ANION GAP SERPL CALCULATED.3IONS-SCNC: 6 MMOL/L (ref 4–13)
AST SERPL W P-5'-P-CCNC: 22 U/L (ref 5–45)
BILIRUB SERPL-MCNC: 0.47 MG/DL (ref 0.2–1)
BUN SERPL-MCNC: 12 MG/DL (ref 5–25)
CALCIUM ALBUM COR SERPL-MCNC: 9.7 MG/DL (ref 8.3–10.1)
CALCIUM SERPL-MCNC: 9.1 MG/DL (ref 8.3–10.1)
CHLORIDE SERPL-SCNC: 106 MMOL/L (ref 100–108)
CHOLEST SERPL-MCNC: 159 MG/DL
CO2 SERPL-SCNC: 26 MMOL/L (ref 21–32)
CREAT SERPL-MCNC: 0.85 MG/DL (ref 0.6–1.3)
ERYTHROCYTE [DISTWIDTH] IN BLOOD BY AUTOMATED COUNT: 14.1 % (ref 11.6–15.1)
EST. AVERAGE GLUCOSE BLD GHB EST-MCNC: 114 MG/DL
GFR SERPL CREATININE-BSD FRML MDRD: 95 ML/MIN/1.73SQ M
GLUCOSE P FAST SERPL-MCNC: 87 MG/DL (ref 65–99)
HBA1C MFR BLD: 5.6 %
HCT VFR BLD AUTO: 40.6 % (ref 34.8–46.1)
HDLC SERPL-MCNC: 44 MG/DL
HGB BLD-MCNC: 12.6 G/DL (ref 11.5–15.4)
LDLC SERPL CALC-MCNC: 103 MG/DL (ref 0–100)
MCH RBC QN AUTO: 25.9 PG (ref 26.8–34.3)
MCHC RBC AUTO-ENTMCNC: 31 G/DL (ref 31.4–37.4)
MCV RBC AUTO: 83 FL (ref 82–98)
NONHDLC SERPL-MCNC: 115 MG/DL
PLATELET # BLD AUTO: 307 THOUSANDS/UL (ref 149–390)
PMV BLD AUTO: 11.2 FL (ref 8.9–12.7)
POTASSIUM SERPL-SCNC: 3.9 MMOL/L (ref 3.5–5.3)
PROT SERPL-MCNC: 7.9 G/DL (ref 6.4–8.2)
RBC # BLD AUTO: 4.87 MILLION/UL (ref 3.81–5.12)
SODIUM SERPL-SCNC: 138 MMOL/L (ref 136–145)
TRIGL SERPL-MCNC: 60 MG/DL
TSH SERPL DL<=0.05 MIU/L-ACNC: 1.89 UIU/ML (ref 0.45–4.5)
WBC # BLD AUTO: 8.67 THOUSAND/UL (ref 4.31–10.16)

## 2022-06-20 PROCEDURE — 85027 COMPLETE CBC AUTOMATED: CPT

## 2022-06-20 PROCEDURE — 36415 COLL VENOUS BLD VENIPUNCTURE: CPT

## 2022-06-20 PROCEDURE — 83036 HEMOGLOBIN GLYCOSYLATED A1C: CPT

## 2022-06-20 PROCEDURE — 80053 COMPREHEN METABOLIC PANEL: CPT

## 2022-06-20 PROCEDURE — 80061 LIPID PANEL: CPT

## 2022-06-20 PROCEDURE — 84443 ASSAY THYROID STIM HORMONE: CPT

## 2022-06-21 ENCOUNTER — CONSULT (OUTPATIENT)
Dept: CARDIOLOGY CLINIC | Facility: CLINIC | Age: 27
End: 2022-06-21
Payer: COMMERCIAL

## 2022-06-21 VITALS
BODY MASS INDEX: 49.09 KG/M2 | HEIGHT: 61 IN | DIASTOLIC BLOOD PRESSURE: 64 MMHG | SYSTOLIC BLOOD PRESSURE: 124 MMHG | HEART RATE: 95 BPM | WEIGHT: 260 LBS

## 2022-06-21 DIAGNOSIS — Z01.810 PREOPERATIVE CARDIOVASCULAR EXAMINATION: Primary | ICD-10-CM

## 2022-06-21 DIAGNOSIS — E66.01 MORBID OBESITY (HCC): ICD-10-CM

## 2022-06-21 PROCEDURE — 93000 ELECTROCARDIOGRAM COMPLETE: CPT | Performed by: INTERNAL MEDICINE

## 2022-06-21 PROCEDURE — 99244 OFF/OP CNSLTJ NEW/EST MOD 40: CPT | Performed by: INTERNAL MEDICINE

## 2022-06-23 ENCOUNTER — OFFICE VISIT (OUTPATIENT)
Dept: BARIATRICS | Facility: CLINIC | Age: 27
End: 2022-06-23
Payer: COMMERCIAL

## 2022-06-23 VITALS
HEIGHT: 62 IN | SYSTOLIC BLOOD PRESSURE: 130 MMHG | DIASTOLIC BLOOD PRESSURE: 80 MMHG | WEIGHT: 259.5 LBS | BODY MASS INDEX: 47.75 KG/M2 | TEMPERATURE: 98.6 F | HEART RATE: 110 BPM

## 2022-06-23 DIAGNOSIS — K42.9 UMBILICAL HERNIA WITHOUT OBSTRUCTION AND WITHOUT GANGRENE: ICD-10-CM

## 2022-06-23 DIAGNOSIS — L83 ACQUIRED ACANTHOSIS NIGRICANS: ICD-10-CM

## 2022-06-23 DIAGNOSIS — E66.01 OBESITY, CLASS III, BMI 40-49.9 (MORBID OBESITY) (HCC): Primary | ICD-10-CM

## 2022-06-23 PROBLEM — E66.813 OBESITY, CLASS III, BMI 40-49.9 (MORBID OBESITY): Status: ACTIVE | Noted: 2022-06-23

## 2022-06-23 PROCEDURE — 99204 OFFICE O/P NEW MOD 45 MIN: CPT | Performed by: SURGERY

## 2022-06-23 NOTE — PROGRESS NOTES
BARIATRIC INITIAL CONSULT - BARIATRIC SURGERY    Jamil Peterson 23 y o  female MRN: [id number]  Unit/Bed#:  Encounter: [id number]      HPI:  Jamil Peterson is a 23 y o  female who presents with a longstanding history of morbid obesity and inability to sustain a meaningful weight loss  Here today to discuss bariatric options  Visit type: initial visit    Symptoms: excess weight and inability to loss weight    Associated Symptoms: depressed mood and anxiety    Associated Conditions: abdominal obesity and acanthosis nigricans  Disease Complications: none  Weight Loss Interest: high  Previous Diet Trials: low calorie     Exercise Frequency:infrequency  Types of Exercise: walking        Review of Systems   Gastrointestinal:        Heartburn intermittently   All other systems reviewed and are negative  Historical Information   Past Medical History:   Diagnosis Date    Gastritis     Migraine     Obesity     Seasonal allergies      Past Surgical History:   Procedure Laterality Date    WISDOM TOOTH EXTRACTION Bilateral 03/2020     Social History   Social History     Substance and Sexual Activity   Alcohol Use Yes    Comment: occasional     Social History     Substance and Sexual Activity   Drug Use No     Social History     Tobacco Use   Smoking Status Never Smoker   Smokeless Tobacco Never Used     Family History: non-contributory    Meds/Allergies   all medications and allergies reviewed  Allergies   Allergen Reactions    Contrast [Iodinated Diagnostic Agents] Anaphylaxis     throat closure    Fish-Derived Products - Food Allergy Anaphylaxis     Anaphylaxis    Pollen Extract Other (See Comments)     hives, throat closure    Shellfish-Derived Products - Food Allergy        Objective       Current Vitals:            Invasive Devices  Report    None                 Physical Exam  Vitals reviewed  Constitutional:       General: She is not in acute distress  Appearance: She is well-developed  She is not diaphoretic  HENT:      Head: Normocephalic and atraumatic  Right Ear: External ear normal       Left Ear: External ear normal       Nose: Nose normal    Eyes:      General: No scleral icterus  Right eye: No discharge  Left eye: No discharge  Conjunctiva/sclera: Conjunctivae normal    Neurological:      Mental Status: She is alert and oriented to person, place, and time  Psychiatric:         Behavior: Behavior normal          Thought Content: Thought content normal          Judgment: Judgment normal          Lab Results: I have personally reviewed pertinent lab results  Imaging: I have personally reviewed pertinent reports  EKG, Pathology, and Other Studies: I have personally reviewed pertinent reports  Assessment/PLAN:    23 y o  female with a long standing h/o of obesity and inability to sustain any meaningful weight loss on her own despite several attempts  She is interested in the Laparoscopic Sleeve gastrectomy  As a part of her pre op evaluation, she will be referred to a cardiologist and for a sleep evaluation and consult  She needs an EGD to evaluate the anatomy of her GI tract prior to the operation  I have spent over 30 minutes with her face to face in the office today discussing her options and details of the surgery  We have seen an animation of the surgery on the computer that illustrates how the operation is done and how the anatomy will be altered with the procedure  Over 50% of this was coordinating care  I have used the [facility] bariatric surgical risk/benefit calculator and we have discussed the results as part of the preop education  She was given the opportunity to ask questions and I have answered all of them  I have discussed and educated the patient with regards to the components of our multidisciplinary program and the importance of compliance and follow up in the post operative period   The patient was also instructed with regards to the importance of behavior modification, nutritional counseling, support meeting attendance and lifestyle changes that are important to ensure success  Although there is a great statistical chance of improvement or even resolution of most of her associated comorbidities, the results vary from patient to patient and they largely depend on her commitment and compliance  She needs to lose 12 lbs prior to the operation        Nora Monk MD  6/23/2022  4:10 PM

## 2022-06-27 ENCOUNTER — TELEPHONE (OUTPATIENT)
Dept: BARIATRICS | Facility: CLINIC | Age: 27
End: 2022-06-27

## 2022-06-27 NOTE — TELEPHONE ENCOUNTER
Pt called the office this morning  She had a consult with you on Thursday and forgot to tell you she does not accept blood products for religous reasons  She wants to know if this will make her ineligible  for surgery   aaron Palma

## 2022-07-15 ENCOUNTER — TELEPHONE (OUTPATIENT)
Dept: ADMINISTRATIVE | Facility: OTHER | Age: 27
End: 2022-07-15

## 2022-07-15 NOTE — TELEPHONE ENCOUNTER
----- Message from Christian Montano LPN sent at 6/75/3689 10:03 AM EDT -----  Regarding: care gap request  07/15/22 10:03 AM    Hello, our patient attached above has had HIV completed/performed  Please assist in updating the patient chart by pulling the Care Everywhere (CE) document  The date of service is 4/26/2017       Thank you,  Christian Montano LPN  [facility]

## 2022-07-15 NOTE — TELEPHONE ENCOUNTER
Upon review of the In Basket request we were able to locate, review, and update the patient chart as requested for HIV  Any additional questions or concerns should be emailed to the Practice Liaisons via [email or web address] email, please do not reply via In Basket      Thank you  Daniel Thomason MA

## 2022-07-18 ENCOUNTER — OFFICE VISIT (OUTPATIENT)
Dept: FAMILY MEDICINE CLINIC | Facility: CLINIC | Age: 27
End: 2022-07-18
Payer: COMMERCIAL

## 2022-07-18 VITALS
DIASTOLIC BLOOD PRESSURE: 74 MMHG | OXYGEN SATURATION: 100 % | WEIGHT: 262 LBS | RESPIRATION RATE: 14 BRPM | SYSTOLIC BLOOD PRESSURE: 108 MMHG | BODY MASS INDEX: 48.21 KG/M2 | TEMPERATURE: 99.9 F | HEIGHT: 62 IN | HEART RATE: 101 BPM

## 2022-07-18 DIAGNOSIS — Z11.1 SCREENING FOR TUBERCULOSIS: ICD-10-CM

## 2022-07-18 DIAGNOSIS — E66.01 OBESITY, CLASS III, BMI 40-49.9 (MORBID OBESITY) (HCC): ICD-10-CM

## 2022-07-18 DIAGNOSIS — Z30.011 ENCOUNTER FOR INITIAL PRESCRIPTION OF CONTRACEPTIVE PILLS: ICD-10-CM

## 2022-07-18 DIAGNOSIS — Z00.00 ANNUAL PHYSICAL EXAM: Primary | ICD-10-CM

## 2022-07-18 DIAGNOSIS — Z01.84 IMMUNITY TO VARICELLA DETERMINED BY SEROLOGIC TEST: ICD-10-CM

## 2022-07-18 DIAGNOSIS — Z01.84 IMMUNITY TO MEASLES, MUMPS, AND RUBELLA DETERMINED BY SEROLOGIC TEST: ICD-10-CM

## 2022-07-18 DIAGNOSIS — Z01.84 IMMUNITY TO HEPATITIS B VIRUS DEMONSTRATED BY SEROLOGIC TEST: ICD-10-CM

## 2022-07-18 PROCEDURE — 99395 PREV VISIT EST AGE 18-39: CPT | Performed by: FAMILY MEDICINE

## 2022-07-18 RX ORDER — NORGESTIMATE AND ETHINYL ESTRADIOL 7DAYSX3 LO
1 KIT ORAL DAILY
Qty: 90 TABLET | Refills: 3 | Status: SHIPPED | OUTPATIENT
Start: 2022-07-18 | End: 2022-08-08 | Stop reason: SINTOL

## 2022-07-18 NOTE — PROGRESS NOTES
Anesthesia Evaluation     Patient summary reviewed   no history of anesthetic complications:  NPO Solid Status: > 8 hours             Airway   Mallampati: I  TM distance: >3 FB  Neck ROM: full  No difficulty expected  Dental - normal exam     Pulmonary - negative pulmonary ROS   Cardiovascular   Exercise tolerance: good (4-7 METS)    (+) hyperlipidemia,       Neuro/Psych  (+) headaches,     GI/Hepatic/Renal/Endo    (+) obesity,  GERD,    (-) liver disease, no renal disease, diabetes    Musculoskeletal     (+) back pain,   Abdominal    Substance History      OB/GYN          Other                        Anesthesia Plan    ASA 2     MAC     intravenous induction     Anesthetic plan, all risks, benefits, and alternatives have been provided, discussed and informed consent has been obtained with: patient.       [facility]    NAME: Layne Zuniga  AGE: 23 y o  SEX: female  : 1995     DATE: 2022     Assessment and Plan:     Problem List Items Addressed This Visit        Other    Obesity, Class III, BMI 40-49 9 (morbid obesity) ([address] )      Other Visit Diagnoses     Annual physical exam    -  Primary    Josue Muniz appears well  She is to continue a healthy, lower carb diet, and getting regular exercise  Immunity to measles, mumps, and rubella determined by serologic test        Viral immunity titers, etc, being ordered for pt's classes at the [facility]  Relevant Orders    Rubeola antibody IgG    Mumps antibody, IgG    Rubella antibody, IgG    Immunity to hepatitis B virus demonstrated by serologic test        Relevant Orders    Hepatitis B surface antibody    Immunity to varicella determined by serologic test        Relevant Orders    Varicella zoster antibody, IgG    Screening for tuberculosis        Relevant Orders    Quantiferon TB Gold Plus    Encounter for initial prescription of contraceptive pills        Restarting pt on her OCP - disc potential R/SE of med  Pt is seeing OB/GYN regularly as well  Relevant Medications    norgestimate-ethinyl estradiol (Ortho Tri-Cyclen Lo) 0 18/0 215/0 25 MG-25 MCG per tablet          Immunizations and preventive care screenings were discussed with patient today  Appropriate education was printed on patient's after visit summary  Counseling:  Dental Health: discussed importance of regular tooth brushing, flossing, and dental visits  · Exercise: the importance of regular exercise/physical activity was discussed  Recommend exercise 3-5 times per week for at least 30 minutes            Return in 1 year (on 2023) for Annual physical      Chief Complaint:     Chief Complaint   Patient presents with    Physical Exam     Patient being seen for physical       History of Present Illness:     Adult Annual Physical   Patient here for a comprehensive physical exam  The patient reports no problems  Working with Dr Agustin Guerra of Bariatric Surgery  Will have sleeve gastrectomy  Already had Cardiology clearance  Labs reviewed - A1c 5 6%, , TSH / CBC normal   Immunizations reviewed - suggested that she had a 3rd dose of the Moderna COV-19 vaccine  On menses currently  Sees OB/GYN  Diet and Physical Activity  · Diet/Nutrition: well balanced diet  · Exercise: 3-4 times a week on average  Depression Screening  PHQ-2/9 Depression Screening         General Health  · Sleep: sleeps well  · Hearing: normal - bilateral   · Vision: no vision problems  · Dental: no dental visits for >1 year  Making an appt  /GYN Health  · Last menstrual period: Menses regular  · Contraceptive method: None  · History of STDs?: no      Review of Systems:     Review of Systems   Constitutional: Negative for activity change  Respiratory: Negative for shortness of breath  Cardiovascular: Negative for chest pain  Gastrointestinal: Negative for abdominal pain and blood in stool  Genitourinary: Negative for menstrual problem  No new breast lumps on self-examination  Psychiatric/Behavioral: Negative for dysphoric mood  The patient is not nervous/anxious  Chronic stressors - some anxiety        Past Medical History:     Past Medical History:   Diagnosis Date    Gastritis     Migraine     Obesity     Seasonal allergies       Past Surgical History:     Past Surgical History:   Procedure Laterality Date    WISDOM TOOTH EXTRACTION Bilateral 03/2020      Social History:     Social History     Socioeconomic History    Marital status: /Civil Union     Spouse name: None    Number of children: None    Years of education: None    Highest education level: None   Occupational History    None   Tobacco Use    Smoking status: Never Smoker  Smokeless tobacco: Never Used   Vaping Use    Vaping Use: Never used   Substance and Sexual Activity    Alcohol use: Yes     Comment: occasional    Drug use: No    Sexual activity: Yes     Partners: Male     Comment: with    Other Topics Concern    None   Social History Narrative    None     Social Determinants of Health     Financial Resource Strain: Not on file   Food Insecurity: Not on file   Transportation Needs: Not on file   Physical Activity: Not on file   Stress: Not on file   Social Connections: Not on file   Intimate Partner Violence: Not on file   Housing Stability: Not on file      Family History:     Family History   Problem Relation Age of Onset    Diabetes Mother     Allergic rhinitis Mother     Hypertension Mother     Hypertension Father     Hyperlipidemia Father     No Known Problems Sister     No Known Problems Sister     No Known Problems Brother     No Known Problems Brother     No Known Problems Brother     No Known Problems Brother     Asthma Brother     Breast cancer Maternal Grandmother     Lung cancer Paternal Grandfather     Asthma Paternal Grandfather     Anxiety disorder Maternal Aunt     Depression Maternal Aunt     Ovarian cancer Neg Hx       Current Medications:     Current Outpatient Medications   Medication Sig Dispense Refill    acetaminophen (TYLENOL) 500 mg tablet Take 500 mg by mouth every 6 (six) hours as needed for mild pain      aspirin-acetaminophen-caffeine (EXCEDRIN MIGRAINE) 250-250-65 MG per tablet Take 1 tablet by mouth every 6 (six) hours as needed for headaches      Cholecalciferol (D3 ADULT PO) Take by mouth      norgestimate-ethinyl estradiol (Ortho Tri-Cyclen Lo) 0 18/0 215/0 25 MG-25 MCG per tablet Take 1 tablet by mouth daily 90 tablet 3    Prenatal-FeFum-FA-DHA w/o A (PRENATAL + DHA PO) Take by mouth Per nutritionist       No current facility-administered medications for this visit  Allergies:      Allergies   Allergen Reactions    Contrast [Iodinated Diagnostic Agents] Anaphylaxis     throat closure    Fish-Derived Products - Food Allergy Anaphylaxis     Anaphylaxis    Pollen Extract Other (See Comments)     hives, throat closure    Shellfish-Derived Products - Food Allergy       Physical Exam:     /74 (BP Location: Left arm, Patient Position: Sitting, Cuff Size: Large)   Pulse 101   Temp 99 9 °F (37 7 °C) (Tympanic)   Resp 14   Ht 5' 2 21" (1 58 m)   Wt 119 kg (262 lb)   SpO2 100%   BMI 47 61 kg/m²     Physical Exam  Vitals and nursing note reviewed  Constitutional:       General: She is not in acute distress  Appearance: Normal appearance  She is not ill-appearing, toxic-appearing or diaphoretic  HENT:      Head: Normocephalic and atraumatic  Eyes:      General: No scleral icterus  Conjunctiva/sclera: Conjunctivae normal    Cardiovascular:      Rate and Rhythm: Normal rate and regular rhythm  Heart sounds: Normal heart sounds  No murmur heard  No friction rub  No gallop  Pulmonary:      Effort: Pulmonary effort is normal  No respiratory distress  Breath sounds: Normal breath sounds  No stridor  No wheezing, rhonchi or rales  Abdominal:      General: Abdomen is flat  Bowel sounds are normal  There is no distension  Palpations: Abdomen is soft  There is no mass  Tenderness: There is no abdominal tenderness  There is no guarding or rebound  Musculoskeletal:      Cervical back: Normal range of motion and neck supple  No rigidity or tenderness  Lymphadenopathy:      Cervical: No cervical adenopathy  Neurological:      Mental Status: She is alert and oriented to person, place, and time  Psychiatric:         Mood and Affect: Mood normal          Thought Content:  Thought content normal          Judgment: Judgment normal           Kimberli Salvador was seen today for physical exam     Diagnoses and all orders for this visit:    Annual physical exam  Comments:  Kimberli Salvador appears well   She is to continue a healthy, lower carb diet, and getting regular exercise  Obesity, Class III, BMI 40-49 9 (morbid obesity) ([facility] [address] )  Comments:  Continued diet and exercise as above  Pt is working also with Dr Rosi Teague of Bariatric Surgery  Sleeve gastrectomy is being planned  Immunity to measles, mumps, and rubella determined by serologic test  Comments:  Viral immunity titers, etc, being ordered for pt's classes at the [facility]  Orders:  -     Rubeola antibody IgG; Future  -     Mumps antibody, IgG; Future  -     Rubella antibody, IgG; Future    Immunity to hepatitis B virus demonstrated by serologic test  -     Hepatitis B surface antibody; Future    Immunity to varicella determined by serologic test  -     Varicella zoster antibody, IgG; Future    Screening for tuberculosis  -     Quantiferon TB Gold Plus; Future    Encounter for initial prescription of contraceptive pills  Comments:  Restarting pt on her OCP - disc potential R/SE of med  Pt is seeing OB/GYN regularly as well  Orders:  -     norgestimate-ethinyl estradiol (Ortho Tri-Cyclen Lo) 0 18/0 215/0 25 MG-25 MCG per tablet;  Take 1 tablet by mouth daily          Mahamed Mariano, DO   [facility]

## 2022-07-18 NOTE — PATIENT INSTRUCTIONS

## 2022-07-20 ENCOUNTER — APPOINTMENT (OUTPATIENT)
Dept: LAB | Facility: HOSPITAL | Age: 27
End: 2022-07-20
Payer: COMMERCIAL

## 2022-07-20 DIAGNOSIS — Z01.84 IMMUNITY TO MEASLES, MUMPS, AND RUBELLA DETERMINED BY SEROLOGIC TEST: ICD-10-CM

## 2022-07-20 DIAGNOSIS — Z01.84 IMMUNITY TO VARICELLA DETERMINED BY SEROLOGIC TEST: ICD-10-CM

## 2022-07-20 DIAGNOSIS — Z01.84 IMMUNITY TO HEPATITIS B VIRUS DEMONSTRATED BY SEROLOGIC TEST: ICD-10-CM

## 2022-07-20 DIAGNOSIS — Z11.1 SCREENING FOR TUBERCULOSIS: ICD-10-CM

## 2022-07-20 LAB
HBV SURFACE AB SER-ACNC: >1000 MIU/ML
MEV IGG SER QL IA: ABNORMAL
MUV IGG SER QL IA: NORMAL
RUBV IGG SERPL IA-ACNC: 57.1 IU/ML
VZV IGG SER QL IA: NORMAL

## 2022-07-20 PROCEDURE — 86706 HEP B SURFACE ANTIBODY: CPT

## 2022-07-20 PROCEDURE — 86765 RUBEOLA ANTIBODY: CPT

## 2022-07-20 PROCEDURE — 86762 RUBELLA ANTIBODY: CPT

## 2022-07-20 PROCEDURE — 86787 VARICELLA-ZOSTER ANTIBODY: CPT

## 2022-07-20 PROCEDURE — 86480 TB TEST CELL IMMUN MEASURE: CPT

## 2022-07-20 PROCEDURE — 86735 MUMPS ANTIBODY: CPT

## 2022-07-20 PROCEDURE — 36415 COLL VENOUS BLD VENIPUNCTURE: CPT

## 2022-07-21 LAB
GAMMA INTERFERON BACKGROUND BLD IA-ACNC: 0.26 IU/ML
M TB IFN-G BLD-IMP: NEGATIVE
M TB IFN-G CD4+ BCKGRND COR BLD-ACNC: 0.12 IU/ML
M TB IFN-G CD4+ BCKGRND COR BLD-ACNC: 0.17 IU/ML
MITOGEN IGNF BCKGRD COR BLD-ACNC: >10 IU/ML

## 2022-07-25 ENCOUNTER — CLINICAL SUPPORT (OUTPATIENT)
Dept: FAMILY MEDICINE CLINIC | Facility: CLINIC | Age: 27
End: 2022-07-25
Payer: COMMERCIAL

## 2022-07-25 DIAGNOSIS — Z23 ENCOUNTER FOR IMMUNIZATION: Primary | ICD-10-CM

## 2022-07-25 PROCEDURE — 90707 MMR VACCINE SC: CPT

## 2022-07-25 PROCEDURE — 90471 IMMUNIZATION ADMIN: CPT

## 2022-07-25 NOTE — PROGRESS NOTES
Assessment/Plan:    No problem-specific Assessment & Plan notes found for this encounter  There are no diagnoses linked to this encounter  Subjective:      Patient ID: Mary Jane Meek is a 23 y o  female  HPI    Patient here for MMR Vaccine    Review of Systems      Objective: There were no vitals taken for this visit           Physical Exam

## 2022-07-26 NOTE — PROGRESS NOTES
Name         Erika: 7/28/22  Sudheer Pardo         Starting weight 261 2  Last time weight 259 5  Today's weight 259 5      Surgery month:  Sept/Oct  Surgery deadline: Jan    Behavioral Health Follow Up Note:    Surgeon Dr Gopal Webb and wellness - Patient presents to the office today for weight check and support visit  The patient explained that  for the last two weeks work has been challenging and she has not been following her normal routine  Currently  has a new manager and she is also starting school  Forgets to pack and when she has time to eat is to busy forgetting to eat and then will get home eating late at night  Eating behaviors - went back to old behaviors  Changes in her environment eating late at night  Hydration also has been an issue  Activity -gardening       Progress toward program requirements    Workflow:  · Psych and/or D+A Clearance: n/a  · PCP Letter:5/17/22  · Support Group: 6/8/22  · Surgeon Appt : 6/23/22  · EGD : pending  · Cardiac Risk Assessment: 6/21/22  · Sleep Studies:5/16/22  · Bloodwork: 6/24/22  · Nicotine test: n/a     Reviewed and discussed  Adequate hydration  Exercise  Meal planning and preparation  Lifestyle changes  Possible problems with poor eating habits  Techniques for self monitoring and keeping daily food journal    Goal: 1-increase motivation to get back on track with eating healthy     Next appointment: 8/26 MOHIT

## 2022-07-28 ENCOUNTER — OFFICE VISIT (OUTPATIENT)
Dept: BARIATRICS | Facility: CLINIC | Age: 27
End: 2022-07-28

## 2022-07-28 DIAGNOSIS — E66.01 OBESITY, CLASS III, BMI 40-49.9 (MORBID OBESITY) (HCC): Primary | ICD-10-CM

## 2022-07-28 PROCEDURE — RECHECK

## 2022-08-06 PROBLEM — Z12.4 SCREENING FOR CERVICAL CANCER: Status: ACTIVE | Noted: 2022-08-06

## 2022-08-06 PROBLEM — Z01.419 ENCOUNTER FOR ANNUAL ROUTINE GYNECOLOGICAL EXAMINATION: Status: ACTIVE | Noted: 2022-08-06

## 2022-08-06 PROBLEM — Z30.41 USES ORAL CONTRACEPTION: Status: ACTIVE | Noted: 2022-08-06

## 2022-08-06 NOTE — PROGRESS NOTES
Assessment/Plan:  NGE  BCM- OC's  Missed menses  Increase from 25-35 mcg Ovcon 35      Morbid Obesity - BMI 48, Gastric sleeve planned  Narrow pubic rami - increased need for  section  Discussed weight effect on oral medications, ovulation, and conception  May need to switch to Ortho Evra or NuvaRing post gastric surgery   Pap smear q 3yrs  '24  Cervical Cultures till 23                                                                            RTO 1 yr  SBA monthly                                                                              Exercise 3/ wk                                                                                        Calcium 1,000 mg/d with Vit D                    Depression Screen: Neg                                     Diagnoses and all orders for this visit:    Encounter for annual routine gynecological examination  -     norethindrone-ethinyl estradiol (Nimesh Cheondoism) 0 4-35 MG-MCG per tablet; Take 1 tablet by mouth daily    Missed menses  -     norethindrone-ethinyl estradiol (OVCON) 0 4-35 MG-MCG per tablet; Take 1 tablet by mouth daily    Uses oral contraception  -     norethindrone-ethinyl estradiol (OVCON) 0 4-35 MG-MCG per tablet; Take 1 tablet by mouth daily              Subjective:        Patient ID: Felicity Huizar is a 23 y o  female  Scherry Fuelling presents today for annual visit  She is concerned when she misses menses on her current oral contraceptive because she thinks she is pregnant  She has a history of migraines but they have not affected by the pill  She is planning to have a gastric sleeve later in the year  An EGD will be performed beforehand but she hopes the sleeve will be placed before the end of the year        The following portions of the patient's history were reviewed and updated as appropriate: She  has a past medical history of Gastritis, Migraine, Obesity, and Seasonal allergies  Patient Active Problem List    Diagnosis Date Noted    Missed menses 08/08/2022    Encounter for annual routine gynecological examination 08/06/2022    Screening for cervical cancer 08/06/2022    Uses oral contraception 08/06/2022    Obesity, Class III, BMI 40-49 9 (morbid obesity) ([facility] [address] ) 06/23/2022    Pelvic pain 01/12/2022    Encounter for gynecological examination (general) (routine) without abnormal findings 07/30/2020    Oral contraceptive pill surveillance 07/30/2020    Gastritis 02/22/2018    Shellfish allergy 16/27/6309    Umbilical hernia without obstruction and without gangrene 02/22/2018    Acquired renal cyst of left kidney 02/22/2018    Acquired acanthosis nigricans 07/15/2013    Obesity 04/26/2013   PMH:  Menarche  12, stopped and restarted at 16  G0  Gardasil  Morbid obesity  Pelvic pain 6/21 - nl US but not done until 1/22  EGD  Gastric Sleeve  She  has a past surgical history that includes Lind tooth extraction (Bilateral, 03/2020)  Her family history includes Allergic rhinitis in her mother; Anxiety disorder in her maternal aunt; Asthma in her brother and paternal grandfather; Bipolar disorder in her maternal aunt; Breast cancer in her maternal grandmother; Depression in her maternal aunt and paternal grandmother; Diabetes in her mother; Hyperlipidemia in her father; Hypertension in her father and mother; Lung cancer in her paternal grandfather; No Known Problems in her brother, brother, brother, brother, sister, and sister  She  reports that she has never smoked  She has never used smokeless tobacco  She reports current alcohol use of about 1 0 standard drink of alcohol per week  She reports that she does not use drugs  SH:   to [employer] '17  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    Current Outpatient Medications   Medication Sig Dispense Refill    acetaminophen (TYLENOL) 500 mg tablet Take 500 mg by mouth every 6 (six) hours as needed for mild pain      aspirin-acetaminophen-caffeine (EXCEDRIN MIGRAINE) 250-250-65 MG per tablet Take 1 tablet by mouth every 6 (six) hours as needed for headaches      Cholecalciferol (D3 ADULT PO) Take by mouth      norethindrone-ethinyl estradiol (OVCON) 0 4-35 MG-MCG per tablet Take 1 tablet by mouth daily 28 tablet 12    Prenatal-FeFum-FA-DHA w/o A (PRENATAL + DHA PO) Take by mouth Per nutritionist       No current facility-administered medications for this visit  Current Outpatient Medications on File Prior to Visit   Medication Sig    acetaminophen (TYLENOL) 500 mg tablet Take 500 mg by mouth every 6 (six) hours as needed for mild pain    aspirin-acetaminophen-caffeine (EXCEDRIN MIGRAINE) 250-250-65 MG per tablet Take 1 tablet by mouth every 6 (six) hours as needed for headaches    Cholecalciferol (D3 ADULT PO) Take by mouth    Prenatal-FeFum-FA-DHA w/o A (PRENATAL + DHA PO) Take by mouth Per nutritionist    [DISCONTINUED] norgestimate-ethinyl estradiol (Ortho Tri-Cyclen Lo) 0 18/0 215/0 25 MG-25 MCG per tablet Take 1 tablet by mouth daily     No current facility-administered medications on file prior to visit  She is allergic to contrast [iodinated diagnostic agents], fish-derived products - food allergy, pollen extract, and shellfish-derived products - food allergy       Review of Systems   Constitutional: Negative for activity change, appetite change, fatigue and unexpected weight change  Eyes: Negative for visual disturbance  Respiratory: Negative for cough, chest tightness, shortness of breath and wheezing  Cardiovascular: Negative for chest pain, palpitations and leg swelling  Breast: Patient denies tenderness, nipple discharge, masses, or erythema  Gastrointestinal: Negative for abdominal distention, abdominal pain, blood in stool, constipation, diarrhea, nausea and vomiting  Endocrine: Negative for cold intolerance and heat intolerance  Genitourinary: Positive for dyspareunia (Notes dryness, uses a water-based lubricant) and menstrual problem (Misses Menses at times on the pill)  Negative for decreased urine volume, difficulty urinating, dysuria, frequency, hematuria, pelvic pain, urgency, vaginal bleeding, vaginal discharge and vaginal pain  Musculoskeletal: Negative for arthralgias  Skin: Negative for rash  Neurological: Positive for headaches  Negative for weakness, light-headedness and numbness  Hematological: Does not bruise/bleed easily  Psychiatric/Behavioral: Negative for agitation, behavioral problems and sleep disturbance  The patient is not nervous/anxious  Objective:    Vitals:    08/08/22 1456   BP: 120/70   BP Location: Left arm   Patient Position: Sitting   Cuff Size: Large   Weight: 117 kg (259 lb)   Height: 5' 1 5" (1 562 m)            Physical Exam  Vitals and nursing note reviewed  Constitutional:       General: She is not in acute distress  Appearance: Normal appearance  She is well-developed  She is obese  She is not ill-appearing  HENT:      Head: Normocephalic and atraumatic  Eyes:      General: No scleral icterus  Right eye: No discharge  Left eye: No discharge  Extraocular Movements: Extraocular movements intact  Conjunctiva/sclera: Conjunctivae normal    Neck:      Thyroid: No thyromegaly  Trachea: No tracheal deviation  Cardiovascular:      Rate and Rhythm: Normal rate and regular rhythm  Heart sounds: Normal heart sounds  No murmur heard  Pulmonary:      Effort: Pulmonary effort is normal  No respiratory distress  Breath sounds: Normal breath sounds  No wheezing  Chest:   Breasts: Breasts are symmetrical       Right: No inverted nipple, mass, nipple discharge, skin change or tenderness  Left: No inverted nipple, mass, nipple discharge, skin change or tenderness  Abdominal:      General: Bowel sounds are normal  There is no distension  Palpations: Abdomen is soft  There is no mass  Tenderness: There is no abdominal tenderness  There is no guarding or rebound  Genitourinary:     General: Normal vulva  Labia:         Right: No rash, tenderness or lesion  Left: No rash, tenderness or lesion  Vagina: Normal       Cervix: No cervical motion tenderness or discharge  Uterus: Not deviated, not enlarged and not tender  Adnexa:         Right: No mass, tenderness or fullness  Left: No mass, tenderness or fullness  Rectum: No external hemorrhoid  Comments: Urethral meatus within normal limits  Perineum within normal limits  Bladder well supported  Narrow pubic rami  No pelvic masses but exam limited by patient's habitus  Musculoskeletal:         General: No tenderness  Normal range of motion  Cervical back: Normal range of motion and neck supple  Lymphadenopathy:      Cervical: No cervical adenopathy  Skin:     General: Skin is warm and dry  Neurological:      Mental Status: She is alert and oriented to person, place, and time  Psychiatric:         Mood and Affect: Mood normal          Behavior: Behavior normal          Thought Content:  Thought content normal          Judgment: Judgment normal

## 2022-08-08 ENCOUNTER — ANNUAL EXAM (OUTPATIENT)
Dept: OBGYN CLINIC | Facility: CLINIC | Age: 27
End: 2022-08-08
Payer: COMMERCIAL

## 2022-08-08 VITALS
BODY MASS INDEX: 47.66 KG/M2 | DIASTOLIC BLOOD PRESSURE: 70 MMHG | SYSTOLIC BLOOD PRESSURE: 120 MMHG | HEIGHT: 62 IN | WEIGHT: 259 LBS

## 2022-08-08 DIAGNOSIS — Z30.41 USES ORAL CONTRACEPTION: ICD-10-CM

## 2022-08-08 DIAGNOSIS — Z01.419 ENCOUNTER FOR ANNUAL ROUTINE GYNECOLOGICAL EXAMINATION: Primary | ICD-10-CM

## 2022-08-08 DIAGNOSIS — N92.6 MISSED MENSES: ICD-10-CM

## 2022-08-08 PROCEDURE — S0612 ANNUAL GYNECOLOGICAL EXAMINA: HCPCS | Performed by: OBSTETRICS & GYNECOLOGY

## 2022-08-26 ENCOUNTER — OFFICE VISIT (OUTPATIENT)
Dept: BARIATRICS | Facility: CLINIC | Age: 27
End: 2022-08-26

## 2022-08-26 VITALS — WEIGHT: 257.1 LBS | BODY MASS INDEX: 47.79 KG/M2

## 2022-08-26 DIAGNOSIS — E66.01 OBESITY, CLASS III, BMI 40-49.9 (MORBID OBESITY) (HCC): ICD-10-CM

## 2022-08-26 DIAGNOSIS — E66.01 CLASS 3 SEVERE OBESITY WITHOUT SERIOUS COMORBIDITY WITH BODY MASS INDEX (BMI) OF 40.0 TO 44.9 IN ADULT, UNSPECIFIED OBESITY TYPE (HCC): Primary | ICD-10-CM

## 2022-08-26 PROCEDURE — RECHECK: Performed by: DIETITIAN, REGISTERED

## 2022-08-26 NOTE — PROGRESS NOTES
Bariatric Nutrition Assessment Note    Type of surgery    Preop  Surgery Date: TBD- no program requirement   Surgeon: Dr Leonides Mohs  23 y o   female     Wt with BMI of 25: 134 1 #  Pre-Op Excess Wt: 128#  Wt 117 kg (257 lb 1 6 oz)   BMI 47 79 kg/m²    Wt Readings from Last 3 Encounters:   08/26/22 117 kg (257 lb 1 6 oz)   08/08/22 117 kg (259 lb)   07/18/22 119 kg (262 lb)       Cristiane- [place] Equation:  1875 kcal per day   Estimated calories for weight loss 9598-2798 kcal per day  ( 1-2# per wk wt loss - sedentary )  Estimated protein needs  (1 0-1 2 gms/kg IBW )   Estimated fluid needs 70 ounces per day (35 ml/kg IBW )      Weight History   Onset of Obesity: Childhood  Family history of obesity: Yes- several members of family had weight loss surgery - Grandmother, Aunt and Niece   Wt Loss Attempts: Counseling with  MD  Exercise  Nutrition Counseling with RD  Self Created Diets (Portion Control, Healthy Food Choices, etc )  Meal prepping, intermittent fasting, herbal life, meal replacements,   Ordered prescription from PCP - thinks was Phenteramine but had to stop due to side effects  OTC supplements , fad diets , went to weight loss camp as a child ( age 9 )   Patient has tried the above for 6 months or more with insufficient weight loss or weight regain, which is why patient has requested to be evaluated for weight loss surgery today  Maximum Wt Lost: lost 30 pounds - worked out at [employer] and was mindful concerning her food intake       Review of History and Medications   Past Medical History:   Diagnosis Date    Gastritis     Migraine     Obesity     Seasonal allergies      Past Surgical History:   Procedure Laterality Date    WISDOM TOOTH EXTRACTION Bilateral 03/2020     Social History     Socioeconomic History    Marital status: /Civil Union     Spouse name: Not on file    Number of children: Not on file    Years of education: Not on file    Highest education level: Not on file   Occupational History    Not on file   Tobacco Use    Smoking status: Never Smoker    Smokeless tobacco: Never Used   Vaping Use    Vaping Use: Never used   Substance and Sexual Activity    Alcohol use:  Yes     Alcohol/week: 1 0 standard drink     Types: 1 Glasses of wine per week     Comment: occasional    Drug use: No    Sexual activity: Yes     Partners: Male     Birth control/protection: OCP     Comment: with    Other Topics Concern    Not on file   Social History Narrative    Not on file     Social Determinants of Health     Financial Resource Strain: Not on file   Food Insecurity: Not on file   Transportation Needs: Not on file   Physical Activity: Not on file   Stress: Not on file   Social Connections: Not on file   Intimate Partner Violence: Not on file   Housing Stability: Not on file       Current Outpatient Medications:     acetaminophen (TYLENOL) 500 mg tablet, Take 500 mg by mouth every 6 (six) hours as needed for mild pain, Disp: , Rfl:     aspirin-acetaminophen-caffeine (EXCEDRIN MIGRAINE) 250-250-65 MG per tablet, Take 1 tablet by mouth every 6 (six) hours as needed for headaches, Disp: , Rfl:     Cholecalciferol (D3 ADULT PO), Take by mouth, Disp: , Rfl:     norethindrone-ethinyl estradiol (OVCON) 0 4-35 MG-MCG per tablet, Take 1 tablet by mouth daily, Disp: 28 tablet, Rfl: 12    Prenatal-FeFum-FA-DHA w/o A (PRENATAL + DHA PO), Take by mouth Per nutritionist, Disp: , Rfl:      Food Intake and Lifestyle Assessment   Food Intake Assessment completed via usual diet recall  Breakfast: 9 am - coffee - creamer and sugar with cheesy eggs with turkey ( if at the hospital )   Three days per week, if at home - coffee and high protein pancakes or oatmeal with protein and egg   Snack: 0   Lunch: 2 pm - anything - usually will eat three times per week   From cafeteria or left overs from home   Snack: 0  Dinner: 6 pm - cooks   Protein, starch and vegetable ( starch is usually the largest portion )   Snack: not usually   Beverage intake: water, juice, regular soda and coffee/tea  Protein supplement: none currently   Estimated protein intake per day: 40-50 grams   Estimated fluid intake per day: Regular sprite or gingerale regular two to three times per week  About 48 ounces of water per day   Juice - 12 ounces or orange juice or passion fruit   Meals eaten away from home: three times per week, 2-3 times at cafe at work   Typical meal pattern: 2-3 meals per day and 0-1 snacks per day  Eating Behaviors: Large portion sizes and does not eat when she emotional , sometimes skips meals due to lack of hunger     Food allergies or intolerances:   Mild milk intolerance  Allergies   Allergen Reactions    Contrast [Iodinated Diagnostic Agents] Anaphylaxis     throat closure    Fish-Derived Products - Food Allergy Anaphylaxis     Anaphylaxis    Pollen Extract Other (See Comments)     hives, throat closure    Shellfish-Derived Products - Food Allergy      Cultural or Yarsanism considerations: N/A    Physical Assessment  Physical Activity  Types of exercise: gym - twice per week   Metabolic class   Weight lifting   Walks dog also   Current physical limitations: knee pain     Psychosocial Assessment   Support systems: spouse and family members   Socioeconomic factors: works full time - 2 days from home , 3 days at work   Lives with spouse and dog     Nutrition Diagnosis  Diagnosis: Overweight / Obesity (NC-3 3)  Related to: Physical inactivity and Excessive energy intake  As Evidenced by: BMI >25 and Unintentional weight gain     Nutrition Prescription: Recommend the following diet  1500 kcal/ 75 grams of protein, 170 grams carb, 60 grams of fat, 15 grams of sugar  70 ounces of calorie free beverages     Interventions and Teaching   Discussed pre-op and post-op nutrition guidelines  Patient educated and handouts provided    Surgical changes to stomach / GI  Capacity of post-surgery stomach  Diet progression  Adequate hydration  Sugar and fat restriction to decrease "dumping syndrome"  Fat restriction to decrease steatorrhea  Expected weight loss  Weight loss plateaus/ possibility of weight regain  Exercise  Suggestions for pre-op diet  Nutrition considerations after surgery  Protein supplements  Meal planning and preparation  Appropriate carbohydrate, protein, and fat intake, and food/fluid choices to maximize safe weight loss, nutrient intake, and tolerance   Dietary and lifestyle changes  Possible problems with poor eating habits  Intuitive eating  Techniques for self monitoring and keeping daily food journal  Potential for food intolerance after surgery, and ways to deal with them including: lactose intolerance, nausea, reflux, vomiting, diarrhea, food intolerance, appetite changes, gas  Vitamin / Mineral supplementation of Multivitamin with minerals, Calcium, Vitamin B12, Iron, Fat Soluble vitamins and Vitamin   Post-operative pregnancy guidelines  Patient is not currently pregnant and doesn't desire to become pregnant a minimum of one year post-op- Reviewed importance of waiting 12 months to attempt to conceive  Patient and  are planning to start a family in 1-2 years  Recommend that patient discuss changing contraception method as OBP is not effective after weight loss surgery, unless a barrier method is also used  Education provided to: patient    Barriers to learning: No barriers identified    Readiness to change: action    Prior research on procedure: discussed with provider, pre-op class and friends or family    Comprehension: verbalizes understanding     Expected Compliance: good    Recommendations  Pt is an appropriate candidate for surgery   Yes    Evaluation / Monitoring  Dietitian to Monitor: Eating pattern as discussed Tolerance of nutrition prescription Body weight Lab values Physical activity Bowel pattern    Goals  Eliminate sugar sweetened beverages, Food journal, Exercise 30 minutes 5 times per week, Complete lession plans 1-6 and Eat 3 meals per day   Previous Goals:  Develop routine and eat 3  Meals per day ( stop skipping lunch )  Recommend an adult multivitamin and mineral( pt prefers prenatal )  and 2000 IU of vitamin d3 per day  Switch to diet juices   Discuss contraception with gyn     Workflow: (Incomplete in [place]):   Psych and/or D+A Clearance: not needed   PCP Letter: done 5/17/22   Support Group: done 6/8/22   Surgeon Appt  Done 6/23/22   EGD scheduled for 10/5/22   Cardiac Risk Assessment done 6/21/22   Sleep Studies not needed   Blood work CBC,CMP completed 6/20 valid till Dec   Nicotine test not needed   6 Month Pre-Operative Program: not needed   Weight Loss 5% wt loss = 248 lbs DO NOT LOSE BELOW 216 LBS     Time Spent:   30 minutes

## 2022-08-30 ENCOUNTER — PREP FOR PROCEDURE (OUTPATIENT)
Dept: BARIATRICS | Facility: CLINIC | Age: 27
End: 2022-08-30

## 2022-08-30 DIAGNOSIS — E66.01 OBESITY, CLASS III, BMI 40-49.9 (MORBID OBESITY) (HCC): Primary | ICD-10-CM

## 2022-09-26 ENCOUNTER — TELEPHONE (OUTPATIENT)
Dept: BARIATRICS | Facility: CLINIC | Age: 27
End: 2022-09-26

## 2022-09-26 NOTE — PROGRESS NOTES
Name             Tanner Augustin      Starting weight 261 2  Last time weight 257 1  Today's weight 257 6        Surgery month:  Sept/Oct  Surgery deadline: [facility]ink Follow Up Note:    Waiting on EGD    Mental health and wellness - Patient presents to the office today  for support and weight check  The patient explained she is working on increasing her activity  She recently purchased a puppy which is very active and keeps her busy  The patient  is learning to adjust  working on balancing working full time  and school  Sleeping well  The patient has her EGD scheduled for next week on 10/5  She shared that it was important to remember she is Jehovah witness and does not take any blood transfusions  Paperwork was provided  Workflow reviewed    Eating behaviors -practicing the 30/60 rule  Increased hydration (water) and drinking throughout the day  Coffee one time a day with creamer or sugar  Three meals a day uses protein drinks for breakfast  Meal prepping  Activity -  Gets up at least every hour during her work day  Progress toward program requirements    Workflow:  · Psych and/or D+A Clearance: n/a  · PCP Letter:5/17/22  · Support Group: 6/8/22  · Surgeon Appt : 6/23/22  · EGD : pending 10  5 2022  · Cardiac Risk Assessment: 6/21/22  · Sleep Studies:5/16/22  · Bloodwork: 6/24/22  · Nicotine test: n/a  ·      Reviewed and discussed  Adequate hydration  Exercise  Meal planning and preparation  Lifestyle changes  Techniques for self monitoring and keeping daily food journal    Goal:  1-continue to hydrate   Next appointment: 10/28/22 MOHIT

## 2022-09-30 ENCOUNTER — OFFICE VISIT (OUTPATIENT)
Dept: BARIATRICS | Facility: CLINIC | Age: 27
End: 2022-09-30

## 2022-09-30 VITALS — BODY MASS INDEX: 47.88 KG/M2 | WEIGHT: 257.6 LBS

## 2022-09-30 DIAGNOSIS — E66.01 OBESITY, CLASS III, BMI 40-49.9 (MORBID OBESITY) (HCC): Primary | ICD-10-CM

## 2022-09-30 PROCEDURE — RECHECK

## 2022-10-03 RX ORDER — SODIUM CHLORIDE 9 MG/ML
125 INJECTION, SOLUTION INTRAVENOUS CONTINUOUS
Status: CANCELLED | OUTPATIENT
Start: 2022-10-03

## 2022-10-05 ENCOUNTER — ANESTHESIA EVENT (OUTPATIENT)
Dept: GASTROENTEROLOGY | Facility: HOSPITAL | Age: 27
End: 2022-10-05

## 2022-10-05 ENCOUNTER — HOSPITAL ENCOUNTER (OUTPATIENT)
Dept: GASTROENTEROLOGY | Facility: HOSPITAL | Age: 27
Setting detail: OUTPATIENT SURGERY
Discharge: HOME | End: 2022-10-05
Attending: SURGERY
Payer: COMMERCIAL

## 2022-10-05 ENCOUNTER — ANESTHESIA (OUTPATIENT)
Dept: GASTROENTEROLOGY | Facility: HOSPITAL | Age: 27
End: 2022-10-05

## 2022-10-05 VITALS
SYSTOLIC BLOOD PRESSURE: 121 MMHG | HEIGHT: 62 IN | DIASTOLIC BLOOD PRESSURE: 72 MMHG | RESPIRATION RATE: 19 BRPM | BODY MASS INDEX: 47.29 KG/M2 | OXYGEN SATURATION: 98 % | HEART RATE: 85 BPM | WEIGHT: 257 LBS | TEMPERATURE: 97.6 F

## 2022-10-05 DIAGNOSIS — E66.01 OBESITY, CLASS III, BMI 40-49.9 (MORBID OBESITY) (HCC): ICD-10-CM

## 2022-10-05 LAB
EXT PREGNANCY TEST URINE: NEGATIVE
EXT. CONTROL: NORMAL

## 2022-10-05 PROCEDURE — 88305 TISSUE EXAM BY PATHOLOGIST: CPT | Performed by: STUDENT IN AN ORGANIZED HEALTH CARE EDUCATION/TRAINING PROGRAM

## 2022-10-05 PROCEDURE — 81025 URINE PREGNANCY TEST: CPT | Performed by: ANESTHESIOLOGY

## 2022-10-05 PROCEDURE — 88342 IMHCHEM/IMCYTCHM 1ST ANTB: CPT | Performed by: STUDENT IN AN ORGANIZED HEALTH CARE EDUCATION/TRAINING PROGRAM

## 2022-10-05 PROCEDURE — 43239 EGD BIOPSY SINGLE/MULTIPLE: CPT | Performed by: SURGERY

## 2022-10-05 RX ORDER — PROPOFOL 10 MG/ML
INJECTION, EMULSION INTRAVENOUS AS NEEDED
Status: DISCONTINUED | OUTPATIENT
Start: 2022-10-05 | End: 2022-10-05

## 2022-10-05 RX ORDER — LIDOCAINE HYDROCHLORIDE 20 MG/ML
INJECTION, SOLUTION EPIDURAL; INFILTRATION; INTRACAUDAL; PERINEURAL AS NEEDED
Status: DISCONTINUED | OUTPATIENT
Start: 2022-10-05 | End: 2022-10-05

## 2022-10-05 RX ORDER — SODIUM CHLORIDE 9 MG/ML
125 INJECTION, SOLUTION INTRAVENOUS CONTINUOUS
Status: DISCONTINUED | OUTPATIENT
Start: 2022-10-05 | End: 2022-10-09 | Stop reason: HOSPADM

## 2022-10-05 RX ADMIN — PROPOFOL 50 MG: 10 INJECTION, EMULSION INTRAVENOUS at 12:05

## 2022-10-05 RX ADMIN — SODIUM CHLORIDE 125 ML/HR: 0.9 INJECTION, SOLUTION INTRAVENOUS at 11:06

## 2022-10-05 RX ADMIN — LIDOCAINE HYDROCHLORIDE 100 MG: 20 INJECTION, SOLUTION EPIDURAL; INFILTRATION; INTRACAUDAL; PERINEURAL at 11:56

## 2022-10-05 RX ADMIN — PROPOFOL 50 MG: 10 INJECTION, EMULSION INTRAVENOUS at 12:03

## 2022-10-05 RX ADMIN — PROPOFOL 150 MG: 10 INJECTION, EMULSION INTRAVENOUS at 11:59

## 2022-10-05 RX ADMIN — PROPOFOL 50 MG: 10 INJECTION, EMULSION INTRAVENOUS at 12:07

## 2022-10-05 NOTE — ANESTHESIA PREPROCEDURE EVALUATION
Procedure:  EGD    Relevant Problems   /RENAL   (+) Acquired renal cyst of left kidney      Digestive   (+) Gastritis      Other   (+) Obesity, Class III, BMI 40-49 9 (morbid obesity) (HCC)        Physical Exam    Airway    Mallampati score: III  TM Distance: >3 FB  Neck ROM: full     Dental   No notable dental hx     Cardiovascular  Rhythm: regular, Rate: normal, Cardiovascular exam normal    Pulmonary  Pulmonary exam normal Breath sounds clear to auscultation,     Other Findings        Anesthesia Plan  ASA Score- 3     Anesthesia Type- general with ASA Monitors  Additional Monitors:   Airway Plan:           Plan Factors-    Chart reviewed  Patient summary reviewed  Induction-     Postoperative Plan-     Informed Consent- Anesthetic plan and risks discussed with patient

## 2022-10-05 NOTE — H&P
This is a 23 y o  female with a history of morbid obesity and Body mass index is 47 77 kg/m²  Here for an EGD to evaluate the anatomy of the GI tract  Physical Exam    /81   Pulse (!) 106   Temp 97 6 °F (36 4 °C) (Temporal)   Resp 19   Ht 5' 1 5" (1 562 m)   Wt 117 kg (257 lb)   SpO2 99%   BMI 47 77 kg/m²    AAOx3  RRR  CTA B  Abdomen obese  Benign  A/P:    This is a 23 y o  female with a history of morbid obesity and Body mass index is 47 77 kg/m²       Will proceed with the EGD and biopsies        [facility]  10/05/22  11:58 AM

## 2022-10-05 NOTE — ANESTHESIA POSTPROCEDURE EVALUATION
Post-Op Assessment Note    CV Status:  Stable    Pain management: adequate     Mental Status:  Alert and awake   Hydration Status:  Euvolemic   PONV Controlled:  Controlled   Airway Patency:  Patent      Post Op Vitals Reviewed: Yes      Staff: Anesthesiologist         No complications documented      BP      Temp      Pulse     Resp      SpO2      /72   Pulse 85   Temp 97 6 °F (36 4 °C) (Temporal)   Resp 19   Ht 5' 1 5" (1 562 m)   Wt 117 kg (257 lb)   SpO2 98%   BMI 47 77 kg/m²

## 2022-10-11 PROBLEM — Z12.4 SCREENING FOR CERVICAL CANCER: Status: RESOLVED | Noted: 2022-08-06 | Resolved: 2022-10-11

## 2022-10-18 PROCEDURE — 88305 TISSUE EXAM BY PATHOLOGIST: CPT | Performed by: STUDENT IN AN ORGANIZED HEALTH CARE EDUCATION/TRAINING PROGRAM

## 2022-10-18 PROCEDURE — 88342 IMHCHEM/IMCYTCHM 1ST ANTB: CPT | Performed by: STUDENT IN AN ORGANIZED HEALTH CARE EDUCATION/TRAINING PROGRAM

## 2022-10-20 DIAGNOSIS — E66.01 MORBID OBESITY (HCC): Primary | ICD-10-CM

## 2022-10-21 DIAGNOSIS — N28.1 ACQUIRED RENAL CYST OF LEFT KIDNEY: ICD-10-CM

## 2022-10-21 DIAGNOSIS — E66.01 OBESITY, CLASS III, BMI 40-49.9 (MORBID OBESITY) (HCC): ICD-10-CM

## 2022-10-21 DIAGNOSIS — L83 ACQUIRED ACANTHOSIS NIGRICANS: ICD-10-CM

## 2022-10-21 DIAGNOSIS — Z01.818 PRE-OPERATIVE CLEARANCE: Primary | ICD-10-CM

## 2022-10-21 DIAGNOSIS — K29.70 GASTRITIS: ICD-10-CM

## 2022-10-24 ENCOUNTER — LAB (OUTPATIENT)
Dept: LAB | Facility: CLINIC | Age: 27
End: 2022-10-24
Payer: COMMERCIAL

## 2022-10-24 ENCOUNTER — APPOINTMENT (OUTPATIENT)
Dept: LAB | Facility: CLINIC | Age: 27
End: 2022-10-24
Payer: COMMERCIAL

## 2022-10-24 DIAGNOSIS — K29.70 GASTRITIS: ICD-10-CM

## 2022-10-24 DIAGNOSIS — N28.1 ACQUIRED RENAL CYST OF LEFT KIDNEY: ICD-10-CM

## 2022-10-24 DIAGNOSIS — L83 ACQUIRED ACANTHOSIS NIGRICANS: ICD-10-CM

## 2022-10-24 DIAGNOSIS — E66.01 OBESITY, CLASS III, BMI 40-49.9 (MORBID OBESITY) (HCC): ICD-10-CM

## 2022-10-24 DIAGNOSIS — Z91.013 SHELLFISH ALLERGY: ICD-10-CM

## 2022-10-24 DIAGNOSIS — D72.829 ELEVATED WBC COUNT: ICD-10-CM

## 2022-10-24 DIAGNOSIS — Z01.818 PRE-OPERATIVE CLEARANCE: ICD-10-CM

## 2022-10-24 DIAGNOSIS — Z01.818 PRE-OPERATIVE CLEARANCE: Primary | ICD-10-CM

## 2022-10-24 DIAGNOSIS — E66.01 MORBID OBESITY (HCC): ICD-10-CM

## 2022-10-24 DIAGNOSIS — R10.2 PELVIC PAIN: ICD-10-CM

## 2022-10-24 LAB
ALBUMIN SERPL BCP-MCNC: 3.5 G/DL (ref 3.5–5)
ALP SERPL-CCNC: 80 U/L (ref 46–116)
ALT SERPL W P-5'-P-CCNC: 39 U/L (ref 12–78)
ANION GAP SERPL CALCULATED.3IONS-SCNC: 6 MMOL/L (ref 4–13)
AST SERPL W P-5'-P-CCNC: 14 U/L (ref 5–45)
ATRIAL RATE: 83 BPM
BILIRUB SERPL-MCNC: 0.33 MG/DL (ref 0.2–1)
BUN SERPL-MCNC: 11 MG/DL (ref 5–25)
CALCIUM SERPL-MCNC: 9.6 MG/DL (ref 8.3–10.1)
CHLORIDE SERPL-SCNC: 107 MMOL/L (ref 96–108)
CO2 SERPL-SCNC: 24 MMOL/L (ref 21–32)
CREAT SERPL-MCNC: 0.91 MG/DL (ref 0.6–1.3)
ERYTHROCYTE [DISTWIDTH] IN BLOOD BY AUTOMATED COUNT: 13.7 % (ref 11.6–15.1)
GFR SERPL CREATININE-BSD FRML MDRD: 87 ML/MIN/1.73SQ M
GLUCOSE P FAST SERPL-MCNC: 87 MG/DL (ref 65–99)
HCT VFR BLD AUTO: 42 % (ref 34.8–46.1)
HGB BLD-MCNC: 12.7 G/DL (ref 11.5–15.4)
MCH RBC QN AUTO: 25.8 PG (ref 26.8–34.3)
MCHC RBC AUTO-ENTMCNC: 30.2 G/DL (ref 31.4–37.4)
MCV RBC AUTO: 85 FL (ref 82–98)
P AXIS: 54 DEGREES
PLATELET # BLD AUTO: 257 THOUSANDS/UL (ref 149–390)
PMV BLD AUTO: 11.4 FL (ref 8.9–12.7)
POTASSIUM SERPL-SCNC: 3.7 MMOL/L (ref 3.5–5.3)
PR INTERVAL: 138 MS
PROT SERPL-MCNC: 7.9 G/DL (ref 6.4–8.4)
QRS AXIS: 23 DEGREES
QRSD INTERVAL: 80 MS
QT INTERVAL: 368 MS
QTC INTERVAL: 432 MS
RBC # BLD AUTO: 4.92 MILLION/UL (ref 3.81–5.12)
SODIUM SERPL-SCNC: 137 MMOL/L (ref 135–147)
T WAVE AXIS: 22 DEGREES
VENTRICULAR RATE: 83 BPM
WBC # BLD AUTO: 13.56 THOUSAND/UL (ref 4.31–10.16)

## 2022-10-24 PROCEDURE — 36415 COLL VENOUS BLD VENIPUNCTURE: CPT

## 2022-10-24 PROCEDURE — 85027 COMPLETE CBC AUTOMATED: CPT

## 2022-10-24 PROCEDURE — 93010 ELECTROCARDIOGRAM REPORT: CPT | Performed by: INTERNAL MEDICINE

## 2022-10-24 PROCEDURE — 93005 ELECTROCARDIOGRAM TRACING: CPT

## 2022-10-24 PROCEDURE — 80053 COMPREHEN METABOLIC PANEL: CPT

## 2022-10-25 ENCOUNTER — TELEPHONE (OUTPATIENT)
Dept: BARIATRICS | Facility: CLINIC | Age: 27
End: 2022-10-25

## 2022-10-25 DIAGNOSIS — D72.829 ELEVATED WBC COUNT: Primary | ICD-10-CM

## 2022-10-25 DIAGNOSIS — Z01.818 PRE-OPERATIVE CLEARANCE: ICD-10-CM

## 2022-10-25 DIAGNOSIS — E66.01 OBESITY, CLASS III, BMI 40-49.9 (MORBID OBESITY) (HCC): ICD-10-CM

## 2022-10-25 NOTE — TELEPHONE ENCOUNTER
Called patient  She felt she was starting with a UTI, which may have increased her WBC    She will wait one week or until symptoms subside and recheck her level

## 2022-10-28 ENCOUNTER — OFFICE VISIT (OUTPATIENT)
Dept: BARIATRICS | Facility: CLINIC | Age: 27
End: 2022-10-28

## 2022-10-28 VITALS — WEIGHT: 243.9 LBS | BODY MASS INDEX: 45.34 KG/M2

## 2022-10-28 DIAGNOSIS — E66.01 CLASS 3 SEVERE OBESITY WITHOUT SERIOUS COMORBIDITY WITH BODY MASS INDEX (BMI) OF 40.0 TO 44.9 IN ADULT, UNSPECIFIED OBESITY TYPE (HCC): ICD-10-CM

## 2022-10-28 DIAGNOSIS — E66.01 OBESITY, CLASS III, BMI 40-49.9 (MORBID OBESITY) (HCC): Primary | ICD-10-CM

## 2022-10-28 PROCEDURE — RECHECK: Performed by: DIETITIAN, REGISTERED

## 2022-10-28 NOTE — PROGRESS NOTES
Bariatric Nutrition Assessment Note    Type of surgery    Preop  Surgery Date: TBD- no program requirement   Surgeon: Dr Nicolás Ruiz sleeve gastrectomy    Nutrition Candy Bermudez  33 y o   female     Wt with BMI of 25: 134 1 #  Pre-Op Excess Wt: 128#  Wt 111 kg (243 lb 14 4 oz)   BMI 45 34 kg/m²    Wt Readings from Last 3 Encounters:   10/28/22 111 kg (243 lb 14 4 oz)   10/05/22 117 kg (257 lb)   09/30/22 117 kg (257 lb 9 6 oz)     Cristiane- [place] Equation:  1875 kcal per day   Estimated calories for weight loss 9412-4484 kcal per day  ( 1-2# per wk wt loss - sedentary )  Estimated protein needs  (1 0-1 2 gms/kg IBW )   Estimated fluid needs 70 ounces per day (35 ml/kg IBW )      Weight History   Onset of Obesity: Childhood  Family history of obesity: Yes- several members of family had weight loss surgery - Grandmother, Aunt and Niece   Wt Loss Attempts: Counseling with  MD  Exercise  Nutrition Counseling with RD  Self Created Diets (Portion Control, Healthy Food Choices, etc )  Meal prepping, intermittent fasting, herbal life, meal replacements,   Ordered prescription from PCP - thinks was Phenteramine but had to stop due to side effects  OTC supplements , fad diets , went to weight loss camp as a child ( age 9 )   Patient has tried the above for 6 months or more with insufficient weight loss or weight regain, which is why patient has requested to be evaluated for weight loss surgery today  Maximum Wt Lost: lost 30 pounds - worked out at [employer] and was mindful concerning her food intake     Review of History and Medications   Past Medical History:   Diagnosis Date   • Gastritis    • Migraine    • Obesity    • Seasonal allergies      Past Surgical History:   Procedure Laterality Date   • WISDOM TOOTH EXTRACTION Bilateral 03/2020     Social History     Socioeconomic History   • Marital status: /Civil Union     Spouse name: Not on file   • Number of children: Not on file   • Years of education: Not on file   • Highest education level: Not on file   Occupational History   • Not on file   Tobacco Use   • Smoking status: Never Smoker   • Smokeless tobacco: Never Used   Vaping Use   • Vaping Use: Never used   Substance and Sexual Activity   • Alcohol use: Yes     Alcohol/week: 1 0 standard drink     Types: 1 Glasses of wine per week     Comment: occasional   • Drug use: No   • Sexual activity: Yes     Partners: Male     Birth control/protection: OCP     Comment: with    Other Topics Concern   • Not on file   Social History Narrative   • Not on file     Social Determinants of Health     Financial Resource Strain: Not on file   Food Insecurity: Not on file   Transportation Needs: Not on file   Physical Activity: Not on file   Stress: Not on file   Social Connections: Not on file   Intimate Partner Violence: Not on file   Housing Stability: Not on file       Current Outpatient Medications:   •  acetaminophen (TYLENOL) 500 mg tablet, Take 500 mg by mouth every 6 (six) hours as needed for mild pain, Disp: , Rfl:   •  aspirin-acetaminophen-caffeine (EXCEDRIN MIGRAINE) 250-250-65 MG per tablet, Take 1 tablet by mouth every 6 (six) hours as needed for headaches, Disp: , Rfl:   •  Cholecalciferol (D3 ADULT PO), Take by mouth, Disp: , Rfl:   •  norethindrone-ethinyl estradiol (Myah Res) 0 4-35 MG-MCG per tablet, Take 1 tablet by mouth daily, Disp: 28 tablet, Rfl: 12  •  Prenatal-FeFum-FA-DHA w/o A (PRENATAL + DHA PO), Take by mouth Per nutritionist, Disp: , Rfl:      Food Intake and Lifestyle Assessment   Food Intake Assessment completed via usual diet recall  Pt reports the past two weeks she has been having marital stress and she tends to lose her appetite and not eat when stressed  Pt reports her mother is making sure that she eats every day currently  Pt has protein drinks in her car to have at work      Breakfast: 9 am - coffee - creamer and sugar with cheesy eggs with turkey ( if at the hospital ) Three days per week, if at home - coffee and high protein pancakes or oatmeal with protein and egg   Snack: 0   Lunch: 2 pm - anything - usually will eat three times per week   From cafeteria or left overs from home   Snack: 0  Dinner: 6 pm - cooks   Protein, starch and vegetable ( starch is usually the largest portion )   Snack: not usually   Beverage intake: water, juice, regular soda and coffee/tea  Protein supplement: Has tried [employer] and [employer]  Estimated protein intake per day: 40-50 grams   Estimated fluid intake per day: Regular sprite or gingerale regular two to three times per week  About 48 ounces of water per day   Meals eaten away from home: three times per week, 2-3 times at cafe at work   Typical meal pattern: 2-3 meals per day and 0-1 snacks per day  Eating Behaviors: Large portion sizes and does not eat when she emotional , sometimes skips meals due to lack of hunger     Food allergies or intolerances:   Mild milk intolerance  Allergies   Allergen Reactions   • Contrast [Iodinated Diagnostic Agents] Anaphylaxis     throat closure   • Fish-Derived Products - Food Allergy Anaphylaxis     Anaphylaxis   • Pollen Extract Other (See Comments)     hives, throat closure   • Shellfish-Derived Products - Food Allergy      Cultural or Rastafarian considerations: N/A    Physical Assessment  Physical Activity  Types of exercise: gym - twice per week   Metabolic class   Weight lifting   Walks dog also   Current physical limitations: knee pain     Psychosocial Assessment   Support systems: spouse and family members  Pt reports spouse is supportive and mother is supportive  Socioeconomic factors: works full time - 2 days from home , 3 days at work   Lives with spouse and dog   Pt states will be going down to part-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      Nutrition Diagnosis-continued  Diagnosis: Overweight / Obesity (NC-3 3)  Related to: Physical inactivity and Excessive energy intake  As Evidenced by: BMI >25 and Unintentional weight gain     Nutrition Prescription: Recommend the following diet  1500 kcal/ 75 grams of protein, 170 grams carb, 60 grams of fat, 15 grams of sugar  70 ounces of calorie free beverages     Interventions and Teaching   Discussed pre-op and post-op nutrition guidelines  Patient educated and handouts provided  Surgical changes to stomach / GI  Capacity of post-surgery stomach  Diet progression  Adequate hydration  Sugar and fat restriction to decrease "dumping syndrome"  Fat restriction to decrease steatorrhea  Expected weight loss  Weight loss plateaus/ possibility of weight regain  Exercise  Suggestions for pre-op diet  Nutrition considerations after surgery  Protein supplements  Meal planning and preparation  Appropriate carbohydrate, protein, and fat intake, and food/fluid choices to maximize safe weight loss, nutrient intake, and tolerance   Dietary and lifestyle changes  Possible problems with poor eating habits  Intuitive eating  Techniques for self monitoring and keeping daily food journal  Potential for food intolerance after surgery, and ways to deal with them including: lactose intolerance, nausea, reflux, vomiting, diarrhea, food intolerance, appetite changes, gas  Vitamin / Mineral supplementation of Multivitamin with minerals, Calcium, Vitamin B12, Iron, Fat Soluble vitamins and Vitamin   Post-operative pregnancy guidelines  Patient is not currently pregnant and doesn't desire to become pregnant a minimum of one year post-op- Reviewed importance of waiting 12 months to attempt to conceive  Patient and  are planning to start a family in 1-2 years  Recommend that patient discuss changing contraception method as OBP is not effective after weight loss surgery, unless a barrier method is also used     Discussed self-care during difficult times and encouraged pt to focus on eating three times a day, taking her daily multivitamin, drinking her water throughout the day, and trying one protein drink per day  Samples of meal replacement protein drinks provided  Education provided to: patient    Barriers to learning: No barriers identified    Readiness to change: action    Prior research on procedure: discussed with provider, pre-op class and friends or family    Comprehension: verbalizes understanding     Expected Compliance: good    Recommendations  Pt is an appropriate candidate for surgery  Yes    Evaluation / Monitoring  Dietitian to Monitor: Eating pattern as discussed Tolerance of nutrition prescription Body weight Lab values Physical activity Bowel pattern    Goals  Eliminate sugar sweetened beverages, Food journal, Exercise 30 minutes 5 times per week, Complete lession plans 1-6 and Eat 3 meals per day   Previous Goals:  Develop routine and eat 3  Meals per day ( stop skipping lunch )  Recommend an adult multivitamin and mineral( pt prefers prenatal )  and 2000 IU of vitamin d3 per day  Switch to diet juices   Discuss contraception with gyn     Workflow: completed  • Psych and/or D+A Clearance: not needed  • PCP Letter: done 5/17/22  • Support Group: done 6/8/22  • Surgeon Appt done 6/23/22  • EGD done 10/5/22  • Cardiac Risk Assessment done 6/21/22  • Sleep Studies not needed  • Blood work CBC,CMP completed 10/24- WBC high  Recheck in 1-2    • Nicotine test not needed  • 6 Month Pre-Operative Program: not needed  • Weight Loss 5% wt loss = 248 lbs DO NOT LOSE BELOW 216 LBS     Time Spent:   30 minutes

## 2022-11-03 ENCOUNTER — TELEPHONE (OUTPATIENT)
Dept: PSYCHIATRY | Facility: CLINIC | Age: 27
End: 2022-11-03

## 2022-11-03 NOTE — TELEPHONE ENCOUNTER
Patient left message @ 530pm inquiring about scheduling NP appt    Writer returned call and advised her to contact [facility] Intake Dept at [phone] to schedule

## 2022-11-04 NOTE — TELEPHONE ENCOUNTER
Behavorial Health Outpatient Intake Questions    Referred by: Self     Please advised interviewee that they need to answer all questions truthfully to allow for best care and any misrepresentations of information may affect their ability to be seen at this clinic   => Was this discussed? Yes     Behavorial Health Outpatient Intake History -     Presenting Problem (in patient's words):     Pt stated that she is having marriage  problems and it is causing her to have anxiety     Are there any developmental disabilities? ? If yes, can they speak to you on the phone? If they are too limited to speak to you on phone, refer out Yes    Are you taking any psychiatric medications? No    => If yes, who prescribes? If yes, are they injectable medications? Does the patient have a language barrier or hearing impairment? No    Have you been treated at [facility] by a therapist or a doctor in the past? If yes, who? No    Has the patient been hospitalized for mental health? No   If yes, how long ago was last hospitalization and where was it? Do you actively use alcohol or marijuana or illegal substances? If yes, what and how much - refer out to Drug and alcohol treatment if use is excessive or daily use of illegal substances No concerns of substance abuse are reported  Do you have a community treatment team or ? No    Legal History-     Does the patient have any history of arrests, USP/FCI time, or DUIs? No  If Yes-  1) What types of charges? 2) When were they last incarcerated? 3) Are they currently on parole or probation? Minor Child-    Who has custody of the child? Is there a custody agreement? If there is a custody agreement remind parent that they must bring a copy to the first appt or they will not be seen       Intake Team, please check with provider before scheduling if flags come up such as:  - complex case  - legal history (other than DUI)  - communication barrier concerns are present  - if, in your judgment, this needs further review    ACCEPTED as a patient Yes  => Appointment Date: 11/14/2022 with [facility]     Referred Elsewhere? No    Name of [employer] Phone #  If ins is primary or secondary  If patient is a minor, parents information such as Name, D  O B of guarantor

## 2022-11-04 NOTE — TELEPHONE ENCOUNTER
Patient contacted the office wishing to scheduled Therapy Anywhere visits for talk therapy  Please reach out at earliest convenience to assist with scheduling  Thank you

## 2022-11-09 ENCOUNTER — LAB (OUTPATIENT)
Dept: LAB | Facility: CLINIC | Age: 27
End: 2022-11-09

## 2022-11-09 DIAGNOSIS — E66.01 OBESITY, CLASS III, BMI 40-49.9 (MORBID OBESITY) (HCC): ICD-10-CM

## 2022-11-09 DIAGNOSIS — D72.829 ELEVATED WBC COUNT: ICD-10-CM

## 2022-11-09 DIAGNOSIS — Z01.818 PRE-OPERATIVE CLEARANCE: ICD-10-CM

## 2022-11-09 LAB
ERYTHROCYTE [DISTWIDTH] IN BLOOD BY AUTOMATED COUNT: 13.7 % (ref 11.6–15.1)
HCT VFR BLD AUTO: 42 % (ref 34.8–46.1)
HGB BLD-MCNC: 12.8 G/DL (ref 11.5–15.4)
MCH RBC QN AUTO: 25.9 PG (ref 26.8–34.3)
MCHC RBC AUTO-ENTMCNC: 30.5 G/DL (ref 31.4–37.4)
MCV RBC AUTO: 85 FL (ref 82–98)
PLATELET # BLD AUTO: 276 THOUSANDS/UL (ref 149–390)
PMV BLD AUTO: 11.3 FL (ref 8.9–12.7)
RBC # BLD AUTO: 4.94 MILLION/UL (ref 3.81–5.12)
WBC # BLD AUTO: 13.8 THOUSAND/UL (ref 4.31–10.16)

## 2022-11-11 ENCOUNTER — NURSE TRIAGE (OUTPATIENT)
Dept: OTHER | Facility: OTHER | Age: 27
End: 2022-11-11

## 2022-11-11 ENCOUNTER — APPOINTMENT (EMERGENCY)
Dept: RADIOLOGY | Facility: HOSPITAL | Age: 27
End: 2022-11-11

## 2022-11-11 ENCOUNTER — TELEPHONE (OUTPATIENT)
Dept: FAMILY MEDICINE CLINIC | Facility: CLINIC | Age: 27
End: 2022-11-11

## 2022-11-11 ENCOUNTER — TELEPHONE (OUTPATIENT)
Dept: BARIATRICS | Facility: CLINIC | Age: 27
End: 2022-11-11

## 2022-11-11 ENCOUNTER — HOSPITAL ENCOUNTER (EMERGENCY)
Facility: HOSPITAL | Age: 27
Discharge: HOME | End: 2022-11-11
Attending: EMERGENCY MEDICINE

## 2022-11-11 VITALS
TEMPERATURE: 98.7 F | OXYGEN SATURATION: 98 % | HEART RATE: 90 BPM | SYSTOLIC BLOOD PRESSURE: 113 MMHG | RESPIRATION RATE: 20 BRPM | DIASTOLIC BLOOD PRESSURE: 64 MMHG

## 2022-11-11 DIAGNOSIS — R07.9 CHEST PAIN: Primary | ICD-10-CM

## 2022-11-11 LAB
ALBUMIN SERPL BCP-MCNC: 3.2 G/DL (ref 3.5–5)
ALP SERPL-CCNC: 90 U/L (ref 46–116)
ALT SERPL W P-5'-P-CCNC: 37 U/L (ref 12–78)
ANION GAP SERPL CALCULATED.3IONS-SCNC: 8 MMOL/L (ref 4–13)
AST SERPL W P-5'-P-CCNC: 17 U/L (ref 5–45)
BASOPHILS # BLD AUTO: 0.03 THOUSANDS/ÂΜL (ref 0–0.1)
BASOPHILS NFR BLD AUTO: 0 % (ref 0–1)
BILIRUB SERPL-MCNC: 0.39 MG/DL (ref 0.2–1)
BUN SERPL-MCNC: 6 MG/DL (ref 5–25)
CALCIUM ALBUM COR SERPL-MCNC: 10.2 MG/DL (ref 8.3–10.1)
CALCIUM SERPL-MCNC: 9.6 MG/DL (ref 8.3–10.1)
CARDIAC TROPONIN I PNL SERPL HS: 2 NG/L
CHLORIDE SERPL-SCNC: 106 MMOL/L (ref 96–108)
CO2 SERPL-SCNC: 24 MMOL/L (ref 21–32)
CREAT SERPL-MCNC: 0.84 MG/DL (ref 0.6–1.3)
D DIMER PPP FEU-MCNC: 0.43 UG/ML FEU
EOSINOPHIL # BLD AUTO: 0.01 THOUSAND/ÂΜL (ref 0–0.61)
EOSINOPHIL NFR BLD AUTO: 0 % (ref 0–6)
ERYTHROCYTE [DISTWIDTH] IN BLOOD BY AUTOMATED COUNT: 13.8 % (ref 11.6–15.1)
GFR SERPL CREATININE-BSD FRML MDRD: 96 ML/MIN/1.73SQ M
GLUCOSE SERPL-MCNC: 92 MG/DL (ref 65–140)
HCT VFR BLD AUTO: 42.6 % (ref 34.8–46.1)
HGB BLD-MCNC: 13.2 G/DL (ref 11.5–15.4)
IMM GRANULOCYTES # BLD AUTO: 0.03 THOUSAND/UL (ref 0–0.2)
IMM GRANULOCYTES NFR BLD AUTO: 0 % (ref 0–2)
LYMPHOCYTES # BLD AUTO: 1.99 THOUSANDS/ÂΜL (ref 0.6–4.47)
LYMPHOCYTES NFR BLD AUTO: 17 % (ref 14–44)
MCH RBC QN AUTO: 25.5 PG (ref 26.8–34.3)
MCHC RBC AUTO-ENTMCNC: 31 G/DL (ref 31.4–37.4)
MCV RBC AUTO: 82 FL (ref 82–98)
MONOCYTES # BLD AUTO: 0.49 THOUSAND/ÂΜL (ref 0.17–1.22)
MONOCYTES NFR BLD AUTO: 4 % (ref 4–12)
NEUTROPHILS # BLD AUTO: 9.49 THOUSANDS/ÂΜL (ref 1.85–7.62)
NEUTS SEG NFR BLD AUTO: 79 % (ref 43–75)
NRBC BLD AUTO-RTO: 0 /100 WBCS
PLATELET # BLD AUTO: 273 THOUSANDS/UL (ref 149–390)
PMV BLD AUTO: 11.2 FL (ref 8.9–12.7)
POTASSIUM SERPL-SCNC: 3.9 MMOL/L (ref 3.5–5.3)
PROT SERPL-MCNC: 8.6 G/DL (ref 6.4–8.4)
RBC # BLD AUTO: 5.18 MILLION/UL (ref 3.81–5.12)
SODIUM SERPL-SCNC: 138 MMOL/L (ref 135–147)
WBC # BLD AUTO: 12.04 THOUSAND/UL (ref 4.31–10.16)

## 2022-11-11 RX ORDER — ACETAMINOPHEN 325 MG/1
650 TABLET ORAL ONCE
Status: COMPLETED | OUTPATIENT
Start: 2022-11-11 | End: 2022-11-11

## 2022-11-11 RX ORDER — SODIUM CHLORIDE 9 MG/ML
3 INJECTION INTRAVENOUS
Status: DISCONTINUED | OUTPATIENT
Start: 2022-11-11 | End: 2022-11-11 | Stop reason: HOSPADM

## 2022-11-11 RX ADMIN — ACETAMINOPHEN 650 MG: 325 TABLET ORAL at 13:25

## 2022-11-11 NOTE — TELEPHONE ENCOUNTER
Regarding: chest pain/ headache/ nausea/ fever  ----- Message from Joe Hallman sent at 11/11/2022 11:21 AM EST -----  " I have been experiencing chest pain, nausea, headache and a fever   I just want to see if I should come into the office for a visit "

## 2022-11-11 NOTE — TELEPHONE ENCOUNTER
Some times this can be a medication effect guys - Pt is not really taking much right now  It also could be that if her GYN believes that she may have something like PCOS, this is a common source for elevated WBC when not currently ill  Thanks    Shai

## 2022-11-11 NOTE — TELEPHONE ENCOUNTER
Patient called and stated that she did blood work on 10/24 for surgery and WBC was high so they had her redo it on 11/9 and it is still high, she stated that before she took the first lab on 10/24 she might be starting with of a UTI , so that is why they retested her and she stated that she was fine between that time and then Wednesday she got the lab done again, it was high, she stated that on 11/10 she might have had a fever but she never checked to see if she did, and she says she feels off but doesn't know why or where its coming from, she wanted to know what she should do   Please advise

## 2022-11-11 NOTE — ED ATTENDING ATTESTATION
11/11/2022  I, Spencer Stratton MD, saw and evaluated the patient  I have discussed the patient with the resident/non-physician practitioner and agree with the resident's/non-physician practitioner's findings, Plan of Care, and MDM as documented in the resident's/non-physician practitioner's note, except where noted  All available labs and Radiology studies were reviewed  I was present for key portions of any procedure(s) performed by the resident/non-physician practitioner and I was immediately available to provide assistance  At this point I agree with the current assessment done in the Emergency Department    I have conducted an independent evaluation of this patient a history and physical is as follows:  Pt states that yesterday had slight fever Last night awoke with chest pain and sob like tightness at 5 am this pain has persisted some sob no cough some cramping I legs Pt has had similar chest pain in past but goes away Today's did not Pt has had constant symptoms no nvd no fevers no cough PE: alert heart reg lungs clear chest wall mild tenderness ext nad MDM: will do cardiac smith and dimer cxr   ED Course         Critical Care Time  Procedures

## 2022-11-11 NOTE — DISCHARGE INSTRUCTIONS
You were seen in the Emergency Department today for chest pain  Please follow up with your primary care doctor in 2-3 days  Please return to the Emergency Department if you experience worsening of your current symptoms, severe pain, shortness of breath, or any other concerning symptoms

## 2022-11-11 NOTE — TELEPHONE ENCOUNTER
Patient called in stating she repeated her CBC and her WBC has increased since last blood work 2 weeks ago  States she woke up with chest pain, chest tightness and pressure,nausea and headache and body aches  States unable to take deep breath  States heart feels like its racing but I have anxiety  Denies any other symptoms  Advised ED  pationet verbalized understanding     Reason for Disposition  • Heart beating irregularly or very rapidly    Answer Assessment - Initial Assessment Questions  1  LOCATION: "Where does it hurt?"       Middle   2  RADIATION: "Does the pain go anywhere else?" (e g , into neck, jaw, arms, back)     Denies   3  ONSET: "When did the chest pain begin?" (Minutes, hours or days)       This morning   4  PATTERN "Does the pain come and go, or has it been constant since it started?"  "Does it get worse with exertion?"       constant pressure and tightness, cnat feel like I can take deep breath   5  DURATION: "How long does it last" (e g , seconds, minutes, hours)     Constant   6  SEVERITY: "How bad is the pain?"  (e g , Scale 1-10; mild, moderate, or severe)     - MILD (1-3): doesn't interfere with normal activities      - MODERATE (4-7): interferes with normal activities or awakens from sleep     - SEVERE (8-10): excruciating pain, unable to do any normal activities        5/10  7  CARDIAC RISK FACTORS: "Do you have any history of heart problems or risk factors for heart disease?" (e g , angina, prior heart attack; diabetes, high blood pressure, high cholesterol, smoker, or strong family history of heart disease)      Dad and mom had high blood pressure and high cholesterol   8  PULMONARY RISK FACTORS: "Do you have any history of lung disease?"  (e g , blood clots in lung, asthma, emphysema, birth control pills)     On birth control   9  CAUSE: "What do you think is causing the chest pain?"     Unsure   10   OTHER SYMPTOMS: "Do you have any other symptoms?" (e g , dizziness, nausea, vomiting, sweating, fever, difficulty breathing, cough)        Nausea, headache, body aches, fever -99 8 after tylenol    11  PREGNANCY: "Is there any chance you are pregnant?" "When was your last menstrual period?"        Denies    Protocols used: CHEST PAIN-ADULT-[place]

## 2022-11-11 NOTE — ED PROVIDER NOTES
History  Chief Complaint   Patient presents with   • Chest Pain     Patient reports chest pain starting about 5 am this morning  States she is in the process of getting ready for Bariatric surgery and her last 2 blood works had high WBCs for unknown reason  States she also has anxiety where she would have chest pain that would go away on its own- but not today  Patient is a 31-year-old female with no significant past medical history presented with chest pain  The chest pain began morning after she woke up around 5:00am after having a nightmare  She describes the pain as pressure and squeezing that does not radiate  She does have associated nausea but no vomiting  The pain is constant with movement and without  She is currently having pain now  She is also complaining of shortness of breath with and without walking  Last night she felt feverish and had a headache but denies cough or congestion  She does take birth control and her doctor increased her dose of estrogen in the springtime for weight loss  She is scheduled for gastric sleeve surgery in January  She recently had labs done that showed an elevated WBC  Prior to Admission Medications   Prescriptions Last Dose Informant Patient Reported? Taking?    Cholecalciferol (D3 ADULT PO)  Self Yes No   Sig: Take by mouth   Prenatal-FeFum-FA-DHA w/o A (PRENATAL + DHA PO)  Self Yes No   Sig: Take by mouth Per nutritionist   acetaminophen (TYLENOL) 500 mg tablet  Self Yes No   Sig: Take 500 mg by mouth every 6 (six) hours as needed for mild pain   aspirin-acetaminophen-caffeine (EXCEDRIN MIGRAINE) 250-250-65 MG per tablet  Self Yes No   Sig: Take 1 tablet by mouth every 6 (six) hours as needed for headaches   norethindrone-ethinyl estradiol (OVCON) 0 4-35 MG-MCG per tablet   No No   Sig: Take 1 tablet by mouth daily      Facility-Administered Medications: None       Past Medical History:   Diagnosis Date   • Gastritis    • Migraine    • Obesity    • Seasonal allergies        Past Surgical History:   Procedure Laterality Date   • WISDOM TOOTH EXTRACTION Bilateral 03/2020       Family History   Problem Relation Age of Onset   • Diabetes Mother    • Allergic rhinitis Mother    • Hypertension Mother    • Hypertension Father    • Hyperlipidemia Father    • No Known Problems Sister    • No Known Problems Sister    • No Known Problems Brother    • No Known Problems Brother    • No Known Problems Brother    • No Known Problems Brother    • Asthma Brother    • Breast cancer Maternal Grandmother    • Depression Paternal Grandmother    • Lung cancer Paternal Grandfather    • Asthma Paternal Grandfather    • Anxiety disorder Maternal Aunt    • Depression Maternal Aunt    • Bipolar disorder Maternal Aunt    • Ovarian cancer Neg Hx      I have reviewed and agree with the history as documented  E-Cigarette/Vaping   • E-Cigarette Use Never User      E-Cigarette/Vaping Substances   • Nicotine No    • THC No    • CBD No    • Flavoring No    • Other No    • Unknown No      Social History     Tobacco Use   • Smoking status: Never Smoker   • Smokeless tobacco: Never Used   Vaping Use   • Vaping Use: Never used   Substance Use Topics   • Alcohol use: Yes     Alcohol/week: 1 0 standard drink     Types: 1 Glasses of wine per week     Comment: occasional   • Drug use: No        Review of Systems   Constitutional: Positive for fever  Negative for chills  HENT: Negative for ear pain and sore throat  Eyes: Negative for pain and visual disturbance  Respiratory: Positive for shortness of breath  Negative for cough  Cardiovascular: Positive for chest pain  Negative for palpitations and leg swelling  Gastrointestinal: Positive for nausea  Negative for abdominal pain and vomiting  Genitourinary: Negative for dysuria and hematuria  Musculoskeletal: Negative for arthralgias and back pain  Skin: Negative for color change and rash     Neurological: Negative for seizures and syncope  All other systems reviewed and are negative  Physical Exam  ED Triage Vitals   Temperature Pulse Respirations Blood Pressure SpO2   11/11/22 1257 11/11/22 1255 11/11/22 1255 11/11/22 1255 11/11/22 1255   98 7 °F (37 1 °C) 102 18 150/74 99 %      Temp Source Heart Rate Source Patient Position - Orthostatic VS BP Location FiO2 (%)   11/11/22 1257 11/11/22 1255 11/11/22 1255 11/11/22 1255 --   Oral Monitor Sitting Left arm       Pain Score       11/11/22 1255       5             Orthostatic Vital Signs  Vitals:    11/11/22 1255 11/11/22 1315 11/11/22 1430   BP: 150/74 140/71 113/64   Pulse: 102 92 90   Patient Position - Orthostatic VS: Sitting Lying Lying       Physical Exam  Vitals and nursing note reviewed  Constitutional:       General: She is not in acute distress  Appearance: She is well-developed  HENT:      Head: Normocephalic and atraumatic  Eyes:      Extraocular Movements: Extraocular movements intact  Conjunctiva/sclera: Conjunctivae normal       Pupils: Pupils are equal, round, and reactive to light  Cardiovascular:      Rate and Rhythm: Normal rate and regular rhythm  Heart sounds: No murmur heard  Pulmonary:      Effort: Pulmonary effort is normal  No respiratory distress  Breath sounds: Normal breath sounds  No wheezing or rhonchi  Abdominal:      Palpations: Abdomen is soft  Tenderness: There is no abdominal tenderness  Musculoskeletal:      Cervical back: Neck supple  Right lower leg: No tenderness  No edema  Left lower leg: No tenderness  No edema  Comments: No tenderness to palpation of left or right calf   Lymphadenopathy:      Cervical: No cervical adenopathy  Skin:     General: Skin is warm and dry  Capillary Refill: Capillary refill takes less than 2 seconds  Neurological:      Mental Status: She is alert           ED Medications  Medications   acetaminophen (TYLENOL) tablet 650 mg (650 mg Oral Given 11/11/22 1325) Diagnostic Studies  Results Reviewed     Procedure Component Value Units Date/Time    HS Troponin 0hr (reflex protocol) [[id number]]  (Normal) Collected: 11/11/22 1325    Lab Status: Final result Specimen: Blood from Arm, Right Updated: 11/11/22 1442     hs TnI 0hr 2 ng/L     D-dimer, quantitative [[id number]]  (Normal) Collected: 11/11/22 1325    Lab Status: Final result Specimen: Blood from Arm, Right Updated: 11/11/22 1432     D-Dimer, Quant 0 43 ug/ml FEU     Comprehensive metabolic panel [[id number]]  (Abnormal) Collected: 11/11/22 1325    Lab Status: Final result Specimen: Blood from Arm, Right Updated: 11/11/22 1422     Sodium 138 mmol/L      Potassium 3 9 mmol/L      Chloride 106 mmol/L      CO2 24 mmol/L      ANION GAP 8 mmol/L      BUN 6 mg/dL      Creatinine 0 84 mg/dL      Glucose 92 mg/dL      Calcium 9 6 mg/dL      Corrected Calcium 10 2 mg/dL      AST 17 U/L      ALT 37 U/L      Alkaline Phosphatase 90 U/L      Total Protein 8 6 g/dL      Albumin 3 2 g/dL      Total Bilirubin 0 39 mg/dL      eGFR 96 ml/min/1 73sq m     Narrative:      [facility] guidelines for Chronic Kidney Disease (CKD):   •  Stage 1 with normal or high GFR (GFR > 90 mL/min/1 73 square meters)  •  Stage 2 Mild CKD (GFR = 60-89 mL/min/1 73 square meters)  •  Stage 3A Moderate CKD (GFR = 45-59 mL/min/1 73 square meters)  •  Stage 3B Moderate CKD (GFR = 30-44 mL/min/1 73 square meters)  •  Stage 4 Severe CKD (GFR = 15-29 mL/min/1 73 square meters)  •  Stage 5 End Stage CKD (GFR <15 mL/min/1 73 square meters)  Note: GFR calculation is accurate only with a steady state creatinine    CBC and differential [[id number]]  (Abnormal) Collected: 11/11/22 1325    Lab Status: Final result Specimen: Blood from Arm, Right Updated: 11/11/22 1348     WBC 12 04 Thousand/uL      RBC 5 18 Million/uL      Hemoglobin 13 2 g/dL      Hematocrit 42 6 %      MCV 82 fL      MCH 25 5 pg      MCHC 31 0 g/dL      RDW 13 8 %      MPV 11 2 fL      Platelets 369 Thousands/uL      nRBC 0 /100 WBCs      Neutrophils Relative 79 %      Immat GRANS % 0 %      Lymphocytes Relative 17 %      Monocytes Relative 4 %      Eosinophils Relative 0 %      Basophils Relative 0 %      Neutrophils Absolute 9 49 Thousands/µL      Immature Grans Absolute 0 03 Thousand/uL      Lymphocytes Absolute 1 99 Thousands/µL      Monocytes Absolute 0 49 Thousand/µL      Eosinophils Absolute 0 01 Thousand/µL      Basophils Absolute 0 03 Thousands/µL                  X-ray chest 1 view portable   Final Result by Scott Harvey MD (11/11 1327)      No acute cardiopulmonary disease  Workstation performed: [id number]               Procedures  Procedures      ED Course  ED Course as of 11/11/22 2306   Fri Nov 11, 2022   1356 WBC(!): 12 04   1356 CXR shows no acute cardiopulmonary disease   1405 On re-evaluation pt states that pain has slightly improved   1450 hs TnI 0hr: 2                                       MDM  Number of Diagnoses or Management Options  Chest pain  Diagnosis management comments: Patient is a 23-year-old female with no significant past medical history presented with chest pain  Differential dx: ACS, MSK, anxiety    A cardiac workup will be done for the patient including CBC, CMP, troponins, and chest x-ray  Given the patient's shortness of breath a D-dimer will be ordered  Troponin was below 5  Her chest x-ray showed no signs of acute cardiopulmonary disease  D-dimer was not elevated  CBC showed elevated white count however this has been downtrending since most recent lab work  Her pain has been improving since she has been here  She was instructed to use Tylenol and Motrin for pain control and follow-up with her PCP  She was given return precautions          Disposition  Final diagnoses:   Chest pain     Time reflects when diagnosis was documented in both MDM as applicable and the Disposition within this note     Time User Action Codes Description Comment    11/11/2022  3:01 PM Kelvin Zavala Add [R07 9] Chest pain       ED Disposition     ED Disposition   Discharge    Condition   Stable    Date/Time   Fri Nov 11, 2022  3:01 PM    Comment   No [place] discharge to home/self care  Follow-up Information     Follow up With Specialties Details Why [address], DO Family Medicine In 2 days  [address] [address] [place]  [address]  [phone]            Discharge Medication List as of 11/11/2022  3:04 PM      CONTINUE these medications which have NOT CHANGED    Details   acetaminophen (TYLENOL) 500 mg tablet Take 500 mg by mouth every 6 (six) hours as needed for mild pain, Historical Med      aspirin-acetaminophen-caffeine (EXCEDRIN MIGRAINE) 250-250-65 MG per tablet Take 1 tablet by mouth every 6 (six) hours as needed for headaches, Historical Med      Cholecalciferol (D3 ADULT PO) Take by mouth, Historical Med      norethindrone-ethinyl estradiol (OVCON) 0 4-35 MG-MCG per tablet Take 1 tablet by mouth daily, Starting Mon 8/8/2022, Normal      Prenatal-FeFum-FA-DHA w/o A (PRENATAL + DHA PO) Take by mouth Per nutritionist, Historical Med           No discharge procedures on file  PDMP Review       Value Time User    PDMP Reviewed  Yes 4/13/2022  2:52 PM Valeriy Bhagat PA-C           ED Provider  Attending physically available and evaluated No [place]  I managed the patient along with the ED Attending      Electronically Signed by         Griffin Rocha MD  11/11/22 9512

## 2022-11-14 ENCOUNTER — TELEMEDICINE (OUTPATIENT)
Dept: PSYCHIATRY | Facility: CLINIC | Age: 27
End: 2022-11-14

## 2022-11-14 DIAGNOSIS — F43.23 ADJUSTMENT DISORDER WITH MIXED ANXIETY AND DEPRESSED MOOD: Primary | ICD-10-CM

## 2022-11-14 LAB
ATRIAL RATE: 100 BPM
P AXIS: 86 DEGREES
PR INTERVAL: 138 MS
QRS AXIS: 52 DEGREES
QRSD INTERVAL: 68 MS
QT INTERVAL: 316 MS
QTC INTERVAL: 407 MS
T WAVE AXIS: 64 DEGREES
VENTRICULAR RATE: 100 BPM

## 2022-11-14 NOTE — PSYCH
Assessment/Plan:      There are no diagnoses linked to this encounter  Subjective:      Patient ID: Sudheer Pardo is a 33 y o  female  HPI:     Pre-morbid level of function and History of Present Illness: Client has no hx of mental illness, but states that recently she found out her  had been unfaithful and she has developed some anxiety about their relationship and his whereabouts  Client wants to develop skills for her anxiety  Previous Psychiatric/psychological treatment/year: Client has never received tx  Current Psychiatrist/Therapist: N/A   Outpatient and/or Partial and Other Community Resources Used (CTT, ICM, VNA): N/A       Problem Assessment:     SOCIAL/VOCATION:  Family Constellation (include parents, relationship with each and pertinent Psych/Medical History):     Family History   Problem Relation Age of Onset   • Diabetes Mother    • Allergic rhinitis Mother    • Hypertension Mother    • Hypertension Father    • Hyperlipidemia Father    • No Known Problems Sister    • No Known Problems Sister    • No Known Problems Brother    • No Known Problems Brother    • No Known Problems Brother    • No Known Problems Brother    • Asthma Brother    • Breast cancer Maternal Grandmother    • Depression Paternal Grandmother    • Lung cancer Paternal Grandfather    • Asthma Paternal Grandfather    • Anxiety disorder Maternal Aunt    • Depression Maternal Aunt    • Bipolar disorder Maternal Aunt    • Ovarian cancer Neg Hx        Mother: Client's mother is adopted, but she knows her biological family  Client's mother is supportive, and reliable  Spouse: Client has been  for 5 years; and one month ago--he began an affair with a woman from their Hindu   is a , began working on her car  Had to repent within the Hindu, and is now sleeping in the guest bedroom--unsure of if he plans to make it work with his wife      Father: Client's father is distant--client describes him as a rolling stone  He had two families in the [facility] and one in the [facility]  Client has many siblings  Children: Client has only tried for about 4 months and is back on birth control  Client states that she is relieved she did not get pregnant  Sibling: Client has a little brother whom she is close to  Her brother was raised by her mom's biological family in   Sibling: N/A    Children: N/A    Other: Client was raised by her maternal aunt as well-- they all lived in the same home in [place]  Cheyenne Baum relates best to her mother  she lives with her   she does not live alone  Domestic Violence: No past history of domestic violence    Additional Comments related to family/relationships/peer support: Client states that her mom and her maternal aunt are a great support to her        School or Work History (strengths/limitations/needs): 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  Client has one more year and has been taking her clinicals as well  Her highest grade level achieved was [employer] history includesN/A     Financial status includes Client denies any financial struggles  LEISURE ASSESSMENT (Include past and present hobbies/interests and level of involvement (Ex: Group/Club Affiliations): Client enjoys arts and crafts; Client is interested in gardening    her primary language is [place] Islander   Preferred language is [place]  Ethnic considerations are Nohemy  Religions affiliations and level of involvement Hoahaoism   Does spirituality help you cope?  Yes     FUNCTIONAL STATUS: There has been a recent change in Cheyenne Bamu ability to do the following: N/A     Level of Assistance Needed/By Whom?: N/A     Cheyenne Baum learns best by  listening    SUBSTANCE ABUSE ASSESSMENT: no substance abuse    Substance/Route/Age/Amount/Frequency/Last Use: N/A     DETOX HISTORY: N/A     Previous detox/rehab treatment: N/A     HEALTH ASSESSMENT: PCP not notified     LEGAL: No Mental Health Advance Directive or Power of  on file    Prenatal History: N/A    Delivery History: N/A    Developmental Milestones: N/A  Temperament as an infant was N/A  Temperament as a toddler was N/A  Temperament at school age was N/A  Temperament as a teenager was N/A  Risk Assessment:   The following ratings are based on my interview(s) with Client     Risk of Harm to Self:   Demographic risk factors include N/A   Historical Risk Factors include N/A   Recent Specific Risk Factors include N/A   Additional Factors for a Child or Adolescent age over 13    Risk of Harm to Others:   Demographic Risk Factors include N/A   Historical Risk Factors include N/A   Recent Specific Risk Factors include N/A     Access to Weapons:   Kurtis Engel has access to the following weapons: Client denies   The following steps have been taken to ensure weapons are properly secured: None    Based on the above information, the client presents the following risk of harm to self or others:  low    The following interventions are recommended:   no intervention changes    Notes regarding this Risk Assessment: Client denies SI/HI/Psychosis        Review Of Systems:     Mood Anxiety   Behavior Normal    Thought Content Normal   General Relationship Problems   Personality Normal   Other Psych Symptoms Normal   Constitutional Normal   ENT Beyond my scope of practice   Cardiovascular Beyond my scope of practice   Respiratory Beyond my scope of practice   Gastrointestinal Beyond my scope of practice   Genitourinary Beyond my scope of practice   Musculoskeletal Beyond my scope of practice   Integumentary Beyond my scope of practice   Neurological Beyond my scope of practice   Endocrine Beyond my scope of practice         Mental status:  Appearance calm and cooperative    Mood dysphoric, anxious and mood appropriate   Affect affect appropriate    Speech a normal rate   Thought Processes coherent/organized and normal thought processes   Hallucinations no hallucinations present    Thought Content no delusions   Abnormal Thoughts no suicidal thoughts  and no homicidal thoughts    Orientation  oriented to person and place and time   Remote Memory short term memory intact and long term memory intact   Attention Span concentration intact   Intellect Appears to be of Average Intelligence   Fund of Knowledge displays adequate knowledge of current events, adequate fund of knowledge regarding past history and adequate fund of knowledge regarding vocabulary    Insight Insight intact   Judgement judgment was intact   Muscle Strength Normal gait    Language no difficulty naming common objects, no difficulty repeating a phrase  and no difficulty writing a sentence    Pain moderate to severe   Pain Scale 4     Visit start and stop times:    11/14/22        Virtual Regular Visit    Verification of patient location:    Patient is located in the following state in which I hold an active license [place]      Assessment/Plan:    Problem List Items Addressed This Visit    None         Goals addressed in session: Goal 1          Reason for visit is No chief complaint on file  Encounter provider Lisa Kidd    Provider located at [address] [place] [id number] [phone]      Recent Visits  Date Type Provider Dept   11/14/22 Telemedicine [place], [facility]   Showing recent visits within past 7 days and meeting all other requirements  Future Appointments  No visits were found meeting these conditions  Showing future appointments within next 150 days and meeting all other requirements       The patient was identified by name and date of birth  Estefania Hayes was informed that this is a telemedicine visit and that [facility]  She agrees to proceed     My office door was closed   No one else was in the room  She acknowledged consent and understanding of privacy and security of the video platform  The patient has agreed to participate and understands they can discontinue the visit at any time  Patient is aware this is a billable service  Jolanta Olguin is a 33 y o  female      HPI     Past Medical History:   Diagnosis Date   • Gastritis    • Migraine    • Obesity    • Seasonal allergies        Past Surgical History:   Procedure Laterality Date   • WISDOM TOOTH EXTRACTION Bilateral 03/2020       Current Outpatient Medications   Medication Sig Dispense Refill   • acetaminophen (TYLENOL) 500 mg tablet Take 500 mg by mouth every 6 (six) hours as needed for mild pain     • aspirin-acetaminophen-caffeine (EXCEDRIN MIGRAINE) 250-250-65 MG per tablet Take 1 tablet by mouth every 6 (six) hours as needed for headaches     • Cholecalciferol (D3 ADULT PO) Take by mouth     • norethindrone-ethinyl estradiol (OVCON) 0 4-35 MG-MCG per tablet Take 1 tablet by mouth daily 28 tablet 12   • Prenatal-FeFum-FA-DHA w/o A (PRENATAL + DHA PO) Take by mouth Per nutritionist       No current facility-administered medications for this visit  Allergies   Allergen Reactions   • Contrast [Iodinated Diagnostic Agents] Anaphylaxis     throat closure   • Fish-Derived Products - Food Allergy Anaphylaxis     Anaphylaxis   • Pollen Extract Other (See Comments)     hives, throat closure   • Shellfish-Derived Products - Food Allergy        Review of Systems    Video Exam    There were no vitals filed for this visit      Physical Exam     Visit Time    11/14/22  Start Time: 6991  Stop Time: 8828  Total Visit Time: 57 minutes

## 2022-11-15 DIAGNOSIS — D72.829 LEUKOCYTOSIS, UNSPECIFIED TYPE: ICD-10-CM

## 2022-11-15 DIAGNOSIS — Z01.812 BLOOD TESTS PRIOR TO TREATMENT OR PROCEDURE: ICD-10-CM

## 2022-11-15 DIAGNOSIS — E66.01 CLASS 3 SEVERE OBESITY WITHOUT SERIOUS COMORBIDITY WITH BODY MASS INDEX (BMI) OF 40.0 TO 44.9 IN ADULT, UNSPECIFIED OBESITY TYPE (HCC): Primary | ICD-10-CM

## 2022-11-15 DIAGNOSIS — E66.01 OBESITY, CLASS III, BMI 40-49.9 (MORBID OBESITY) (HCC): ICD-10-CM

## 2022-11-15 DIAGNOSIS — Z01.818 PREOPERATIVE CLEARANCE: ICD-10-CM

## 2022-11-15 PROBLEM — F43.23 ADJUSTMENT DISORDER WITH MIXED ANXIETY AND DEPRESSED MOOD: Status: ACTIVE | Noted: 2022-11-15

## 2022-11-21 NOTE — PROGRESS NOTES
Name           Sean Chilel        Starting weight 261 2  Last time weight 243 9  Today's weight 240 3      Surgery month: Sept/Oct   Surgery deadline: Jan  Surgeon: Dr Berenice Hess Follow Up Note:      Wants sleeve gastrectomy    Mental health and wellness - Patient presents to the office today  for weight check and support visit  The pt started therapy for [facility] and reports being very helpful  Sleep is interrupted curently because of some personal issues  Support-partner, mother, aunt and bother  Reviewed workflow and following up to have her CBC re-checked    Eating behaviors - two meals a day breakfast and dinner  No snacks  Hydration-poor  Monitors by use of an kathi to remind her to drink water  Has protein drinks, but has difficult remembering to drink it  Activity - started walking about a mile and a half  Progress toward program requirements    Workflow:  • Psych and/or D+A Clearance: not needed  • PCP Letter: done 5/17/22  • Support Group: done 6/8/22  • Surgeon Appt done 6/23/22  • EGD done 10/5/22  • Cardiac Risk Assessment done 6/21/22  • Sleep Studies not needed  • Blood work CBC,CMP completed 10/24- WBC high  Recheck in 1-2  • Nicotine test not needed    Reviewed and discussed  Adequate hydration  Exercise  Meal planning and preparation  Lifestyle changes  Possible problems with poor eating habits  Continue to use echniques for self monitoring     Goal: 1-eat healthier and not lose to much weight    Next appointment: 12/15/22 Class

## 2022-11-22 ENCOUNTER — TELEMEDICINE (OUTPATIENT)
Dept: PSYCHIATRY | Facility: CLINIC | Age: 27
End: 2022-11-22

## 2022-11-22 ENCOUNTER — PREP FOR PROCEDURE (OUTPATIENT)
Dept: BARIATRICS | Facility: CLINIC | Age: 27
End: 2022-11-22

## 2022-11-22 DIAGNOSIS — F43.23 ADJUSTMENT DISORDER WITH MIXED ANXIETY AND DEPRESSED MOOD: Primary | ICD-10-CM

## 2022-11-22 DIAGNOSIS — E66.01 MORBID OBESITY (HCC): Primary | ICD-10-CM

## 2022-11-22 NOTE — BH TREATMENT PLAN
Emerald Orta  1995       Date of Initial Treatment Plan: 11/22/22   Date of Current Treatment Plan: 11/23/22    Treatment Plan Number 1     Strengths/Personal Resources for Self Care: Client  States that she has her Buddhism, her support system within her Buddhism and her family--her mother and her aunt  Client also has her work, and is venturing out--doing things she is interested in  Diagnosis:   1  Adjustment disorder with mixed anxiety and depressed mood            Area of Needs: I want to be emotionally independent--I dont want to seek validation from anyone-- confident    im always seeking someone else's input    I want to self soothe   I worry too much about other things will effect each other      Im someone who wants to fix it  Liana Fowler its taking too long          Long Term Goal 1: AWant to increase confidence--     Target Date: 11/22/23  Completion Date: 5/22/23         Short Term Objectives for Goal 1: Byron Lovett will identify at least 3 strengths of hers and will utilize them at least once a week; client will identify at least 3 negative core beliefs, client will learn to challenge her negative beliefs and will utilize this skill 5/5x; client will practice  self reflection at least once a day, will learn to identify at least 3-5 emotional and physical needs; client will learn to express these needs effectively at least once a week; client will practice making choices without others at least once a week   Client will practice positive self talk 5/5x     Long Term Goal 2: I want to learn how to self regulate--cope with my own emotions     Target Date: 11/22/23  Completion Date: 5/22/23    Short Term Objectives for Goal 2: AClient will idenitfy 5 hobbies/talents that she enjoys; client will engage in these talents hobbies 5/5x when she is experiencing a negative emotion; client will identify 3-5 places of comfort--places in which she feels safe or happy; client will utilize these places 5/5x when she is overwhelmed or stressed within her environment; client will identify at least 3 people she can trust and rely on-- people that lift her mood, and will learn to spend time with these people 5/5x  when she is needing to lift her mood; client will develop at least 5 coping skills to deal with her anxiety, sadness, anger, or any negative emotion  Client will practice meeting her own emotional/physical needs once a week  Long Term Goal # 3: Client will process this trauma of her relationship      Target Date: 11/22/23  Completion Date: 5/22/23    Short Term Objectives for Goal 3: AClient will practice exploring her emotions and thoughts regarding this infidelity with her  within session; client will learn the stages of grief; client will practice mindfulness as she goes through these stages 5/5x; client will identify at least 3 consequences--of this trauma, and will allow herself space and zackary as she accepts and moves forward 5/5x    GOAL 1, 2, 3: Modality: Individual 4x per month   Completion Date 5/22/23          [address]: Diagnosis and Treatment Plan explained to Ac Gregory relates understanding diagnosis and is agreeable to Treatment Plan

## 2022-11-25 ENCOUNTER — OFFICE VISIT (OUTPATIENT)
Dept: BARIATRICS | Facility: CLINIC | Age: 27
End: 2022-11-25

## 2022-11-25 VITALS — BODY MASS INDEX: 44.67 KG/M2 | WEIGHT: 240.3 LBS

## 2022-11-25 DIAGNOSIS — E66.01 OBESITY, CLASS III, BMI 40-49.9 (MORBID OBESITY) (HCC): Primary | ICD-10-CM

## 2022-11-29 ENCOUNTER — TELEMEDICINE (OUTPATIENT)
Dept: PSYCHIATRY | Facility: CLINIC | Age: 27
End: 2022-11-29

## 2022-11-29 DIAGNOSIS — F43.23 ADJUSTMENT DISORDER WITH MIXED ANXIETY AND DEPRESSED MOOD: Primary | ICD-10-CM

## 2022-11-30 NOTE — PSYCH
Virtual Regular Visit    Verification of patient location:    Patient is located in the following state in which I hold an active license [place]      Assessment/Plan:    Problem List Items Addressed This Visit    None      Goals addressed in session: Goal 3           Reason for visit is No chief complaint on file  Encounter provider Lisa Gracia    Provider located at [address] [place] [id number] [phone]      Recent Visits  Date Type Provider Dept   11/22/22 Telemedicine [place], [facility]   Showing recent visits within past 7 days and meeting all other requirements  Today's Visits  Date Type Provider Dept   11/29/22 Telemedicine [place], [facility]   Showing today's visits and meeting all other requirements  Future Appointments  No visits were found meeting these conditions  Showing future appointments within next 150 days and meeting all other requirements       The patient was identified by name and date of birth  Tree Argueta was informed that this is a telemedicine visit and [facility] the [facility]  She agrees to proceed     My office door was closed  No one else was in the room  She acknowledged consent and understanding of privacy and security of the video platform  The patient has agreed to participate and understands they can discontinue the visit at any time  Patient is aware this is a billable service  Subjective  Tree Argueta is a 33 y o  female Mik So attended a Individual  session today  Claricereyna Bustos entered session with Depressed and Dysthymic mood and mood-congruent affect  Clariecreyna Bustos expresses extreme disappointment in regard to catching her  in lies again this weekend   Client states that she doesn't understand why he lies, and is granting his separation  Adhering to boundaries that he has set-- keeping them  and not talking  Writer provided empathic responses, provided trauma informed responses and explored emotions and processed  Writer utilized an eclectic therapeutic approach including expressive therapy and person-centered  Clinician provided active listening, affirming client's efforts to give leeway and trying to trust her partner  Clinician affirmed client's ideals in regard to her partner needing to adhere to the boundaries, be consistent, and agree to be faithful  Clinician encouraged client to live her life as independent as she could--spending time with friends, taking care of her animals, and making her money  Clinician discussed "what if's with client to address her anxieties  A: Dozier Boeck presented as cooperative, calm  Dozier Boeck appears to be in the Preparation stage of change  Dozier Boeck has Fair insight  Noy's speech was appropriate quality, quantity and organization of sentences  Dozier Boeck appeared appropriate  P: Dozier Boeck will follow up with ERASMO Stinson  in 1 week  In the interim, Dozier Boeck will acknowledge her stages of grief-- as she experiences them  Goal(s) 3 was/were addressed in session  Mental Pain:  Very Mild    Dozier Boeck denies SI, SH, HI at this time and can contract for safety  Behavioral Health Treatment Plan [facility]: Diagnosis and Treatment Plan explained to Dozier Boeck Dozier Boeck relates understanding diagnosis and is agreeable to Treatment Plan  Yes            HPI     Past Medical History:   Diagnosis Date   • Gastritis    • Migraine    • Obesity    • Seasonal allergies        Past Surgical History:   Procedure Laterality Date   • WISDOM TOOTH EXTRACTION Bilateral 03/2020       Current Outpatient Medications   Medication Sig Dispense Refill   • acetaminophen (TYLENOL) 500 mg tablet Take 500 mg by mouth every 6 (six) hours as needed for mild pain     • aspirin-acetaminophen-caffeine (EXCEDRIN MIGRAINE) 250-250-65 MG per tablet Take 1 tablet by mouth every 6 (six) hours as needed for headaches     • Cholecalciferol (D3 ADULT PO) Take by mouth     • norethindrone-ethinyl estradiol (OVCON) 0 4-35 MG-MCG per tablet Take 1 tablet by mouth daily 28 tablet 12   • Prenatal-FeFum-FA-DHA w/o A (PRENATAL + DHA PO) Take by mouth Per nutritionist       No current facility-administered medications for this visit  Allergies   Allergen Reactions   • Contrast [Iodinated Diagnostic Agents] Anaphylaxis     throat closure   • Fish-Derived Products - Food Allergy Anaphylaxis     Anaphylaxis   • Pollen Extract Other (See Comments)     hives, throat closure   • Shellfish-Derived Products - Food Allergy        Review of Systems    Video Exam    There were no vitals filed for this visit      Physical Exam     Visit Time  11/29/22  Start Time: 1202  Stop Time: 9519  Total Visit Time: 51 minutes

## 2022-12-06 ENCOUNTER — TELEMEDICINE (OUTPATIENT)
Dept: PSYCHIATRY | Facility: CLINIC | Age: 27
End: 2022-12-06

## 2022-12-06 DIAGNOSIS — F43.23 ADJUSTMENT DISORDER WITH MIXED ANXIETY AND DEPRESSED MOOD: Primary | ICD-10-CM

## 2022-12-07 NOTE — PSYCH
Virtual Regular Visit    Verification of patient location:    Patient is located in the following state in which I hold an active license [place]      Assessment/Plan:    Problem List Items Addressed This Visit        Psychiatry Problems    Adjustment disorder with mixed anxiety and depressed mood - Primary       Goals addressed in session: Goal 3           Reason for visit is No chief complaint on file  Encounter provider Lisa De La Garza    Provider located at [address] [place] [id number] [phone]      Recent Visits  Date Type Provider Dept   12/06/22 Telemedicine [place], [facility]   Showing recent visits within past 7 days and meeting all other requirements  Future Appointments  No visits were found meeting these conditions  Showing future appointments within next 150 days and meeting all other requirements       The patient was identified by name and date of birth  Homero Frances was informed that this is a telemedicine visit and that the [facility]  She agrees to proceed     My office door was closed  No one else was in the room  She acknowledged consent and understanding of privacy and security of the video platform  The patient has agreed to participate and understands they can discontinue the visit at any time  Patient is aware this is a billable service  Subjective  Homero Frances is a 33 y o  female Hira Anderson attended a Individual  session today  Hira Barron entered session with Euthymic mood and mood-congruent affect  Hira Anderson expressed contentment with the way things are going with her , but expressed fear as he has been going back and forth and has been inconsistent  Client feels hurt but recognizes that she needs time for self care       Writer provided empathic responses and provided feedback   Writer utilized an eclectic therapeutic approach including expressive therapy and person-centered  Clinician affirmed client's ability to set boundaries with her --and communicating what her emotional and physical needs are and the requirements needed to rebuild their trust in their relationship  Clinician validated client's emotions and addressed the fear of a pregnancy for the future  A: Jackquline Rings presented as cooperative, calm  Jackquline Rings appears to be in the Action stage of change  Jackquline Rings has Improving insight  Noy's speech was appropriate quality, quantity and organization of sentences  Jackquline Rings appeared appropriate  P: Gregory Johann will follow up with ERASMO Stinson  in 1 week  In the interim, Jackquline Rings is to incorporate self care at least once       Goal(s) 3 was/were addressed in session  Mental Pain:  Very Mild    Lisaline Rings denies SI, SH, HI at this time and can contract for safety  Behavioral Health Treatment Plan [facility]: Diagnosis and Treatment Plan explained to Jackrios Wills  Gregory Wills relates understanding diagnosis and is agreeable to Treatment Plan  Yes            HPI     Past Medical History:   Diagnosis Date   • Gastritis    • Migraine    • Obesity    • Seasonal allergies        Past Surgical History:   Procedure Laterality Date   • WISDOM TOOTH EXTRACTION Bilateral 03/2020       Current Outpatient Medications   Medication Sig Dispense Refill   • acetaminophen (TYLENOL) 500 mg tablet Take 500 mg by mouth every 6 (six) hours as needed for mild pain     • aspirin-acetaminophen-caffeine (EXCEDRIN MIGRAINE) 250-250-65 MG per tablet Take 1 tablet by mouth every 6 (six) hours as needed for headaches     • Cholecalciferol (D3 ADULT PO) Take by mouth     • norethindrone-ethinyl estradiol (OVCON) 0 4-35 MG-MCG per tablet Take 1 tablet by mouth daily 28 tablet 12   • Prenatal-FeFum-FA-DHA w/o A (PRENATAL + DHA PO) Take by mouth Per nutritionist       No current facility-administered medications for this visit  Allergies   Allergen Reactions   • Contrast [Iodinated Diagnostic Agents] Anaphylaxis     throat closure   • Fish-Derived Products - Food Allergy Anaphylaxis     Anaphylaxis   • Pollen Extract Other (See Comments)     hives, throat closure   • Shellfish-Derived Products - Food Allergy        Review of Systems    Video Exam    There were no vitals filed for this visit      Physical Exam     Visit Time    12/06/22  Start Time: 0937  Stop Time: 1332  Total Visit Time: 50 minutes

## 2022-12-13 ENCOUNTER — TELEMEDICINE (OUTPATIENT)
Dept: PSYCHIATRY | Facility: CLINIC | Age: 27
End: 2022-12-13

## 2022-12-13 DIAGNOSIS — F43.23 ADJUSTMENT DISORDER WITH MIXED ANXIETY AND DEPRESSED MOOD: Primary | ICD-10-CM

## 2022-12-14 ENCOUNTER — APPOINTMENT (OUTPATIENT)
Dept: LAB | Facility: CLINIC | Age: 27
End: 2022-12-14

## 2022-12-14 DIAGNOSIS — D72.829 LEUKOCYTOSIS, UNSPECIFIED TYPE: ICD-10-CM

## 2022-12-14 DIAGNOSIS — E66.01 OBESITY, CLASS III, BMI 40-49.9 (MORBID OBESITY) (HCC): ICD-10-CM

## 2022-12-14 DIAGNOSIS — E66.01 CLASS 3 SEVERE OBESITY WITHOUT SERIOUS COMORBIDITY WITH BODY MASS INDEX (BMI) OF 40.0 TO 44.9 IN ADULT, UNSPECIFIED OBESITY TYPE (HCC): ICD-10-CM

## 2022-12-14 DIAGNOSIS — Z01.818 PREOPERATIVE CLEARANCE: ICD-10-CM

## 2022-12-14 DIAGNOSIS — Z01.812 BLOOD TESTS PRIOR TO TREATMENT OR PROCEDURE: ICD-10-CM

## 2022-12-14 LAB
ERYTHROCYTE [DISTWIDTH] IN BLOOD BY AUTOMATED COUNT: 14.6 % (ref 11.6–15.1)
HCT VFR BLD AUTO: 39.1 % (ref 34.8–46.1)
HGB BLD-MCNC: 12.1 G/DL (ref 11.5–15.4)
MCH RBC QN AUTO: 25.7 PG (ref 26.8–34.3)
MCHC RBC AUTO-ENTMCNC: 30.9 G/DL (ref 31.4–37.4)
MCV RBC AUTO: 83 FL (ref 82–98)
PLATELET # BLD AUTO: 268 THOUSANDS/UL (ref 149–390)
PMV BLD AUTO: 10.9 FL (ref 8.9–12.7)
RBC # BLD AUTO: 4.71 MILLION/UL (ref 3.81–5.12)
WBC # BLD AUTO: 12.02 THOUSAND/UL (ref 4.31–10.16)

## 2022-12-14 NOTE — PSYCH
Virtual Regular Visit    Verification of patient location:    Patient is located in the following state in which I hold an active license [place]      Assessment/Plan:    Problem List Items Addressed This Visit    None      Goals addressed in session: Goal 3           Reason for visit is No chief complaint on file  Encounter provider Lisa Bowman    Provider located at [address] [place] [id number] [phone]      Recent Visits  Date Type Provider Dept   12/06/22 Telemedicine [facility] [place], [facility]   Showing recent visits within past 7 days and meeting all other requirements  Today's Visits  Date Type Provider Dept   12/13/22 Telemedicine [facility] [place]   Showing today's visits and meeting all other requirements  Future Appointments  No visits were found meeting these conditions  Showing future appointments within next 150 days and meeting all other requirements       The patient was identified by name and date of birth  Jeny Roldan was informed that this is a telemedicine visit and that the [facility]  She agrees to proceed     My office door was closed  No one else was in the room  She acknowledged consent and understanding of privacy and security of the video platform  The patient has agreed to participate and understands they can discontinue the visit at any time  Patient is aware this is a billable service  Subjective  Jeny Roldan is a 33 y o  female Mariusz Jean Baptiste attended a Individual  session today  Mariusz Jean Baptiste entered session with Dysthymic mood and mood-congruent affect  Mariusz Jean Baptiste expressed sadness and exhaustion as her  is processing his actions and the consequences   Client states that she feels she has to shove down or swallow her emotions because her  cannot handle them due to his depression  Client states that he is irritable and has become a source of stress  She plans to go on vacation but she is concerned that he will make a mistake with the person he was intimate with  Writer provided psycho-education, provided feedback , initiated grief discussion and explored cognitive schemas  Writer utilized an eclectic therapeutic approach including expressive therapy, person-centered and psycho-ed  Clinician affirmed client's emotions-- discussing the stages of grief-- and assisting her in recognizing that there was nothing she could do and she didn't deserve the infidelity  Clinician and client discussed accountability and attempted to problem solve-- being bored or stuck in a routine with their partner  A: Hira Barron presented as cooperative, calm  Hira Barron appears to be in the Action stage of change  Hira Barron has Improving insight  Noy's speech was appropriate quality, quantity and organization of sentences  Hira Anderson appeared appropriate  P: Hira Barorn will follow up with ERASMO Stinson  in 2 weeks  In the interim, Hira Barron will go on vacation and will allow her  to contact her while she is gone  Goal(s) 3 was/were addressed in session  Mental Pain:  Very Mild    Hira Barron denies SI, SH, HI at this time and can contract for safety  Behavioral Health Treatment Plan [facility]: Diagnosis and Treatment Plan explained to Hira Barron relates understanding diagnosis and is agreeable to Treatment Plan  Yes            HPI     Past Medical History:   Diagnosis Date   • Gastritis    • Migraine    • Obesity    • Seasonal allergies        Past Surgical History:   Procedure Laterality Date   • WISDOM TOOTH EXTRACTION Bilateral 03/2020       Current Outpatient Medications   Medication Sig Dispense Refill   • acetaminophen (TYLENOL) 500 mg tablet Take 500 mg by mouth every 6 (six) hours as needed for mild pain     • aspirin-acetaminophen-caffeine (EXCEDRIN MIGRAINE) 250-250-65 MG per tablet Take 1 tablet by mouth every 6 (six) hours as needed for headaches     • Cholecalciferol (D3 ADULT PO) Take by mouth     • norethindrone-ethinyl estradiol (OVCON) 0 4-35 MG-MCG per tablet Take 1 tablet by mouth daily 28 tablet 12   • Prenatal-FeFum-FA-DHA w/o A (PRENATAL + DHA PO) Take by mouth Per nutritionist       No current facility-administered medications for this visit  Allergies   Allergen Reactions   • Contrast [Iodinated Diagnostic Agents] Anaphylaxis     throat closure   • Fish-Derived Products - Food Allergy Anaphylaxis     Anaphylaxis   • Pollen Extract Other (See Comments)     hives, throat closure   • Shellfish-Derived Products - Food Allergy        Review of Systems    Video Exam    There were no vitals filed for this visit      Physical Exam     Visit Time  12/13/22  Start Time: 6542  Stop Time: 5174  Total Visit Time: 52 minutes

## 2022-12-15 ENCOUNTER — CLINICAL SUPPORT (OUTPATIENT)
Dept: BARIATRICS | Facility: CLINIC | Age: 27
End: 2022-12-15

## 2022-12-15 ENCOUNTER — OFFICE VISIT (OUTPATIENT)
Dept: BARIATRICS | Facility: CLINIC | Age: 27
End: 2022-12-15

## 2022-12-15 VITALS
WEIGHT: 244.5 LBS | DIASTOLIC BLOOD PRESSURE: 82 MMHG | TEMPERATURE: 98.3 F | HEART RATE: 109 BPM | BODY MASS INDEX: 44.99 KG/M2 | SYSTOLIC BLOOD PRESSURE: 128 MMHG | HEIGHT: 62 IN

## 2022-12-15 DIAGNOSIS — E66.01 OBESITY, CLASS III, BMI 40-49.9 (MORBID OBESITY) (HCC): Primary | ICD-10-CM

## 2022-12-15 DIAGNOSIS — K42.9 UMBILICAL HERNIA WITHOUT OBSTRUCTION AND WITHOUT GANGRENE: ICD-10-CM

## 2022-12-15 RX ORDER — ACETAMINOPHEN 325 MG/1
975 TABLET ORAL ONCE
OUTPATIENT
Start: 2022-12-15 | End: 2022-12-15

## 2022-12-15 RX ORDER — GABAPENTIN 300 MG/1
300 CAPSULE ORAL ONCE
OUTPATIENT
Start: 2022-12-15 | End: 2022-12-15

## 2022-12-15 RX ORDER — CELECOXIB 200 MG/1
200 CAPSULE ORAL ONCE
OUTPATIENT
Start: 2022-12-15 | End: 2022-12-15

## 2022-12-15 RX ORDER — ENOXAPARIN SODIUM 100 MG/ML
40 INJECTION SUBCUTANEOUS
OUTPATIENT
Start: 2022-12-16 | End: 2022-12-17

## 2022-12-15 RX ORDER — SCOLOPAMINE TRANSDERMAL SYSTEM 1 MG/1
1 PATCH, EXTENDED RELEASE TRANSDERMAL ONCE
OUTPATIENT
Start: 2022-12-15 | End: 2022-12-15

## 2022-12-15 RX ORDER — CEFAZOLIN SODIUM 2 G/50ML
2000 SOLUTION INTRAVENOUS ONCE
OUTPATIENT
Start: 2022-12-15 | End: 2022-12-15

## 2022-12-15 NOTE — H&P (VIEW-ONLY)
BARIATRIC H&P - BARIATRIC SURGERY  Malini Flor 33 y o  female MRN: [id number]  Unit/Bed#:  Encounter: [id number]      HPI:  Malini Flor is a 33 y o  female who presents with a long-standing history of morbid obesity  She was found to be a good candidate to undergo a bariatric operation [facility]  She is here today to discuss details of her surgery  Review of Systems   All other systems reviewed and are negative  Historical Information   Past Medical History:   Diagnosis Date   • Gastritis    • Migraine    • Obesity    • Seasonal allergies      Past Surgical History:   Procedure Laterality Date   • WISDOM TOOTH EXTRACTION Bilateral 03/2020     Social History   Social History     Substance and Sexual Activity   Alcohol Use Yes   • Alcohol/week: 1 0 standard drink   • Types: 1 Glasses of wine per week    Comment: occasional     Social History     Substance and Sexual Activity   Drug Use No     Social History     Tobacco Use   Smoking Status Never   Smokeless Tobacco Never     Family History: non-contributory    Meds/Allergies   all medications and allergies reviewed  Allergies   Allergen Reactions   • Contrast [Iodinated Diagnostic Agents] Anaphylaxis     throat closure   • Fish-Derived Products - Food Allergy Anaphylaxis     Anaphylaxis   • Pollen Extract Other (See Comments)     hives, throat closure   • Shellfish-Derived Products - Food Allergy        Objective     Current Vitals:   Blood Pressure: 128/82 (12/15/22 1300)  Pulse: (!) 109 (12/15/22 1300)  Temperature: 98 3 °F (36 8 °C) (12/15/22 1300)  Temp Source: Tympanic (12/15/22 1300)  Height: 5' 1 5" (156 2 cm) (12/15/22 1300)  Weight - Scale: 111 kg (244 lb 8 oz) (12/15/22 1300)      Invasive Devices     None                 Physical Exam  Vitals and nursing note reviewed  Constitutional:       General: She is not in acute distress  Appearance: Normal appearance   She is well-developed  She is not diaphoretic  HENT:      Head: Normocephalic and atraumatic  Nose: Nose normal    Eyes:      General: No scleral icterus  Right eye: No discharge  Left eye: No discharge  Conjunctiva/sclera: Conjunctivae normal    Cardiovascular:      Rate and Rhythm: Normal rate and regular rhythm  Heart sounds: Normal heart sounds  Pulmonary:      Effort: Pulmonary effort is normal  No respiratory distress  Breath sounds: Normal breath sounds  No stridor  No wheezing or rales  Chest:      Chest wall: No tenderness  Abdominal:      General: Bowel sounds are normal       Palpations: Abdomen is soft  Tenderness: There is no abdominal tenderness  There is no guarding or rebound  Comments: Abdomen is obese, soft and benign  Nonreducible umbilical hernia   Musculoskeletal:         General: No deformity  Normal range of motion  Cervical back: Normal range of motion and neck supple  Lymphadenopathy:      Cervical: No cervical adenopathy  Skin:     General: Skin is warm and dry  Findings: No erythema or rash  Neurological:      Mental Status: She is alert and oriented to person, place, and time  Psychiatric:         Behavior: Behavior normal          Thought Content: Thought content normal          Judgment: Judgment normal          Lab Results: I have personally reviewed pertinent lab results  Imaging: I have personally reviewed pertinent reports  EKG, Pathology, and Other Studies: I have personally reviewed pertinent reports  The endoscopy showed gastritis  The biopsies revealed  Final Diagnosis  STOMACH, RANDOM BIOPSIES:    - Corpus and antral-type mucosa with chronic inactive gastritis  - No H  pylori organisms identified on immunohistochemistry  - Negative for intestinal metaplasia or dysplasia           Assessment/PLAN:    33 y o  female morbidly obese found to be a good candidate to undergo a weight loss operation upon being enrolled here at the [facility]     Patient has a long history of morbid obesity and is presenting to discuss the surgical weight loss options  Despite the patient best efforts patient was unable to lose any meaningful or sustainable weight using nonsurgical means  We had a long discussion regarding all the surgical weight-loss options at our disposal at this point and reviewed the risks and benefits of each procedure in details as it relates to her age, BMI and medical conditions  She has been pre certified to undergo a Laparoscopic sleeve gastrectomy  We have discussed the 14%-20% incidence of post op heartburn after the sleeve gastrectomy and the fact that if this cannot be controlled with medication or conservative measures it might need to be converted to a Mely-en-Y gastric bypass  We have also discussed the fact that the patient needs to be followed up postoperatively and potentially get screening endoscopies in the future to rule out any development of pathology as a result of the sleeve gastrectomy  Here today to review her pre op test results  Has been medically cleared for the procedure     ++++++++++++++++++++++++++++++++  She is a Jain and does not want any blood related product transfusion  ++++++++++++++++++++++++++++++++    I have discussed with her at length the risks and benefits of the operation and reiterated the components of our multidisciplinary program and the importance of compliance and follow up in the post operative period  Although there is a great statistical chance of improvement or even resolution of most of her associated comorbidities, the results vary from patient to patient and they largely depend on her commitment  The patient was also instructed with regards to the importance of behavior modification, nutritional counseling, support meeting attendance and lifestyle changes that are important to ensure success      She was given the opportunity to ask questions and I have answered all of them  I have addressed with the patient the level of CODE STATUS for this hospital stay and after explaining the different options currently she wishes to be a Level I  She understands and wishes to proceed  She has lost all the weight required prior to surgery      Sheba Li MD  12/15/2022  1:06 PM

## 2022-12-15 NOTE — H&P
BARIATRIC H&P - BARIATRIC SURGERY  Malini Flor 33 y o  female MRN: [id number]  Unit/Bed#:  Encounter: [id number]      HPI:  Malini Flor is a 33 y o  female who presents with a long-standing history of morbid obesity  She was found to be a good candidate to undergo a bariatric operation upon [facility]  She is here today to discuss details of her surgery  Review of Systems   All other systems reviewed and are negative  Historical Information   Past Medical History:   Diagnosis Date   • Gastritis    • Migraine    • Obesity    • Seasonal allergies      Past Surgical History:   Procedure Laterality Date   • WISDOM TOOTH EXTRACTION Bilateral 03/2020     Social History   Social History     Substance and Sexual Activity   Alcohol Use Yes   • Alcohol/week: 1 0 standard drink   • Types: 1 Glasses of wine per week    Comment: occasional     Social History     Substance and Sexual Activity   Drug Use No     Social History     Tobacco Use   Smoking Status Never   Smokeless Tobacco Never     Family History: non-contributory    Meds/Allergies   all medications and allergies reviewed  Allergies   Allergen Reactions   • Contrast [Iodinated Diagnostic Agents] Anaphylaxis     throat closure   • Fish-Derived Products - Food Allergy Anaphylaxis     Anaphylaxis   • Pollen Extract Other (See Comments)     hives, throat closure   • Shellfish-Derived Products - Food Allergy        Objective     Current Vitals:   Blood Pressure: 128/82 (12/15/22 1300)  Pulse: (!) 109 (12/15/22 1300)  Temperature: 98 3 °F (36 8 °C) (12/15/22 1300)  Temp Source: Tympanic (12/15/22 1300)  Height: 5' 1 5" (156 2 cm) (12/15/22 1300)  Weight - Scale: 111 kg (244 lb 8 oz) (12/15/22 1300)      Invasive Devices     None                 Physical Exam  Vitals and nursing note reviewed  Constitutional:       General: She is not in acute distress  Appearance: Normal appearance   She is well-developed  She is not diaphoretic  HENT:      Head: Normocephalic and atraumatic  Nose: Nose normal    Eyes:      General: No scleral icterus  Right eye: No discharge  Left eye: No discharge  Conjunctiva/sclera: Conjunctivae normal    Cardiovascular:      Rate and Rhythm: Normal rate and regular rhythm  Heart sounds: Normal heart sounds  Pulmonary:      Effort: Pulmonary effort is normal  No respiratory distress  Breath sounds: Normal breath sounds  No stridor  No wheezing or rales  Chest:      Chest wall: No tenderness  Abdominal:      General: Bowel sounds are normal       Palpations: Abdomen is soft  Tenderness: There is no abdominal tenderness  There is no guarding or rebound  Comments: Abdomen is obese, soft and benign  Nonreducible umbilical hernia   Musculoskeletal:         General: No deformity  Normal range of motion  Cervical back: Normal range of motion and neck supple  Lymphadenopathy:      Cervical: No cervical adenopathy  Skin:     General: Skin is warm and dry  Findings: No erythema or rash  Neurological:      Mental Status: She is alert and oriented to person, place, and time  Psychiatric:         Behavior: Behavior normal          Thought Content: Thought content normal          Judgment: Judgment normal          Lab Results: I have personally reviewed pertinent lab results  Imaging: I have personally reviewed pertinent reports  EKG, Pathology, and Other Studies: I have personally reviewed pertinent reports  The endoscopy showed gastritis  The biopsies revealed  Final Diagnosis  STOMACH, RANDOM BIOPSIES:    - Corpus and antral-type mucosa with chronic inactive gastritis  - No H  pylori organisms identified on immunohistochemistry  - Negative for intestinal metaplasia or dysplasia           Assessment/PLAN:    33 y o  female morbidly obese found to be a good candidate to undergo a weight loss operation upon being enrolled here at the [address] [place] [address]     Patient has a long history of morbid obesity and is presenting to discuss the surgical weight loss options  Despite the patient best efforts patient was unable to lose any meaningful or sustainable weight using nonsurgical means  We had a long discussion regarding all the surgical weight-loss options at our disposal at this point and reviewed the risks and benefits of each procedure in details as it relates to her age, BMI and medical conditions  She has been pre certified to undergo a Laparoscopic sleeve gastrectomy  We have discussed the 14%-20% incidence of post op heartburn after the sleeve gastrectomy and the fact that if this cannot be controlled with medication or conservative measures it might need to be converted to a Mely-en-Y gastric bypass  We have also discussed the fact that the patient needs to be followed up postoperatively and potentially get screening endoscopies in the future to rule out any development of pathology as a result of the sleeve gastrectomy  Here today to review her pre op test results  Has been medically cleared for the procedure     ++++++++++++++++++++++++++++++++  She is a Amish and does not want any blood related product transfusion  ++++++++++++++++++++++++++++++++    I have discussed with her at length the risks and benefits of the operation and reiterated the components of our multidisciplinary program and the importance of compliance and follow up in the post operative period  Although there is a great statistical chance of improvement or even resolution of most of her associated comorbidities, the results vary from patient to patient and they largely depend on her commitment  The patient was also instructed with regards to the importance of behavior modification, nutritional counseling, support meeting attendance and lifestyle changes that are important to ensure success      She was given the opportunity to ask questions and I have answered all of them  I have addressed with the patient the level of CODE STATUS for this hospital stay and after explaining the different options currently she wishes to be a Level I  She understands and wishes to proceed  She has lost all the weight required prior to surgery      Sonido Warren MD  12/15/2022  1:06 PM

## 2022-12-16 DIAGNOSIS — E66.01 MORBID OBESITY (HCC): Primary | ICD-10-CM

## 2022-12-16 NOTE — PSYCH
Virtual Regular Visit    Verification of patient location:    Patient is located in the following state in which I hold an active license { amb virtual patient location:[address]}      Assessment/Plan:    Problem List Items Addressed This Visit        Psychiatry Problems    Adjustment disorder with mixed anxiety and depressed mood - Primary       Goals addressed in session: {GOALS:62755}          Reason for visit is   Chief Complaint   Patient presents with   • Virtual Regular Visit   • Virtual Regular Visit        Encounter provider Lisa Solis    Provider located at [address] [place] [id number] [phone]      Recent Visits  Date Type Provider Dept   12/13/22 Telemedicine [facility] [place], [place] [facility]   Showing recent visits within past 7 days and meeting all other requirements  Future Appointments  No visits were found meeting these conditions  Showing future appointments within next 150 days and meeting all other requirements       The patient was identified by name and date of birth  Michelle Henley was informed that this is a telemedicine visit and that the visit is being conducted through{AMB VIRTUAL VISIT LVYOUI:73600}  {Telemedicine confidentiality :45454} {Telemedicine participants:47304}  She acknowledged consent and understanding of privacy and security of the video platform  The patient has agreed to participate and understands they can discontinue the visit at any time  Patient is aware this is a billable service  Subjective  Michelle Henley is a 33 y o  female ***         HPI     Past Medical History:   Diagnosis Date   • Gastritis    • Migraine    • Obesity    • Seasonal allergies        Past Surgical History:   Procedure Laterality Date   • WISDOM TOOTH EXTRACTION Bilateral 03/2020       Current Outpatient Medications   Medication Sig Dispense Refill   • acetaminophen (TYLENOL) 500 mg tablet Take 500 mg by mouth every 6 (six) hours as needed for mild pain     • aspirin-acetaminophen-caffeine (EXCEDRIN MIGRAINE) 250-250-65 MG per tablet Take 1 tablet by mouth every 6 (six) hours as needed for headaches (Patient not taking: Reported on 12/15/2022)     • Cholecalciferol (D3 ADULT PO) Take by mouth     • norethindrone-ethinyl estradiol (OVCON) 0 4-35 MG-MCG per tablet Take 1 tablet by mouth daily 28 tablet 12   • Prenatal-FeFum-FA-DHA w/o A (PRENATAL + DHA PO) Take by mouth Per nutritionist       No current facility-administered medications for this visit  Allergies   Allergen Reactions   • Contrast [Iodinated Diagnostic Agents] Anaphylaxis     throat closure   • Fish-Derived Products - Food Allergy Anaphylaxis     Anaphylaxis   • Pollen Extract Other (See Comments)     hives, throat closure   • Shellfish-Derived Products - Food Allergy        Review of Systems    Video Exam    There were no vitals filed for this visit      Physical Exam     Visit Time    Visit Start Time: ***  Visit Stop Time: ***  Total Visit Duration: {Psych Total Visit Time:46700}

## 2022-12-22 RX ORDER — ENOXAPARIN SODIUM 100 MG/ML
40 INJECTION SUBCUTANEOUS DAILY
Qty: 5.2 ML | Refills: 0 | Status: SHIPPED | OUTPATIENT
Start: 2023-01-10 | End: 2023-01-23

## 2022-12-22 RX ORDER — OXYCODONE HYDROCHLORIDE 5 MG/1
5 TABLET ORAL EVERY 4 HOURS PRN
Qty: 10 TABLET | Refills: 0 | Status: SHIPPED | OUTPATIENT
Start: 2023-01-10

## 2022-12-22 RX ORDER — OMEPRAZOLE 20 MG/1
20 CAPSULE, DELAYED RELEASE ORAL DAILY
Qty: 30 CAPSULE | Refills: 3 | Status: SHIPPED | OUTPATIENT
Start: 2023-01-10

## 2022-12-30 NOTE — PRE-PROCEDURE INSTRUCTIONS
Pre-Surgery Instructions:   Medication Instructions   • acetaminophen (TYLENOL) 500 mg tablet Uses PRN- OK to take day of surgery   • NON FORMULARY Instructions provided by MD   • norethindrone-ethinyl estradiol (Carlin Marlene) 0 4-35 MG-MCG per tablet Stopped taking 12/19/22 as instructed by Surgeon  Covid screening negative as per patient  Reviewed with patient via phone all medication instructions  Advised not to take any NSAID's, Vitamins or Herbal products not ordered by Surgeon for 7 days prior to the DOS  Acetaminophen products are ok to take  Reviewed showering instructions and confirmed pt received 3 Ensure drinks with instructions as given by surgical office  Instructed to call office with any questions or concerns  Instructed about NPO after midnight the night before DOS, except sips of water with allowed medications and 3rd Ensure drink as instructed by Surgical office in AM on DOS  Informed about call from [facility] with the time to arrive for the scheduled surgery  Patient verbalized understanding

## 2023-01-06 ENCOUNTER — ANESTHESIA EVENT (INPATIENT)
Dept: PERIOP | Facility: HOSPITAL | Age: 28
End: 2023-01-06

## 2023-01-09 ENCOUNTER — ANESTHESIA (INPATIENT)
Dept: PERIOP | Facility: HOSPITAL | Age: 28
End: 2023-01-09

## 2023-01-09 ENCOUNTER — HOSPITAL ENCOUNTER (INPATIENT)
Facility: HOSPITAL | Age: 28
LOS: 2 days | Discharge: HOME | End: 2023-01-11
Attending: SURGERY | Admitting: SURGERY

## 2023-01-09 DIAGNOSIS — E66.01 MORBID OBESITY (HCC): ICD-10-CM

## 2023-01-09 LAB
EXT PREGNANCY TEST URINE: NEGATIVE
EXT. CONTROL: NORMAL

## 2023-01-09 PROCEDURE — 0DB64Z3 EXCISION OF STOMACH, PERCUTANEOUS ENDOSCOPIC APPROACH, VERTICAL: ICD-10-PCS | Performed by: SURGERY

## 2023-01-09 PROCEDURE — 0DJ08ZZ INSPECTION OF UPPER INTESTINAL TRACT, VIA NATURAL OR ARTIFICIAL OPENING ENDOSCOPIC: ICD-10-PCS | Performed by: SURGERY

## 2023-01-09 DEVICE — SEAMGUARD STPL REINF ENDO GIA ULTRA UNV 60 BLACK: Type: IMPLANTABLE DEVICE | Site: STOMACH | Status: FUNCTIONAL

## 2023-01-09 DEVICE — SEAMGUARD STPL REINF ENDO GIA ULTRA UNIV 60 PURPLE: Type: IMPLANTABLE DEVICE | Site: STOMACH | Status: FUNCTIONAL

## 2023-01-09 RX ORDER — OXYCODONE HCL 5 MG/5 ML
10 SOLUTION, ORAL ORAL EVERY 4 HOURS PRN
Status: DISCONTINUED | OUTPATIENT
Start: 2023-01-09 | End: 2023-01-11 | Stop reason: HOSPADM

## 2023-01-09 RX ORDER — SODIUM CHLORIDE, SODIUM LACTATE, POTASSIUM CHLORIDE, CALCIUM CHLORIDE 600; 310; 30; 20 MG/100ML; MG/100ML; MG/100ML; MG/100ML
125 INJECTION, SOLUTION INTRAVENOUS CONTINUOUS
Status: DISCONTINUED | OUTPATIENT
Start: 2023-01-09 | End: 2023-01-11 | Stop reason: HOSPADM

## 2023-01-09 RX ORDER — MORPHINE SULFATE 4 MG/ML
4 INJECTION, SOLUTION INTRAMUSCULAR; INTRAVENOUS EVERY 4 HOURS PRN
Status: DISCONTINUED | OUTPATIENT
Start: 2023-01-09 | End: 2023-01-11 | Stop reason: HOSPADM

## 2023-01-09 RX ORDER — ROCURONIUM BROMIDE 10 MG/ML
INJECTION, SOLUTION INTRAVENOUS AS NEEDED
Status: DISCONTINUED | OUTPATIENT
Start: 2023-01-09 | End: 2023-01-09

## 2023-01-09 RX ORDER — CELECOXIB 200 MG/1
200 CAPSULE ORAL ONCE
Status: COMPLETED | OUTPATIENT
Start: 2023-01-09 | End: 2023-01-09

## 2023-01-09 RX ORDER — FENTANYL CITRATE 50 UG/ML
INJECTION, SOLUTION INTRAMUSCULAR; INTRAVENOUS AS NEEDED
Status: DISCONTINUED | OUTPATIENT
Start: 2023-01-09 | End: 2023-01-09

## 2023-01-09 RX ORDER — HYDROMORPHONE HCL/PF 1 MG/ML
0.5 SYRINGE (ML) INJECTION
Status: DISCONTINUED | OUTPATIENT
Start: 2023-01-09 | End: 2023-01-09 | Stop reason: HOSPADM

## 2023-01-09 RX ORDER — OXYCODONE HCL 5 MG/5 ML
5 SOLUTION, ORAL ORAL EVERY 4 HOURS PRN
Status: DISCONTINUED | OUTPATIENT
Start: 2023-01-09 | End: 2023-01-11 | Stop reason: HOSPADM

## 2023-01-09 RX ORDER — GABAPENTIN 300 MG/1
300 CAPSULE ORAL ONCE
Status: COMPLETED | OUTPATIENT
Start: 2023-01-09 | End: 2023-01-09

## 2023-01-09 RX ORDER — ACETAMINOPHEN 160 MG/5ML
975 SUSPENSION, ORAL (FINAL DOSE FORM) ORAL EVERY 8 HOURS
Status: DISCONTINUED | OUTPATIENT
Start: 2023-01-09 | End: 2023-01-11 | Stop reason: HOSPADM

## 2023-01-09 RX ORDER — FAMOTIDINE 10 MG/ML
20 INJECTION, SOLUTION INTRAVENOUS 2 TIMES DAILY
Status: DISCONTINUED | OUTPATIENT
Start: 2023-01-09 | End: 2023-01-11 | Stop reason: HOSPADM

## 2023-01-09 RX ORDER — GLYCOPYRROLATE 0.2 MG/ML
INJECTION INTRAMUSCULAR; INTRAVENOUS AS NEEDED
Status: DISCONTINUED | OUTPATIENT
Start: 2023-01-09 | End: 2023-01-09

## 2023-01-09 RX ORDER — SIMETHICONE 80 MG
80 TABLET,CHEWABLE ORAL 4 TIMES DAILY PRN
Status: DISCONTINUED | OUTPATIENT
Start: 2023-01-09 | End: 2023-01-11 | Stop reason: HOSPADM

## 2023-01-09 RX ORDER — SODIUM CHLORIDE, SODIUM LACTATE, POTASSIUM CHLORIDE, CALCIUM CHLORIDE 600; 310; 30; 20 MG/100ML; MG/100ML; MG/100ML; MG/100ML
100 INJECTION, SOLUTION INTRAVENOUS CONTINUOUS
Status: DISCONTINUED | OUTPATIENT
Start: 2023-01-09 | End: 2023-01-11 | Stop reason: HOSPADM

## 2023-01-09 RX ORDER — DEXAMETHASONE SODIUM PHOSPHATE 10 MG/ML
INJECTION, SOLUTION INTRAMUSCULAR; INTRAVENOUS AS NEEDED
Status: DISCONTINUED | OUTPATIENT
Start: 2023-01-09 | End: 2023-01-09

## 2023-01-09 RX ORDER — ONDANSETRON 2 MG/ML
4 INJECTION INTRAMUSCULAR; INTRAVENOUS ONCE AS NEEDED
Status: DISCONTINUED | OUTPATIENT
Start: 2023-01-09 | End: 2023-01-09 | Stop reason: HOSPADM

## 2023-01-09 RX ORDER — ONDANSETRON 2 MG/ML
4 INJECTION INTRAMUSCULAR; INTRAVENOUS EVERY 6 HOURS PRN
Status: DISCONTINUED | OUTPATIENT
Start: 2023-01-09 | End: 2023-01-11 | Stop reason: HOSPADM

## 2023-01-09 RX ORDER — MIDAZOLAM HYDROCHLORIDE 2 MG/2ML
INJECTION, SOLUTION INTRAMUSCULAR; INTRAVENOUS AS NEEDED
Status: DISCONTINUED | OUTPATIENT
Start: 2023-01-09 | End: 2023-01-09

## 2023-01-09 RX ORDER — LIDOCAINE HYDROCHLORIDE 10 MG/ML
INJECTION, SOLUTION EPIDURAL; INFILTRATION; INTRACAUDAL; PERINEURAL AS NEEDED
Status: DISCONTINUED | OUTPATIENT
Start: 2023-01-09 | End: 2023-01-09

## 2023-01-09 RX ORDER — PROPOFOL 10 MG/ML
INJECTION, EMULSION INTRAVENOUS AS NEEDED
Status: DISCONTINUED | OUTPATIENT
Start: 2023-01-09 | End: 2023-01-09

## 2023-01-09 RX ORDER — NEOSTIGMINE METHYLSULFATE 1 MG/ML
INJECTION INTRAVENOUS AS NEEDED
Status: DISCONTINUED | OUTPATIENT
Start: 2023-01-09 | End: 2023-01-09

## 2023-01-09 RX ORDER — CEFAZOLIN SODIUM 2 G/50ML
2000 SOLUTION INTRAVENOUS ONCE
Status: DISCONTINUED | OUTPATIENT
Start: 2023-01-09 | End: 2023-01-09 | Stop reason: HOSPADM

## 2023-01-09 RX ORDER — PROMETHAZINE HYDROCHLORIDE 25 MG/ML
25 INJECTION, SOLUTION INTRAMUSCULAR; INTRAVENOUS EVERY 6 HOURS PRN
Status: DISCONTINUED | OUTPATIENT
Start: 2023-01-09 | End: 2023-01-11 | Stop reason: HOSPADM

## 2023-01-09 RX ORDER — METOCLOPRAMIDE HYDROCHLORIDE 5 MG/ML
10 INJECTION INTRAMUSCULAR; INTRAVENOUS EVERY 6 HOURS PRN
Status: DISCONTINUED | OUTPATIENT
Start: 2023-01-09 | End: 2023-01-11 | Stop reason: HOSPADM

## 2023-01-09 RX ORDER — ONDANSETRON 2 MG/ML
INJECTION INTRAMUSCULAR; INTRAVENOUS AS NEEDED
Status: DISCONTINUED | OUTPATIENT
Start: 2023-01-09 | End: 2023-01-09

## 2023-01-09 RX ORDER — ACETAMINOPHEN 325 MG/1
975 TABLET ORAL ONCE
Status: COMPLETED | OUTPATIENT
Start: 2023-01-09 | End: 2023-01-09

## 2023-01-09 RX ORDER — ENOXAPARIN SODIUM 100 MG/ML
40 INJECTION SUBCUTANEOUS
Status: COMPLETED | OUTPATIENT
Start: 2023-01-09 | End: 2023-01-09

## 2023-01-09 RX ORDER — SODIUM CHLORIDE 9 MG/ML
INJECTION, SOLUTION INTRAVENOUS AS NEEDED
Status: DISCONTINUED | OUTPATIENT
Start: 2023-01-09 | End: 2023-01-09 | Stop reason: HOSPADM

## 2023-01-09 RX ORDER — ACETAMINOPHEN 325 MG/1
975 TABLET ORAL EVERY 8 HOURS
Status: DISCONTINUED | OUTPATIENT
Start: 2023-01-09 | End: 2023-01-11 | Stop reason: HOSPADM

## 2023-01-09 RX ORDER — SCOLOPAMINE TRANSDERMAL SYSTEM 1 MG/1
1 PATCH, EXTENDED RELEASE TRANSDERMAL ONCE
Status: DISCONTINUED | OUTPATIENT
Start: 2023-01-09 | End: 2023-01-11 | Stop reason: HOSPADM

## 2023-01-09 RX ORDER — DIPHENHYDRAMINE HCL 25 MG
25 TABLET ORAL EVERY 8 HOURS PRN
Status: DISCONTINUED | OUTPATIENT
Start: 2023-01-09 | End: 2023-01-11 | Stop reason: HOSPADM

## 2023-01-09 RX ORDER — BUPIVACAINE HYDROCHLORIDE 5 MG/ML
INJECTION, SOLUTION EPIDURAL; INTRACAUDAL AS NEEDED
Status: DISCONTINUED | OUTPATIENT
Start: 2023-01-09 | End: 2023-01-09 | Stop reason: HOSPADM

## 2023-01-09 RX ORDER — DEXMEDETOMIDINE HYDROCHLORIDE 100 UG/ML
INJECTION, SOLUTION INTRAVENOUS AS NEEDED
Status: DISCONTINUED | OUTPATIENT
Start: 2023-01-09 | End: 2023-01-09

## 2023-01-09 RX ORDER — ENOXAPARIN SODIUM 100 MG/ML
40 INJECTION SUBCUTANEOUS EVERY 24 HOURS
Status: DISCONTINUED | OUTPATIENT
Start: 2023-01-10 | End: 2023-01-11 | Stop reason: HOSPADM

## 2023-01-09 RX ORDER — CEFAZOLIN SODIUM 2 G/50ML
SOLUTION INTRAVENOUS AS NEEDED
Status: DISCONTINUED | OUTPATIENT
Start: 2023-01-09 | End: 2023-01-09

## 2023-01-09 RX ADMIN — NEOSTIGMINE METHYLSULFATE 3 MG: 1 INJECTION INTRAVENOUS at 11:57

## 2023-01-09 RX ADMIN — LIDOCAINE HYDROCHLORIDE 100 MG: 10 INJECTION, SOLUTION EPIDURAL; INFILTRATION; INTRACAUDAL; PERINEURAL at 10:24

## 2023-01-09 RX ADMIN — SODIUM CHLORIDE, SODIUM LACTATE, POTASSIUM CHLORIDE, AND CALCIUM CHLORIDE 125 ML/HR: .6; .31; .03; .02 INJECTION, SOLUTION INTRAVENOUS at 08:33

## 2023-01-09 RX ADMIN — DEXMEDETOMIDINE HCL 4 MCG: 100 INJECTION INTRAVENOUS at 10:51

## 2023-01-09 RX ADMIN — FENTANYL CITRATE 50 MCG: 50 INJECTION INTRAMUSCULAR; INTRAVENOUS at 10:34

## 2023-01-09 RX ADMIN — ONDANSETRON 4 MG: 2 INJECTION INTRAMUSCULAR; INTRAVENOUS at 11:37

## 2023-01-09 RX ADMIN — DEXAMETHASONE SODIUM PHOSPHATE 10 MG: 10 INJECTION INTRAMUSCULAR; INTRAVENOUS at 10:35

## 2023-01-09 RX ADMIN — DEXMEDETOMIDINE HCL 4 MCG: 100 INJECTION INTRAVENOUS at 10:56

## 2023-01-09 RX ADMIN — ROCURONIUM BROMIDE 60 MG: 10 INJECTION, SOLUTION INTRAVENOUS at 10:24

## 2023-01-09 RX ADMIN — DEXMEDETOMIDINE HCL 4 MCG: 100 INJECTION INTRAVENOUS at 10:44

## 2023-01-09 RX ADMIN — GABAPENTIN 300 MG: 300 CAPSULE ORAL at 08:13

## 2023-01-09 RX ADMIN — MIDAZOLAM 2 MG: 1 INJECTION INTRAMUSCULAR; INTRAVENOUS at 10:17

## 2023-01-09 RX ADMIN — DEXMEDETOMIDINE HCL 4 MCG: 100 INJECTION INTRAVENOUS at 11:03

## 2023-01-09 RX ADMIN — DEXMEDETOMIDINE HCL 4 MCG: 100 INJECTION INTRAVENOUS at 11:26

## 2023-01-09 RX ADMIN — ENOXAPARIN SODIUM 40 MG: 40 INJECTION SUBCUTANEOUS at 09:25

## 2023-01-09 RX ADMIN — FAMOTIDINE 20 MG: 10 INJECTION, SOLUTION INTRAVENOUS at 21:57

## 2023-01-09 RX ADMIN — FENTANYL CITRATE 50 MCG: 50 INJECTION INTRAMUSCULAR; INTRAVENOUS at 10:47

## 2023-01-09 RX ADMIN — SCOPALAMINE 1 PATCH: 1 PATCH, EXTENDED RELEASE TRANSDERMAL at 08:13

## 2023-01-09 RX ADMIN — DEXMEDETOMIDINE HCL 4 MCG: 100 INJECTION INTRAVENOUS at 11:08

## 2023-01-09 RX ADMIN — OXYCODONE HYDROCHLORIDE 10 MG: 5 SOLUTION ORAL at 19:36

## 2023-01-09 RX ADMIN — ONDANSETRON HYDROCHLORIDE 4 MG: 2 SOLUTION INTRAMUSCULAR; INTRAVENOUS at 19:35

## 2023-01-09 RX ADMIN — SODIUM CHLORIDE, SODIUM LACTATE, POTASSIUM CHLORIDE, AND CALCIUM CHLORIDE 100 ML/HR: .6; .31; .03; .02 INJECTION, SOLUTION INTRAVENOUS at 16:28

## 2023-01-09 RX ADMIN — ACETAMINOPHEN 975 MG: 325 SUSPENSION ORAL at 16:26

## 2023-01-09 RX ADMIN — GLYCOPYRROLATE 0.4 MG: 0.2 INJECTION INTRAMUSCULAR; INTRAVENOUS at 11:57

## 2023-01-09 RX ADMIN — HYDROMORPHONE HYDROCHLORIDE 0.5 MG: 1 INJECTION, SOLUTION INTRAMUSCULAR; INTRAVENOUS; SUBCUTANEOUS at 13:16

## 2023-01-09 RX ADMIN — CEFAZOLIN SODIUM 2000 MG: 2 SOLUTION INTRAVENOUS at 10:35

## 2023-01-09 RX ADMIN — FENTANYL CITRATE 100 MCG: 50 INJECTION INTRAMUSCULAR; INTRAVENOUS at 10:24

## 2023-01-09 RX ADMIN — CELECOXIB 200 MG: 200 CAPSULE ORAL at 08:13

## 2023-01-09 RX ADMIN — DEXMEDETOMIDINE HCL 4 MCG: 100 INJECTION INTRAVENOUS at 10:41

## 2023-01-09 RX ADMIN — ACETAMINOPHEN 975 MG: 325 TABLET, FILM COATED ORAL at 08:13

## 2023-01-09 RX ADMIN — PROPOFOL 200 MG: 10 INJECTION, EMULSION INTRAVENOUS at 10:24

## 2023-01-09 RX ADMIN — FENTANYL CITRATE 50 MCG: 50 INJECTION INTRAMUSCULAR; INTRAVENOUS at 11:07

## 2023-01-09 RX ADMIN — DEXMEDETOMIDINE HCL 4 MCG: 100 INJECTION INTRAVENOUS at 10:58

## 2023-01-09 NOTE — ANESTHESIA POSTPROCEDURE EVALUATION
Post-Op Assessment Note    CV Status:  Stable    Pain management: adequate     Mental Status:  Alert and awake   Hydration Status:  Euvolemic   PONV Controlled:  Controlled   Airway Patency:  Patent      Post Op Vitals Reviewed: Yes      Staff: Anesthesiologist         No notable events documented      BP      Temp     Pulse     Resp      SpO2      /69   Pulse 87   Temp (!) 97 2 °F (36 2 °C)   Resp 22   Wt 108 kg (238 lb 15 7 oz)   LMP 12/19/2022   SpO2 100%   BMI 44 42 kg/m²

## 2023-01-09 NOTE — LETTER
[address]  Dept: [phone]    January 11, 2023     Patient: Zonia Saucedo   YOB: 1995   Date of Visit: 1/9/2023       To Whom it May Concern:    Zonia Saucedo is under my professional care  She was seen in the hospital from 1/9/2023 to 01/11/23  Please excuse her significant other from work on 1/11 as he is taking care of her through her discharge from the hospital     If you have any questions or concerns, please don't hesitate to call           Sincerely,          Shelia Jenkins, DO

## 2023-01-09 NOTE — INTERVAL H&P NOTE
H&P reviewed  After examining the patient I find no changes in the patients condition since the H&P had been written      Vitals:    01/09/23 0748   BP: 122/69   Pulse: (!) 106   Resp: 14   Temp: 98 4 °F (36 9 °C)   SpO2: 97%

## 2023-01-09 NOTE — PLAN OF CARE
Problem: PAIN - ADULT  Goal: Verbalizes/displays adequate comfort level or baseline comfort level  Description: Interventions:  - Encourage patient to monitor pain and request assistance  - Assess pain using appropriate pain scale  - Administer analgesics based on type and severity of pain and evaluate response  - Implement non-pharmacological measures as appropriate and evaluate response  - Consider cultural and social influences on pain and pain management  - Notify physician/advanced practitioner if interventions unsuccessful or patient reports new pain  Outcome: Progressing     Problem: INFECTION - ADULT  Goal: Absence or prevention of progression during hospitalization  Description: INTERVENTIONS:  - Assess and monitor for signs and symptoms of infection  - Monitor lab/diagnostic results  - Monitor all insertion sites, i e  indwelling lines, tubes, and drains  - Monitor endotracheal if appropriate and nasal secretions for changes in amount and color  - [place] appropriate cooling/warming therapies per order  - Administer medications as ordered  - Instruct and encourage patient and family to use good hand hygiene technique  - Identify and instruct in appropriate isolation precautions for identified infection/condition  Outcome: Progressing     Problem: SAFETY ADULT  Goal: Patient will remain free of falls  Description: INTERVENTIONS:  - Educate patient/family on patient safety including physical limitations  - Instruct patient to call for assistance with activity   - Consult OT/PT to assist with strengthening/mobility   - Keep Call bell within reach  - Keep bed low and locked with side rails adjusted as appropriate  - Keep care items and personal belongings within reach  - Initiate and maintain comfort rounds  - Make Fall Risk Sign visible to staff  - Apply yellow socks and bracelet for high fall risk patients  - Consider moving patient to room near nurses station  Outcome: Progressing     Problem: DISCHARGE PLANNING  Goal: Discharge to home or other facility with appropriate resources  Description: INTERVENTIONS:  - Identify barriers to discharge w/patient and caregiver  - Arrange for needed discharge resources and transportation as appropriate  - Identify discharge learning needs (meds, wound care, etc )  - Arrange for interpretive services to assist at discharge as needed  - Refer to Case Management Department for coordinating discharge planning if the patient needs post-hospital services based on physician/advanced practitioner order or complex needs related to functional status, cognitive ability, or social support system  Outcome: Progressing     Problem: Knowledge Deficit  Goal: Patient/family/caregiver demonstrates understanding of disease process, treatment plan, medications, and discharge instructions  Description: Complete learning assessment and assess knowledge base    Interventions:  - Provide teaching at level of understanding  - Provide teaching via preferred learning methods  Outcome: Progressing     Problem: GASTROINTESTINAL - ADULT  Goal: Minimal or absence of nausea and/or vomiting  Description: INTERVENTIONS:  - Administer IV fluids if ordered to ensure adequate hydration  - Maintain NPO status until nausea and vomiting are resolved  - Nasogastric tube if ordered  - Administer ordered antiemetic medications as needed  - Provide nonpharmacologic comfort measures as appropriate  - Advance diet as tolerated, if ordered  - Consider nutrition services referral to assist patient with adequate nutrition and appropriate food choices  Outcome: Progressing  Goal: Maintains or returns to baseline bowel function  Description: INTERVENTIONS:  - Assess bowel function  - Encourage oral fluids to ensure adequate hydration  - Administer IV fluids if ordered to ensure adequate hydration  - Administer ordered medications as needed  - Encourage mobilization and activity  - Consider nutritional services referral to assist patient with adequate nutrition and appropriate food choices  Outcome: Progressing  Goal: Maintains adequate nutritional intake  Description: INTERVENTIONS:  - Monitor percentage of each meal consumed  - Identify factors contributing to decreased intake, treat as appropriate  - Assist with meals as needed  - Monitor I&O, weight, and lab values if indicated  - Obtain nutrition services referral as needed  Outcome: Progressing     Problem: GENITOURINARY - ADULT  Goal: Maintains or returns to baseline urinary function  Description: INTERVENTIONS:  - Assess urinary function  - Encourage oral fluids to ensure adequate hydration if ordered  - Administer IV fluids as ordered to ensure adequate hydration  - Administer ordered medications as needed  - Offer frequent toileting  - Follow urinary retention protocol if ordered  Outcome: Progressing  Goal: Absence of urinary retention  Description: INTERVENTIONS:  - Assess patient’s ability to void and empty bladder  - Monitor I/O  - Bladder scan as needed  - Discuss with physician/AP medications to alleviate retention as needed  - Discuss catheterization for long term situations as appropriate  Outcome: Progressing     Problem: METABOLIC, FLUID AND ELECTROLYTES - ADULT  Goal: Electrolytes maintained within normal limits  Description: INTERVENTIONS:  - Monitor labs and assess patient for signs and symptoms of electrolyte imbalances  - Administer electrolyte replacement as ordered  - Monitor response to electrolyte replacements, including repeat lab results as appropriate  - Instruct patient on fluid and nutrition as appropriate  Outcome: Progressing

## 2023-01-09 NOTE — ANESTHESIA PREPROCEDURE EVALUATION
Procedure:  GASTRECTOMY SLEEVE LAP & INTRAOPERATIVE EGD (Abdomen)    Relevant Problems   CARDIO   (+) Migraine      GI/HEPATIC   (+) GERD (gastroesophageal reflux disease)      /RENAL   (+) Acquired renal cyst of left kidney      NEURO/PSYCH   (+) Migraine      Musculoskeletal and Integument   (+) Acquired acanthosis nigricans      Other   (+) Adjustment disorder with mixed anxiety and depressed mood   (+) No blood products   (+) Obesity, Class III, BMI 40-49 9 (morbid obesity) (HCC)   (+) Shellfish allergy   (+) Umbilical hernia without obstruction and without gangrene        Physical Exam    Airway    Mallampati score: II  TM Distance: <3 FB  Neck ROM: full     Dental   No notable dental hx     Cardiovascular  Rhythm: regular, Rate: normal,     Pulmonary  Breath sounds clear to auscultation,     Other Findings        Anesthesia Plan  ASA Score- 3     Anesthesia Type- general with ASA Monitors  Additional Monitors:   Airway Plan: ETT  Comment: Declines blood products, OK with cryoprecipitate, concentrated clotting factors and albumin  Plan Factors-Exercise tolerance (METS): >4 METS  Chart reviewed  Patient is not a current smoker  Obstructive sleep apnea risk education given perioperatively  Induction- intravenous  Postoperative Plan- Plan for postoperative opioid use  Informed Consent- Anesthetic plan and risks discussed with patient

## 2023-01-09 NOTE — OP NOTE
[facility]   [address] [facility], [address]  [phone] (Fax)      Operative Report  GASTRECTOMY SLEEVE LAP & INTRAOPERATIVE EGD     Patient Name: Estefania Hayes    :  1995  MRN: [id number]  Patient Location: [facility] OR ROOM 06  Surgery Date : 2023  Surgeons:  Surgeon(s) and Role:     * Pantera Benoit MD - Primary     * Dario Mario DO - [address] [place] [address], PA-     Diagnosis:    Pre-Op Diagnosis Codes: Morbid obesity ([facility] ) [E66 01]  Body mass index is 44 42 kg/m²  Post-Op Diagnosis Codes:     * Morbid obesity ([facility] ) [E66 01]      * Body mass index is 44 42 kg/m²  Procedure  1  Laparoscopic Sleeve Gastrectomy  2  Intraoperative Endoscopy    Specimen(s):  ID Type Source Tests Collected by Time Destination   1 : Portion of stomach Tissue Stomach TISSUE EXAM Pantera Benoit MD 2023 1102        Estimated Blood Loss:    25 mL     Anesthesia Type:     General    Operative Indications: Morbid obesity ([facility] ) [E66 01]  Body mass index is 44 42 kg/m²  Operative Findings:    Normal    Complications:     None    Procedure and Technique:    INDICATION    Estefania Hayes is a 33 y o  female with a Body mass index is 44 42 kg/m²  and a long standing history of morbid obesity and inability to lose a significant amount of weight on its own  This patient was found to be a good candidate to undergo a bariatric procedure upon being enrolled here at the [address] [place] [address]  OPERATIVE TECHNIQUE    The patient was taken to the operating room and placed in a supine position  A dose of IV antibiotic prophylaxis that consisted of Ancef 2g was given  Also 40 mg of subcutaneous Enoxaparin to prevent deep vein thrombosis were administered  Sequential compression devices were placed on both lower extremities      After satisfactory general anesthesia induction and endotracheal intubation was achieved, the extremities were placed and properly secured to prevent neuromuscular damage as best as possible  Subsequently, the abdominal wall was prepped and draped in a surgical standard sterile fashion  After a timeout was done and the patient was properly identified and the type of procedure was confirmed a supra-umbilical transverse skin incision was made and the subcutaneous tissues dissected  Access to the peritoneal cavity was gained with the Visiport trocar  With this device, we were able to visualize the layers of the abdominal wall, and enter the peritoneal cavity under direct visualization  Pneumoperitoneum was then established with CO2 insufflation  A four quadrant transversus abdominis plane block was performed under direct laparoscopic vision  After this was completed four additional trocars were placed: a 12 mm in the right upper quadrant subcostal position in the anterior axillary line, a 15-mm port was placed in the right flank midclavicular line, a 12-mm port was placed in the left upper quadrant subcostal position in the midclavicular line and another 12-mm port was placed in the left quadrant anterior axillary line lateral to the supraumbilical port  The [facility] liver retractor was placed in the subxiphoid position through the use of a 5-mm trocar incision  The patient was repositioned to a reverse Trendelenburg position  With the trocars in place, the dissection was begun  We divided the gastrocolic ligament with the energy device to enter the lesser sac  We continued to divide this ligament along the greater curvature of the stomach towards the angle of His  Special care was taken while dividing the short gastric vessels close to the spleen  This process was completely hemostatic  We then turned to the creation of an elongated and thin gastric pouch  A 36 [place] calibration tube was placed by the anesthesia staff into the stomach under our laparoscopic surveillance   Once the tip of the bougie was confirmed to be next to the pylorus, serial firings of the laparoscopic stapler with 60-mm cartridges were utilized  We started in a point inferior to the incisura angularis and the Crows foot nerve looking to preserve the gastric emptying  This was 5 to 6 centimeters proximal to the pylorus  The staple lines were reinforced with buttressing material  We created a pouch based on the lesser curve, and in vertical orientation  We continued the vertical serial firings of the stapler to the angle of His gently cinching the bougie with our laparoscopic stapler looking to create a thin pouch  As we approached the fundus of the stomach and the angle of His, the stapler loads were changed appropriately according to the variable thickness of the tissue  This completely  the pouch from the remnant stomach  We then turned our attention to the newly created pouch and examined it for bleeding or obvious defects on the staple lines and none were found  The distal stomach pouch was occluded with a Aureliano clamp, and an EGD as well as an air insufflation test was performed  Neither intraoperative bleeding nor leaks were detected  The periumbilical trocar site was dilated and the gastric remnant was externalized through it and passed off the surgical field to be sent to pathology  I then sprayed with [place] seal and covered the staple line with a tongue of omentum in a Pietro patch fashion and secured it in place with a single 2/0 Vicryl stitch  The sponge, needle and instrument count was reported complete  The previously dilated trocar site and the 15 mm were then closed with use of a suture closure device and a figure-of-eight with absorbable suture  The pneumoperitoneum was evacuated using the [facility] filter and smoke evacuator  The liver retractor and the remainder ports were then removed under direct laparoscopic visualization and no back bleeding was noted   The skin incisions were all closed with 4-0 absorbable subcuticular suture  The patient tolerated the procedure well, was extubated uneventfully and was transferred to the recovery room in stable condition  I was present for the entire length of the procedure as the attending of record  No qualified resident was available to assist   The presence of an assistant was necessary for camera holding, traction and counter traction and for help with suturing and stapling in addition to performing the intraop-EGD        Patient Disposition:    PACU     Signature: Lorin Burroughs MD  Date: January 9, 2023  Time: 11:43 AM

## 2023-01-10 LAB
ANION GAP SERPL CALCULATED.3IONS-SCNC: 12 MMOL/L (ref 4–13)
BUN SERPL-MCNC: 3 MG/DL (ref 5–25)
CALCIUM SERPL-MCNC: 8.2 MG/DL (ref 8.3–10.1)
CHLORIDE SERPL-SCNC: 103 MMOL/L (ref 96–108)
CO2 SERPL-SCNC: 20 MMOL/L (ref 21–32)
CREAT SERPL-MCNC: 0.57 MG/DL (ref 0.6–1.3)
ERYTHROCYTE [DISTWIDTH] IN BLOOD BY AUTOMATED COUNT: 14.7 % (ref 11.6–15.1)
GFR SERPL CREATININE-BSD FRML MDRD: 128 ML/MIN/1.73SQ M
GLUCOSE SERPL-MCNC: 92 MG/DL (ref 65–140)
HCT VFR BLD AUTO: 29.9 % (ref 34.8–46.1)
HGB BLD-MCNC: 9.7 G/DL (ref 11.5–15.4)
MCH RBC QN AUTO: 27 PG (ref 26.8–34.3)
MCHC RBC AUTO-ENTMCNC: 32.4 G/DL (ref 31.4–37.4)
MCV RBC AUTO: 83 FL (ref 82–98)
PLATELET # BLD AUTO: 203 THOUSANDS/UL (ref 149–390)
PMV BLD AUTO: 11 FL (ref 8.9–12.7)
POTASSIUM SERPL-SCNC: 4 MMOL/L (ref 3.5–5.3)
RBC # BLD AUTO: 3.59 MILLION/UL (ref 3.81–5.12)
SODIUM SERPL-SCNC: 135 MMOL/L (ref 135–147)
WBC # BLD AUTO: 10.77 THOUSAND/UL (ref 4.31–10.16)

## 2023-01-10 RX ADMIN — OXYCODONE HYDROCHLORIDE 10 MG: 5 SOLUTION ORAL at 19:49

## 2023-01-10 RX ADMIN — SODIUM CHLORIDE, SODIUM LACTATE, POTASSIUM CHLORIDE, AND CALCIUM CHLORIDE 100 ML/HR: .6; .31; .03; .02 INJECTION, SOLUTION INTRAVENOUS at 03:28

## 2023-01-10 RX ADMIN — FAMOTIDINE 20 MG: 10 INJECTION, SOLUTION INTRAVENOUS at 21:42

## 2023-01-10 RX ADMIN — SODIUM CHLORIDE, SODIUM LACTATE, POTASSIUM CHLORIDE, AND CALCIUM CHLORIDE 100 ML/HR: .6; .31; .03; .02 INJECTION, SOLUTION INTRAVENOUS at 13:42

## 2023-01-10 RX ADMIN — ONDANSETRON HYDROCHLORIDE 4 MG: 2 SOLUTION INTRAMUSCULAR; INTRAVENOUS at 06:29

## 2023-01-10 RX ADMIN — FAMOTIDINE 20 MG: 10 INJECTION, SOLUTION INTRAVENOUS at 09:17

## 2023-01-10 RX ADMIN — ACETAMINOPHEN 975 MG: 325 SUSPENSION ORAL at 00:12

## 2023-01-10 RX ADMIN — ENOXAPARIN SODIUM 40 MG: 40 INJECTION SUBCUTANEOUS at 09:16

## 2023-01-10 RX ADMIN — OXYCODONE HYDROCHLORIDE 10 MG: 5 SOLUTION ORAL at 06:32

## 2023-01-10 RX ADMIN — METOCLOPRAMIDE 10 MG: 5 INJECTION, SOLUTION INTRAMUSCULAR; INTRAVENOUS at 19:49

## 2023-01-10 RX ADMIN — METOCLOPRAMIDE 10 MG: 5 INJECTION, SOLUTION INTRAMUSCULAR; INTRAVENOUS at 09:21

## 2023-01-10 RX ADMIN — ACETAMINOPHEN 975 MG: 325 SUSPENSION ORAL at 09:16

## 2023-01-10 RX ADMIN — ONDANSETRON HYDROCHLORIDE 4 MG: 2 SOLUTION INTRAMUSCULAR; INTRAVENOUS at 17:37

## 2023-01-10 NOTE — PLAN OF CARE
Problem: PAIN - ADULT  Goal: Verbalizes/displays adequate comfort level or baseline comfort level  Description: Interventions:  - Encourage patient to monitor pain and request assistance  - Assess pain using appropriate pain scale  - Administer analgesics based on type and severity of pain and evaluate response  - Implement non-pharmacological measures as appropriate and evaluate response  - Consider cultural and social influences on pain and pain management  - Notify physician/advanced practitioner if interventions unsuccessful or patient reports new pain  Outcome: Progressing     Problem: INFECTION - ADULT  Goal: Absence or prevention of progression during hospitalization  Description: INTERVENTIONS:  - Assess and monitor for signs and symptoms of infection  - Monitor lab/diagnostic results  - Monitor all insertion sites, i e  indwelling lines, tubes, and drains  - Monitor endotracheal if appropriate and nasal secretions for changes in amount and color  - [place] appropriate cooling/warming therapies per order  - Administer medications as ordered  - Instruct and encourage patient and family to use good hand hygiene technique  - Identify and instruct in appropriate isolation precautions for identified infection/condition  Outcome: Progressing     Problem: SAFETY ADULT  Goal: Patient will remain free of falls  Description: INTERVENTIONS:  - Educate patient/family on patient safety including physical limitations  - Instruct patient to call for assistance with activity   - Consult OT/PT to assist with strengthening/mobility   - Keep Call bell within reach  - Keep bed low and locked with side rails adjusted as appropriate  - Keep care items and personal belongings within reach  - Initiate and maintain comfort rounds  - Make Fall Risk Sign visible to staff  - Offer Toileting every 2 Hours, in advance of need  - Initiate/Maintain bed alarm  - Apply yellow socks and bracelet for high fall risk patients  - Consider moving patient to room near nurses station  Outcome: Progressing  Goal: Maintain or return to baseline ADL function  Description: INTERVENTIONS:  -  Assess patient's ability to carry out ADLs; assess patient's baseline for ADL function and identify physical deficits which impact ability to perform ADLs (bathing, care of mouth/teeth, toileting, grooming, dressing, etc )  - Assess/evaluate cause of self-care deficits   - Assess range of motion  - Assess patient's mobility; develop plan if impaired  - Assess patient's need for assistive devices and provide as appropriate  - Encourage maximum independence but intervene and supervise when necessary  - Involve family in performance of ADLs  - Assess for home care needs following discharge   - Consider OT consult to assist with ADL evaluation and planning for discharge  - Provide patient education as appropriate  Outcome: Progressing  Goal: Maintains/Returns to pre admission functional level  Description: INTERVENTIONS:  - Perform BMAT or MOVE assessment daily    - Set and communicate daily mobility goal to care team and patient/family/caregiver  - Collaborate with rehabilitation services on mobility goals if consulted  - Perform Range of Motion 3 times a day  - Reposition patient every 2 hours    - Dangle patient 3 times a day  - Stand patient 3 times a day  - Ambulate patient 3 times a day  - Out of bed to chair 3 times a day   - Out of bed for meals 3 times a day  - Out of bed for toileting  - Record patient progress and toleration of activity level   Outcome: Progressing     Problem: DISCHARGE PLANNING  Goal: Discharge to home or other facility with appropriate resources  Description: INTERVENTIONS:  - Identify barriers to discharge w/patient and caregiver  - Arrange for needed discharge resources and transportation as appropriate  - Identify discharge learning needs (meds, wound care, etc )  - Arrange for interpretive services to assist at discharge as needed  - Refer to Case Management Department for coordinating discharge planning if the patient needs post-hospital services based on physician/advanced practitioner order or complex needs related to functional status, cognitive ability, or social support system  Outcome: Progressing     Problem: Knowledge Deficit  Goal: Patient/family/caregiver demonstrates understanding of disease process, treatment plan, medications, and discharge instructions  Description: Complete learning assessment and assess knowledge base    Interventions:  - Provide teaching at level of understanding  - Provide teaching via preferred learning methods  Outcome: Progressing     Problem: GASTROINTESTINAL - ADULT  Goal: Minimal or absence of nausea and/or vomiting  Description: INTERVENTIONS:  - Administer IV fluids if ordered to ensure adequate hydration  - Maintain NPO status until nausea and vomiting are resolved  - Nasogastric tube if ordered  - Administer ordered antiemetic medications as needed  - Provide nonpharmacologic comfort measures as appropriate  - Advance diet as tolerated, if ordered  - Consider nutrition services referral to assist patient with adequate nutrition and appropriate food choices  Outcome: Progressing  Goal: Maintains or returns to baseline bowel function  Description: INTERVENTIONS:  - Assess bowel function  - Encourage oral fluids to ensure adequate hydration  - Administer IV fluids if ordered to ensure adequate hydration  - Administer ordered medications as needed  - Encourage mobilization and activity  - Consider nutritional services referral to assist patient with adequate nutrition and appropriate food choices  Outcome: Progressing  Goal: Maintains adequate nutritional intake  Description: INTERVENTIONS:  - Monitor percentage of each meal consumed  - Identify factors contributing to decreased intake, treat as appropriate  - Assist with meals as needed  - Monitor I&O, weight, and lab values if indicated  - Obtain nutrition services referral as needed  Outcome: Progressing     Problem: GENITOURINARY - ADULT  Goal: Maintains or returns to baseline urinary function  Description: INTERVENTIONS:  - Assess urinary function  - Encourage oral fluids to ensure adequate hydration if ordered  - Administer IV fluids as ordered to ensure adequate hydration  - Administer ordered medications as needed  - Offer frequent toileting  - Follow urinary retention protocol if ordered  Outcome: Progressing  Goal: Absence of urinary retention  Description: INTERVENTIONS:  - Assess patient’s ability to void and empty bladder  - Monitor I/O  - Bladder scan as needed  - Discuss with physician/AP medications to alleviate retention as needed  - Discuss catheterization for long term situations as appropriate  Outcome: Progressing     Problem: METABOLIC, FLUID AND ELECTROLYTES - ADULT  Goal: Electrolytes maintained within normal limits  Description: INTERVENTIONS:  - Monitor labs and assess patient for signs and symptoms of electrolyte imbalances  - Administer electrolyte replacement as ordered  - Monitor response to electrolyte replacements, including repeat lab results as appropriate  - Instruct patient on fluid and nutrition as appropriate  Outcome: Progressing  Goal: Fluid balance maintained  Description: INTERVENTIONS:  - Monitor labs   - Monitor I/O and WT  - Instruct patient on fluid and nutrition as appropriate  - Assess for signs & symptoms of volume excess or deficit  Outcome: Progressing     Problem: Potential for Falls  Goal: Patient will remain free of falls  Description: INTERVENTIONS:  - Educate patient/family on patient safety including physical limitations  - Instruct patient to call for assistance with activity   - Consult OT/PT to assist with strengthening/mobility   - Keep Call bell within reach  - Keep bed low and locked with side rails adjusted as appropriate  - Keep care items and personal belongings within reach  - Initiate and maintain comfort rounds  - Make Fall Risk Sign visible to staff  - Offer Toileting every 2 Hours, in advance of need  - Initiate/Maintain bed alarm  - Apply yellow socks and bracelet for high fall risk patients  - Consider moving patient to room near nurses station  Outcome: Progressing

## 2023-01-10 NOTE — UTILIZATION REVIEW
Initial Clinical Review    Elective    Ip    surgical procedure    Age/Sex: 33 y o  female     Surgery Date:    1/9/23    1  Procedure: Laparoscopic Sleeve Gastrectomy  Intraoperative Endoscopy    Anesthesia:    general    Operative Findings:    normal    POD#1 Progress Note:   1/10     Continue post op care  Continue pain control/antiemetics as needed  Monitor labs  Hemoglobin  9 7  This  Am  Encourage  Fluids        Admission Orders: Date/Time/Statement:   Admission Orders (From admission, onward)     Ordered        01/09/23 0914  Inpatient Admission  Once                      Orders Placed This Encounter   Procedures   • Inpatient Admission     Standing Status:   Standing     Number of Occurrences:   1     Order Specific Question:   Level of Care     Answer:   Med Surg [16]     Order Specific Question:   Estimated length of stay     Answer:   Inpatient Only Surgery     Vital Signs: /88 (BP Location: Left arm)   Pulse 67   Temp 98 3 °F (36 8 °C) (Oral)   Resp 16   Wt 108 kg (238 lb 15 7 oz)   LMP 12/19/2022   SpO2 96%   BMI 44 42 kg/m²     Pertinent Labs/Diagnostic Test Results:     Results from last 7 days   Lab Units 01/10/23  0530   WBC Thousand/uL 10 77*   HEMOGLOBIN g/dL 9 7*   HEMATOCRIT % 29 9*   PLATELETS Thousands/uL 203         Results from last 7 days   Lab Units 01/10/23  0530   SODIUM mmol/L 135   POTASSIUM mmol/L 4 0   CHLORIDE mmol/L 103   CO2 mmol/L 20*   ANION GAP mmol/L 12   BUN mg/dL 3*   CREATININE mg/dL 0 57*   EGFR ml/min/1 73sq m 128   CALCIUM mg/dL 8 2*             Results from last 7 days   Lab Units 01/10/23  0530   GLUCOSE RANDOM mg/dL 92         Diet:   Bariatric cl liq    Mobility:   OOB as  tolerated    DVT Prophylaxis:    SCD'S    Medications/Pain Control:   Scheduled Medications:  acetaminophen, 975 mg, Oral, Q8H   Or  acetaminophen, 975 mg, Oral, Q8H  enoxaparin, 40 mg, Subcutaneous, Q24H  famotidine, 20 mg, Intravenous, BID  scopolamine, 1 patch, Transdermal, Once      Continuous IV Infusions:  lactated ringers, 125 mL/hr, Intravenous, Continuous  lactated ringers, 100 mL/hr, Intravenous, Continuous      PRN Meds:  diphenhydrAMINE, 25 mg, Oral, Q8H PRN  metoclopramide, 10 mg, Intravenous, Q6H PRN  ( x1  1/10 thus far)  morphine injection, 4 mg, Intravenous, Q4H PRN  ondansetron, 4 mg, Intravenous, Q6H PRN  ( x1  1/9 and X 1  1/10 thus far)  oxyCODONE, 10 mg, Oral, Q4H PRN  oxyCODONE, 5 mg, Oral, Q4H PRN  phenol, 2 spray, Mouth/Throat, Q2H PRN  promethazine, 25 mg, Intravenous, Q6H PRN  simethicone, 80 mg, Oral, 4x Daily PRN        Network Utilization Review Department  ATTENTION: Please call with any questions or concerns to [phone] and carefully listen to the prompts so that you are directed to the right person  All voicemails are confidential   Ashanti Stanley all requests for admission clinical reviews, approved or denied determinations and any other requests to dedicated fax number below belonging to the campus where the patient is receiving treatment   List of dedicated fax numbers for the Facilities:  [address] DENIALS (Administrative/Medical Necessity) [phone]   [address] (Maternity/NICU/Pediatrics) [phone]   [address] Hilda Alston [phone]   [facility] [phone]   [address] [address] [facility] [address] [address] [phone]   [address] [facility] [place] [address] 815 [facility] [phone]

## 2023-01-10 NOTE — UTILIZATION REVIEW
NOTIFICATION OF INPATIENT ADMISSION   AUTHORIZATION REQUEST   SERVICING FACILITY:   [place] [address] [facility]  Tax ID: [id number]  NPI: [id number] ATTENDING PROVIDER:  Attending Name and NPI#: [facility] [[id number]]  Address: [address] [place] [address] [facility]  Phone: [phone]     ADMISSION INFORMATION:  Place of Service: [place] [address]  Place of Service Code: 21  Inpatient Admission Date/Time: 1/9/23  9:14 AM  Discharge Date/Time: No discharge date for patient encounter  Admitting Diagnosis Code/Description:  Morbid obesity ([facility] [address] ) [E66 01]     UTILIZATION REVIEW CONTACT:  Jaky Monterroso Utilization   Network Utilization Review Department  Phone: [phone]  Fax: [phone]  Email: [email or web address]  Contact for approvals/pending authorizations, clinical reviews, and discharge  PHYSICIAN ADVISORY SERVICES:  Medical Necessity Denial & [place] Review  Phone: [phone]  Fax: [phone]  Email: [email or web address]

## 2023-01-10 NOTE — PROGRESS NOTES
Progress Note - Bariatric Surgery   Estelle Arnold 33 y o  female MRN: [id number]  Unit/Bed#: E5 -01 Encounter: [id number]      Subjective/Objective     Subjective:  Patient was seen and evaluated this morning at bedside  Patient is POD1 s/p Laparoscopic Sleeve Gastrectomy  Patient denies fevers, chills, sweats, SOB, CP, calf pain  Pain adequately controlled on oral pain medication  Ambulating without assistance, voiding well, and using incentive spirometer  Tolerating liquid diet without nausea or vomiting today  Objective:    /88 (BP Location: Left arm)   Pulse 67   Temp 98 3 °F (36 8 °C) (Oral)   Resp 16   Wt 108 kg (238 lb 15 7 oz)   LMP 12/19/2022   SpO2 96%   BMI 44 42 kg/m²       Intake/Output Summary (Last 24 hours) at 1/10/2023 1227  Last data filed at 1/10/2023 0328  Gross per 24 hour   Intake 1170 ml   Output 200 ml   Net 970 ml       Invasive Devices     Peripheral Intravenous Line  Duration           Peripheral IV 01/09/23 Dorsal (posterior); Right Hand 1 day                ROS: 10-point system completed  All negative except see HPI  Physical Exam    General Appearance:    Alert, cooperative, no distress, appears stated age   Head:    Normocephalic, without obvious abnormality, atraumatic   Lungs:     Respirations unlabored   Heart:    Regular rate and rhythm   Abdomen:     Soft, appropriate tenderness, no masses, no organomegaly, non-distended   Extremities:   Extremities normal, atraumatic, no cyanosis or edema   Neurologic:  Incision:  Psych:   Normal strength and sensation    Clean, dry, and intact    Normal mood and affect       Lab, Imaging and other studies:  I have personally reviewed pertinent lab results    , CBC:   Lab Results   Component Value Date    WBC 10 77 (H) 01/10/2023    HGB 9 7 (L) 01/10/2023    HCT 29 9 (L) 01/10/2023    MCV 83 01/10/2023     01/10/2023    MCH 27 0 01/10/2023    MCHC 32 4 01/10/2023    RDW 14 7 01/10/2023    MPV 11 0 01/10/2023   , CMP:   Lab Results   Component Value Date    SODIUM 135 01/10/2023    K 4 0 01/10/2023     01/10/2023    CO2 20 (L) 01/10/2023    BUN 3 (L) 01/10/2023    CREATININE 0 57 (L) 01/10/2023    CALCIUM 8 2 (L) 01/10/2023    EGFR 128 01/10/2023        VTE Mechanical Prophylaxis: sequential compression device    Assessment/Plan  1)  Patient with Morbid Obesity s/p Laparoscopic Sleeve Gastrectomy with stable post op course  Patient afebrile and hemodynamically stable  - Encourage PO fluids  - Recommend ambulation, use of SCDs when not ambulating, and incentive spirometry  - May give Lovenox   - Multimodal pain control  - Plan to D/C patient home today pending anticipated progression    Plan of care was discussed with patient    Care plan discussed with Dr Danika Chi DO  Bariatric Surgery Fellow  1/10/2023  12:27 PM

## 2023-01-11 ENCOUNTER — TELEPHONE (OUTPATIENT)
Dept: PSYCHIATRY | Facility: CLINIC | Age: 28
End: 2023-01-11

## 2023-01-11 VITALS
WEIGHT: 238.98 LBS | OXYGEN SATURATION: 95 % | SYSTOLIC BLOOD PRESSURE: 133 MMHG | TEMPERATURE: 98.4 F | RESPIRATION RATE: 16 BRPM | DIASTOLIC BLOOD PRESSURE: 76 MMHG | BODY MASS INDEX: 44.42 KG/M2 | HEART RATE: 63 BPM

## 2023-01-11 RX ORDER — ENOXAPARIN SODIUM 100 MG/ML
40 INJECTION SUBCUTANEOUS DAILY
Qty: 5.2 ML | Refills: 0 | Status: SHIPPED | OUTPATIENT
Start: 2023-01-11 | End: 2023-01-11 | Stop reason: SDUPTHER

## 2023-01-11 RX ORDER — ENOXAPARIN SODIUM 100 MG/ML
40 INJECTION SUBCUTANEOUS DAILY
Qty: 4.8 ML | Refills: 0 | Status: SHIPPED | OUTPATIENT
Start: 2023-01-11 | End: 2023-01-23

## 2023-01-11 RX ADMIN — ENOXAPARIN SODIUM 40 MG: 40 INJECTION SUBCUTANEOUS at 09:54

## 2023-01-11 RX ADMIN — FAMOTIDINE 20 MG: 10 INJECTION, SOLUTION INTRAVENOUS at 09:52

## 2023-01-11 RX ADMIN — ACETAMINOPHEN 975 MG: 325 SUSPENSION ORAL at 00:11

## 2023-01-11 RX ADMIN — ACETAMINOPHEN 975 MG: 325 TABLET ORAL at 09:54

## 2023-01-11 NOTE — PROGRESS NOTES
Progress Note - Bariatric Surgery   Ayse Joy 33 y o  female MRN: [id number]  Unit/Bed#: E5 -01 Encounter: [id number]      Subjective/Objective     Subjective:  Patient was seen and evaluated this morning at bedside  Patient is POD2 s/p Laparoscopic Sleeve Gastrectomy  She is feeling much better today without any further episodes of nauseousness or vomiting  Patient denies fevers, chills, sweats, SOB, CP, calf pain  Pain adequately controlled on oral pain medication  Ambulating without assistance, voiding well, and using incentive spirometer  Tolerating liquid diet without any issues  Objective:    /76 (BP Location: Left arm)   Pulse 63   Temp 98 4 °F (36 9 °C) (Oral)   Resp 16   Wt 108 kg (238 lb 15 7 oz)   LMP 12/19/2022   SpO2 95%   BMI 44 42 kg/m²       Intake/Output Summary (Last 24 hours) at 1/11/2023 0835  Last data filed at 1/11/2023 0334  Gross per 24 hour   Intake 120 ml   Output --   Net 120 ml       Invasive Devices     Peripheral Intravenous Line  Duration           Peripheral IV 01/09/23 Dorsal (posterior); Right Hand 2 days                ROS: 10-point system completed  All negative except see HPI      Physical Exam    General Appearance:    Alert, cooperative, no distress, appears stated age   Head:    Normocephalic, without obvious abnormality, atraumatic   Lungs:     Respirations unlabored   Heart:    Regular rate and rhythm   Abdomen:     Soft, appropriate tenderness, no masses, no organomegaly, non-distended   Extremities:   Extremities normal, atraumatic, no cyanosis or edema   Neurologic:  Incision:  Psych:   Normal strength and sensation    Clean, dry, and intact    Normal mood and affect       Lab, Imaging and other studies:CBC: No results found for: WBC, HGB, HCT, MCV, PLT, ADJUSTEDWBC, MCH, MCHC, RDW, MPV, NRBC, CMP: No results found for: SODIUM, K, CL, CO2, ANIONGAP, BUN, CREATININE, GLUCOSE, CALCIUM, AST, ALT, ALKPHOS, PROT, BILITOT, EGFR     VTE Mechanical Prophylaxis: sequential compression device    Assessment/Plan  1)  Patient with Morbid Obesity s/p Laparoscopic Sleeve Gastrectomy with stable post op course  Patient afebrile and hemodynamically stable  - Encourage PO fluids  - Recommend ambulation, use of SCDs when not ambulating, and incentive spirometry  - May give Lovenox   - Multimodal pain control  - Plan to D/C patient home today pending anticipated progression    Plan of care was discussed with patient    Care plan discussed with Dr Piotr Delgado DO  Bariatric Surgery Fellow  1/11/2023  8:35 AM

## 2023-01-11 NOTE — TELEPHONE ENCOUNTER
Returned pt voice message and pt has been added to employee wait list for couples therapy  Male provider and [place] or Tiana   Open to Virtual

## 2023-01-11 NOTE — PROGRESS NOTES
There are not any changes from previous RNs  Assessment  Pt denies pain, relieves gas discomfort by ambulating, says she has been voiding and noted 120 ml PO intake

## 2023-01-11 NOTE — DISCHARGE INSTR - AVS FIRST PAGE
Bariatric/Weight Loss Surgery  Hospital Discharge Instructions  ACTIVITY:  Progress as feels comfortable - a good rule is:  if you are doing something and it begins to hurt, stop doing the activity  Walk every hour while at home  You may walk stairs if you do so slowly  You may shower 48 hours after surgery  Do not scrub incision sites  Blot gently with clean towel to dry incisions  (see #4 below)   Use your incentive spirometer 10 times per hour while awake for 1 week after surgery  Do NOT drive for 48 hours after surgery  No driving 24 hours after taking certain prescription pain medications  Examples of such medication are Percocet, Darvocet, Oxycodone, Tylenol #3, and Tylenol with Codeine  DIET  Stay on a liquid diet for 7 days after your surgery date, sipping slowly  Refer to your manual for examples of choices  Remember to keep your liquids sugar free or low calorie  You may have protein drinks  Make sure to drink 48 to 64 ounces per day of fluids  You may advance to a pureed diet one week after surgery as instructed by your diet progression pamphlet  Once you get approval from your surgeon at your first post operative visit, you may advance to the soft diet and remain on soft diet for 8 weeks unless otherwise instructed  MEDICATIONS:  The abdominal nerve block will wear off during the first 1-2 days that you are home, and you may become sore (especially over incision site/sites where abdominal wall is sutured)  This may create a pulling sensation, especially while moving around, and will fade over time  Continue to take your Tylenol and your pain medication as instructed  Start vitamins and minerals one week after surgery or when you start stage 3/puree diet  Anti-acid Medication as per prescription  Other medications as indicated on the Physician Patient Discharge Instructions form given to you at the time of discharge    Make sure that you are splitting your pill or tablet medications in halves or fourths or even crushing them before you take them  Capsules should be opened and mixed with water or jello  You need to do this for at least 4 weeks after surgery  Eventually you will be able to take your medications the regular way as they were prescribed  You will need to consult with your Family Doctor in regards to all your prescribed medication, particularly those for blood pressure and diabetes  As you lose weight, medical conditions may change, requiring an alteration or elimination of the drug dose  Monitor blood pressure closely and call PCP with any concerns  Sleeve Gastrectomy patients ONLY:  Complete full course of lovenox injections! DO NOT TAKE BIRTH CONTROL(BC) MEDICATIONS, INSERT BC VAGINAL RINGS, OR PLACE IUD OR ANY OTHER BC METHODS UNTIL 31 DAYS FROM DAY OF DISCHARGE FROM HOSPITAL  THIS PLACES YOU AT HIGH RISK FOR A POTENTIALLY LIFE THREATENING BLOOD CLOT  Remember to always use barrier methods for birth control and speak to your GYN about using two forms of birth control to start 31 days after surgery  It is very important to avoid pregnancy until at least 18-24 months after surgery  INCISION CARE  You may shower and get incisions wet 2 days after surgery  No soaking tub baths or swimming for 30 days after surgery  Keep abdominal area and incisions clean  Use soap and water to create a good lather and rinse off  Do not scrub incisions  If you have a drain, empty the drain as the nurses instructed  FOLLOW-UP APPOINTMENT should be made for one week after discharge  Call surgeon’s office at [phone] to schedule an appointment      CALL YOUR DOCTOR FOR:  pain not controlled by pain medications, a temperature greater than 101 5° F, any increase or change in drainage or redness from any incision, any vomiting or inability to keep liquids down, shortness of breath, shoulder pain, or bleeding

## 2023-01-11 NOTE — PLAN OF CARE
Problem: PAIN - ADULT  Goal: Verbalizes/displays adequate comfort level or baseline comfort level  Description: Interventions:  - Encourage patient to monitor pain and request assistance  - Assess pain using appropriate pain scale  - Administer analgesics based on type and severity of pain and evaluate response  - Implement non-pharmacological measures as appropriate and evaluate response  - Consider cultural and social influences on pain and pain management  - Notify physician/advanced practitioner if interventions unsuccessful or patient reports new pain  Outcome: Progressing     Problem: INFECTION - ADULT  Goal: Absence or prevention of progression during hospitalization  Description: INTERVENTIONS:  - Assess and monitor for signs and symptoms of infection  - Monitor lab/diagnostic results  - Monitor all insertion sites, i e  indwelling lines, tubes, and drains  - Monitor endotracheal if appropriate and nasal secretions for changes in amount and color  - [place] appropriate cooling/warming therapies per order  - Administer medications as ordered  - Instruct and encourage patient and family to use good hand hygiene technique  - Identify and instruct in appropriate isolation precautions for identified infection/condition  Outcome: Progressing     Problem: SAFETY ADULT  Goal: Patient will remain free of falls  Description: INTERVENTIONS:  - Educate patient/family on patient safety including physical limitations  - Instruct patient to call for assistance with activity   - Consult OT/PT to assist with strengthening/mobility   - Keep Call bell within reach  - Keep bed low and locked with side rails adjusted as appropriate  - Keep care items and personal belongings within reach  - Initiate and maintain comfort rounds  - Make Fall Risk Sign visible to staff  - Offer Toileting every Hours, in advance of need  - Initiate/Maintain alarm  - Obtain necessary fall risk management equipment:   - Apply yellow socks and bracelet for high fall risk patients  - Consider moving patient to room near nurses station  Outcome: Progressing  Goal: Maintain or return to baseline ADL function  Description: INTERVENTIONS:  -  Assess patient's ability to carry out ADLs; assess patient's baseline for ADL function and identify physical deficits which impact ability to perform ADLs (bathing, care of mouth/teeth, toileting, grooming, dressing, etc )  - Assess/evaluate cause of self-care deficits   - Assess range of motion  - Assess patient's mobility; develop plan if impaired  - Assess patient's need for assistive devices and provide as appropriate  - Encourage maximum independence but intervene and supervise when necessary  - Involve family in performance of ADLs  - Assess for home care needs following discharge   - Consider OT consult to assist with ADL evaluation and planning for discharge  - Provide patient education as appropriate  Outcome: Progressing  Goal: Maintains/Returns to pre admission functional level  Description: INTERVENTIONS:  - Perform BMAT or MOVE assessment daily    - Set and communicate daily mobility goal to care team and patient/family/caregiver  - Collaborate with rehabilitation services on mobility goals if consulted  - Perform Range of Motion  times a day  - Reposition patient every  hours    - Dangle patient  times a day  - Stand patient  times a day  - Ambulate patient  times a day  - Out of bed to chair  times a day   - Out of bed for meals  times a day  - Out of bed for toileting  - Record patient progress and toleration of activity level   Outcome: Progressing     Problem: DISCHARGE PLANNING  Goal: Discharge to home or other facility with appropriate resources  Description: INTERVENTIONS:  - Identify barriers to discharge w/patient and caregiver  - Arrange for needed discharge resources and transportation as appropriate  - Identify discharge learning needs (meds, wound care, etc )  - Arrange for interpretive services to assist at discharge as needed  - Refer to Case Management Department for coordinating discharge planning if the patient needs post-hospital services based on physician/advanced practitioner order or complex needs related to functional status, cognitive ability, or social support system  Outcome: Progressing     Problem: Knowledge Deficit  Goal: Patient/family/caregiver demonstrates understanding of disease process, treatment plan, medications, and discharge instructions  Description: Complete learning assessment and assess knowledge base    Interventions:  - Provide teaching at level of understanding  - Provide teaching via preferred learning methods  Outcome: Progressing     Problem: GASTROINTESTINAL - ADULT  Goal: Minimal or absence of nausea and/or vomiting  Description: INTERVENTIONS:  - Administer IV fluids if ordered to ensure adequate hydration  - Maintain NPO status until nausea and vomiting are resolved  - Nasogastric tube if ordered  - Administer ordered antiemetic medications as needed  - Provide nonpharmacologic comfort measures as appropriate  - Advance diet as tolerated, if ordered  - Consider nutrition services referral to assist patient with adequate nutrition and appropriate food choices  Outcome: Progressing  Goal: Maintains or returns to baseline bowel function  Description: INTERVENTIONS:  - Assess bowel function  - Encourage oral fluids to ensure adequate hydration  - Administer IV fluids if ordered to ensure adequate hydration  - Administer ordered medications as needed  - Encourage mobilization and activity  - Consider nutritional services referral to assist patient with adequate nutrition and appropriate food choices  Outcome: Progressing  Goal: Maintains adequate nutritional intake  Description: INTERVENTIONS:  - Monitor percentage of each meal consumed  - Identify factors contributing to decreased intake, treat as appropriate  - Assist with meals as needed  - Monitor I&O, weight, and lab values if indicated  - Obtain nutrition services referral as needed  Outcome: Progressing     Problem: GENITOURINARY - ADULT  Goal: Maintains or returns to baseline urinary function  Description: INTERVENTIONS:  - Assess urinary function  - Encourage oral fluids to ensure adequate hydration if ordered  - Administer IV fluids as ordered to ensure adequate hydration  - Administer ordered medications as needed  - Offer frequent toileting  - Follow urinary retention protocol if ordered  Outcome: Progressing  Goal: Absence of urinary retention  Description: INTERVENTIONS:  - Assess patient’s ability to void and empty bladder  - Monitor I/O  - Bladder scan as needed  - Discuss with physician/AP medications to alleviate retention as needed  - Discuss catheterization for long term situations as appropriate  Outcome: Progressing     Problem: METABOLIC, FLUID AND ELECTROLYTES - ADULT  Goal: Electrolytes maintained within normal limits  Description: INTERVENTIONS:  - Monitor labs and assess patient for signs and symptoms of electrolyte imbalances  - Administer electrolyte replacement as ordered  - Monitor response to electrolyte replacements, including repeat lab results as appropriate  - Instruct patient on fluid and nutrition as appropriate  Outcome: Progressing  Goal: Fluid balance maintained  Description: INTERVENTIONS:  - Monitor labs   - Monitor I/O and WT  - Instruct patient on fluid and nutrition as appropriate  - Assess for signs & symptoms of volume excess or deficit  Outcome: Progressing     Problem: Potential for Falls  Goal: Patient will remain free of falls  Description: INTERVENTIONS:  - Educate patient/family on patient safety including physical limitations  - Instruct patient to call for assistance with activity   - Consult OT/PT to assist with strengthening/mobility   - Keep Call bell within reach  - Keep bed low and locked with side rails adjusted as appropriate  - Keep care items and personal belongings within reach  - Initiate and maintain comfort rounds  - Make Fall Risk Sign visible to staff  - Offer Toileting every  Hours, in advance of need  - Initiate/Maintain alarm  - Obtain necessary fall risk management equipment:   - Apply yellow socks and bracelet for high fall risk patients  - Consider moving patient to room near nurses station  Outcome: Progressing

## 2023-01-11 NOTE — DISCHARGE SUMMARY
Discharge Summary - Nayak Gut 33 y o  female MRN: [id number]    Unit/Bed#: E5 -01 Encounter: [id number]      Pre-Operative Diagnosis: Pre-Op Diagnosis Codes:     * Morbid obesity ([facility] [address] ) [E66 01]    Post-Operative Diagnosis: Post-Op Diagnosis Codes:     * Morbid obesity ([facility] [address] ) [E66 01]    Procedures Performed:  Procedure(s):  GASTRECTOMY SLEEVE LAP & INTRAOPERATIVE EGD    Surgeon: Sabas Byrd MD    See H & P for full details of admission and Operative Note for full details of operations performed  Patient tolerated surgery well without complications  She was experiencing nausea and vomiting on postoperative day 1 and was thus kept inpatient for 1 additional night  In the morning postoperative Day 2, the patient had mild nausea and abdominal pain  Tolerated a clear liquid diet without vomiting  Able to ambulate and voiding independently  Patient was deemed ready for discharge home  Patient was seen and examined prior to discharge  Provisions for Follow-Up Care:  See After Visit Summary/Discharge Instructions for information related to follow-up care and home orders  Disposition: Home, in stable condition  Planned Readmission: No    Discharge Medications:  See After Visit Summary/Discharge Instructions for reconciled discharge medications provided to patient and family  Post Operative instructions: Reviewed with patient and/or family      Signature:   Patricia Carmen DO  Date: 1/11/2023 Time: 8:40 AM

## 2023-01-12 ENCOUNTER — TELEPHONE (OUTPATIENT)
Dept: BARIATRICS | Facility: CLINIC | Age: 28
End: 2023-01-12

## 2023-01-12 ENCOUNTER — TRANSITIONAL CARE MANAGEMENT (OUTPATIENT)
Dept: FAMILY MEDICINE CLINIC | Facility: CLINIC | Age: 28
End: 2023-01-12

## 2023-01-12 NOTE — TELEPHONE ENCOUNTER
Post op follow up phone call completed  Pt is sipping liquids, and nausea is much less than it had been in the hospital   Denies vomiting  using IS as instructed, reinforced importance of using IS to help prevent pneumonia  Ambulating about home without difficulty  Pain controlled with analgesia  Reaffirmed examples of liquid diet over the next week  Pt stated understanding about discharge instructions and medication adjustments  Tolerating self administration of Lovenox injections without difficulty  Follow up appt with surgeon scheduled for next week  Instructed to call with any additional questions or concerns

## 2023-01-17 ENCOUNTER — OFFICE VISIT (OUTPATIENT)
Dept: FAMILY MEDICINE CLINIC | Facility: CLINIC | Age: 28
End: 2023-01-17

## 2023-01-17 VITALS
HEART RATE: 89 BPM | HEIGHT: 62 IN | TEMPERATURE: 97.2 F | BODY MASS INDEX: 42.51 KG/M2 | SYSTOLIC BLOOD PRESSURE: 112 MMHG | OXYGEN SATURATION: 99 % | WEIGHT: 231 LBS | RESPIRATION RATE: 14 BRPM | DIASTOLIC BLOOD PRESSURE: 76 MMHG

## 2023-01-17 DIAGNOSIS — D50.8 OTHER IRON DEFICIENCY ANEMIA: ICD-10-CM

## 2023-01-17 DIAGNOSIS — E66.01 OBESITY, CLASS III, BMI 40-49.9 (MORBID OBESITY) (HCC): Primary | ICD-10-CM

## 2023-01-17 DIAGNOSIS — E83.51 HYPOCALCEMIA: ICD-10-CM

## 2023-01-17 NOTE — PROGRESS NOTES
FAMILY PRACTICE OFFICE VISIT       NAME: Bri Celeste  AGE: 33 y o  SEX: female       : 1995        MRN: [id number]    DATE: 2023  TIME: 12:13 PM    Assessment and Plan   1  Obesity, Class III, BMI 40-49 9 (morbid obesity) ([facility] )  Comments:  [facility] is doing very well post-surgery; sees Bariatric Surgery soon in f/u  Pt is following her diet, moving around at home, etc     2  Other iron deficiency anemia  Comments:  Mild; post-op  Taking vitamins now  Check CBC again soon  Orders:  -     CBC and differential; Future    3  Hypocalcemia  Comments:  Mild; post-surgery  Check non-fasting BMP  Orders:  -     Basic metabolic panel; Future         There are no Patient Instructions on file for this visit  TCM Call     Date and time call was made  2023  2:31 PM    Patient was hospitialized at  [facility] - CLOSED    Date of Admission  23    Date of discharge  23    Diagnosis  Obesity, Class III, BMI 40-49 9 (morbid obesity)    Disposition  Home    Were the patients medications reviewed and updated  No    Current Symptoms  None      TCM Call     Should patient be enrolled in anticoag monitoring? Yes    Scheduled for follow up?   Yes    Did you obtain your prescribed medications  Yes    Do you need help managing your prescriptions or medications  No    Is transportation to your appointment needed  No    I have advised the patient to call PCP with any new or worsening symptoms  Alondra Johnson, MR    Living Arrangements  Family members    Are you recieving any outpatient services  No    Are you recieving home care services  No    Are you using any community resources  No    Current waiver services  No    Have you fallen in the last 12 months  No    Interperter language line needed  No            Chief Complaint     Chief Complaint   Patient presents with   • Transition of Care Management     Patient being seen for TCM-- S/p gastric sleeve       History of Present Illness   [facility] Katherin Cummings is a 33y o -year-old female who presents in f/u with her mom -underwent gastric sleeve with Dr Ernesto Lopez of Bariatric Surgery -> hospitalized 1/9 thru 1/11/23  Records reviewed  Pt had some nausea POD #1 - kept an extra night  Following day, she was tolerating clear liquids, and was discharged home  Post-op labs reviewed  No longer taking any pain meds  Only taking OTC Tylenol now PRN  On a pureed diet now - tolerating  Has already lost 7 lbs! Sees Dr Ernesto Lopez in f/u later this week  Review of Systems   Review of Systems   Constitutional: Negative for activity change and fever  Respiratory: Negative for shortness of breath  Cardiovascular: Negative for chest pain  Gastrointestinal: Negative for abdominal pain, blood in stool, constipation and nausea  Genitourinary: Negative for dysuria         Active Problem List     Patient Active Problem List   Diagnosis   • Gastritis   • Acquired acanthosis nigricans   • Obesity   • Shellfish allergy   • Umbilical hernia without obstruction and without gangrene   • Acquired renal cyst of left kidney   • Encounter for gynecological examination (general) (routine) without abnormal findings   • Oral contraceptive pill surveillance   • Pelvic pain   • Obesity, Class III, BMI 40-49 9 (morbid obesity) ([facility] )   • Encounter for annual routine gynecological examination   • Uses oral contraception   • Missed menses   • Adjustment disorder with mixed anxiety and depressed mood   • GERD (gastroesophageal reflux disease)   • Migraine   • No blood products         Past Medical History:  Past Medical History:   Diagnosis Date   • Gastritis    • GERD (gastroesophageal reflux disease)    • Migraine    • Obesity    • Seasonal allergies    • Umbilical hernia        Past Surgical History:  Past Surgical History:   Procedure Laterality Date   • [place] LAPS [address] [place] [address] RSTRICTIV PX LONGITUDINAL GASTRECTOMY N/A 1/9/2023    Procedure: GASTRECTOMY SLEEVE LAP & INTRAOPERATIVE EGD;  Surgeon: Ellyn Dial MD;  Location: [facility] OR;  Service: Bariatrics   • WISDOM TOOTH EXTRACTION Bilateral 03/2020       Family History:  Family History   Problem Relation Age of Onset   • Diabetes Mother    • Allergic rhinitis Mother    • Hypertension Mother    • Hypertension Father    • Hyperlipidemia Father    • No Known Problems Sister    • No Known Problems Sister    • No Known Problems Brother    • No Known Problems Brother    • No Known Problems Brother    • No Known Problems Brother    • Asthma Brother    • Breast cancer Maternal Grandmother    • Depression Paternal Grandmother    • Lung cancer Paternal Grandfather    • Asthma Paternal Grandfather    • Anxiety disorder Maternal Aunt    • Depression Maternal Aunt    • Bipolar disorder Maternal Aunt    • Ovarian cancer Neg Hx        Social History:  Social History     Socioeconomic History   • Marital status: /Civil Union     Spouse name: Not on file   • Number of children: Not on file   • Years of education: Not on file   • Highest education level: Not on file   Occupational History   • Not on file   Tobacco Use   • Smoking status: Never   • Smokeless tobacco: Never   Vaping Use   • Vaping Use: Never used   Substance and Sexual Activity   • Alcohol use:  Yes     Alcohol/week: 1 0 standard drink     Types: 1 Glasses of wine per week     Comment: occasional   • Drug use: No   • Sexual activity: Yes     Partners: Male     Birth control/protection: OCP     Comment: with    Other Topics Concern   • Not on file   Social History Narrative   • Not on file     Social Determinants of Health     Financial Resource Strain: Not on file   Food Insecurity: Not on file   Transportation Needs: Not on file   Physical Activity: Not on file   Stress: Not on file   Social Connections: Not on file   Intimate Partner Violence: Not on file   Housing Stability: Not on file       Objective     Vitals:    01/17/23 1126   BP: 112/76   Pulse: 89   Resp: 14   Temp: (!) 97 2 °F (36 2 °C)   SpO2: 99%     Wt Readings from Last 3 Encounters:   01/17/23 105 kg (231 lb)   01/09/23 108 kg (238 lb 15 7 oz)   12/15/22 111 kg (244 lb 8 oz)       Physical Exam  Vitals and nursing note reviewed  Constitutional:       General: She is not in acute distress  Appearance: Normal appearance  She is not ill-appearing, toxic-appearing or diaphoretic  HENT:      Head: Normocephalic and atraumatic  Eyes:      General: No scleral icterus  Conjunctiva/sclera: Conjunctivae normal    Cardiovascular:      Rate and Rhythm: Normal rate and regular rhythm  Heart sounds: Normal heart sounds  No murmur heard  No friction rub  No gallop  Pulmonary:      Effort: Pulmonary effort is normal  No respiratory distress  Breath sounds: Normal breath sounds  No stridor  No wheezing, rhonchi or rales  Abdominal:      General: Abdomen is flat  Bowel sounds are normal  There is no distension  Palpations: Abdomen is soft  There is no mass  Tenderness: There is no abdominal tenderness  There is no guarding or rebound  Musculoskeletal:      Cervical back: Normal range of motion and neck supple  No rigidity or tenderness  Lymphadenopathy:      Cervical: No cervical adenopathy  Neurological:      Mental Status: She is alert and oriented to person, place, and time  Psychiatric:         Mood and Affect: Mood normal          Behavior: Behavior normal          Thought Content:  Thought content normal          Judgment: Judgment normal          Pertinent Laboratory/Diagnostic Studies:  Lab Results   Component Value Date    GLUCOSE 79 04/15/2018    BUN 3 (L) 01/10/2023    CREATININE 0 57 (L) 01/10/2023    CALCIUM 8 2 (L) 01/10/2023     04/15/2018    K 4 0 01/10/2023    CO2 20 (L) 01/10/2023     01/10/2023     Lab Results   Component Value Date    ALT 37 11/11/2022    AST 17 11/11/2022    ALKPHOS 90 11/11/2022    BILITOT 0 2 04/15/2018       Lab Results   Component Value Date WBC 10 77 (H) 01/10/2023    HGB 9 7 (L) 01/10/2023    HCT 29 9 (L) 01/10/2023    MCV 83 01/10/2023     01/10/2023       No results found for: TSH    No results found for: CHOL  Lab Results   Component Value Date    TRIG 60 06/20/2022     Lab Results   Component Value Date    HDL 44 (L) 06/20/2022     Lab Results   Component Value Date    LDLCALC 103 (H) 06/20/2022     Lab Results   Component Value Date    HGBA1C 5 6 06/20/2022       Results for orders placed or performed during the hospital encounter of 63/48/08   Basic metabolic panel   Result Value Ref Range    Sodium 135 135 - 147 mmol/L    Potassium 4 0 3 5 - 5 3 mmol/L    Chloride 103 96 - 108 mmol/L    CO2 20 (L) 21 - 32 mmol/L    ANION GAP 12 4 - 13 mmol/L    BUN 3 (L) 5 - 25 mg/dL    Creatinine 0 57 (L) 0 60 - 1 30 mg/dL    Glucose 92 65 - 140 mg/dL    Calcium 8 2 (L) 8 3 - 10 1 mg/dL    eGFR 128 ml/min/1 73sq m   CBC (With Platelets)   Result Value Ref Range    WBC 10 77 (H) 4 31 - 10 16 Thousand/uL    RBC 3 59 (L) 3 81 - 5 12 Million/uL    Hemoglobin 9 7 (L) 11 5 - 15 4 g/dL    Hematocrit 29 9 (L) 34 8 - 46 1 %    MCV 83 82 - 98 fL    MCH 27 0 26 8 - 34 3 pg    MCHC 32 4 31 4 - 37 4 g/dL    RDW 14 7 11 6 - 15 1 %    Platelets 499 829 - 720 Thousands/uL    MPV 11 0 8 9 - 12 7 fL   POCT pregnancy, urine   Result Value Ref Range    EXT Preg Test, Ur Negative Negative    Control Valid Valid   Tissue Exam   Result Value Ref Range    Case Report       Surgical Pathology Report                         Case: [id number]                                   Authorizing Provider:  Shon Epstein MD        Collected:           01/09/2023 1102              Ordering Location:     [facility]        Received:            01/09/2023 [address]                                                     Pathologist:           Rohith Blackwood MD                                                    Specimen: Stomach, Portion of stomach                                                                Final Diagnosis       A  Stomach, Portion of stomach:  - Segment of benign gastric body with mild chronic nonspecific inflammation   - No Helicobacter pylori organisms identified with routine H&E stain  Additional Information       All reported additional testing was performed with appropriately reactive controls  These tests were developed and their performance characteristics determined by [facility] or appropriate performing facility, though some tests may be performed on tissues which have not been validated for performance characteristics (such as staining performed on alcohol exposed cell blocks and decalcified tissues)  Results should be interpreted with caution and in the context of the patients’ clinical condition  These tests may not be cleared or approved by the [place]  Food and Drug Administration, though the FDA has determined that such clearance or approval is not necessary  These tests are used for clinical purposes and they should not be regarded as investigational or for research  This laboratory has been approved by [place], designated as a high-complexity laboratory and is qualified to perform these tests  Interpretation performed at [facility], [address] [place] [id number]      Gross Description       A  The specimen is received in formalin, labeled with the patient's name and hospital number, and is designated " portion of stomach "  The specimen consists of a piece of stomach that measures 18 5 cm long and has a greatest circumference of 8 2 cm  The serosa is tan and smooth with small areas of attached fat specimen is opened revealing normal-appearing rugal folds  No lesions are grossly evident  Representative sections are submitted in 2 cassettes      TStevens         Orders Placed This Encounter   Procedures   • CBC and differential   • Basic metabolic panel ALLERGIES:  Allergies   Allergen Reactions   • Contrast [Iodinated Contrast Media] Anaphylaxis     throat closure   • Fish-Derived Products - Food Allergy Anaphylaxis     Anaphylaxis   • Pollen Extract Other (See Comments)     hives, throat closure   • Shellfish-Derived Products - Food Allergy        Current Medications     Current Outpatient Medications   Medication Sig Dispense Refill   • acetaminophen (TYLENOL) 500 mg tablet Take 500 mg by mouth every 6 (six) hours as needed for mild pain     • enoxaparin (Lovenox) 40 mg/0 4 mL Inject 0 4 mL (40 mg total) under the skin daily for 12 days 4 8 mL 0   • omeprazole (PriLOSEC) 20 mg delayed release capsule Take 1 capsule (20 mg total) by mouth daily Do not start before January 10, 2023  30 capsule 3   • oxyCODONE (Roxicodone) 5 immediate release tablet Take 1 tablet (5 mg total) by mouth every 4 (four) hours as needed for moderate pain Max Daily Amount: 30 mg Do not start before January 10, 2023  10 tablet 0     No current facility-administered medications for this visit           Health Maintenance     Health Maintenance   Topic Date Due   • HPV Vaccine (3 - 2-dose series) 01/29/2009   • COVID-19 Vaccine (3 - Booster for Moderna series) 04/09/2021   • Influenza Vaccine (1) 09/01/2022   • Annual Physical  07/18/2023   • BMI: Followup Plan  12/15/2023   • BMI: Adult  01/17/2024   • Cervical Cancer Screening  08/02/2024   • DTaP,Tdap,and Td Vaccines (7 - Td or Tdap) 08/21/2028   • HIV Screening  Completed   • Hepatitis C Screening  Completed   • Hepatitis A Vaccine  Completed   • Meningococcal ACWY Vaccine  Completed   • Pneumococcal Vaccine: Pediatrics (0 to 5 Years) and At-Risk Patients (6 to 59 Years)  Aged Out   • HIB Vaccine  Aged Out   • IPV Vaccine  Aged Out   • Chlamydia Screening  Discontinued     Immunization History   Administered Date(s) Administered   • COVID-19 MODERNA VACC 0 5 ML IM 01/13/2021, 02/12/2021   • DTP 01/17/1997, 03/07/1997, 04/07/1997, 12/02/2001   • DTaP 08/21/2018   • DTaP,unspecified 08/21/2018   • HPV 06/01/2008, 08/29/2008, 11/06/2008   • Hep A, ped/adol, 2 dose 06/05/2008, 09/15/2010   • Hep B, Adolescent or Pediatric 01/17/1997, 02/17/1997, 08/17/1997, 09/25/2003   • Hib (PRP-T) 09/25/2003   • INFLUENZA 09/15/2010, 01/13/2016, 10/02/2018, 10/15/2020, 10/14/2021   • IPV 01/17/1997, 03/17/1997, 04/17/1997   • MMR 09/25/2003, 03/17/2004, 07/25/2022   • Meningococcal Conjugate (MCV4O) 09/15/2010   • Meningococcal MCV4P 09/15/2010, 05/07/2013, 05/07/2013   • Pneumococcal Conjugate 13-Valent 01/17/1997, 03/17/1997, 04/17/1997   • Td (adult), Unspecified 05/17/2004   • Tdap 11/07/2007   • Tuberculin Skin Test-PPD Intradermal 03/09/2015   • Varicella 09/25/2003, 03/17/2004          Mahamed Mariano DO

## 2023-01-18 ENCOUNTER — TELEPHONE (OUTPATIENT)
Dept: BEHAVIORAL/MENTAL HEALTH CLINIC | Facility: CLINIC | Age: 28
End: 2023-01-18

## 2023-01-18 NOTE — TELEPHONE ENCOUNTER
NO-SHOW LETTER MAILED TO Sailaja Ramos    ADDRESS: [address]  [facility] [address]

## 2023-01-19 ENCOUNTER — OFFICE VISIT (OUTPATIENT)
Dept: BARIATRICS | Facility: CLINIC | Age: 28
End: 2023-01-19

## 2023-01-19 VITALS
TEMPERATURE: 97.5 F | BODY MASS INDEX: 41.96 KG/M2 | DIASTOLIC BLOOD PRESSURE: 52 MMHG | WEIGHT: 228 LBS | HEART RATE: 112 BPM | HEIGHT: 62 IN | SYSTOLIC BLOOD PRESSURE: 120 MMHG

## 2023-01-19 DIAGNOSIS — Z48.815 ENCOUNTER FOR SURGICAL AFTERCARE FOLLOWING SURGERY OF DIGESTIVE SYSTEM: Primary | ICD-10-CM

## 2023-01-19 DIAGNOSIS — Z98.84 BARIATRIC SURGERY STATUS: Primary | ICD-10-CM

## 2023-01-19 NOTE — PROGRESS NOTES
POST OP UP VISIT - BARIATRIC SURGERY  Efrain Huntley 33 y o  female MRN: [id number]  Unit/Bed#:  Encounter: [id number]      HPI:  Efrain Huntley is a 33 y o  female status post laparoscopic sleeve gastrectomy performed by Dr Alton Thapa on 1/9 returning to office for first post op visit since surgery  Tolerating diet  Denies nausea and vomiting  Taking multivitamins and PPI daily  Administering lovenox injections  Review of Systems   Constitutional: Negative  Respiratory: Negative  Cardiovascular: Negative  Gastrointestinal: Negative          Historical Information   Past Medical History:   Diagnosis Date   • Gastritis    • GERD (gastroesophageal reflux disease)    • Migraine    • Obesity    • Seasonal allergies    • Umbilical hernia      Past Surgical History:   Procedure Laterality Date   • [place] LAPS GSTRC RSTRICTIV PX LONGITUDINAL GASTRECTOMY N/A 1/9/2023    Procedure: GASTRECTOMY SLEEVE LAP & INTRAOPERATIVE EGD;  Surgeon: Alton Thapa MD;  Location: [facility];  Service: Bariatrics   • WISDOM TOOTH EXTRACTION Bilateral 03/2020     Social History   Social History     Substance and Sexual Activity   Alcohol Use Yes   • Alcohol/week: 1 0 standard drink   • Types: 1 Glasses of wine per week    Comment: occasional     Social History     Substance and Sexual Activity   Drug Use No     Social History     Tobacco Use   Smoking Status Never   Smokeless Tobacco Never     Family History: non-contributory    Meds/Allergies   all medications and allergies reviewed  Allergies   Allergen Reactions   • Contrast [Iodinated Contrast Media] Anaphylaxis     throat closure   • Fish-Derived Products - Food Allergy Anaphylaxis     Anaphylaxis   • Pollen Extract Other (See Comments)     hives, throat closure   • Shellfish-Derived Products - Food Allergy        Objective       Current Vitals:          Invasive Devices     None                 Physical Exam  Constitutional:       General: She is not in acute distress  Cardiovascular:      Rate and Rhythm: Normal rate and regular rhythm  Pulmonary:      Effort: Pulmonary effort is normal  No respiratory distress  Abdominal:      General: There is no distension  Palpations: Abdomen is soft  Tenderness: There is no abdominal tenderness  Comments: Incisions CDI   Neurological:      Mental Status: She is alert  Assessment/PLAN:    33 y o  female status post Laparoscopic sleeve gastrectomy done on 1/9 by Dr Adam Dale, doing well post op  No major issues and healing well  The pathology report was reviewed with the patient and the results were within normal limits  Pathology, and Other Studies: I have personally reviewed pertinent reports  Lab Results   Component Value Date    FINALDX  01/09/2023     A  Stomach, Portion of stomach:  - Segment of benign gastric body with mild chronic nonspecific inflammation   - No Helicobacter pylori organisms identified with routine H&E stain  Increase physical activity slowly as tolerated and instructed  Advance diet as instructed by our dietitians today and as indicated in the binder  Continue Lovenox prophylaxis until completed  Continue PPI  Follow up in one month as scheduled          Shilpi Hawthorne DO  Bariatric Surgery Fellow  [facility]  1/19/2023  10:38 AM

## 2023-01-19 NOTE — PROGRESS NOTES
Weight Management Nutrition Class     Diagnosis: Morbid Obesity    Bariatric Surgeon: Dr Chon Cohen    Surgery: Vertical Sleeve Gastrectomy    Class: first post op note    Topics discussed today include:     fluid goals post op, protein goals post op, constipation, chew food well, exercise, diet progression, hypoglycemia, dumping syndrome, protein supplems, vitamin/mineral supplements, calcium supplements, additional vitamin B12, iron supplements and fat soluble vitamins     Patient was able to verbalize basic diet (protein, fluid, vitamin and mineral) recommendations and possible nutrition-related complications   Yes

## 2023-01-24 ENCOUNTER — TELEMEDICINE (OUTPATIENT)
Dept: PSYCHIATRY | Facility: CLINIC | Age: 28
End: 2023-01-24

## 2023-01-24 DIAGNOSIS — F43.23 ADJUSTMENT DISORDER WITH MIXED ANXIETY AND DEPRESSED MOOD: Primary | ICD-10-CM

## 2023-01-25 NOTE — PSYCH
Virtual Regular Visit    Verification of patient location:    Patient is located in the following state in which I hold an active license [place]      Assessment/Plan:    Problem List Items Addressed This Visit    None      Goals addressed in session: Goal 3           Reason for visit is No chief complaint on file  Encounter provider Paulino Price, [place]    Provider located at [address] [place] [id number] [phone]      Recent Visits  Date Type Provider Dept   01/18/23 Telephone Paulino Seen, [address] [id number]   01/17/23 Office Visit DO Dimitry Ayoub   Showing recent visits within past 7 days and meeting all other requirements  Today's Visits  Date Type Provider Dept   01/24/23 Telemedicine [facility] [place], [place] [facility]   Showing today's visits and meeting all other requirements  Future Appointments  No visits were found meeting these conditions  Showing future appointments within next 150 days and meeting all other requirements       The patient was identified by name and date of birth  Jayson Boles was informed that this is a telemedicine visit and that the visit is being conducted throughthe [email or web address] platform  She agrees to proceed     My office door was closed  No one else was in the room  She acknowledged consent and understanding of privacy and security of the video platform  The patient has agreed to participate and understands they can discontinue the visit at any time  Patient is aware this is a billable service  Subjective  Jayson Boles is a 33 y o  female Behavioral Health Psychotherapy Progress Note    Psychotherapy Provided: Individual Psychotherapy     No diagnosis found      Goals addressed in session: Goal 3      DATA: Clinician explored the client's experiences for the past month and a half-- on vacation and as well as with her   Clinician affirmed client's emotional strength and her willingness to forgive her  as well as acknowledging her self worth  Clinician provided a safe place for client to vent about her stressors and her feelings of betrayal    During this session, this clinician used the following therapeutic modalities: Bereavement Therapy and Supportive Psychotherapy    Substance Abuse was not addressed during this session  If the client is diagnosed with a co-occurring substance use disorder, please indicate any changes in the frequency or amount of use: NA  Stage of change for addressing substance use diagnoses: No substance use/Not applicable    ASSESSMENT:  Ken Curry presents with a Euthymic/ normal mood  her affect is Normal range and intensity, which is congruent, with her mood and the content of the session  The client has made progress on their goals  Client reports that she had a great vacation and had recently felt closer to her   A few days ago, client discovered that her  was unfaithful while she was on vacation and continued after she returned home  Client has given her  an ultimatum and is awaiting a meeting with  to discuss options  Ken Curry presents with a none risk of suicide, none risk of self-harm, and none risk of harm to others  For any risk assessment that surpasses a "low" rating, a safety plan must be developed  A safety plan was indicated: no  If yes, describe in detail NA    PLAN: Between sessions, Ken Curry will have another conversation with her  regarding their future  At the next session, the therapist will use Supportive Psychotherapy to address client's situation regarding her healing from her 's infidelity  Behavioral Health Treatment Plan and Discharge Planning: Ken Curry is aware of and agrees to continue to work on their treatment plan   They have identified and are working toward their discharge goals  yes    Visit start and stop times:    01/24/23  Start Time: 7884  Stop Time: 1558  Total Visit Time: 47 minutes   HPI     Past Medical History:   Diagnosis Date   • Gastritis    • GERD (gastroesophageal reflux disease)    • Migraine    • Obesity    • Seasonal allergies    • Umbilical hernia        Past Surgical History:   Procedure Laterality Date   • [place] LAPS [address] [place] [address] RSTRICTIV PX LONGITUDINAL GASTRECTOMY N/A 1/9/2023    Procedure: GASTRECTOMY SLEEVE LAP & INTRAOPERATIVE EGD;  Surgeon: Candace Monte MD;  Location: [facility];  Service: Bariatrics   • WISDOM TOOTH EXTRACTION Bilateral 03/2020       Current Outpatient Medications   Medication Sig Dispense Refill   • acetaminophen (TYLENOL) 500 mg tablet Take 500 mg by mouth every 6 (six) hours as needed for mild pain     • enoxaparin (Lovenox) 40 mg/0 4 mL Inject 0 4 mL (40 mg total) under the skin daily for 12 days 4 8 mL 0   • omeprazole (PriLOSEC) 20 mg delayed release capsule Take 1 capsule (20 mg total) by mouth daily Do not start before January 10, 2023  30 capsule 3   • oxyCODONE (Roxicodone) 5 immediate release tablet Take 1 tablet (5 mg total) by mouth every 4 (four) hours as needed for moderate pain Max Daily Amount: 30 mg Do not start before January 10, 2023  (Patient not taking: Reported on 1/19/2023) 10 tablet 0     No current facility-administered medications for this visit  Allergies   Allergen Reactions   • Contrast [Iodinated Contrast Media] Anaphylaxis     throat closure   • Fish-Derived Products - Food Allergy Anaphylaxis     Anaphylaxis   • Pollen Extract Other (See Comments)     hives, throat closure   • Shellfish-Derived Products - Food Allergy        Review of Systems    Video Exam    There were no vitals filed for this visit      Physical Exam

## 2023-01-31 ENCOUNTER — TELEMEDICINE (OUTPATIENT)
Dept: PSYCHIATRY | Facility: CLINIC | Age: 28
End: 2023-01-31

## 2023-01-31 DIAGNOSIS — F43.23 ADJUSTMENT DISORDER WITH MIXED ANXIETY AND DEPRESSED MOOD: Primary | ICD-10-CM

## 2023-02-01 NOTE — PSYCH
Virtual Regular Visit    Verification of patient location:    Patient is located in the following state in which I hold an active license [place]      Assessment/Plan:    Problem List Items Addressed This Visit        Psychiatry Problems    Adjustment disorder with mixed anxiety and depressed mood - Primary       Goals addressed in session: Goal 2          Reason for visit is No chief complaint on file  Encounter provider Lisa Jackson    Provider located at [address] [place], [place] [address]neil Alston  [facility] [address]  [phone]      Recent Visits  Date Type Provider Dept   01/31/23 Telemedicine [place], [place] [facility]   Showing recent visits within past 7 days and meeting all other requirements  Future Appointments  No visits were found meeting these conditions  Showing future appointments within next 150 days and meeting all other requirements       The patient was identified by name and date of birth  Carina Soliman was informed that this is a telemedicine visit and that the visit is being conducted throughthe FX Aligned platform  She agrees to proceed     My office door was closed  No one else was in the room  She acknowledged consent and understanding of privacy and security of the video platform  The patient has agreed to participate and understands they can discontinue the visit at any time  Patient is aware this is a billable service  Subjective  Carina Soliman is a 33 y o  female Behavioral Health Psychotherapy Progress Note    Psychotherapy Provided: Individual Psychotherapy     1  Adjustment disorder with mixed anxiety and depressed mood            Goals addressed in session: Goal 2     DATA: Clinician explored client's experiences over the past week--affirming client's boundaries and reassuring client that she is moving forward in her relationship   Clinician integrated client's nelson within the discussion of identity and stability  Clinician encouraged client to reach out to her father when she feels compelled and allowed client a safe place to vent about her   During this session, this clinician used the following therapeutic modalities: Client-centered Therapy and Supportive Psychotherapy    Substance Abuse was not addressed during this session  If the client is diagnosed with a co-occurring substance use disorder, please indicate any changes in the frequency or amount of use: NA  Stage of change for addressing substance use diagnoses: No substance use/Not applicable    ASSESSMENT:  Jayson Boles presents with a Euthymic/ normal mood  her affect is Normal range and intensity, which is congruent, with her mood and the content of the session  The client has made progress on their goals  Client reports that she has given the final parameters and expectations to her   Client has met with a  who advised her to stay in the marriage until after school-- should she decide to divorce  Client reports that her  has shown that he has made effort to reestablish trust  Client is wanting to reach out to her father for advice but struggles with their relationship  Jayson Boles presents with a none risk of suicide, none risk of self-harm, and none risk of harm to others  For any risk assessment that surpasses a "low" rating, a safety plan must be developed  A safety plan was indicated: no  If yes, describe in detail NA    PLAN: Between sessions, Jayson Boles will reach out to her father as she feels the urge  At the next session, the therapist will use Client-centered Therapy, Cognitive Behavioral Therapy and Supportive Psychotherapy to address client's ongoing stress with her  and to mend her relaitonships with her father        Behavioral Health Treatment Plan and Discharge Planning: Jayson Boles is aware of and agrees to continue to work on their treatment plan  They have identified and are working toward their discharge goals  yes    Visit start and stop times:    01/31/23  Start Time: 1504  Stop Time: 1556  Total Visit Time: 52 minutes   HPI     Past Medical History:   Diagnosis Date   • Gastritis    • GERD (gastroesophageal reflux disease)    • Migraine    • Obesity    • Seasonal allergies    • Umbilical hernia        Past Surgical History:   Procedure Laterality Date   • [place] LAPS [address] [facility] [address] RSTRICTIV PX LONGITUDINAL GASTRECTOMY N/A 1/9/2023    Procedure: GASTRECTOMY SLEEVE LAP & INTRAOPERATIVE EGD;  Surgeon: Nael Kaplan MD;  [facility];  Service: Bariatrics   • WISDOM TOOTH EXTRACTION Bilateral 03/2020       Current Outpatient Medications   Medication Sig Dispense Refill   • acetaminophen (TYLENOL) 500 mg tablet Take 500 mg by mouth every 6 (six) hours as needed for mild pain     • enoxaparin (Lovenox) 40 mg/0 4 mL Inject 0 4 mL (40 mg total) under the skin daily for 12 days 4 8 mL 0   • omeprazole (PriLOSEC) 20 mg delayed release capsule Take 1 capsule (20 mg total) by mouth daily Do not start before January 10, 2023  30 capsule 3   • oxyCODONE (Roxicodone) 5 immediate release tablet Take 1 tablet (5 mg total) by mouth every 4 (four) hours as needed for moderate pain Max Daily Amount: 30 mg Do not start before January 10, 2023  (Patient not taking: Reported on 1/19/2023) 10 tablet 0     No current facility-administered medications for this visit  Allergies   Allergen Reactions   • Contrast [Iodinated Contrast Media] Anaphylaxis     throat closure   • Fish-Derived Products - Food Allergy Anaphylaxis     Anaphylaxis   • Pollen Extract Other (See Comments)     hives, throat closure   • Shellfish-Derived Products - Food Allergy        Review of Systems    Video Exam    There were no vitals filed for this visit      Physical Exam

## 2023-02-10 ENCOUNTER — TELEPHONE (OUTPATIENT)
Dept: PSYCHIATRY | Facility: CLINIC | Age: 28
End: 2023-02-10

## 2023-02-10 DIAGNOSIS — E66.01 MORBID OBESITY (HCC): ICD-10-CM

## 2023-02-10 RX ORDER — OMEPRAZOLE 20 MG/1
20 CAPSULE, DELAYED RELEASE ORAL DAILY
Qty: 30 CAPSULE | Refills: 0 | Status: SHIPPED | OUTPATIENT
Start: 2023-02-10

## 2023-02-21 ENCOUNTER — CLINICAL SUPPORT (OUTPATIENT)
Dept: BARIATRICS | Facility: CLINIC | Age: 28
End: 2023-02-21

## 2023-02-21 DIAGNOSIS — Z98.84 BARIATRIC SURGERY STATUS: ICD-10-CM

## 2023-02-21 DIAGNOSIS — E66.01 OBESITY, CLASS III, BMI 40-49.9 (MORBID OBESITY) (HCC): Primary | ICD-10-CM

## 2023-02-21 NOTE — PROGRESS NOTES
Weight Management Nutrition Class     Diagnosis: Morbid Obesity    Bariatric Surgeon: Dr Denise Fisher    Surgery: Vertical Sleeve Gastrectomy    Class: 5 week post op     Topics discussed today include:     fluid goals post op, protein goals post op, constipation, chew food well, exercise, avoidance of alcohol, PPI use, diet progression, hypoglycemia, dumping syndrome, protein supplems, vitamin/mineral supplements, calcium supplements and iron supplements    Patient was able to verbalize basic diet (protein, fluid, vitamin and mineral) recommendations and possible nutrition-related complications   Yes

## 2023-02-23 ENCOUNTER — TELEPHONE (OUTPATIENT)
Dept: PSYCHIATRY | Facility: CLINIC | Age: 28
End: 2023-02-23

## 2023-02-23 NOTE — TELEPHONE ENCOUNTER
Pt stated she was interested in couples therapy     Pt already sees [facility] for individual therapy so would need a different provider for couples therapy

## 2023-02-23 NOTE — TELEPHONE ENCOUNTER
Spoke with pt in regards to couples counseling  Pt stated she would prefer the therapist to be closer to the [facility]  Writer informed pt that I would update her chart and that I do see she was added to the wait list in [facility] so when they did have the availability they would give her a call

## 2023-02-28 PROBLEM — Z98.890 STATUS POST GASTRIC SURGERY: Status: ACTIVE | Noted: 2023-02-28

## 2023-02-28 NOTE — PROGRESS NOTES
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  Abstinence recommended for the next 10 days  Recommend Riana Manner since the uterus is smaller than average  Pregnancy test at insertion  2 Advil 1 hour before  Diagnoses and all orders for this visit:    Encounter for other general counseling or advice on contraception    Status post gastric surgery    Uses oral contraception              Subjective:        Patient ID: Carina Soliman is a 33 y o  female  Alexsandra Punch presents today desiring an IUD as birth control  I had last seen her in August and at that time she had some missed menses on oral contraceptives  The dose of the oral contraceptive was increased to 35 mcg of ethinyl estradiol via Ovcon-35  She has a history of migraine headaches which are not adversely affected by the pill  BMI at the time was 48 with a plan to have gastric sleeve surgery performed the near future  She was able to lose weight working with bariatrics  Starting weight with him was 261 pounds  Prior to surgery she was down to 240 pounds  Gastric sleeve with an EGD performed at the same time occurred on 1/9/23  She was 228 pounds on January 19th and is currently 212 pounds  She ran out of oral contraceptives on February 26  She last had intercourse yesterday and that was unprotected  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  She is here for that compliance with oral contraceptives might be an issue going forward  At her last bariatric visit she was also told that oral contraceptives are less effective because of the surgery  She is taking supplements because absorption of nutrients is adversely affected by gastric sleeve surgery    1 piece of information that was inaccurate was that after gastric surgery the likelihood of an IUD falling out is increased because of weight loss  The following portions of the patient's history were reviewed and updated as appropriate: She  has a past medical history of Gastritis, GERD (gastroesophageal reflux disease), Migraine, Obesity, Seasonal allergies, and Umbilical hernia  Patient Active Problem List    Diagnosis Date Noted   • Status post gastric surgery 02/28/2023   • GERD (gastroesophageal reflux disease)    • Migraine    • No blood products    • Adjustment disorder with mixed anxiety and depressed mood 11/15/2022   • Missed menses 08/08/2022   • Encounter for annual routine gynecological examination 08/06/2022   • Uses oral contraception 08/06/2022   • Obesity, Class III, BMI 40-49 9 (morbid obesity) ([facility] [address] ) 06/23/2022   • Pelvic pain 01/12/2022   • Encounter for gynecological examination (general) (routine) without abnormal findings 07/30/2020   • Oral contraceptive pill surveillance 07/30/2020   • Gastritis 02/22/2018   • Shellfish allergy 16/95/1522   • Umbilical hernia without obstruction and without gangrene 02/22/2018   • Acquired renal cyst of left kidney 02/22/2018   • Acquired acanthosis nigricans 07/15/2013   • Obesity 04/26/2013   PMH:  Menarche  12, stopped and restarted at 16  G0  Gardasil  Morbid obesity  Pelvic pain 6/21 - nl US but not done until 1/22  Missed menses on OC's 8/22 increased OC to Ovcon 35  Gastric Sleeve with EGD 1/9/23  She  has a past surgical history that includes [place] tooth extraction (Bilateral, 03/2020) and pr laps gstrc rstrictiv px longitudinal gastrectomy (N/A, 1/9/2023)  Her family history includes Allergic rhinitis in her mother; Anxiety disorder in her maternal aunt; Asthma in her brother and paternal grandfather; Bipolar disorder in her maternal aunt; Breast cancer in her maternal grandmother; Depression in her maternal aunt and paternal grandmother; Diabetes in her mother; Hyperlipidemia in her father; Hypertension in her father and mother; Lung cancer in her paternal grandfather;  No Known Problems in her brother, brother, brother, brother, sister, and sister  She  reports that she has never smoked  She has never used smokeless tobacco  She reports current alcohol use of about 1 0 standard drink per week  She reports that she does not use drugs  SH:   to Aaron Conde  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  Current Outpatient Medications   Medication Sig Dispense Refill   • acetaminophen (TYLENOL) 500 mg tablet Take 500 mg by mouth every 6 (six) hours as needed for mild pain     • omeprazole (PriLOSEC) 20 mg delayed release capsule Take 1 capsule (20 mg total) by mouth daily 30 capsule 0     No current facility-administered medications for this visit  Current Outpatient Medications on File Prior to Visit   Medication Sig   • acetaminophen (TYLENOL) 500 mg tablet Take 500 mg by mouth every 6 (six) hours as needed for mild pain   • omeprazole (PriLOSEC) 20 mg delayed release capsule Take 1 capsule (20 mg total) by mouth daily   • [DISCONTINUED] enoxaparin (Lovenox) 40 mg/0 4 mL Inject 0 4 mL (40 mg total) under the skin daily for 12 days   • [DISCONTINUED] oxyCODONE (Roxicodone) 5 immediate release tablet Take 1 tablet (5 mg total) by mouth every 4 (four) hours as needed for moderate pain Max Daily Amount: 30 mg Do not start before January 10, 2023  (Patient not taking: Reported on 1/19/2023)     No current facility-administered medications on file prior to visit  She is allergic to contrast [iodinated contrast media], fish-derived products - food allergy, pollen extract, and shellfish-derived products - food allergy       Review of Systems   Constitutional: Negative  Genitourinary: Negative for dyspareunia, menstrual problem, pelvic pain, vaginal discharge and vaginal pain           Objective:    Vitals:    03/01/23 0946   BP: 118/80   BP Location: Left arm   Patient Position: Sitting   Cuff Size: Standard   Weight: 96 4 kg (212 lb 9 6 oz)   Height: 5' 1" (1 549 m)            Physical Exam  Vitals and nursing note reviewed  Exam conducted with a chaperone present  Constitutional:       Appearance: Normal appearance  She is obese  Genitourinary:     General: Normal vulva  Comments: The cervix is nulliparous  The uterus is on the smaller side and retroverted  Because of this Raven Don would be a better choice than Mirena  The pubic rami are narrow  Neurological:      Mental Status: She is alert  Psychiatric:         Mood and Affect: Mood normal          Behavior: Behavior normal          Thought Content:  Thought content normal          Judgment: Judgment normal

## 2023-03-01 ENCOUNTER — OFFICE VISIT (OUTPATIENT)
Dept: OBGYN CLINIC | Facility: CLINIC | Age: 28
End: 2023-03-01

## 2023-03-01 VITALS
DIASTOLIC BLOOD PRESSURE: 80 MMHG | SYSTOLIC BLOOD PRESSURE: 118 MMHG | BODY MASS INDEX: 40.14 KG/M2 | HEIGHT: 61 IN | WEIGHT: 212.6 LBS

## 2023-03-01 DIAGNOSIS — Z30.41 USES ORAL CONTRACEPTION: ICD-10-CM

## 2023-03-01 DIAGNOSIS — Z98.890 STATUS POST GASTRIC SURGERY: ICD-10-CM

## 2023-03-01 DIAGNOSIS — Z30.09 ENCOUNTER FOR OTHER GENERAL COUNSELING OR ADVICE ON CONTRACEPTION: Primary | ICD-10-CM

## 2023-03-09 ENCOUNTER — APPOINTMENT (OUTPATIENT)
Dept: URGENT CARE | Facility: MEDICAL CENTER | Age: 28
End: 2023-03-09

## 2023-03-09 ENCOUNTER — APPOINTMENT (OUTPATIENT)
Dept: LAB | Facility: MEDICAL CENTER | Age: 28
End: 2023-03-09

## 2023-03-09 DIAGNOSIS — Z02.1 PRE-EMPLOYMENT HEALTH SCREENING EXAMINATION: Primary | ICD-10-CM

## 2023-03-09 DIAGNOSIS — Z02.1 PRE-EMPLOYMENT HEALTH SCREENING EXAMINATION: ICD-10-CM

## 2023-03-09 LAB
HBV SURFACE AB SER-ACNC: >1000 MIU/ML
RUBV IGG SERPL IA-ACNC: 172.7 IU/ML

## 2023-03-10 ENCOUNTER — TELEPHONE (OUTPATIENT)
Dept: OTHER | Facility: OTHER | Age: 28
End: 2023-03-10

## 2023-03-10 DIAGNOSIS — D64.9 POSTOPERATIVE ANEMIA: Primary | ICD-10-CM

## 2023-03-10 LAB
GAMMA INTERFERON BACKGROUND BLD IA-ACNC: 0.16 IU/ML
M TB IFN-G BLD-IMP: NEGATIVE
M TB IFN-G CD4+ BCKGRND COR BLD-ACNC: 0.14 IU/ML
M TB IFN-G CD4+ BCKGRND COR BLD-ACNC: 0.2 IU/ML
MEV IGG SER QL IA: NORMAL
MITOGEN IGNF BCKGRD COR BLD-ACNC: >10 IU/ML
MUV IGG SER QL IA: NORMAL
VZV IGG SER QL IA: NORMAL

## 2023-03-10 NOTE — TELEPHONE ENCOUNTER
Per patient's phone call, per weight management she is to call her PCP in regards to recent lab results  Per patient, she is having anemia symptoms

## 2023-03-13 ENCOUNTER — TELEPHONE (OUTPATIENT)
Dept: HEMATOLOGY ONCOLOGY | Facility: CLINIC | Age: 28
End: 2023-03-13

## 2023-03-14 ENCOUNTER — TELEPHONE (OUTPATIENT)
Dept: HEMATOLOGY ONCOLOGY | Facility: CLINIC | Age: 28
End: 2023-03-14

## 2023-03-14 NOTE — TELEPHONE ENCOUNTER
Patient has a referral on file - Made an attempt to schedule a new patient consultation  A voicemail was left making patient aware of their referral and instructing them to call back at the [facility] phone number in order to schedule their consultation

## 2023-03-15 ENCOUNTER — TELEPHONE (OUTPATIENT)
Dept: HEMATOLOGY ONCOLOGY | Facility: CLINIC | Age: 28
End: 2023-03-15

## 2023-03-15 NOTE — TELEPHONE ENCOUNTER
Patient has a referral on file - Made an attempt to schedule a new patient consultation  A voicemail was left making patient aware of their referral and instructing them to call back at the [facility] phone number in order to schedule their consultation  This is the third attempt to contact patient unsuccessfully  The referral has been closed and a department letter has been mailed to patients address on file

## 2023-03-16 NOTE — PROGRESS NOTES
Assessment/Plan:  Successful Kyleena placement  Return to the office in 1 month        Diagnoses and all orders for this visit:    Encounter for IUD insertion  -     Iud insertions  -     levonorgestrel (KYLEENA) 19 5 mg intrauterine device (IUD)    Status post gastric surgery  -     Iud insertions              Subjective:        Patient ID: Carina Soliman is a 33 y o  female  Alexsandra Punch returns for a Riana Manner IUD  She last had intercourse more than 10 days ago  A pregnancy test is negative  Procedure has been explained previously and was repeated today  She has a retroverted uterus  The following portions of the patient's history were reviewed and updated as appropriate: She  has a past medical history of Gastritis, GERD (gastroesophageal reflux disease), Migraine, Obesity, Seasonal allergies, and Umbilical hernia  Patient Active Problem List    Diagnosis Date Noted   • Status post gastric surgery 02/28/2023   • GERD (gastroesophageal reflux disease)    • Migraine    • No blood products    • Adjustment disorder with mixed anxiety and depressed mood 11/15/2022   • Missed menses 08/08/2022   • Encounter for annual routine gynecological examination 08/06/2022   • Uses oral contraception 08/06/2022   • Obesity, Class III, BMI 40-49 9 (morbid obesity) ([facility] [address] ) 06/23/2022   • Pelvic pain 01/12/2022   • Encounter for gynecological examination (general) (routine) without abnormal findings 07/30/2020   • Oral contraceptive pill surveillance 07/30/2020   • Gastritis 02/22/2018   • Shellfish allergy 81/41/7123   • Umbilical hernia without obstruction and without gangrene 02/22/2018   • Acquired renal cyst of left kidney 02/22/2018   • Acquired acanthosis nigricans 07/15/2013   • Obesity 04/26/2013     She  has a past surgical history that includes [place] tooth extraction (Bilateral, 03/2020) and pr laps gstrc rstrictiv px longitudinal gastrectomy (N/A, 1/9/2023)    Her family history includes Allergic rhinitis in her mother; Anxiety disorder in her maternal aunt; Asthma in her brother and paternal grandfather; Bipolar disorder in her maternal aunt; Breast cancer in her maternal grandmother; Depression in her maternal aunt and paternal grandmother; Diabetes in her mother; Hyperlipidemia in her father; Hypertension in her father and mother; Lung cancer in her paternal grandfather; No Known Problems in her brother, brother, brother, brother, sister, and sister  She  reports that she has never smoked  She has never used smokeless tobacco  She reports current alcohol use of about 1 0 standard drink per week  She reports that she does not use drugs  Current Outpatient Medications   Medication Sig Dispense Refill   • acetaminophen (TYLENOL) 500 mg tablet Take 500 mg by mouth every 6 (six) hours as needed for mild pain     • omeprazole (PriLOSEC) 20 mg delayed release capsule Take 1 capsule (20 mg total) by mouth daily 30 capsule 0     Current Facility-Administered Medications   Medication Dose Route Frequency Provider Last Rate Last Admin   • levonorgestrel (KYLEENA) 19 5 mg intrauterine device (IUD)  1 each Intrauterine Once Claudette Arora MD         Current Outpatient Medications on File Prior to Visit   Medication Sig   • acetaminophen (TYLENOL) 500 mg tablet Take 500 mg by mouth every 6 (six) hours as needed for mild pain   • omeprazole (PriLOSEC) 20 mg delayed release capsule Take 1 capsule (20 mg total) by mouth daily     No current facility-administered medications on file prior to visit  She is allergic to contrast [iodinated contrast media], fish-derived products - food allergy, pollen extract, and shellfish-derived products - food allergy       Review of Systems      Objective:    Vitals:    03/17/23 0843   BP: 110/80   BP Location: Right arm   Patient Position: Sitting   Cuff Size: Standard   Weight: 94 9 kg (209 lb 3 2 oz)       Iud insertions    Date/Time: 3/16/2023 1:39 PM  Performed by: Claudette Arora MD  Authorized by: Rogerio Sarkar MD     Other Assisting Provider: No    Verbal consent obtained?: Yes    Written consent obtained?: Yes    Risks and benefits: Risks, benefits and alternatives were discussed    Consent given by:  Patient  Time Out:     Time out: Immediately prior to the procedure a time out was called    Patient states understanding of procedure being performed: Yes    Patient's understanding of procedure matches consent: Yes    Patient identity confirmed:  Verbally with patient  Procedure:     Pelvic exam performed: no (just performed 3/1 )      Negative urine pregnancy test: yes      Cervix cleaned and prepped: yes      Speculum placed in vagina: yes      Tenaculum applied to cervix: yes      Uterus sounded: yes      Uterus sound depth (cm):  8    IUD inserted with no complications: no      IUD type:  Kyleena  Post-procedure:     Patient tolerated procedure well: yes (Mild lightheadedness which resolved spontaneously)    Comments:      Easily performed in a routine manner    Instructions given

## 2023-03-17 ENCOUNTER — PROCEDURE VISIT (OUTPATIENT)
Dept: OBGYN CLINIC | Facility: CLINIC | Age: 28
End: 2023-03-17

## 2023-03-17 VITALS — SYSTOLIC BLOOD PRESSURE: 110 MMHG | BODY MASS INDEX: 39.53 KG/M2 | DIASTOLIC BLOOD PRESSURE: 80 MMHG | WEIGHT: 209.2 LBS

## 2023-03-17 DIAGNOSIS — Z30.430 ENCOUNTER FOR IUD INSERTION: Primary | ICD-10-CM

## 2023-03-17 DIAGNOSIS — Z98.890 STATUS POST GASTRIC SURGERY: ICD-10-CM

## 2023-04-05 DIAGNOSIS — E66.01 MORBID OBESITY (HCC): ICD-10-CM

## 2023-04-05 RX ORDER — OMEPRAZOLE 20 MG/1
CAPSULE, DELAYED RELEASE ORAL
Qty: 30 CAPSULE | Refills: 0 | Status: SHIPPED | OUTPATIENT
Start: 2023-04-05

## 2023-04-06 ENCOUNTER — TELEMEDICINE (OUTPATIENT)
Dept: PSYCHIATRY | Facility: CLINIC | Age: 28
End: 2023-04-06

## 2023-04-06 DIAGNOSIS — F43.23 ADJUSTMENT DISORDER WITH MIXED ANXIETY AND DEPRESSED MOOD: Primary | ICD-10-CM

## 2023-04-07 NOTE — PSYCH
Virtual Regular Visit    Verification of patient location:    Patient is located in the following state in which I hold an active license [place]      Assessment/Plan:    Problem List Items Addressed This Visit    None      Goals addressed in session: Goal 2          Reason for visit is No chief complaint on file  Encounter provider Lisa Meehan    Provider located at [address] [place] [id number] [phone]      Recent Visits  No visits were found meeting these conditions  Showing recent visits within past 7 days and meeting all other requirements  Future Appointments  No visits were found meeting these conditions  Showing future appointments within next 150 days and meeting all other requirements       The patient was identified by name and date of birth  Wyvonnia Sicard was informed that this is a telemedicine visit and that the visit is being conducted throughthe [employer] platform  She agrees to proceed     My office door was closed  No one else was in the room  She acknowledged consent and understanding of privacy and security of the video platform  The patient has agreed to participate and understands they can discontinue the visit at any time  Patient is aware this is a billable service  Subjective  Wyvonnia Sicard is a 33 y o  female Behavioral Health Psychotherapy Progress Note    Psychotherapy Provided: Individual Psychotherapy     No diagnosis found  Goals addressed in session: Goal 2     DATA: Clinician explored client's current stressors; clinician affirmed client's current use of her coping skills and decision making  Clinician discussed obsessions and irrational fears  Clinician advised client on taking self care and limiting her responsibility  Clinician and client discussed difficulty with concentration and symptoms of depression and anxiety     During this session, this "clinician used the following therapeutic modalities: Client-centered Therapy and Supportive Psychotherapy    Substance Abuse was not addressed during this session  If the client is diagnosed with a co-occurring substance use disorder, please indicate any changes in the frequency or amount of use: NA  Stage of change for addressing substance use diagnoses: No substance use/Not applicable    ASSESSMENT:  Rhianna Encarnacion presents with a Anxious mood  her affect is Normal range and intensity, which is congruent, with her mood and the content of the session  The client has made progress on their goals  Client reports that she is overwhelmed with work, school, 2nd job, responsibilities at home  Rhianna Encarnacion presents with a none risk of suicide, none risk of self-harm, and none risk of harm to others  For any risk assessment that surpasses a \"low\" rating, a safety plan must be developed  A safety plan was indicated: no  If yes, describe in detail NA    PLAN: Between sessions, Rhianna Encarnacion will continue to practice self care  At the next session, the therapist will use Client-centered Therapy to address ongoing stressors  Behavioral Health Treatment Plan and Discharge Planning: Rhianna Encarnacion is aware of and agrees to continue to work on their treatment plan  They have identified and are working toward their discharge goals  yes    Visit start and stop times:    04/06/23  Start Time: 5484  Stop Time: 1425  Total Visit Time: 52 minutes        HPI     Past Medical History:   Diagnosis Date   • Gastritis    • GERD (gastroesophageal reflux disease)    • Migraine    • Obesity    • Seasonal allergies    • Umbilical hernia        Past Surgical History:   Procedure Laterality Date   • MI LAPS [address] [place] [address] RSTRICTIV PX LONGITUDINAL GASTRECTOMY N/A 1/9/2023    Procedure: GASTRECTOMY SLEEVE LAP & INTRAOPERATIVE EGD;  Surgeon: Jr Barboza MD;  Location: [facility] OR;  Service: Bariatrics   • WISDOM TOOTH " EXTRACTION Bilateral 03/2020       Current Outpatient Medications   Medication Sig Dispense Refill   • acetaminophen (TYLENOL) 500 mg tablet Take 500 mg by mouth every 6 (six) hours as needed for mild pain     • omeprazole (PriLOSEC) 20 mg delayed release capsule TAKE ONE CAPSULE BY MOUTH EVERY DAY 30 capsule 0     No current facility-administered medications for this visit  Allergies   Allergen Reactions   • Contrast [Iodinated Contrast Media] Anaphylaxis     throat closure   • Fish-Derived Products - Food Allergy Anaphylaxis     Anaphylaxis   • Pollen Extract Other (See Comments)     hives, throat closure   • Shellfish-Derived Products - Food Allergy        Review of Systems    Video Exam    There were no vitals filed for this visit      Physical Exam

## 2023-04-24 ENCOUNTER — OFFICE VISIT (OUTPATIENT)
Dept: BARIATRICS | Facility: CLINIC | Age: 28
End: 2023-04-24

## 2023-04-24 VITALS
SYSTOLIC BLOOD PRESSURE: 116 MMHG | TEMPERATURE: 97.6 F | DIASTOLIC BLOOD PRESSURE: 64 MMHG | WEIGHT: 200.5 LBS | HEIGHT: 62 IN | BODY MASS INDEX: 36.9 KG/M2 | HEART RATE: 58 BPM

## 2023-04-24 DIAGNOSIS — Z48.815 ENCOUNTER FOR SURGICAL AFTERCARE FOLLOWING SURGERY OF DIGESTIVE SYSTEM: Primary | ICD-10-CM

## 2023-04-24 DIAGNOSIS — E66.01 MORBID OBESITY (HCC): ICD-10-CM

## 2023-04-24 DIAGNOSIS — K91.2 POSTSURGICAL MALABSORPTION: ICD-10-CM

## 2023-04-24 DIAGNOSIS — E66.9 OBESITY, CLASS II, BMI 35-39.9: ICD-10-CM

## 2023-04-24 DIAGNOSIS — Z98.84 BARIATRIC SURGERY STATUS: ICD-10-CM

## 2023-04-24 RX ORDER — CALCIUM CARBONATE 200(500)MG
1 TABLET,CHEWABLE ORAL DAILY
COMMUNITY

## 2023-04-24 RX ORDER — OMEPRAZOLE 20 MG/1
20 CAPSULE, DELAYED RELEASE ORAL DAILY
Qty: 30 CAPSULE | Refills: 2 | Status: SHIPPED | OUTPATIENT
Start: 2023-04-24

## 2023-04-24 RX ORDER — PEDI MULTIVIT NO.91/IRON FUM 15 MG
1 TABLET,CHEWABLE ORAL DAILY
COMMUNITY

## 2023-04-24 NOTE — PROGRESS NOTES
Assessment/Plan:     Patient ID: Eber Loera is a 33 y o  female  Bariatric Surgery Status    -s/p Vertical Sleeve Gastrectomy with Dr Felicity Braga on 01/09/2023  Presents to the office today for 2nd post op visit  Overall doing well, tolerating a regular diet  Denies having any abdominal pain, N/V/D/C, regurgitation, reflux or dysphagia  Taking her MVI and PPI daily  Stressed out due to nursing school  PLAN:     - Continue with omeprazole for now  - Routine follow up in 3 months for 3rd post op visit  - Continue with healthy lifestyle, adequate protein intake of 60 gm, fluid intake of at least 64 oz  - Continue with MVI daily  - Activity as tolerated  - Labs ordered and will adjust accordingly if any deficiency  - Follow up with RD and SW as needed       · Continued/Maintain healthy weight loss with good nutrition intakes  · Adequate hydration with at least 64oz  fluid intake  · Follow diet as discussed  · Follow vitamin and mineral recommendations as reviewed with you  · Exercise as tolerated  · Colonoscopy referral made: N/A  · Mammogram - N/A    · Follow-up in 3 months for 3rd post op visit  We kindly ask that your arrive 15 minutes before your scheduled appointment time with your provider to allow our staff to room you, get your vital signs and update your chart  · Get lab work done  Please call the office if you need a script  It is recommended to check with your insurance BEFORE getting labs done to make sure they are covered by your policy  · Call our office if you have any problems with abdominal pain especially associated with fever, chills, nausea, vomiting or any other concerns  · All  Post-bariatric surgery patients should be aware that very small quantities of any alcohol can cause impairment and it is very possible not to feel the effect  The effect can be in the system for several hours  It is also a stomach irritant       · It is advised to AVOID alcohol, Nonsteroidal antiinflammatory drugs (NSAIDS) and nicotine of all forms   Any of these can cause stomach irritation/pain  · Discussed the effects of alcohol on a bariatric patient and the increased impairment risk  · Keep up the good work! Postsurgical Malabsorption   -At risk for malabsorption of vitamins/minerals secondary to malabsorption and restriction of intake from bariatric surgery  -Currently taking adequate postop bariatric surgery vitamin supplementation  -Next set of bariatric labs ordered for approximately 3 months  -Patient received education about the importance of adhering to a lifelong supplementation regimen to avoid vitamin/mineral deficiencies      Diagnoses and all orders for this visit:    Encounter for surgical aftercare following surgery of digestive system  -     Zinc; Future  -     Vitamin D 25 hydroxy; Future  -     Vitamin B1, whole blood; Future  -     Vitamin A; Future  -     PTH, intact; Future  -     Folate; Future    Bariatric surgery status  -     Zinc; Future  -     Vitamin D 25 hydroxy; Future  -     Vitamin B1, whole blood; Future  -     Vitamin A; Future  -     PTH, intact; Future  -     Folate; Future    Postsurgical malabsorption  -     Zinc; Future  -     Vitamin D 25 hydroxy; Future  -     Vitamin B1, whole blood; Future  -     Vitamin A; Future  -     PTH, intact; Future  -     Folate; Future    Obesity, Class II, BMI 35-39 9  -     Zinc; Future  -     Vitamin D 25 hydroxy; Future  -     Vitamin B1, whole blood; Future  -     Vitamin A; Future  -     PTH, intact; Future  -     Folate; Future    Morbid obesity (HCC)  -     omeprazole (PriLOSEC) 20 mg delayed release capsule; Take 1 capsule (20 mg total) by mouth daily    Other orders  -     pediatric multivitamin-iron (POLY-VI-SOL with IRON) 15 MG chewable tablet; Chew 1 tablet daily  -     calcium carbonate (TUMS) 500 mg chewable tablet;  Chew 1 tablet daily         Subjective:      Patient ID: Maurice guerrier "a 33 y o  female  -s/p Vertical Sleeve Gastrectomy with Dr Manny Morales on 01/09/2023  Presents to the office today for 2nd post op visit  Overall doing well, tolerating a regular diet  Denies having any abdominal pain, N/V/D/C, regurgitation, reflux or dysphagia  Taking her MVI and PPI daily  Stressed out due to nursing school  Initial: 261 lbs  Current: 200 5 lbs  EWL: (Weight loss is ahead of schedule at this post surgical period )  [place]reet: current  Current BMI is Body mass index is 37 27 kg/m²  · Tolerating a regular diet-yes  · Eating at least 60 grams of protein per day-yes  · Following 30/60 minute rule with liquids-yes  · Drinking at least 64 ounces of fluid per day-yes - some days have trouble with this  · Drinking carbonated beverages-no  · Sufficient exercise-yes - gym   · Using NSAIDs regularly-no  · Using nicotine-no  · Using alcohol-yes - very rarely  · Supplements: Multivitamins and WITH IRON 45 MG, calcium 500 mg TID, biotin    · EWL is 48%, which places the patient ahead of schedule for expected post surgical weight loss at this time  The following portions of the patient's history were reviewed and updated as appropriate: allergies, current medications, past family history, past medical history, past social history, past surgical history and problem list     Review of Systems   Constitutional: Negative  Respiratory: Negative  Cardiovascular: Negative  Gastrointestinal: Negative  Denies heartburn/reflux     Musculoskeletal: Negative  Neurological: Positive for dizziness (lightheadedness with positional change)  Objective:    /64   Pulse 58   Temp 97 6 °F (36 4 °C) (Tympanic)   Ht 5' 1 5\" (1 562 m)   Wt 90 9 kg (200 lb 8 oz)   LMP 04/12/2023 (Exact Date)   BMI 37 27 kg/m²      Physical Exam  Vitals and nursing note reviewed  Constitutional:       Appearance: Normal appearance  She is obese     Cardiovascular:      Rate and Rhythm: Normal rate and " regular rhythm  Pulses: Normal pulses  Heart sounds: Normal heart sounds  Pulmonary:      Effort: Pulmonary effort is normal       Breath sounds: Normal breath sounds  Abdominal:      General: Bowel sounds are normal       Palpations: Abdomen is soft  Tenderness: There is no abdominal tenderness  Comments: Incision C/D/I - no signs if infection, healing well  Musculoskeletal:         General: Normal range of motion  Skin:     General: Skin is warm and dry  Neurological:      General: No focal deficit present  Mental Status: She is alert and oriented to person, place, and time  Psychiatric:         Mood and Affect: Mood normal          Behavior: Behavior normal          Thought Content:  Thought content normal          Judgment: Judgment normal

## 2023-04-25 ENCOUNTER — TELEMEDICINE (OUTPATIENT)
Dept: PSYCHIATRY | Facility: CLINIC | Age: 28
End: 2023-04-25

## 2023-04-25 DIAGNOSIS — F43.23 ADJUSTMENT DISORDER WITH MIXED ANXIETY AND DEPRESSED MOOD: Primary | ICD-10-CM

## 2023-04-26 NOTE — PSYCH
Virtual Regular Visit    Verification of patient location:    Patient is located at Home in the following state in which I hold an active license [place]      Assessment/Plan:    Problem List Items Addressed This Visit        Psychiatry Problems    Adjustment disorder with mixed anxiety and depressed mood - Primary       Goals addressed in session: Goal 2          Reason for visit is No chief complaint on file  Encounter provider Lisa Grewal    Provider located at [address] [place] [id number] [phone]      Recent Visits  Date Type Provider Dept   04/25/23 Telemedicine [place]   Showing recent visits within past 7 days and meeting all other requirements  Future Appointments  No visits were found meeting these conditions  Showing future appointments within next 150 days and meeting all other requirements       The patient was identified by name and date of birth  Abiola Villegas was informed that this is a telemedicine visit and that the visit is being conducted throughthe [employer] platform  She agrees to proceed     My office door was closed  No one else was in the room  She acknowledged consent and understanding of privacy and security of the video platform  The patient has agreed to participate and understands they can discontinue the visit at any time  Patient is aware this is a billable service  Subjective  Abiola Villegas is a 33 y o  female Behavioral Health Psychotherapy Progress Note    Psychotherapy Provided: Individual Psychotherapy     1  Adjustment disorder with mixed anxiety and depressed mood            Goals addressed in session: Goal 2     DATA: Clinician explored client's emotions and adaptations made  Clinician assisted client in identifying her anxieties-- and her reasoning   Clinician encouraged client to express herself "in a way that is culturally acceptable, but closer to her preference  During this session, this clinician used the following therapeutic modalities: Client-centered Therapy    Substance Abuse was not addressed during this session  If the client is diagnosed with a co-occurring substance use disorder, please indicate any changes in the frequency or amount of use: NA  Stage of change for addressing substance use diagnoses: No substance use/Not applicable    ASSESSMENT:  Artie De Leon presents with a Euthymic/ normal mood  her affect is Normal range and intensity, which is congruent, with her mood and the content of the session  The client has made progress on their goals  Artie De Leon presents with a none risk of suicide, none risk of self-harm, and none risk of harm to others  For any risk assessment that surpasses a \"low\" rating, a safety plan must be developed  A safety plan was indicated: no  If yes, describe in detail NA    PLAN: Between sessions, Artie De Leon will practice self care within her self expression    At the next session, the therapist will use Client-centered Therapy to address ongoing stressors  Behavioral Health Treatment Plan and Discharge Planning: Artie De Leon is aware of and agrees to continue to work on their treatment plan  They have identified and are working toward their discharge goals  yes    Visit start and stop times:    04/25/23  Start Time: 1605  Stop Time: 2845  Total Visit Time: 52 minutes         HPI     Past Medical History:   Diagnosis Date   • Gastritis    • GERD (gastroesophageal reflux disease)    • Migraine    • Obesity    • Seasonal allergies    • Umbilical hernia        Past Surgical History:   Procedure Laterality Date   • INSERTION OF INTRAUTERINE DEVICE (IUD)     • IN LAPS [address] [place] [address] RSTRICTIV PX LONGITUDINAL GASTRECTOMY N/A 01/09/2023    Procedure: GASTRECTOMY SLEEVE LAP & INTRAOPERATIVE EGD;  Surgeon: Crys Alexander MD;  Location: [facility]" OR;  Service: Bariatrics   • WISDOM TOOTH EXTRACTION Bilateral 03/2020       Current Outpatient Medications   Medication Sig Dispense Refill   • acetaminophen (TYLENOL) 500 mg tablet Take 500 mg by mouth every 6 (six) hours as needed for mild pain     • calcium carbonate (TUMS) 500 mg chewable tablet Chew 1 tablet daily     • omeprazole (PriLOSEC) 20 mg delayed release capsule Take 1 capsule (20 mg total) by mouth daily 30 capsule 2   • pediatric multivitamin-iron (POLY-VI-SOL with IRON) 15 MG chewable tablet Chew 1 tablet daily       No current facility-administered medications for this visit  Allergies   Allergen Reactions   • Contrast [Iodinated Contrast Media] Anaphylaxis     throat closure   • Fish-Derived Products - Food Allergy Anaphylaxis     Anaphylaxis   • Pollen Extract Other (See Comments)     hives, throat closure   • Shellfish-Derived Products - Food Allergy        Review of Systems    Video Exam    There were no vitals filed for this visit      Physical Exam

## 2023-05-23 ENCOUNTER — TELEMEDICINE (OUTPATIENT)
Dept: PSYCHIATRY | Facility: CLINIC | Age: 28
End: 2023-05-23

## 2023-05-23 DIAGNOSIS — F41.9 ANXIETY DISORDER, UNSPECIFIED TYPE: Primary | ICD-10-CM

## 2023-05-23 NOTE — PSYCH
"Behavioral Health Psychotherapy Progress Note    Psychotherapy Provided: Individual Psychotherapy     1  Anxiety disorder, unspecified type            Goals addressed in session: Goal 1     DATA: Clinician explored client's current progress and affirmed client's changes and motivation to move forward within her relationship  Emily Bass assisted client in recognizing what she still wants to work on within therapy-- as she has accomplished her current objectives for her goals, but she wants to strengthen her goals with new objectives  During this session, this clinician used the following therapeutic modalities: Client-centered Therapy and Supportive Psychotherapy    Substance Abuse was not addressed during this session  If the client is diagnosed with a co-occurring substance use disorder, please indicate any changes in the frequency or amount of use: NA  Stage of change for addressing substance use diagnoses: No substance use/Not applicable    ASSESSMENT:  Bren Wynn presents with a Euthymic/ normal mood  her affect is Normal range and intensity, which is congruent, with her mood and the content of the session  The client has made progress on their goals  Bren Wynn presents with a none risk of suicide, none risk of self-harm, and none risk of harm to others  For any risk assessment that surpasses a \"low\" rating, a safety plan must be developed  A safety plan was indicated: no  If yes, describe in detail NA    PLAN: Between sessions, Bren Wynn will continue practicing her mindfulness within the moments with her   At the next session, the therapist will use Client-centered Therapy to address ongoing stressors  Behavioral Health Treatment Plan and Discharge Planning: Bren Wynn is aware of and agrees to continue to work on their treatment plan  They have identified and are working toward their discharge goals   yes    Visit start and stop times:    05/23/23  Start Time: " 2114  Stop Time: 1661  Total Visit Time: 46 minutes
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
## 2023-06-14 ENCOUNTER — TELEMEDICINE (OUTPATIENT)
Dept: PSYCHIATRY | Facility: CLINIC | Age: 28
End: 2023-06-14
Payer: COMMERCIAL

## 2023-06-14 DIAGNOSIS — F41.9 ANXIETY DISORDER, UNSPECIFIED TYPE: Primary | ICD-10-CM

## 2023-06-14 PROCEDURE — 90837 PSYTX W PT 60 MINUTES: CPT | Performed by: SOCIAL WORKER

## 2023-06-14 NOTE — PSYCH
Virtual Regular Visit    Verification of patient location:    Patient is located at Home in the following state in which I hold an active license [place]      Assessment/Plan:    Problem List Items Addressed This Visit    None  Visit Diagnoses     Anxiety disorder, unspecified type    -  Primary          Goals addressed in session: Goal 1          Reason for visit is No chief complaint on file  Encounter provider Lisa Lyons    Provider located at [address] [place] [id number] [phone]      Recent Visits  No visits were found meeting these conditions  Showing recent visits within past 7 days and meeting all other requirements  Today's Visits  Date Type Provider Dept   06/14/23 Telemedicine [facility] [place], [place] [facility]   Showing today's visits and meeting all other requirements  Future Appointments  No visits were found meeting these conditions  Showing future appointments within next 150 days and meeting all other requirements       The patient was identified by name and date of birth  Genesis Townsend was informed that this is a telemedicine visit and that the visit is being conducted throughthe [employer] platform  She agrees to proceed     My office door was closed  No one else was in the room  She acknowledged consent and understanding of privacy and security of the video platform  The patient has agreed to participate and understands they can discontinue the visit at any time  Patient is aware this is a billable service  Subjective  Genesis Townsend is a 33 y o  female Behavioral Health Psychotherapy Progress Note    Psychotherapy Provided: Individual Psychotherapy     1   Anxiety disorder, unspecified type            Goals addressed in session: Goal 1     DATA: Clinician explored client's current stressors and discussed her progress-- assisted her in "identifying the positives of her relationship and the changes within her 's actions  Clinician and client discussed fears of abandonment-- how they effect her day to day  During this session, this clinician used the following therapeutic modalities: Client-centered Therapy, Solution-Focused Therapy and Supportive Psychotherapy    Substance Abuse was not addressed during this session  If the client is diagnosed with a co-occurring substance use disorder, please indicate any changes in the frequency or amount of use: NA  Stage of change for addressing substance use diagnoses: No substance use/Not applicable    ASSESSMENT:  Debbi Wallace presents with a Euthymic/ normal mood  her affect is Normal range and intensity, which is congruent, with her mood and the content of the session  The client has made progress on their goals  Debbi Wallace presents with a none risk of suicide, none risk of self-harm, and none risk of harm to others  For any risk assessment that surpasses a \"low\" rating, a safety plan must be developed  A safety plan was indicated: no  If yes, describe in detail NA    PLAN: Between sessions, Debbi Wallace will continue utilizing self care  At the next session, the therapist will use Client-centered Therapy to address ongoing stressors  Behavioral Health Treatment Plan and Discharge Planning: Debbi Wallace is aware of and agrees to continue to work on their treatment plan  They have identified and are working toward their discharge goals  yes    Visit start and stop times:    06/14/23  Start Time: 0803  Stop Time: 0902  Total Visit Time: 59 minutes         HPI     Past Medical History:   Diagnosis Date   • Gastritis    • GERD (gastroesophageal reflux disease)    • Migraine    • Obesity    • Seasonal allergies    • Umbilical hernia        Past Surgical History:   Procedure Laterality Date   • INSERTION OF INTRAUTERINE DEVICE (IUD)     • [place] LAPS GSTRC RSTRICTIV PX " LONGITUDINAL GASTRECTOMY N/A 01/09/2023    Procedure: GASTRECTOMY SLEEVE LAP & INTRAOPERATIVE EGD;  Surgeon: Darell Nunez MD;  Location: [facility];  Service: Bariatrics   • WISDOM TOOTH EXTRACTION Bilateral 03/2020       Current Outpatient Medications   Medication Sig Dispense Refill   • acetaminophen (TYLENOL) 500 mg tablet Take 500 mg by mouth every 6 (six) hours as needed for mild pain     • calcium carbonate (TUMS) 500 mg chewable tablet Chew 1 tablet daily     • omeprazole (PriLOSEC) 20 mg delayed release capsule Take 1 capsule (20 mg total) by mouth daily 30 capsule 2   • pediatric multivitamin-iron (POLY-VI-SOL with IRON) 15 MG chewable tablet Chew 1 tablet daily       No current facility-administered medications for this visit  Allergies   Allergen Reactions   • Contrast [Iodinated Contrast Media] Anaphylaxis     throat closure   • Fish-Derived Products - Food Allergy Anaphylaxis     Anaphylaxis   • Pollen Extract Other (See Comments)     hives, throat closure   • Shellfish-Derived Products - Food Allergy        Review of Systems    Video Exam    There were no vitals filed for this visit      Physical Exam

## 2023-07-11 ENCOUNTER — TELEMEDICINE (OUTPATIENT)
Dept: PSYCHIATRY | Facility: CLINIC | Age: 28
End: 2023-07-11
Payer: COMMERCIAL

## 2023-07-11 DIAGNOSIS — F41.9 ANXIETY DISORDER, UNSPECIFIED TYPE: Primary | ICD-10-CM

## 2023-07-11 PROCEDURE — 90837 PSYTX W PT 60 MINUTES: CPT | Performed by: SOCIAL WORKER

## 2023-07-12 NOTE — PSYCH
Virtual Regular Visit    Verification of patient location:    Patient is located at Home in the following state in which I hold an active license [place]      Assessment/Plan:    Problem List Items Addressed This Visit    None  Visit Diagnoses     Anxiety disorder, unspecified type    -  Primary          Goals addressed in session: Goal 1          Reason for visit is No chief complaint on file. Encounter provider Cherylene Acosta, [place]    Provider located at [address] [place] [id number] [phone]      Recent Visits  No visits were found meeting these conditions. Showing recent visits within past 7 days and meeting all other requirements  Today's Visits  Date Type Provider Dept   07/11/23 Telemedicine [facility] [place], [place] [facility]   Showing today's visits and meeting all other requirements  Future Appointments  No visits were found meeting these conditions. Showing future appointments within next 150 days and meeting all other requirements       The patient was identified by name and date of birth. Jamilah Hahn was informed that this is a telemedicine visit and that the visit is being conducted throughthe [employer] platform. She agrees to proceed. .  My office door was closed. No one else was in the room. She acknowledged consent and understanding of privacy and security of the video platform. The patient has agreed to participate and understands they can discontinue the visit at any time. Patient is aware this is a billable service. Subjective  Jamilah Hahn is a 33 y.o. female Behavioral Health Psychotherapy Progress Note    Psychotherapy Provided: Individual Psychotherapy     1.  Anxiety disorder, unspecified type            Goals addressed in session: Goal 1     DATA: Clinician explored client's current stressors; discussed client's progress, affirmed her boundary setting with  and in laws and discussed possibilities pros and cons of added stress to their lives. Clinician and client discussed her future plans and assisted client in recognizing her strengths in regard to work and her decision making. During this session, this clinician used the following therapeutic modalities: Client-centered Therapy, Cognitive Behavioral Therapy, Solution-Focused Therapy and Supportive Psychotherapy    Substance Abuse was not addressed during this session. If the client is diagnosed with a co-occurring substance use disorder, please indicate any changes in the frequency or amount of use: NA. Stage of change for addressing substance use diagnoses: No substance use/Not applicable    ASSESSMENT:  Minerva Yancey presents with a Euthymic/ normal mood. her affect is Normal range and intensity, which is congruent, with her mood and the content of the session. The client has made progress on their goals. Minerva Yancey presents with a none risk of suicide, none risk of self-harm, and none risk of harm to others. For any risk assessment that surpasses a "low" rating, a safety plan must be developed. A safety plan was indicated: no  If yes, describe in detail NA    PLAN: Between sessions, Minerva Yancey will continue to set boundaries for herself and her family. At the next session, the therapist will use Client-centered Therapy to address ongoing stressors. Behavioral Health Treatment Plan and Discharge Planning: Minerva Yancey is aware of and agrees to continue to work on their treatment plan. They have identified and are working toward their discharge goals. yes    Visit start and stop times:    07/11/23  Start Time: 1805  Stop Time: 1902  Total Visit Time: 57 minutes .       HPI     Past Medical History:   Diagnosis Date   • Gastritis    • GERD (gastroesophageal reflux disease)    • Migraine    • Obesity    • Seasonal allergies    • Umbilical hernia        Past Surgical History:   Procedure Laterality Date   • INSERTION OF INTRAUTERINE DEVICE (IUD)     • [place] LAPS [address] RSTRICTIV PX LONGITUDINAL GASTRECTOMY N/A 01/09/2023    Procedure: GASTRECTOMY SLEEVE LAP & INTRAOPERATIVE EGD;  Surgeon: Kristan Alvarez MD;  Location: [facility];  Service: Bariatrics   • WISDOM TOOTH EXTRACTION Bilateral 03/2020       Current Outpatient Medications   Medication Sig Dispense Refill   • acetaminophen (TYLENOL) 500 mg tablet Take 500 mg by mouth every 6 (six) hours as needed for mild pain     • calcium carbonate (TUMS) 500 mg chewable tablet Chew 1 tablet daily     • omeprazole (PriLOSEC) 20 mg delayed release capsule Take 1 capsule (20 mg total) by mouth daily 30 capsule 2   • pediatric multivitamin-iron (POLY-VI-SOL with IRON) 15 MG chewable tablet Chew 1 tablet daily       No current facility-administered medications for this visit. Allergies   Allergen Reactions   • Contrast [Iodinated Contrast Media] Anaphylaxis     throat closure   • Fish-Derived Products - Food Allergy Anaphylaxis     Anaphylaxis   • Pollen Extract Other (See Comments)     hives, throat closure   • Shellfish-Derived Products - Food Allergy        Review of Systems    Video Exam    There were no vitals filed for this visit.     Physical Exam

## 2023-08-10 ENCOUNTER — TELEMEDICINE (OUTPATIENT)
Dept: PSYCHIATRY | Facility: CLINIC | Age: 28
End: 2023-08-10
Payer: COMMERCIAL

## 2023-08-10 DIAGNOSIS — F41.9 ANXIETY DISORDER, UNSPECIFIED TYPE: Primary | ICD-10-CM

## 2023-08-10 PROCEDURE — 90837 PSYTX W PT 60 MINUTES: CPT | Performed by: SOCIAL WORKER

## 2023-08-12 NOTE — PSYCH
Virtual Regular Visit    Verification of patient location:    Patient is located at Home in the following state in which I hold an active license [place]      Assessment/Plan:    Problem List Items Addressed This Visit    None  Visit Diagnoses     Anxiety disorder, unspecified type    -  Primary          Goals addressed in session: Goal 1          Reason for visit is No chief complaint on file. Encounter provider [place], [place]    Provider located at [address] [place] [id number] [phone]      Recent Visits  Date Type Provider Dept   08/10/23 Telemedicine [facility] [place], [place] [facility]   Showing recent visits within past 7 days and meeting all other requirements  Future Appointments  No visits were found meeting these conditions. Showing future appointments within next 150 days and meeting all other requirements       The patient was identified by name and date of birth. Ramiro Medina was informed that this is a telemedicine visit and that the visit is being conducted throughthe [employer] platform. She agrees to proceed. .  My office door was closed. No one else was in the room. She acknowledged consent and understanding of privacy and security of the video platform. The patient has agreed to participate and understands they can discontinue the visit at any time. Patient is aware this is a billable service. Subjective  Ramiro Medina is a 33 y.o. female Behavioral Health Psychotherapy Progress Note    Psychotherapy Provided: Individual Psychotherapy     1. Anxiety disorder, unspecified type            Goals addressed in session: Goal 1     DATA: Clinician explored client's current stressors-- affirmed the progress made with her  and their family.  Clinician assisted client in brainstorming alternative ways that she could allow herself some self care-- applying limits in regard to all of her responsibilities. Clinician and client discussed utilizing her support system--what it means to her to have limits. During this session, this clinician used the following therapeutic modalities: Client-centered Therapy, Cognitive Behavioral Therapy, Solution-Focused Therapy and Supportive Psychotherapy    Substance Abuse was not addressed during this session. If the client is diagnosed with a co-occurring substance use disorder, please indicate any changes in the frequency or amount of use: NA. Stage of change for addressing substance use diagnoses: No substance use/Not applicable    ASSESSMENT:  Fransisco Calix presents with a Euthymic/ normal mood. her affect is Normal range and intensity, which is congruent, with her mood and the content of the session. The client has made progress on their goals. Fransisco Calix presents with a none risk of suicide, none risk of self-harm, and none risk of harm to others. For any risk assessment that surpasses a "low" rating, a safety plan must be developed. A safety plan was indicated: no  If yes, describe in detail NA    PLAN: Between sessions, Fransisco Calix will practice self care. At the next session, the therapist will use Client-centered Therapy to address ongoing stressors. Behavioral Health Treatment Plan and Discharge Planning: Fransisco Calix is aware of and agrees to continue to work on their treatment plan. They have identified and are working toward their discharge goals. yes    Visit start and stop times:    08/10/23  Start Time: 1704  Stop Time: 8738  Total Visit Time: 53 minutes .       HPI     Past Medical History:   Diagnosis Date   • Gastritis    • GERD (gastroesophageal reflux disease)    • Migraine    • Obesity    • Seasonal allergies    • Umbilical hernia        Past Surgical History:   Procedure Laterality Date   • INSERTION OF INTRAUTERINE DEVICE (IUD)     • [place] NELYS [address] RSTRICTIV PX LONGITUDINAL GASTRECTOMY N/A 01/09/2023    Procedure: GASTRECTOMY SLEEVE LAP & INTRAOPERATIVE EGD;  Surgeon: Kristan Alvarez MD;  [facility];  Service: Bariatrics   • WISDOM TOOTH EXTRACTION Bilateral 03/2020       Current Outpatient Medications   Medication Sig Dispense Refill   • acetaminophen (TYLENOL) 500 mg tablet Take 500 mg by mouth every 6 (six) hours as needed for mild pain     • calcium carbonate (TUMS) 500 mg chewable tablet Chew 1 tablet daily     • omeprazole (PriLOSEC) 20 mg delayed release capsule Take 1 capsule (20 mg total) by mouth daily 30 capsule 2   • pediatric multivitamin-iron (POLY-VI-SOL with IRON) 15 MG chewable tablet Chew 1 tablet daily       No current facility-administered medications for this visit. Allergies   Allergen Reactions   • Contrast [Iodinated Contrast Media] Anaphylaxis     throat closure   • Fish-Derived Products - Food Allergy Anaphylaxis     Anaphylaxis   • Pollen Extract Other (See Comments)     hives, throat closure   • Shellfish-Derived Products - Food Allergy        Review of Systems    Video Exam    There were no vitals filed for this visit.     Physical Exam

## 2023-08-20 PROBLEM — Z97.5 IUD (INTRAUTERINE DEVICE) IN PLACE: Status: ACTIVE | Noted: 2023-08-20

## 2023-08-21 ENCOUNTER — OFFICE VISIT (OUTPATIENT)
Dept: FAMILY MEDICINE CLINIC | Facility: CLINIC | Age: 28
End: 2023-08-21
Payer: COMMERCIAL

## 2023-08-21 VITALS
OXYGEN SATURATION: 100 % | HEART RATE: 86 BPM | SYSTOLIC BLOOD PRESSURE: 120 MMHG | RESPIRATION RATE: 14 BRPM | DIASTOLIC BLOOD PRESSURE: 82 MMHG | BODY MASS INDEX: 33.46 KG/M2 | HEIGHT: 61 IN | WEIGHT: 177.2 LBS | TEMPERATURE: 98.5 F

## 2023-08-21 DIAGNOSIS — Z98.890 STATUS POST GASTRIC SURGERY: ICD-10-CM

## 2023-08-21 DIAGNOSIS — Z13.1 SCREENING FOR DIABETES MELLITUS: ICD-10-CM

## 2023-08-21 DIAGNOSIS — E66.01 OBESITY, CLASS III, BMI 40-49.9 (MORBID OBESITY) (HCC): ICD-10-CM

## 2023-08-21 DIAGNOSIS — Z00.00 ANNUAL PHYSICAL EXAM: Primary | ICD-10-CM

## 2023-08-21 DIAGNOSIS — F43.23 ADJUSTMENT DISORDER WITH MIXED ANXIETY AND DEPRESSED MOOD: ICD-10-CM

## 2023-08-21 DIAGNOSIS — Z13.6 SCREENING FOR CARDIOVASCULAR CONDITION: ICD-10-CM

## 2023-08-21 DIAGNOSIS — D64.9 ANEMIA, UNSPECIFIED TYPE: ICD-10-CM

## 2023-08-21 PROCEDURE — 99395 PREV VISIT EST AGE 18-39: CPT | Performed by: FAMILY MEDICINE

## 2023-08-21 NOTE — PROGRESS NOTES
[facility]    NAME: Naveed Beth  AGE: 33 y.o. SEX: female  : 1995     DATE: 2023     Assessment and Plan:     Problem List Items Addressed This Visit        Other    Obesity, Class III, BMI 40-49.9 (morbid obesity) ([address])    Adjustment disorder with mixed anxiety and depressed mood    Status post gastric surgery   Other Visit Diagnoses     Annual physical exam    -  Primary    David Del Toroyer appears well. She is to continue a healthy, lower carb diet, and regular exercise. Labs ordered. Anemia, unspecified type        Stable on OTC supplement. Working with Heme/Onc. Screening for diabetes mellitus        Relevant Orders    HEMOGLOBIN A1C W/ EAG ESTIMATION    Screening for cardiovascular condition        Relevant Orders    Lipid Panel with Direct LDL reflex          Immunizations and preventive care screenings were discussed with patient today. Appropriate education was printed on patient's after visit summary. Counseling:  Dental Health: discussed importance of regular tooth brushing, flossing, and dental visits. Exercise: the importance of regular exercise/physical activity was discussed. Recommend exercise 3-5 times per week for at least 30 minutes. Return in 1 year (on 2024) for Annual physical.     Chief Complaint:     Chief Complaint   Patient presents with   • Physical Exam     Patient being seen for physical       History of Present Illness:     Adult Annual Physical   Patient here for a comprehensive physical exam. The patient reports no problems. Feeling well - down 8-0+ pounds after her gastric sleeve surgery! Continues f/u with Bariatrics, Dr Crys Monreal, etc.   Louie Ran as well - adjusted her iron supplements, and doing well. Saw GYN in 2023 -> IUD placed. Reviewed labs done in 2023. Immunizations reviewed, and are UTD.      Diet and Physical Activity  Diet/Nutrition: well balanced diet. Exercise: no formal exercise. Exercise off due to school, but this has completed, and she will return to the gym tomorrow. Depression Screening  PHQ-2/9 Depression Screening         General Health  Sleep: sleeps well. Hearing: normal - bilateral.  Vision: no vision problems. Dental: regular dental visits. /GYN Health  Last menstrual period: Regular, very light with IUD. Contraceptive method: IUD placement. [place] [employer]. History of STDs?: no.     Review of Systems:     Review of Systems   Constitutional: Negative for activity change. Respiratory: Negative for shortness of breath. Cardiovascular: Negative for chest pain. Gastrointestinal: Negative for abdominal pain and blood in stool. Mild recent constipation - started PRN Miralax. Genitourinary: Negative for menstrual problem. No new breast lumps on self-examination. Psychiatric/Behavioral: Negative for dysphoric mood. The patient is not nervous/anxious.          Doing well - still seeing a Therapist.        Past Medical History:     Past Medical History:   Diagnosis Date   • Gastritis    • GERD (gastroesophageal reflux disease)    • Migraine    • Obesity    • Seasonal allergies    • Umbilical hernia       Past Surgical History:     Past Surgical History:   Procedure Laterality Date   • INSERTION OF INTRAUTERINE DEVICE (IUD)     • [place] LAPS 175 Bernabe Fay RSTRICTIV PX LONGITUDINAL GASTRECTOMY N/A 01/09/2023    Procedure: GASTRECTOMY SLEEVE LAP & INTRAOPERATIVE EGD;  Surgeon: Brayden Vuong MD;  [facility];  Service: Bariatrics   • WISDOM TOOTH EXTRACTION Bilateral 03/2020      Social History:     Social History     Socioeconomic History   • Marital status: /Civil Union     Spouse name: None   • Number of children: None   • Years of education: None   • Highest education level: None   Occupational History   • None   Tobacco Use   • Smoking status: Never   • Smokeless tobacco: Never   Vaping Use   • Vaping Use: Never used   Substance and Sexual Activity   • Alcohol use:  Yes     Alcohol/week: 1.0 standard drink of alcohol     Types: 1 Glasses of wine per week     Comment: occasional   • Drug use: No   • Sexual activity: Yes     Partners: Male     Birth control/protection: OCP     Comment: with    Other Topics Concern   • None   Social History Narrative   • None     Social Determinants of Health     Financial Resource Strain: Not on file   Food Insecurity: Not on file   Transportation Needs: Not on file   Physical Activity: Not on file   Stress: Not on file   Social Connections: Not on file   Intimate Partner Violence: Not on file   Housing Stability: Not on file      Family History:     Family History   Problem Relation Age of Onset   • Diabetes Mother    • Allergic rhinitis Mother    • Hypertension Mother    • Hypertension Father    • Hyperlipidemia Father    • No Known Problems Sister    • No Known Problems Sister    • No Known Problems Brother    • No Known Problems Brother    • No Known Problems Brother    • No Known Problems Brother    • Asthma Brother    • Breast cancer Maternal Grandmother    • Depression Paternal Grandmother    • Lung cancer Paternal Grandfather    • Asthma Paternal Grandfather    • Anxiety disorder Maternal Aunt    • Depression Maternal Aunt    • Bipolar disorder Maternal Aunt    • Ovarian cancer Neg Hx       Current Medications:     Current Outpatient Medications   Medication Sig Dispense Refill   • acetaminophen (TYLENOL) 500 mg tablet Take 500 mg by mouth every 6 (six) hours as needed for mild pain     • BIOTIN PO Take 1 tablet by mouth daily     • calcium carbonate (TUMS) 500 mg chewable tablet Chew 1 tablet daily     • pediatric multivitamin-iron (POLY-VI-SOL with IRON) 15 MG chewable tablet Chew 1 tablet daily     • omeprazole (PriLOSEC) 20 mg delayed release capsule Take 1 capsule (20 mg total) by mouth daily (Patient not taking: Reported on 8/21/2023) 30 capsule 2     No current facility-administered medications for this visit. Allergies: Allergies   Allergen Reactions   • Contrast [Iodinated Contrast Media] Anaphylaxis     throat closure   • Fish-Derived Products - Food Allergy Anaphylaxis     Anaphylaxis   • Pollen Extract Other (See Comments)     hives, throat closure   • Shellfish-Derived Products - Food Allergy       Physical Exam:     /82 (BP Location: Left arm, Patient Position: Sitting, Cuff Size: Standard)   Pulse 86   Temp 98.5 °F (36.9 °C) (Tympanic)   Resp 14   Ht 5' 1.5" (1.562 m)   Wt 80.4 kg (177 lb 3.2 oz)   SpO2 100%   BMI 32.94 kg/m²     Physical Exam  Vitals and nursing note reviewed. Constitutional:       General: She is not in acute distress. Appearance: Normal appearance. She is not ill-appearing, toxic-appearing or diaphoretic. HENT:      Head: Normocephalic and atraumatic. Right Ear: Tympanic membrane, ear canal and external ear normal.      Left Ear: Tympanic membrane, ear canal and external ear normal.      Mouth/Throat:      Mouth: Mucous membranes are moist.      Pharynx: Oropharynx is clear. No oropharyngeal exudate or posterior oropharyngeal erythema. Eyes:      General: No scleral icterus. Conjunctiva/sclera: Conjunctivae normal.   Cardiovascular:      Rate and Rhythm: Normal rate and regular rhythm. Heart sounds: Normal heart sounds. No murmur heard. No friction rub. No gallop. Pulmonary:      Effort: Pulmonary effort is normal. No respiratory distress. Breath sounds: Normal breath sounds. No stridor. No wheezing, rhonchi or rales. Abdominal:      General: Abdomen is flat. Bowel sounds are normal. There is no distension. Palpations: Abdomen is soft. There is no mass. Tenderness: There is no abdominal tenderness. There is no guarding or rebound. Musculoskeletal:      Cervical back: Normal range of motion and neck supple. No rigidity or tenderness.    Lymphadenopathy:      Cervical: No cervical adenopathy. Neurological:      Mental Status: She is alert and oriented to person, place, and time. Psychiatric:         Mood and Affect: Mood normal.         Behavior: Behavior normal.         Thought Content: Thought content normal.         Judgment: Judgment normal.          Carlito Ireland was seen today for physical exam.    Diagnoses and all orders for this visit:    Annual physical exam  Comments:  Carlito Ireland appears well. She is to continue a healthy, lower carb diet, and regular exercise. Labs ordered. Obesity, Class III, BMI 40-49.9 (morbid obesity) ([address])  Comments:  Doing very well after gastric sleeve procedure. Continues to work with [employer]. Status post gastric surgery  Comments:  As above. Anemia, unspecified type  Comments:  Stable on OTC supplement. Working with Heme/Onc. Adjustment disorder with mixed anxiety and depressed mood  Comments:  Doing well - working with Therapist.    Screening for diabetes mellitus  -     HEMOGLOBIN A1C W/ EAG ESTIMATION; Future    Screening for cardiovascular condition  -     Lipid Panel with Direct LDL reflex;  Future          [place] [id number] [facility], DO   [facility]

## 2023-08-25 NOTE — PROGRESS NOTES
Assessment/Plan:  NGE  [facility] 3/23      Desires STD testing  Morbid Obesity - BMI 48, Gastric sleeve  - lost 83 lbs. BMI 33  Narrow pubic rami - increased need for  section  Pap smear q 3yrs. '  Cervical Cultures till 23                                                                            RTO 1 yr. SBA monthly                                                                              Exercise 3/ wk                                                                                        Calcium 1,000 mg/d with Vit D                     Depression Screen: Neg                         Diagnoses and all orders for this visit:    Encounter for annual routine gynecological examination    IUD (intrauterine device) in place    Status post gastric surgery    Screening for STD (sexually transmitted disease)  -     Chlamydia/GC amplified DNA by PCR  -     RPR-Syphilis Screening (Total Syphilis IGG/IGM); Future  -     HIV 1/2 AG/AB w Reflex [facility] for 2 yr old and above; Future              Subjective:        Patient ID: Jameson Johnston is a 33 y.o. female. Matthew Gandhi presents for a yearly evaluation. She is without any gynecologic complaints. Her menses are very light and regular with Nena Harmon. She would like cervical cultures today. Her  was unfaithful last year but things are resolved. They went through counseling. Through the gastric sleeve she lost 83 pounds. She is thinking of conceiving next year. The following portions of the patient's history were reviewed and updated as appropriate: She  has a past medical history of Gastritis, GERD (gastroesophageal reflux disease), Migraine, Obesity, Seasonal allergies, and Umbilical hernia.   Patient Active Problem List    Diagnosis Date Noted   • IUD (intrauterine device) in place 2023   • Status post gastric surgery 2023   • GERD (gastroesophageal reflux disease)    • Migraine    • No blood products    • Adjustment disorder with mixed anxiety and depressed mood 11/15/2022   • Missed menses 08/08/2022   • Encounter for annual routine gynecological examination 08/06/2022   • Uses oral contraception 08/06/2022   • Obesity, Class III, BMI 40-49.9 (morbid obesity) ([address]) 06/23/2022   • Pelvic pain 01/12/2022   • Encounter for gynecological examination (general) (routine) without abnormal findings 07/30/2020   • Oral contraceptive pill surveillance 07/30/2020   • Gastritis 02/22/2018   • Shellfish allergy 03/90/9653   • Umbilical hernia without obstruction and without gangrene 02/22/2018   • Acquired renal cyst of left kidney 02/22/2018   • Acquired acanthosis nigricans 07/15/2013   • Obesity 04/26/2013   PMH:  Menarche  12, stopped and restarted at 16  G0  Gardasil  Morbid obesity  Pelvic pain 6/21 - nl US but not done until 1/22  Amenorrhea on OC's - fear of pregnancy, 8/22 increased OC to [place] [address]- cycled  Rossy Peal 3/23  EGD/Gastric Sleeve 1/9/23- lost 83#'s  She  has a past surgical history that includes [place] tooth extraction (Bilateral, 03/2020); pr laps gstrc rstrictiv px longitudinal gastrectomy (N/A, 01/09/2023); and INSERTION OF INTRAUTERINE DEVICE (IUD). Her family history includes Allergic rhinitis in her mother; Anxiety disorder in her maternal aunt; Asthma in her brother and paternal grandfather; Bipolar disorder in her maternal aunt; Breast cancer in her maternal grandmother; Depression in her maternal aunt and paternal grandmother; Diabetes in her mother; Hyperlipidemia in her father; Hypertension in her father and mother; Lung cancer in her paternal grandfather; No Known Problems in her brother, brother, brother, brother, sister, and sister. She  reports that she has never smoked. She has never used smokeless tobacco. She reports current alcohol use of about 1.0 standard drink of alcohol per week.  She reports that she does not use drugs. SH:   to [employer] '17. 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 Current Outpatient Medications   Medication Sig Dispense Refill   • acetaminophen (TYLENOL) 500 mg tablet Take 500 mg by mouth every 6 (six) hours as needed for mild pain     • BIOTIN PO Take 1 tablet by mouth daily     • calcium carbonate (TUMS) 500 mg chewable tablet Chew 1 tablet daily     • pediatric multivitamin-iron (POLY-VI-SOL with IRON) 15 MG chewable tablet Chew 1 tablet daily       No current facility-administered medications for this visit. Current Outpatient Medications on File Prior to Visit   Medication Sig   • acetaminophen (TYLENOL) 500 mg tablet Take 500 mg by mouth every 6 (six) hours as needed for mild pain   • BIOTIN PO Take 1 tablet by mouth daily   • calcium carbonate (TUMS) 500 mg chewable tablet Chew 1 tablet daily   • pediatric multivitamin-iron (POLY-VI-SOL with IRON) 15 MG chewable tablet Chew 1 tablet daily   • [DISCONTINUED] omeprazole (PriLOSEC) 20 mg delayed release capsule Take 1 capsule (20 mg total) by mouth daily (Patient not taking: Reported on 8/21/2023)     No current facility-administered medications on file prior to visit. She is allergic to contrast [iodinated contrast media], fish-derived products - food allergy, pollen extract, and shellfish-derived products - food allergy. .    Review of Systems   Constitutional: Negative for activity change, appetite change, fatigue and unexpected weight change. Eyes: Negative for visual disturbance. Respiratory: Negative for cough, chest tightness, shortness of breath and wheezing. Cardiovascular: Negative for chest pain, palpitations and leg swelling. Breast: Patient denies tenderness, nipple discharge, masses, or erythema.    Gastrointestinal: Negative for abdominal distention, abdominal pain, blood in stool, constipation, diarrhea, nausea and vomiting. Endocrine: Negative for cold intolerance and heat intolerance. Genitourinary: Positive for dyspareunia (Mild, intermittent and positional). Negative for decreased urine volume, difficulty urinating, dysuria, frequency, hematuria, menstrual problem, pelvic pain, urgency, vaginal bleeding, vaginal discharge and vaginal pain. Musculoskeletal: Negative for arthralgias. Skin: Negative for rash. Neurological: Negative for weakness, light-headedness, numbness and headaches. Hematological: Does not bruise/bleed easily. Psychiatric/Behavioral: Negative for agitation, behavioral problems and sleep disturbance. The patient is not nervous/anxious. Objective:    Vitals:    08/28/23 0811   BP: 110/70   BP Location: Right arm   Patient Position: Sitting   Cuff Size: Standard   Weight: 80 kg (176 lb 6.4 oz)   Height: 5' 1" (1.549 m)            Physical Exam  Vitals and nursing note reviewed. Constitutional:       General: She is not in acute distress. Appearance: She is well-developed. HENT:      Head: Normocephalic and atraumatic. Eyes:      General: No scleral icterus. Right eye: No discharge. Left eye: No discharge. Extraocular Movements: Extraocular movements intact. Conjunctiva/sclera: Conjunctivae normal.   Neck:      Thyroid: No thyromegaly. Trachea: No tracheal deviation. Cardiovascular:      Rate and Rhythm: Normal rate and regular rhythm. Heart sounds: Normal heart sounds. No murmur heard. Pulmonary:      Effort: Pulmonary effort is normal. No respiratory distress. Breath sounds: Normal breath sounds. No wheezing. Chest:   Breasts:     Breasts are symmetrical.      Right: No inverted nipple, mass, nipple discharge, skin change or tenderness. Left: No inverted nipple, mass, nipple discharge, skin change or tenderness. Abdominal:      General: Bowel sounds are normal. There is no distension. Palpations: Abdomen is soft.  There is no mass. Tenderness: There is no abdominal tenderness. There is no guarding or rebound. Genitourinary:     General: Normal vulva. Labia:         Right: No rash, tenderness or lesion. Left: No rash, tenderness or lesion. Vagina: Normal.      Cervix: No cervical motion tenderness or discharge. Uterus: Not deviated, not enlarged and not tender. Adnexa:         Right: No mass, tenderness or fullness. Left: No mass, tenderness or fullness. Rectum: No external hemorrhoid. Comments: Urethral meatus within normal limits. Perineum within normal limits. Bladder well supported. Narrow pubic rami. The uterus is retroverted. The IUD string is present. Musculoskeletal:         General: No tenderness. Normal range of motion. Cervical back: Normal range of motion and neck supple. Lymphadenopathy:      Cervical: No cervical adenopathy. Skin:     General: Skin is warm and dry. Neurological:      Mental Status: She is alert and oriented to person, place, and time. Psychiatric:         Mood and Affect: Mood normal.         Behavior: Behavior normal.         Thought Content:  Thought content normal.         Judgment: Judgment normal.

## 2023-08-28 ENCOUNTER — LAB (OUTPATIENT)
Dept: LAB | Facility: CLINIC | Age: 28
End: 2023-08-28
Payer: COMMERCIAL

## 2023-08-28 ENCOUNTER — ANNUAL EXAM (OUTPATIENT)
Dept: OBGYN CLINIC | Facility: CLINIC | Age: 28
End: 2023-08-28
Payer: COMMERCIAL

## 2023-08-28 VITALS
WEIGHT: 176.4 LBS | BODY MASS INDEX: 33.3 KG/M2 | SYSTOLIC BLOOD PRESSURE: 110 MMHG | HEIGHT: 61 IN | DIASTOLIC BLOOD PRESSURE: 70 MMHG

## 2023-08-28 DIAGNOSIS — Z13.1 SCREENING FOR DIABETES MELLITUS: ICD-10-CM

## 2023-08-28 DIAGNOSIS — Z98.890 STATUS POST GASTRIC SURGERY: ICD-10-CM

## 2023-08-28 DIAGNOSIS — Z11.3 SCREENING FOR STD (SEXUALLY TRANSMITTED DISEASE): ICD-10-CM

## 2023-08-28 DIAGNOSIS — Z13.6 SCREENING FOR CARDIOVASCULAR CONDITION: ICD-10-CM

## 2023-08-28 DIAGNOSIS — E83.51 HYPOCALCEMIA: ICD-10-CM

## 2023-08-28 DIAGNOSIS — Z01.419 ENCOUNTER FOR ANNUAL ROUTINE GYNECOLOGICAL EXAMINATION: Primary | ICD-10-CM

## 2023-08-28 DIAGNOSIS — Z97.5 IUD (INTRAUTERINE DEVICE) IN PLACE: ICD-10-CM

## 2023-08-28 DIAGNOSIS — D50.8 OTHER IRON DEFICIENCY ANEMIA: ICD-10-CM

## 2023-08-28 LAB
ANION GAP SERPL CALCULATED.3IONS-SCNC: 10 MMOL/L
BASOPHILS # BLD AUTO: 0.03 THOUSANDS/ÂΜL (ref 0–0.1)
BASOPHILS NFR BLD AUTO: 0 % (ref 0–1)
BUN SERPL-MCNC: 12 MG/DL (ref 5–25)
CALCIUM SERPL-MCNC: 9.1 MG/DL (ref 8.4–10.2)
CHLORIDE SERPL-SCNC: 104 MMOL/L (ref 96–108)
CHOLEST SERPL-MCNC: 158 MG/DL
CO2 SERPL-SCNC: 24 MMOL/L (ref 21–32)
CREAT SERPL-MCNC: 0.86 MG/DL (ref 0.6–1.3)
EOSINOPHIL # BLD AUTO: 0.11 THOUSAND/ÂΜL (ref 0–0.61)
EOSINOPHIL NFR BLD AUTO: 1 % (ref 0–6)
ERYTHROCYTE [DISTWIDTH] IN BLOOD BY AUTOMATED COUNT: 14.6 % (ref 11.6–15.1)
EST. AVERAGE GLUCOSE BLD GHB EST-MCNC: 114 MG/DL
GFR SERPL CREATININE-BSD FRML MDRD: 93 ML/MIN/1.73SQ M
GLUCOSE P FAST SERPL-MCNC: 74 MG/DL (ref 65–99)
HBA1C MFR BLD: 5.6 %
HCT VFR BLD AUTO: 39 % (ref 34.8–46.1)
HDLC SERPL-MCNC: 47 MG/DL
HGB BLD-MCNC: 12.5 G/DL (ref 11.5–15.4)
HIV 1+2 AB+HIV1 P24 AG SERPL QL IA: NORMAL
HIV 2 AB SERPL QL IA: NORMAL
HIV1 AB SERPL QL IA: NORMAL
HIV1 P24 AG SERPL QL IA: NORMAL
IMM GRANULOCYTES # BLD AUTO: 0.02 THOUSAND/UL (ref 0–0.2)
IMM GRANULOCYTES NFR BLD AUTO: 0 % (ref 0–2)
LDLC SERPL CALC-MCNC: 99 MG/DL (ref 0–100)
LYMPHOCYTES # BLD AUTO: 3.19 THOUSANDS/ÂΜL (ref 0.6–4.47)
LYMPHOCYTES NFR BLD AUTO: 33 % (ref 14–44)
MCH RBC QN AUTO: 27 PG (ref 26.8–34.3)
MCHC RBC AUTO-ENTMCNC: 32.1 G/DL (ref 31.4–37.4)
MCV RBC AUTO: 84 FL (ref 82–98)
MONOCYTES # BLD AUTO: 0.44 THOUSAND/ÂΜL (ref 0.17–1.22)
MONOCYTES NFR BLD AUTO: 5 % (ref 4–12)
NEUTROPHILS # BLD AUTO: 5.8 THOUSANDS/ÂΜL (ref 1.85–7.62)
NEUTS SEG NFR BLD AUTO: 61 % (ref 43–75)
NRBC BLD AUTO-RTO: 0 /100 WBCS
PLATELET # BLD AUTO: 252 THOUSANDS/UL (ref 149–390)
PMV BLD AUTO: 11.4 FL (ref 8.9–12.7)
POTASSIUM SERPL-SCNC: 3.9 MMOL/L (ref 3.5–5.3)
RBC # BLD AUTO: 4.63 MILLION/UL (ref 3.81–5.12)
SODIUM SERPL-SCNC: 138 MMOL/L (ref 135–147)
TREPONEMA PALLIDUM IGG+IGM AB [PRESENCE] IN SERUM OR PLASMA BY IMMUNOASSAY: NORMAL
TRIGL SERPL-MCNC: 60 MG/DL
WBC # BLD AUTO: 9.59 THOUSAND/UL (ref 4.31–10.16)

## 2023-08-28 PROCEDURE — 80048 BASIC METABOLIC PNL TOTAL CA: CPT

## 2023-08-28 PROCEDURE — 86780 TREPONEMA PALLIDUM: CPT

## 2023-08-28 PROCEDURE — 85025 COMPLETE CBC W/AUTO DIFF WBC: CPT

## 2023-08-28 PROCEDURE — 80061 LIPID PANEL: CPT

## 2023-08-28 PROCEDURE — 87591 N.GONORRHOEAE DNA AMP PROB: CPT | Performed by: OBSTETRICS & GYNECOLOGY

## 2023-08-28 PROCEDURE — 83036 HEMOGLOBIN GLYCOSYLATED A1C: CPT

## 2023-08-28 PROCEDURE — 36415 COLL VENOUS BLD VENIPUNCTURE: CPT

## 2023-08-28 PROCEDURE — S0612 ANNUAL GYNECOLOGICAL EXAMINA: HCPCS | Performed by: OBSTETRICS & GYNECOLOGY

## 2023-08-28 PROCEDURE — 87389 HIV-1 AG W/HIV-1&-2 AB AG IA: CPT

## 2023-08-28 PROCEDURE — 87491 CHLMYD TRACH DNA AMP PROBE: CPT | Performed by: OBSTETRICS & GYNECOLOGY

## 2023-08-30 LAB
C TRACH DNA SPEC QL NAA+PROBE: NEGATIVE
N GONORRHOEA DNA SPEC QL NAA+PROBE: NEGATIVE

## 2023-09-05 ENCOUNTER — TELEPHONE (OUTPATIENT)
Dept: PSYCHIATRY | Facility: CLINIC | Age: 28
End: 2023-09-05

## 2023-09-05 NOTE — TELEPHONE ENCOUNTER
Patient is calling regarding cancelling an appointment.     Date/Time: 9/5/2023 5pm    Reason:     Patient was rescheduled: YES [] NO [x]  If yes, when was Patient reschedule for:     Patient requesting call back to reschedule: YES [x] NO []

## 2023-09-14 ENCOUNTER — TELEMEDICINE (OUTPATIENT)
Dept: PSYCHIATRY | Facility: CLINIC | Age: 28
End: 2023-09-14
Payer: COMMERCIAL

## 2023-09-14 DIAGNOSIS — F41.9 ANXIETY DISORDER, UNSPECIFIED TYPE: Primary | ICD-10-CM

## 2023-09-14 PROCEDURE — 90837 PSYTX W PT 60 MINUTES: CPT | Performed by: SOCIAL WORKER

## 2023-09-15 NOTE — PSYCH
Virtual Regular Visit    Verification of patient location:    Patient is located at Home in the following state in which I hold an active license [place]      Assessment/Plan:    Problem List Items Addressed This Visit    None  Visit Diagnoses     Anxiety disorder, unspecified type    -  Primary          Goals addressed in session: Goal 1, Goal 2 and Goal 3           Reason for visit is No chief complaint on file. Encounter provider Ayah Do    Provider located at [address] [place] [address] Road [id number] [phone]      Recent Visits  Date Type Provider Dept   09/14/23 Telemedicine [facility] [place]   Showing recent visits within past 7 days and meeting all other requirements  Future Appointments  No visits were found meeting these conditions. Showing future appointments within next 150 days and meeting all other requirements       The patient was identified by name and date of birth. Jaciel Ga was informed that this is a telemedicine visit and that the visit is being conducted throughthe [employer] platform. She agrees to proceed. .  My office door was closed. No one else was in the room. She acknowledged consent and understanding of privacy and security of the video platform. The patient has agreed to participate and understands they can discontinue the visit at any time. Patient is aware this is a billable service. Subjective  Jaciel Ga is a 33 y.o. female Behavioral Health Psychotherapy Progress Note    Psychotherapy Provided: Individual Psychotherapy     1.  Anxiety disorder, unspecified type            Goals addressed in session: Goal 1, Goal 2 and Goal 3      DATA: Clinician explored client's current stressors-- assisted client in recognizing current triggers and discussed ways to renew these memories, paying tribute to her relationship as it has survived the past year. Clinician encouraged client to make time for relaxation, chores, and school and discussed a routine that she could practice to complete school, scheduling tests, and accomplishing tasks at home. Clinician affirmed and validated client's emotions and actions concerning her work and relationship. During this session, this clinician used the following therapeutic modalities: Client-centered Therapy, Cognitive Behavioral Therapy, Solution-Focused Therapy and Supportive Psychotherapy    Substance Abuse was not addressed during this session. If the client is diagnosed with a co-occurring substance use disorder, please indicate any changes in the frequency or amount of use: NA. Stage of change for addressing substance use diagnoses: No substance use/Not applicable    ASSESSMENT:  Jameson Johnston presents with a Anxious mood. her affect is Normal range and intensity and Tearful, which is congruent, with her mood and the content of the session. The client has made progress on their goals. Jameson Johnston presents with a none risk of suicide, none risk of self-harm, and none risk of harm to others. For any risk assessment that surpasses a "low" rating, a safety plan must be developed. A safety plan was indicated: no  If yes, describe in detail NA    PLAN: Between sessions, Jameson Johnston will discuss with  renewing their vows between each other for their anniversay. At the next session, the therapist will use Client-centered Therapy to address ongoing stressors. Behavioral Health Treatment Plan and Discharge Planning: Jameson Johnston is aware of and agrees to continue to work on their treatment plan. They have identified and are working toward their discharge goals. yes    Visit start and stop times:    09/1423  Start Time: 7100  Stop Time: 1803  Total Visit Time: 56 minutes .       HPI     Past Medical History:   Diagnosis Date   • Gastritis    • GERD (gastroesophageal reflux disease)    • Migraine    • Obesity    • Seasonal allergies    • Umbilical hernia        Past Surgical History:   Procedure Laterality Date   • INSERTION OF INTRAUTERINE DEVICE (IUD)     • [place] LAPS 175 Bernabe Fay RSTRICTIV PX LONGITUDINAL GASTRECTOMY N/A 01/09/2023    Procedure: GASTRECTOMY SLEEVE LAP & INTRAOPERATIVE EGD;  Surgeon: Umu Meza MD;  Location: [facility] OR;  Service: Bariatrics   • WISDOM TOOTH EXTRACTION Bilateral 03/2020       Current Outpatient Medications   Medication Sig Dispense Refill   • acetaminophen (TYLENOL) 500 mg tablet Take 500 mg by mouth every 6 (six) hours as needed for mild pain     • BIOTIN PO Take 1 tablet by mouth daily     • calcium carbonate (TUMS) 500 mg chewable tablet Chew 1 tablet daily     • pediatric multivitamin-iron (POLY-VI-SOL with IRON) 15 MG chewable tablet Chew 1 tablet daily       No current facility-administered medications for this visit. Allergies   Allergen Reactions   • Contrast [Iodinated Contrast Media] Anaphylaxis     throat closure   • Fish-Derived Products - Food Allergy Anaphylaxis     Anaphylaxis   • Pollen Extract Other (See Comments)     hives, throat closure   • Shellfish-Derived Products - Food Allergy        Review of Systems    Video Exam    There were no vitals filed for this visit.     Physical Exam     Visit Time

## 2023-10-06 ENCOUNTER — TELEMEDICINE (OUTPATIENT)
Dept: PSYCHIATRY | Facility: CLINIC | Age: 28
End: 2023-10-06
Payer: COMMERCIAL

## 2023-10-06 DIAGNOSIS — F41.9 ANXIETY DISORDER, UNSPECIFIED TYPE: Primary | ICD-10-CM

## 2023-10-06 PROCEDURE — 90837 PSYTX W PT 60 MINUTES: CPT | Performed by: SOCIAL WORKER

## 2023-10-06 NOTE — PSYCH
Virtual Regular Visit    Verification of patient location:    Patient is located at Home in the following state in which I hold an active license [place]      Assessment/Plan:    Problem List Items Addressed This Visit    None  Visit Diagnoses     Anxiety disorder, unspecified type    -  Primary          Goals addressed in session: Goal 1          Reason for visit is No chief complaint on file. Encounter provider Ayah Tim    Provider located at [address] [place] [id number] [phone]      Recent Visits  No visits were found meeting these conditions. Showing recent visits within past 7 days and meeting all other requirements  Today's Visits  Date Type Provider Dept   10/06/23 Telemedicine [facility] [place], [facility]   Showing today's visits and meeting all other requirements  Future Appointments  No visits were found meeting these conditions. Showing future appointments within next 150 days and meeting all other requirements       The patient was identified by name and date of birth. Isiah Medrano was informed that this is a telemedicine visit and that the visit is being conducted throughthe [employer] platform. She agrees to proceed. .  My office door was closed. No one else was in the room. She acknowledged consent and understanding of privacy and security of the video platform. The patient has agreed to participate and understands they can discontinue the visit at any time. Patient is aware this is a billable service. Subjective  Isiah Medrano is a 33 y.o. female Behavioral Health Psychotherapy Progress Note    Psychotherapy Provided: Individual Psychotherapy     1.  Anxiety disorder, unspecified type            Goals addressed in session: Goal 1     DATA: Clinician explored client's current stressors regarding her family-- clinician affirmed her efforts in advocating for her and her 's needs; clinician and client discussed the benefits in boundaries at work as well as at home. Clinician explored client's ideas of turning her home into a sanctuary for her and her . During this session, this clinician used the following therapeutic modalities: Client-centered Therapy, Solution-Focused Therapy and Supportive Psychotherapy    Substance Abuse was not addressed during this session. If the client is diagnosed with a co-occurring substance use disorder, please indicate any changes in the frequency or amount of use: NA. Stage of change for addressing substance use diagnoses: No substance use/Not applicable    ASSESSMENT:  Scott Alexander presents with a Euthymic/ normal mood. her affect is Normal range and intensity, which is congruent, with her mood and the content of the session. The client has made progress on their goals. Scott Alexander presents with a none risk of suicide, none risk of self-harm, and none risk of harm to others. For any risk assessment that surpasses a "low" rating, a safety plan must be developed. A safety plan was indicated: no  If yes, describe in detail NA    PLAN: Between sessions, Scott Alexander will continue placing boundaries with her family. At the next session, the therapist will use Client-centered Therapy to address ongoing stressors. Behavioral Health Treatment Plan and Discharge Planning: Scott Alexander is aware of and agrees to continue to work on their treatment plan. They have identified and are working toward their discharge goals. yes    Visit start and stop times:    10/06/23  Start Time: 0904  Stop Time: 3896  Total Visit Time: 55 minutes .       HPI     Past Medical History:   Diagnosis Date   • Gastritis    • GERD (gastroesophageal reflux disease)    • Migraine    • Obesity    • Seasonal allergies    • Umbilical hernia        Past Surgical History:   Procedure Laterality Date   • INSERTION OF INTRAUTERINE DEVICE (IUD)     • [place] LAPS 175 Bernabe Fay RSTRICTIV PX LONGITUDINAL GASTRECTOMY N/A 01/09/2023    Procedure: GASTRECTOMY SLEEVE LAP & INTRAOPERATIVE EGD;  Surgeon: Alethea Ellison MD;  Location: [facility];  Service: Bariatrics   • WISDOM TOOTH EXTRACTION Bilateral 03/2020       Current Outpatient Medications   Medication Sig Dispense Refill   • acetaminophen (TYLENOL) 500 mg tablet Take 500 mg by mouth every 6 (six) hours as needed for mild pain     • BIOTIN PO Take 1 tablet by mouth daily     • calcium carbonate (TUMS) 500 mg chewable tablet Chew 1 tablet daily     • pediatric multivitamin-iron (POLY-VI-SOL with IRON) 15 MG chewable tablet Chew 1 tablet daily       No current facility-administered medications for this visit. Allergies   Allergen Reactions   • Contrast [Iodinated Contrast Media] Anaphylaxis     throat closure   • Fish-Derived Products - Food Allergy Anaphylaxis     Anaphylaxis   • Pollen Extract Other (See Comments)     hives, throat closure   • Shellfish-Derived Products - Food Allergy        Review of Systems    Video Exam    There were no vitals filed for this visit.     Physical Exam

## 2023-10-17 ENCOUNTER — APPOINTMENT (OUTPATIENT)
Dept: LAB | Facility: CLINIC | Age: 28
End: 2023-10-17
Payer: COMMERCIAL

## 2023-10-17 DIAGNOSIS — Z98.890 STATUS POST GASTRIC SURGERY: ICD-10-CM

## 2023-10-17 DIAGNOSIS — K29.70 GASTRITIS: ICD-10-CM

## 2023-10-17 DIAGNOSIS — Z48.815 ENCOUNTER FOR SURGICAL AFTERCARE FOLLOWING SURGERY OF DIGESTIVE SYSTEM: ICD-10-CM

## 2023-10-17 DIAGNOSIS — K91.2 POSTSURGICAL MALABSORPTION: ICD-10-CM

## 2023-10-17 DIAGNOSIS — N92.0 MENORRHAGIA WITH REGULAR CYCLE: ICD-10-CM

## 2023-10-17 DIAGNOSIS — N28.1 ACQUIRED RENAL CYST OF LEFT KIDNEY: ICD-10-CM

## 2023-10-17 DIAGNOSIS — D50.0 IRON DEFICIENCY ANEMIA DUE TO CHRONIC BLOOD LOSS: ICD-10-CM

## 2023-10-17 DIAGNOSIS — Z01.818 PRE-OPERATIVE CLEARANCE: ICD-10-CM

## 2023-10-17 DIAGNOSIS — E66.01 OBESITY, CLASS III, BMI 40-49.9 (MORBID OBESITY) (HCC): ICD-10-CM

## 2023-10-17 DIAGNOSIS — D72.829 ELEVATED WBC COUNT: ICD-10-CM

## 2023-10-17 DIAGNOSIS — E66.9 OBESITY, CLASS II, BMI 35-39.9: ICD-10-CM

## 2023-10-17 DIAGNOSIS — R10.2 PELVIC PAIN: ICD-10-CM

## 2023-10-17 DIAGNOSIS — Z98.84 BARIATRIC SURGERY STATUS: ICD-10-CM

## 2023-10-17 DIAGNOSIS — L83 ACQUIRED ACANTHOSIS NIGRICANS: ICD-10-CM

## 2023-10-17 DIAGNOSIS — D64.9 POSTOPERATIVE ANEMIA: ICD-10-CM

## 2023-10-17 LAB
25(OH)D3 SERPL-MCNC: 32.5 NG/ML (ref 30–100)
ERYTHROCYTE [DISTWIDTH] IN BLOOD BY AUTOMATED COUNT: 14.6 % (ref 11.6–15.1)
FERRITIN SERPL-MCNC: 114 NG/ML (ref 11–307)
FOLATE SERPL-MCNC: 5.4 NG/ML
HCT VFR BLD AUTO: 40.8 % (ref 34.8–46.1)
HGB BLD-MCNC: 12.5 G/DL (ref 11.5–15.4)
IRON SATN MFR SERPL: 38 % (ref 15–50)
IRON SERPL-MCNC: 99 UG/DL (ref 50–212)
MCH RBC QN AUTO: 25.8 PG (ref 26.8–34.3)
MCHC RBC AUTO-ENTMCNC: 30.6 G/DL (ref 31.4–37.4)
MCV RBC AUTO: 84 FL (ref 82–98)
PLATELET # BLD AUTO: 239 THOUSANDS/UL (ref 149–390)
PMV BLD AUTO: 11.7 FL (ref 8.9–12.7)
PTH-INTACT SERPL-MCNC: 24.8 PG/ML (ref 12–88)
RBC # BLD AUTO: 4.84 MILLION/UL (ref 3.81–5.12)
TIBC SERPL-MCNC: 259 UG/DL (ref 250–450)
UIBC SERPL-MCNC: 160 UG/DL (ref 155–355)
WBC # BLD AUTO: 7.45 THOUSAND/UL (ref 4.31–10.16)

## 2023-10-17 PROCEDURE — 83970 ASSAY OF PARATHORMONE: CPT

## 2023-10-17 PROCEDURE — 83540 ASSAY OF IRON: CPT

## 2023-10-17 PROCEDURE — 82728 ASSAY OF FERRITIN: CPT

## 2023-10-17 PROCEDURE — 85027 COMPLETE CBC AUTOMATED: CPT

## 2023-10-17 PROCEDURE — 84590 ASSAY OF VITAMIN A: CPT

## 2023-10-17 PROCEDURE — 83550 IRON BINDING TEST: CPT

## 2023-10-17 PROCEDURE — 84425 ASSAY OF VITAMIN B-1: CPT

## 2023-10-17 PROCEDURE — 84630 ASSAY OF ZINC: CPT

## 2023-10-17 PROCEDURE — 82306 VITAMIN D 25 HYDROXY: CPT

## 2023-10-17 PROCEDURE — 82746 ASSAY OF FOLIC ACID SERUM: CPT

## 2023-10-17 PROCEDURE — 36415 COLL VENOUS BLD VENIPUNCTURE: CPT

## 2023-10-18 ENCOUNTER — OFFICE VISIT (OUTPATIENT)
Dept: HEMATOLOGY ONCOLOGY | Facility: CLINIC | Age: 28
End: 2023-10-18

## 2023-10-18 VITALS
BODY MASS INDEX: 32.31 KG/M2 | OXYGEN SATURATION: 98 % | DIASTOLIC BLOOD PRESSURE: 72 MMHG | TEMPERATURE: 97.9 F | SYSTOLIC BLOOD PRESSURE: 130 MMHG | WEIGHT: 171 LBS | HEART RATE: 113 BPM

## 2023-10-18 DIAGNOSIS — E53.8 FOLIC ACID DEFICIENCY: ICD-10-CM

## 2023-10-18 DIAGNOSIS — D53.9 NUTRITIONAL ANEMIA: ICD-10-CM

## 2023-10-18 DIAGNOSIS — Z98.890 STATUS POST GASTRIC SURGERY: Primary | ICD-10-CM

## 2023-10-18 RX ORDER — LANOLIN ALCOHOL/MO/W.PET/CERES
400 CREAM (GRAM) TOPICAL DAILY
Qty: 30 TABLET | Refills: 6 | Status: SHIPPED | OUTPATIENT
Start: 2023-10-18

## 2023-10-18 NOTE — PROGRESS NOTES
[facility] [address] [id number] [phone]  Hematology Ambulatory Follow-Up  Sophia Delaney, 1995, [id number]  10/18/2023    Assessment/Plan:    1. Status post gastric surgery  2. Folic acid deficiency  3. Nutritional anemia   Patient is a 31-year-old female with a history of gastritis, obesity, GERD, anxiety, and nutritional deficiencies status post gastric sleeve in January 2023. She also has a history of heavy menses. Previously iron saturation was 22% currently it is 38%, ferritin 114, CBC within normal limits, folate is low at 5.4, and previous vitamin B12 was 634. Patient has not received iron infusions, with current levels I am inclined to observe. She will likely need maintenance infusions at some point. I instructed her to start folic acid 493 mcg once daily. We will repeat blood work in 3-month intervals and see her in 6 months. Patient verbalized understanding and is in agreement with the plan. - folic acid (FOLVITE) 711 mcg tablet; Take 1 tablet (400 mcg total) by mouth daily  Dispense: 30 tablet; Refill: 6  - CBC; Standing  - Folate; Standing  - Iron Panel (Includes Ferritin, Iron Sat%, Iron, and TIBC); Standing  - CBC  - Folate  - Iron Panel (Includes Ferritin, Iron Sat%, Iron, and TIBC)    Barrier(s) to care: None  The patient is able to self care. [address]    Interval history: Clinically stable    Review of Systems   Constitutional:  Negative for activity change, appetite change, fatigue, fever and unexpected weight change. Respiratory:  Negative for cough and shortness of breath. Cardiovascular:  Negative for chest pain and leg swelling. Gastrointestinal:  Negative for abdominal pain, constipation, diarrhea and nausea. Endocrine: Negative for cold intolerance and heat intolerance.    Musculoskeletal:  Negative for arthralgias and myalgias. Skin: Negative. Neurological:  Negative for dizziness, weakness and headaches. Hematological:  Negative for adenopathy. Bruises/bleeds easily. Past Medical History:   Diagnosis Date    Gastritis     GERD (gastroesophageal reflux disease)     Migraine     Obesity     Seasonal allergies     Umbilical hernia      Past Surgical History:   Procedure Laterality Date    INSERTION OF INTRAUTERINE DEVICE (IUD)      [place] LAPS GSTRC RSTRICTIV PX LONGITUDINAL GASTRECTOMY N/A 01/09/2023    Procedure: GASTRECTOMY SLEEVE LAP & INTRAOPERATIVE EGD;  Surgeon: Marco A Hudson MD;  Location: [facility];  Service: Bariatrics    WISDOM TOOTH EXTRACTION Bilateral 03/2020       Current Outpatient Medications:     acetaminophen (TYLENOL) 500 mg tablet, Take 500 mg by mouth every 6 (six) hours as needed for mild pain, Disp: , Rfl:     BIOTIN PO, Take 1 tablet by mouth daily, Disp: , Rfl:     calcium carbonate (TUMS) 500 mg chewable tablet, Chew 1 tablet daily, Disp: , Rfl:     folic acid (FOLVITE) 911 mcg tablet, Take 1 tablet (400 mcg total) by mouth daily, Disp: 30 tablet, Rfl: 6    pediatric multivitamin-iron (POLY-VI-SOL with IRON) 15 MG chewable tablet, Chew 1 tablet daily, Disp: , Rfl:     Allergies   Allergen Reactions    Contrast [Iodinated Contrast Media] Anaphylaxis     throat closure    Fish-Derived Products - Food Allergy Anaphylaxis     Anaphylaxis    Pollen Extract Other (See Comments)     hives, throat closure    Shellfish-Derived Products - Food Allergy      Objective:  /72 (BP Location: Left arm, Patient Position: Sitting, Cuff Size: Standard)   Pulse (!) 113   Temp 97.9 °F (36.6 °C) (Temporal)   Wt 77.6 kg (171 lb)   SpO2 98%   BMI 32.31 kg/m²    Physical Exam  Vitals reviewed. Constitutional:       Appearance: Normal appearance. She is well-developed. HENT:      Head: Normocephalic and atraumatic.    Eyes:      Conjunctiva/sclera: Conjunctivae normal.      Pupils: Pupils are equal, round, and reactive to light. Pulmonary:      Effort: Pulmonary effort is normal. No respiratory distress. Musculoskeletal:         General: Normal range of motion. Cervical back: Normal range of motion. Lymphadenopathy:      Cervical: No cervical adenopathy. Skin:     General: Skin is dry. Neurological:      Mental Status: She is alert and oriented to person, place, and time. Psychiatric:         Behavior: Behavior normal.     Result Review  Labs:  Appointment on 10/17/2023   Component Date Value Ref Range Status    WBC 10/17/2023 7.45  4.31 - 10.16 Thousand/uL Final    RBC 10/17/2023 4.84  3.81 - 5.12 Million/uL Final    Hemoglobin 10/17/2023 12.5  11.5 - 15.4 g/dL Final    Hematocrit 10/17/2023 40.8  34.8 - 46.1 % Final    MCV 10/17/2023 84  82 - 98 fL Final    MCH 10/17/2023 25.8 (L)  26.8 - 34.3 pg Final    MCHC 10/17/2023 30.6 (L)  31.4 - 37.4 g/dL Final    RDW 10/17/2023 14.6  11.6 - 15.1 % Final    Platelets 42/71/4270 239  149 - 390 Thousands/uL Final    MPV 10/17/2023 11.7  8.9 - 12.7 fL Final    Vit D, 25-Hydroxy 10/17/2023 32.5  30.0 - 100.0 ng/mL Final    Vitamin D guidelines established by Clinical Guidelines Subcommittee  of the Endocrine Society Task Force, 2011    Deficiency <20ng/ml   Insufficiency 20-30ng/ml   Sufficient  ng/ml     PTH 10/17/2023 24.8  12.0 - 88.0 pg/mL Final    Folate 10/17/2023 5.4 (L)  >5.9 ng/mL Final    The World Health Organization has determined deficient folate concentrations are considered to be <4.0 ng/mL. Iron Saturation 10/17/2023 38  15 - 50 % Final    TIBC 10/17/2023 259  250 - 450 ug/dL Final    Iron 10/17/2023 99  50 - 212 ug/dL Final    Patients treated with metal-binding drugs (ie. Deferoxamine) may have depressed iron values. UIBC 10/17/2023 160  155 - 355 ug/dL Final    Ferritin 10/17/2023 114  11 - 307 ng/mL Final     Please note: This report has been generated by a voice recognition software system.  Therefore there may be syntax, spelling, and/or grammatical errors. Please call if you have any questions.

## 2023-10-19 LAB — VIT B1 BLD-SCNC: 79.6 NMOL/L (ref 66.5–200)

## 2023-10-21 LAB — VIT A SERPL-MCNC: 27.9 UG/DL (ref 18.9–57.3)

## 2023-10-24 ENCOUNTER — OFFICE VISIT (OUTPATIENT)
Dept: BARIATRICS | Facility: CLINIC | Age: 28
End: 2023-10-24
Payer: COMMERCIAL

## 2023-10-24 VITALS
HEART RATE: 105 BPM | TEMPERATURE: 97.5 F | DIASTOLIC BLOOD PRESSURE: 76 MMHG | BODY MASS INDEX: 30.55 KG/M2 | SYSTOLIC BLOOD PRESSURE: 118 MMHG | WEIGHT: 166 LBS | HEIGHT: 62 IN

## 2023-10-24 DIAGNOSIS — Z48.815 ENCOUNTER FOR SURGICAL AFTERCARE FOLLOWING SURGERY OF DIGESTIVE SYSTEM: Primary | ICD-10-CM

## 2023-10-24 DIAGNOSIS — E66.9 OBESITY, CLASS I, BMI 30-34.9: ICD-10-CM

## 2023-10-24 DIAGNOSIS — Z98.84 BARIATRIC SURGERY STATUS: ICD-10-CM

## 2023-10-24 DIAGNOSIS — K91.2 POSTSURGICAL MALABSORPTION: ICD-10-CM

## 2023-10-24 DIAGNOSIS — K21.9 GERD (GASTROESOPHAGEAL REFLUX DISEASE): ICD-10-CM

## 2023-10-24 PROCEDURE — 99214 OFFICE O/P EST MOD 30 MIN: CPT | Performed by: NURSE PRACTITIONER

## 2023-10-24 NOTE — PROGRESS NOTES
Assessment/Plan:     Patient ID: Vu Urena is a 33 y.o. female. Bariatric Surgery Status/GERD    -s/p Vertical Sleeve Gastrectomy with Dr. Maury Rea on 01/09/2023. Presents to the office today for 3rd post op visit. Overall doing well, tolerating a regular diet. Denies having any abdominal pain, N/V/D/C, regurgitation, reflux or dysphagia. Taking her MVI daily. Recently added folic acid 924 mcg once daily due to low levels. Able to wean off PPI without any issues. At times does get acid reflux after food triggers. 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 Enjoying her job so far and is more active. Discussed about plans on conceiving when she is a year out from surgery. PLAN:     - discussed to wait at least a year out from surgery and plan to switch her vitamins to bariatric prenatals. - Routine follow up in 6 months for annual visit. - Continue with healthy lifestyle, adequate protein intake of 60 gm, fluid intake of at least 64 oz. - Continue with MVI daily. - Activity as tolerated. - Labs ordered and will adjust accordingly if any deficiency. - Follow up with RD and SW as needed. Continued/Maintain healthy weight loss with good nutrition intakes. Adequate hydration with at least 64oz. fluid intake. Follow diet as discussed. Follow vitamin and mineral recommendations as reviewed with you. Exercise as tolerated. Colonoscopy referral made: n/a  Mammogram - N/A    Follow-up in 6 months for annual visit. We kindly ask that your arrive 15 minutes before your scheduled appointment time with your provider to allow our staff to room you, get your vital signs and update your chart. Get lab work done prior to annual visit. Please call the office if you need a script. It is recommended to check with your insurance BEFORE getting labs done to make sure they are covered by your policy.       Call our office if you have any problems with abdominal pain especially associated with fever, chills, nausea, vomiting or any other concerns. All  Post-bariatric surgery patients should be aware that very small quantities of any alcohol can cause impairment and it is very possible not to feel the effect. The effect can be in the system for several hours. It is also a stomach irritant. It is advised to AVOID alcohol, Nonsteroidal antiinflammatory drugs (NSAIDS) and nicotine of all forms . Any of these can cause stomach irritation/pain. Discussed the effects of alcohol on a bariatric patient and the increased impairment risk. Keep up the good work! Postsurgical Malabsorption   -At risk for malabsorption of vitamins/minerals secondary to malabsorption and restriction of intake from bariatric surgery  - Currently taking adequate postop bariatric surgery vitamin supplementation  -Last set of bariatric labs completed on 10/2023 and showed low folic acid. -Patient received education about the importance of adhering to a lifelong supplementation regimen to avoid vitamin/mineral deficiencies      Diagnoses and all orders for this visit:    Encounter for surgical aftercare following surgery of digestive system    Bariatric surgery status    Postsurgical malabsorption    Obesity, Class I, BMI 30-34.9    GERD (gastroesophageal reflux disease)         Subjective:      Patient ID: Naman Dumont is a 33 y.o. female. -s/p Vertical Sleeve Gastrectomy with Dr. Ave Duverney on 01/09/2023. Presents to the office today for 3rd post op visit. Overall doing well, tolerating a regular diet. Denies having any abdominal pain, N/V/D/C, regurgitation, reflux or dysphagia. Taking her MVI daily. Recently added folic acid 950 mcg once daily due to low levels. Able to wean off PPI without any issues. At times does get acid reflux after food triggers. 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 Enjoying her job so far and is more active. Discussed about plans on conceiving when she is a year out from surgery. Initial: 261 lbs   Current: 166 lbs  EWL: (Weight loss is ahead of schedule at this post surgical period.)  [place]reet: current   Current BMI is Body mass index is 30.86 kg/m². Tolerating a regular diet-yes  Eating at least 60 grams of protein per day-yes  Following 30/60 minute rule with liquids-yes  Drinking at least 64 ounces of fluid per day-yes  Drinking carbonated beverages-no  Sufficient exercise- yes - walking, was going to the gym but scheduled has been busy. Will   Using NSAIDs regularly-no  Using nicotine-no  Using alcohol-yes - socially. Supplements:  Multivitamins and WITH IRON 45 MG, calcium 500 mg TID, biotin + folic acid      EWL is 75%, which places the patient ahead of schedule for expected post surgical weight loss at this time. The following portions of the patient's history were reviewed and updated as appropriate: allergies, current medications, past family history, past medical history, past social history, past surgical history and problem list.    Review of Systems   Constitutional: Negative. Respiratory: Negative. Cardiovascular: Negative. Gastrointestinal: Negative. Neurological: Negative. Psychiatric/Behavioral: Negative. Objective:    /76 (BP Location: Left arm, Patient Position: Sitting, Cuff Size: Large)   Pulse 105   Temp 97.5 °F (36.4 °C) (Tympanic)   Ht 5' 1.5" (1.562 m)   Wt 75.3 kg (166 lb)   BMI 30.86 kg/m²      Physical Exam  Vitals and nursing note reviewed. Constitutional:       Appearance: Normal appearance. She is obese. Cardiovascular:      Rate and Rhythm: Normal rate and regular rhythm. Pulses: Normal pulses. Heart sounds: Normal heart sounds. Pulmonary:      Effort: Pulmonary effort is normal.      Breath sounds: Normal breath sounds. Abdominal:      General: Bowel sounds are normal.      Palpations: Abdomen is soft.       Tenderness: There is no abdominal tenderness. Musculoskeletal:         General: Normal range of motion. Skin:     General: Skin is warm and dry. Neurological:      General: No focal deficit present. Mental Status: She is alert and oriented to person, place, and time. Psychiatric:         Mood and Affect: Mood normal.         Behavior: Behavior normal.         Thought Content:  Thought content normal.         Judgment: Judgment normal.

## 2023-10-25 LAB — ZINC SERPL-MCNC: 70 UG/DL (ref 44–115)

## 2023-11-08 ENCOUNTER — TELEMEDICINE (OUTPATIENT)
Dept: PSYCHIATRY | Facility: CLINIC | Age: 28
End: 2023-11-08
Payer: COMMERCIAL

## 2023-11-08 DIAGNOSIS — F41.9 ANXIETY DISORDER, UNSPECIFIED TYPE: Primary | ICD-10-CM

## 2023-11-08 PROCEDURE — 90837 PSYTX W PT 60 MINUTES: CPT | Performed by: SOCIAL WORKER

## 2023-11-10 ENCOUNTER — OFFICE VISIT (OUTPATIENT)
Dept: FAMILY MEDICINE CLINIC | Facility: CLINIC | Age: 28
End: 2023-11-10
Payer: COMMERCIAL

## 2023-11-10 ENCOUNTER — APPOINTMENT (OUTPATIENT)
Dept: LAB | Facility: CLINIC | Age: 28
End: 2023-11-10
Payer: COMMERCIAL

## 2023-11-10 VITALS
SYSTOLIC BLOOD PRESSURE: 120 MMHG | RESPIRATION RATE: 16 BRPM | HEART RATE: 138 BPM | WEIGHT: 165 LBS | BODY MASS INDEX: 31.15 KG/M2 | HEIGHT: 61 IN | DIASTOLIC BLOOD PRESSURE: 86 MMHG | TEMPERATURE: 99.5 F | OXYGEN SATURATION: 98 %

## 2023-11-10 DIAGNOSIS — R00.0 TACHYCARDIA: ICD-10-CM

## 2023-11-10 DIAGNOSIS — R00.0 TACHYCARDIA: Primary | ICD-10-CM

## 2023-11-10 DIAGNOSIS — R05.1 ACUTE COUGH: ICD-10-CM

## 2023-11-10 DIAGNOSIS — T78.00XA ALLERGY WITH ANAPHYLAXIS DUE TO FOOD: ICD-10-CM

## 2023-11-10 LAB
ANION GAP SERPL CALCULATED.3IONS-SCNC: 9 MMOL/L
BASOPHILS # BLD AUTO: 0.03 THOUSANDS/ÂΜL (ref 0–0.1)
BASOPHILS NFR BLD AUTO: 0 % (ref 0–1)
BUN SERPL-MCNC: 8 MG/DL (ref 5–25)
CALCIUM SERPL-MCNC: 9.5 MG/DL (ref 8.4–10.2)
CHLORIDE SERPL-SCNC: 102 MMOL/L (ref 96–108)
CO2 SERPL-SCNC: 25 MMOL/L (ref 21–32)
CREAT SERPL-MCNC: 0.73 MG/DL (ref 0.6–1.3)
D DIMER PPP FEU-MCNC: 0.34 UG/ML FEU
EOSINOPHIL # BLD AUTO: 0.02 THOUSAND/ÂΜL (ref 0–0.61)
EOSINOPHIL NFR BLD AUTO: 0 % (ref 0–6)
ERYTHROCYTE [DISTWIDTH] IN BLOOD BY AUTOMATED COUNT: 14.7 % (ref 11.6–15.1)
GFR SERPL CREATININE-BSD FRML MDRD: 113 ML/MIN/1.73SQ M
GLUCOSE SERPL-MCNC: 100 MG/DL (ref 65–140)
HCT VFR BLD AUTO: 40.8 % (ref 34.8–46.1)
HGB BLD-MCNC: 13.1 G/DL (ref 11.5–15.4)
IMM GRANULOCYTES # BLD AUTO: 0.03 THOUSAND/UL (ref 0–0.2)
IMM GRANULOCYTES NFR BLD AUTO: 0 % (ref 0–2)
LYMPHOCYTES # BLD AUTO: 0.84 THOUSANDS/ÂΜL (ref 0.6–4.47)
LYMPHOCYTES NFR BLD AUTO: 9 % (ref 14–44)
MCH RBC QN AUTO: 26.7 PG (ref 26.8–34.3)
MCHC RBC AUTO-ENTMCNC: 32.1 G/DL (ref 31.4–37.4)
MCV RBC AUTO: 83 FL (ref 82–98)
MONOCYTES # BLD AUTO: 0.47 THOUSAND/ÂΜL (ref 0.17–1.22)
MONOCYTES NFR BLD AUTO: 5 % (ref 4–12)
NEUTROPHILS # BLD AUTO: 7.57 THOUSANDS/ÂΜL (ref 1.85–7.62)
NEUTS SEG NFR BLD AUTO: 86 % (ref 43–75)
NRBC BLD AUTO-RTO: 0 /100 WBCS
PLATELET # BLD AUTO: 217 THOUSANDS/UL (ref 149–390)
PMV BLD AUTO: 12.1 FL (ref 8.9–12.7)
POTASSIUM SERPL-SCNC: 3.7 MMOL/L (ref 3.5–5.3)
RBC # BLD AUTO: 4.9 MILLION/UL (ref 3.81–5.12)
SODIUM SERPL-SCNC: 136 MMOL/L (ref 135–147)
TSH SERPL DL<=0.05 MIU/L-ACNC: 0.72 UIU/ML (ref 0.45–4.5)
WBC # BLD AUTO: 8.96 THOUSAND/UL (ref 4.31–10.16)

## 2023-11-10 PROCEDURE — 85379 FIBRIN DEGRADATION QUANT: CPT

## 2023-11-10 PROCEDURE — 84443 ASSAY THYROID STIM HORMONE: CPT

## 2023-11-10 PROCEDURE — 93000 ELECTROCARDIOGRAM COMPLETE: CPT | Performed by: NURSE PRACTITIONER

## 2023-11-10 PROCEDURE — 87635 SARS-COV-2 COVID-19 AMP PRB: CPT | Performed by: NURSE PRACTITIONER

## 2023-11-10 PROCEDURE — 80048 BASIC METABOLIC PNL TOTAL CA: CPT

## 2023-11-10 PROCEDURE — 99214 OFFICE O/P EST MOD 30 MIN: CPT | Performed by: NURSE PRACTITIONER

## 2023-11-10 PROCEDURE — 85025 COMPLETE CBC W/AUTO DIFF WBC: CPT

## 2023-11-10 PROCEDURE — 36415 COLL VENOUS BLD VENIPUNCTURE: CPT

## 2023-11-10 RX ORDER — FLUTICASONE PROPIONATE AND SALMETEROL XINAFOATE 230; 21 UG/1; UG/1
1 AEROSOL, METERED RESPIRATORY (INHALATION) 2 TIMES DAILY
Qty: 8 G | Refills: 0 | Status: SHIPPED | OUTPATIENT
Start: 2023-11-10

## 2023-11-10 RX ORDER — EPINEPHRINE 0.3 MG/.3ML
0.3 INJECTION SUBCUTANEOUS ONCE
Qty: 0.6 ML | Refills: 0 | Status: SHIPPED | OUTPATIENT
Start: 2023-11-10 | End: 2023-11-10

## 2023-11-10 NOTE — ASSESSMENT & PLAN NOTE
Tachycardia with onset of cough, chest discomfort, sob. Lungs are clear, normal spo2. EKG shows sinus tachycardia. Check labs, if d-dimer elevated will get cta. follow up 1 week.

## 2023-11-10 NOTE — PROGRESS NOTES
Name: John Ramires      : 1995      MRN: [id number]  Encounter Provider: ANAMIKA Austin  Encounter Date: 11/10/2023   Encounter department: [facility]     1. Tachycardia  Assessment & Plan:  Tachycardia with onset of cough, chest discomfort, sob. Lungs are clear, normal spo2. EKG shows sinus tachycardia. Check labs, if d-dimer elevated will get cta. follow up 1 week. Orders:  -     POCT ECG  -     Basic metabolic panel; Future  -     CBC and differential; Future  -     TSH, 3rd generation with Free T4 reflex; Future  -     D-dimer, quantitative; Future    2. Acute cough  Assessment & Plan:  Symptoms started , suspects triggered by exposure to cats. Lungs are clear, normal spo2. Needs to be tested for asthma, pft ordered. For now start advair bid, educated on use. Given the sudden onset with cough and tachycardia would like to get labs to evaluate for PE. Follow up next week. Orders:  -     COVID Only- Office Collect  -     fluticasone-salmeterol (Advair HFA) 230-21 MCG/ACT inhaler; Inhale 1 puff 2 (two) times a day Rinse mouth after use. -     Complete PFT with post bronchodilator; Future    3. 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 y/o female who is seen today for evaluation of SOB. Past medical history of allergies, migraines, GERD, renal cyst.  She started with sob on . She has been into homes of patient's who have cats and she is allergic to cats. She says she usually responds to benadryl but it has not gotten better yet. She says when she tries to take a deep breath she coughs. When she blows her nose she has clear discharge. Her throat is scratchy but she says this is also typical with an allergic reaction. No fever, chills, body aches, fatigue. No GI symptoms.   She says her sense of taste is decreased but she thinks this is due to congestion. She has never been tested for asthma, but she says she was told by GI that she may have asthma. She notices that she seems to be having these episodes more frequently when she is around triggers such as smoke. Review of Systems   Constitutional:  Negative for appetite change, chills, fatigue and fever. HENT:  Positive for congestion, postnasal drip and sore throat. Negative for rhinorrhea. Respiratory:  Positive for cough, chest tightness and shortness of breath. Negative for wheezing. Cardiovascular:  Negative for chest pain. Musculoskeletal:  Negative for myalgias. Neurological:  Negative for dizziness, light-headedness and headaches. Current Outpatient Medications on File Prior to Visit   Medication Sig    acetaminophen (TYLENOL) 500 mg tablet Take 500 mg by mouth every 6 (six) hours as needed for mild pain    BIOTIN PO Take 1 tablet by mouth daily    calcium carbonate (TUMS) 500 mg chewable tablet Chew 1 tablet daily    folic acid (FOLVITE) 159 mcg tablet Take 1 tablet (400 mcg total) by mouth daily    pediatric multivitamin-iron (POLY-VI-SOL with IRON) 15 MG chewable tablet Chew 1 tablet daily       Objective     /86 (BP Location: Left arm, Patient Position: Sitting, Cuff Size: Standard)   Pulse (!) 138   Temp 99.5 °F (37.5 °C) (Tympanic)   Resp 16   Ht 5' 1" (1.549 m)   Wt 74.8 kg (165 lb)   SpO2 98%   BMI 31.18 kg/m²     Physical Exam  Vitals reviewed. Constitutional:       General: She is awake. She is not in acute distress. Appearance: Normal appearance. She is well-developed and well-groomed. She is not ill-appearing. HENT:      Head: Normocephalic. Right Ear: Hearing, tympanic membrane, ear canal and external ear normal. No middle ear effusion. Tympanic membrane is not bulging.       Left Ear: Hearing, tympanic membrane, ear canal and external ear normal.  No middle ear effusion. Tympanic membrane is not bulging. Nose: No mucosal edema or rhinorrhea. Right Turbinates: Enlarged and pale. Left Turbinates: Enlarged and pale. Mouth/Throat:      Lips: Pink. Mouth: Mucous membranes are moist. Mucous membranes are not dry. Pharynx: No oropharyngeal exudate or posterior oropharyngeal erythema. Tonsils: No tonsillar abscesses. Eyes:      Conjunctiva/sclera: Conjunctivae normal.   Cardiovascular:      Rate and Rhythm: Regular rhythm. Tachycardia present. Pulses: Normal pulses. Heart sounds: Normal heart sounds. No murmur heard. Pulmonary:      Effort: Pulmonary effort is normal. No accessory muscle usage or respiratory distress. Breath sounds: Normal breath sounds. No decreased breath sounds, wheezing, rhonchi or rales. Lymphadenopathy:      Head:      Right side of head: No submental, submandibular, tonsillar, preauricular, posterior auricular or occipital adenopathy. Left side of head: No submental, submandibular, tonsillar, preauricular, posterior auricular or occipital adenopathy. Cervical: No cervical adenopathy. Skin:     General: Skin is warm and dry. Neurological:      Mental Status: She is alert and oriented to person, place, and time. Psychiatric:         Attention and Perception: Attention normal.         Mood and Affect: Mood normal.         Speech: Speech normal.         Behavior: Behavior normal. Behavior is cooperative. Thought Content:  Thought content normal.         Cognition and Memory: Cognition normal.         Judgment: Judgment normal.       ANAMIKA Mckeon

## 2023-11-10 NOTE — ASSESSMENT & PLAN NOTE
Symptoms started 11/8, suspects triggered by exposure to cats. Lungs are clear, normal spo2. Needs to be tested for asthma, pft ordered. For now start advair bid, educated on use. Given the sudden onset with cough and tachycardia would like to get labs to evaluate for PE. Follow up next week.

## 2023-11-11 LAB — SARS-COV-2 RNA RESP QL NAA+PROBE: POSITIVE

## 2023-11-11 NOTE — PSYCH
Behavioral Health Psychotherapy Progress Note    Psychotherapy Provided: Individual Psychotherapy     No diagnosis found. Goals addressed in session: Goal 1     DATA: Clinician explored client's current stressors-- discussed her problem solving with her in laws-- her hesitance with her  as he is moving forward from his errors. Clinician reassured client and brainstormed alternative ways that she can address these feelings with him. During this session, this clinician used the following therapeutic modalities: Client-centered Therapy, Cognitive Behavioral Therapy, and Supportive Psychotherapy    Substance Abuse was not addressed during this session. If the client is diagnosed with a co-occurring substance use disorder, please indicate any changes in the frequency or amount of use: \NA. Stage of change for addressing substance use diagnoses: No substance use/Not applicable    ASSESSMENT:  Valarie Eason presents with a Euthymic/ normal mood. her affect is Normal range and intensity, which is congruent, with her mood and the content of the session. The client has made progress on their goals. Valarie Eason presents with a none risk of suicide, none risk of self-harm, and none risk of harm to others. For any risk assessment that surpasses a "low" rating, a safety plan must be developed. A safety plan was indicated: no  If yes, describe in detail NA    PLAN: Between sessions, Valarie Eason will continue to advocate her boundaries. At the next session, the therapist will use Client-centered Therapy to address ongoing stressors. Behavioral Health Treatment Plan and Discharge Planning: Valarie Eason is aware of and agrees to continue to work on their treatment plan. They have identified and are working toward their discharge goals.  yes    Visit start and stop times:    11/8/23  Start Time: 0802  Stop Time: 0902  Total Visit Time: 60 minutes  Virtual Regular Visit    Verification of patient location:    Patient is located at Home in the following state in which I hold an active license [place]      Assessment/Plan:    Problem List Items Addressed This Visit    None      Goals addressed in session: Goal 1          Reason for visit is No chief complaint on file. Encounter provider Ayah Damon    Provider located at [address]   [address] [facility] [address] [phone]      Recent Visits  Date Type Provider Dept   11/08/23 Telemedicine Aerial Graton, [facility]   Showing recent visits within past 7 days and meeting all other requirements  Future Appointments  No visits were found meeting these conditions. Showing future appointments within next 150 days and meeting all other requirements       The patient was identified by name and date of birth. Sophia Delaney was informed that this is a telemedicine visit and that the visit is being conducted throughthe [employer] platform. She agrees to proceed. .  My office door was closed. No one else was in the room. She acknowledged consent and understanding of privacy and security of the video platform. The patient has agreed to participate and understands they can discontinue the visit at any time. Patient is aware this is a billable service. Subjective  Sophia Delaney is a 33 y.o. female  .       HPI     Past Medical History:   Diagnosis Date    Gastritis     GERD (gastroesophageal reflux disease)     Migraine     Obesity     Seasonal allergies     Umbilical hernia        Past Surgical History:   Procedure Laterality Date    INSERTION OF INTRAUTERINE DEVICE (IUD)      [place] LAPS [address] RSTRICTIV PX LONGITUDINAL GASTRECTOMY N/A 01/09/2023    Procedure: GASTRECTOMY SLEEVE LAP & INTRAOPERATIVE EGD;  Surgeon: Marco A Hudson MD;  Location: [facility];  Service: Bariatrics    WISDOM TOOTH EXTRACTION Bilateral 03/2020 Current Outpatient Medications   Medication Sig Dispense Refill    acetaminophen (TYLENOL) 500 mg tablet Take 500 mg by mouth every 6 (six) hours as needed for mild pain      BIOTIN PO Take 1 tablet by mouth daily      calcium carbonate (TUMS) 500 mg chewable tablet Chew 1 tablet daily      EPINEPHrine (EPIPEN) 0.3 mg/0.3 mL SOAJ Inject 0.3 mL (0.3 mg total) into a muscle once for 1 dose 0.6 mL 0    fluticasone-salmeterol (Advair HFA) 230-21 MCG/ACT inhaler Inhale 1 puff 2 (two) times a day Rinse mouth after use. 8 g 0    folic acid (FOLVITE) 088 mcg tablet Take 1 tablet (400 mcg total) by mouth daily 30 tablet 6    pediatric multivitamin-iron (POLY-VI-SOL with IRON) 15 MG chewable tablet Chew 1 tablet daily       No current facility-administered medications for this visit. Allergies   Allergen Reactions    Cat Hair Extract Shortness Of Breath    Contrast [Iodinated Contrast Media] Anaphylaxis     throat closure    Fish-Derived Products - Food Allergy Anaphylaxis     Anaphylaxis    Pollen Extract Other (See Comments)     hives, throat closure    Shellfish-Derived Products - Food Allergy        Review of Systems    Video Exam    There were no vitals filed for this visit.     Physical Exam

## 2023-11-13 NOTE — BH TREATMENT PLAN
Outpatient Behavioral Health Psychotherapy Treatment Plan    Francois Wolf  1995     Date of Initial Psychotherapy Assessment: 11/14/2022   Date of Current Treatment Plan: 11/8/23  Treatment Plan Target Date: 11/8/24  Treatment Plan Expiration Date: 5/8/24    Diagnosis:   1. Anxiety disorder, unspecified type              Area(s) of Need:client reports wanting to incorporate a routine with gym included; client also wants to decrease her anxiety regarding the trauma within her relationship--not dwelling on the past. Client expresses that she would like to strengthen her relationship with her father. Long Term Goal 1 (in the client's own words): Vicente Miller I want to better my relationship with my father     Stage of Change: Maintenance    Target Date for completion: 5/23/24     Anticipated therapeutic modalities: compassion focused;  solution focused; CBT; Emotion focused; People identified to complete this goal: Client and clinician       Objective 1: (identify the means of measuring success in meeting the objective):    Client will explore the complexities of her relationship wit hehr father; client will identify at least 3 feelings she feels toward her father; client will identify the type of relationship that she wants to achieve with her father--will align with her intentions. Client will practice expressing her feelings to her father 5/5x   Objective 2: (identify the means of measuring success in meeting the objective): NA      Long Term Goal 2 (in the client's own words): I want to go to the gym and give everything that I do--school, work, self care-- it's own time.      Stage of Change: Action    Target Date for completion: 5/23/24     Anticipated therapeutic modalities: solution focused; collaborative; constructivist therapy     People identified to complete this goal: Client and clinician       Objective 1: (identify the means of measuring success in meeting the objective): Client will construct a routine to include her work, gym, hobbies, her spiritual commitments, time with her , and time with her family. Client will follow the routine 5 days a week. Objective 2: (identify the means of measuring success in meeting the objective): NA     Long Term Goal 3 (in the client's own words): I want to accept the infidelity as a whole. . I dont want to reopen it or relive it. Stage of Change: Action    Target Date for completion: 5/23/2024     Anticipated therapeutic modalities: breathwork; somatic therapy; trauma focused; attachment based      People identified to complete this goal: Client and clinician       Objective 1: (identify the means of measuring success in meeting the objective): ; client will acknowledge what has been done in the past and will practice mindfulness while spending time with her ; client will practice mindfulness while making decisions--within her control, to further her relationship at least once a day; client will practice grounding techniques when experiencing negative thoughts or bad memories. Objective 2: (identify the means of measuring success in meeting the objective): NA     I am currently under the care of a [facility] psychiatric provider: no    My [facility] psychiatric provider is: NA    I am currently taking psychiatric medications: No    I feel that I will be ready for discharge from mental health care when I reach the following : When I no longer dwell on what happened. For children and adults who have a legal guardian:   Has there been any change to custody orders and/or guardianship status? NA. If yes, attach updated documentation. I have created my Crisis Plan and have been offered a copy of this plan    [id number]: Diagnosis and Treatment Plan explained to Robby Goldman acknowledges an understanding of their diagnosis. Amara Douglas agrees to this treatment plan.     I have been offered a copy of this Treatment Plan. yes

## 2023-12-20 ENCOUNTER — TELEPHONE (OUTPATIENT)
Dept: PSYCHIATRY | Facility: CLINIC | Age: 28
End: 2023-12-20

## 2023-12-20 NOTE — TELEPHONE ENCOUNTER
Left detailed message requesting a call back to schedule an appt with Aerial Rehman. Sent scheduling ticket. Left TA & Maldonado numbers

## 2023-12-22 ENCOUNTER — TELEMEDICINE (OUTPATIENT)
Dept: PSYCHIATRY | Facility: CLINIC | Age: 28
End: 2023-12-22
Payer: COMMERCIAL

## 2023-12-22 DIAGNOSIS — F41.9 ANXIETY DISORDER, UNSPECIFIED TYPE: Primary | ICD-10-CM

## 2023-12-22 PROCEDURE — 90837 PSYTX W PT 60 MINUTES: CPT | Performed by: SOCIAL WORKER

## 2023-12-22 NOTE — BH CRISIS PLAN
"Client Name: Noy Palomo       Client YOB: 1995  : 1995    Treatment Team (include name and contact information):     Psychotherapist: Brie [phone]    Psychiatrist: RYANN   Release of information completed: no    \" NA   Release of information completed: no    Other (Specify Role): NA    Release of information completed: no    Other (Specify Role): NA   Release of information completed: no    Healthcare Provider  Mahamed Mariano DO  No address on file  [phone]    Type of Plan   * Child plans (children 12 yo and younger) must be completed and signed by the child's legal guardian   * Plans for all individuals 15 yo and above must be signed by the client.     Plan Type: adolescent/adult (14 and over) Initial      My Personal Strengths are (in the client's own words):  Caring, kind, independent, driven  The stressors and triggers that may put me at risk are:  Conflict with spouse or conflict with family    Coping skills I can use to keep myself calm and safe:  Listen to music, Call a friend or family member, and [place]/meditate    Coping skills/supports I can use to maintain abstinence from substance use:   Not Applicable    The people that provide me with help and support: (Include name, contact, and how they can help)   Support person #1:     * Phone number: in notebook    * How can they help me?    Support person #2:Mother    * Phone number: in notebook    * How can they help me?     Support person #3: Brother    * Phone number: in notebook    * How can they help me?     In the past, the following has helped me in times of crisis:    Calling a family member, Praying or meditating, and Listening to music\      If it is an emergency and you need immediate help, call     If there is a possibility of danger to yourself or others, call the following crisis hotline resources:     Adult Crisis Numbers  Suicide Prevention Hotline - Dial   [facility]: " [phone]  [facility]: [phone]  [facility]: [phone]  [facility]: [phone]  [facility]: [phone]  [facility]: [phone]  [facility]: [phone]  [place]: [phone] (daytime).       [phone] (after hours, weekends, holidays)     Child/Adolescent Crisis Numbers   [facility]: [phone]   [facility]: [phone]   [place]: [phone]   [facility]: [phone]    Please note: Some [facility] do not have a separate number for Child/Adolescent specific crisis. If your county is not listed under Child/Adolescent, please call the adult number for your county     National Talk to Text Line   [employer] [place] - [phone]    In the event your feelings become unmanageable, and you cannot reach your support system, you will call 631 immediately or go to the nearest hospital emergency room.

## 2023-12-26 NOTE — PSYCH
"Behavioral Health Psychotherapy Progress Note    Psychotherapy Provided: Individual Psychotherapy     1. Anxiety disorder, unspecified type            Goals addressed in session: Goal 1     DATA: Clinician explored client's current stressors-- discussed her relationship with her father. Clinician and client identified some triggers regarding her father-- her feelings of abandonment and worthlessness stemming from his behaviors and her tendency to rely on her brothers-- not being able to confide in them about her marital concerns.   During this session, this clinician used the following therapeutic modalities: Client-centered Therapy and Cognitive Behavioral Therapy    Substance Abuse was not addressed during this session. If the client is diagnosed with a co-occurring substance use disorder, please indicate any changes in the frequency or amount of use: NA. Stage of change for addressing substance use diagnoses: No substance use/Not applicable    ASSESSMENT:  Noy Palomo presents with a Anxious mood.     her affect is Normal range and intensity, which is congruent, with her mood and the content of the session. The client has made progress on their goals.     Noy Palomo presents with a none risk of suicide, none risk of self-harm, and none risk of harm to others.    For any risk assessment that surpasses a \"low\" rating, a safety plan must be developed.    A safety plan was indicated: no  If yes, describe in detail NA    PLAN: Between sessions, Noy Palomo will continue to be mindful of her emotions when making decisions. At the next session, the therapist will use Client-centered Therapy to address ongoing stressors.    Behavioral Health Treatment Plan and Discharge Planning: Noy Palomo is aware of and agrees to continue to work on their treatment plan. They have identified and are working toward their discharge goals. yes    Visit start and stop times:    12/22/23  Start Time: 0802  Stop Time: " 0907  Total Visit Time: 65 minutes  Virtual Regular Visit    Verification of patient location:    Patient is located at Home in the following state in which I hold an active license [place]      Assessment/Plan:    Problem List Items Addressed This Visit    None  Visit Diagnoses       Anxiety disorder, unspecified type    -  Primary            Goals addressed in session: Goal 1          Reason for visit is No chief complaint on file.       Encounter provider Brie Rehman LCSW    Provider [facility]  [facility]  [place] [address] [phone]      Recent Visits  Date Type Provider Dept   12/22/23 Telemedicine [facility]   12/20/23 Telephone [facility] ERASMO [facility]   Showing recent visits within past 7 days and meeting all other requirements  Future Appointments  No visits were found meeting these conditions.  Showing future appointments within next 150 days and meeting all other requirements       The patient was identified by name and date of birth. Noy Palomo was informed that this is a telemedicine visit and that the visit is being conducted throughthe [employer] platform. She agrees to proceed..  My office door was closed. No one else was in the room.  She acknowledged consent and understanding of privacy and security of the video platform. The patient has agreed to participate and understands they can discontinue the visit at any time.    Patient is aware this is a billable service.     Subjective  Noy Palomo is a 28 y.o. female  .      HPI     Past Medical History:   Diagnosis Date    Gastritis     GERD (gastroesophageal reflux disease)     Migraine     Obesity     Seasonal allergies     Umbilical hernia        Past Surgical History:   Procedure Laterality Date    INSERTION OF INTRAUTERINE DEVICE (IUD)      HI LAPS [facility]  RSTRICTIV PX LONGITUDINAL GASTRECTOMY N/A 01/09/2023    Procedure: GASTRECTOMY SLEEVE LAP & INTRAOPERATIVE EGD;  Surgeon: Leif Tolliver MD;  Location: [facility];  Service: Bariatrics    WISDOM TOOTH EXTRACTION Bilateral 03/2020       Current Outpatient Medications   Medication Sig Dispense Refill    acetaminophen (TYLENOL) 500 mg tablet Take 500 mg by mouth every 6 (six) hours as needed for mild pain      BIOTIN PO Take 1 tablet by mouth daily      calcium carbonate (TUMS) 500 mg chewable tablet Chew 1 tablet daily      EPINEPHrine (EPIPEN) 0.3 mg/0.3 mL SOAJ Inject 0.3 mL (0.3 mg total) into a muscle once for 1 dose 0.6 mL 0    fluticasone-salmeterol (Advair HFA) 230-21 MCG/ACT inhaler Inhale 1 puff 2 (two) times a day Rinse mouth after use. 8 g 0    folic acid (FOLVITE) 400 mcg tablet Take 1 tablet (400 mcg total) by mouth daily 30 tablet 6    pediatric multivitamin-iron (POLY-VI-SOL with IRON) 15 MG chewable tablet Chew 1 tablet daily       No current facility-administered medications for this visit.        Allergies   Allergen Reactions    Cat Hair Extract Shortness Of Breath    Contrast [Iodinated Contrast Media] Anaphylaxis     throat closure    Fish-Derived Products - Food Allergy Anaphylaxis     Anaphylaxis    Pollen Extract Other (See Comments)     hives, throat closure    Shellfish-Derived Products - Food Allergy        Review of Systems    Video Exam    There were no vitals filed for this visit.    Physical Exam

## 2023-12-28 ENCOUNTER — HOSPITAL ENCOUNTER (OUTPATIENT)
Dept: PULMONOLOGY | Facility: HOSPITAL | Age: 28
End: 2023-12-28
Payer: COMMERCIAL

## 2023-12-28 DIAGNOSIS — R05.1 ACUTE COUGH: ICD-10-CM

## 2023-12-28 PROCEDURE — 94760 N-INVAS EAR/PLS OXIMETRY 1: CPT

## 2023-12-28 PROCEDURE — 94060 EVALUATION OF WHEEZING: CPT | Performed by: INTERNAL MEDICINE

## 2023-12-28 PROCEDURE — 94726 PLETHYSMOGRAPHY LUNG VOLUMES: CPT

## 2023-12-28 PROCEDURE — 94060 EVALUATION OF WHEEZING: CPT

## 2023-12-28 PROCEDURE — 94729 DIFFUSING CAPACITY: CPT | Performed by: INTERNAL MEDICINE

## 2023-12-28 PROCEDURE — 94729 DIFFUSING CAPACITY: CPT

## 2023-12-28 PROCEDURE — 94726 PLETHYSMOGRAPHY LUNG VOLUMES: CPT | Performed by: INTERNAL MEDICINE

## 2023-12-28 RX ORDER — ALBUTEROL SULFATE 2.5 MG/3ML
2.5 SOLUTION RESPIRATORY (INHALATION) ONCE
Status: COMPLETED | OUTPATIENT
Start: 2023-12-28 | End: 2023-12-28

## 2023-12-28 RX ADMIN — ALBUTEROL SULFATE 2.5 MG: 2.5 SOLUTION RESPIRATORY (INHALATION) at 07:51

## 2024-01-04 NOTE — PROGRESS NOTES
Assessment/Plan:  Cyclic pelvic pain and Dyspareunia which was not present prior to the Kyleena placement.  Patient desired removal  Desire for pregnancy -folic acid was fully explained to the patient.  Neural tube defects and spina bifida were discussed.  Patient was recommended to take an additional 1 mg of folic acid daily.  This is in addition to her multivitamin that she is taking from bariatric surgery.  She will call with a missed period to schedule for a dating ultrasound.  To begin extra folic acid today.       Kyleena placed 3/23    Diagnoses and all orders for this visit:    Pelvic pain  -     Iud removal    Dyspareunia in female  -     Iud removal    Encounter for IUD removal  -     Iud removal    Pre-conception counseling              Subjective:        Patient ID: Noy Palomo is a 28 y.o. female.    Noy presents today stating she can no longer palpate the IUD string.  Since the Kyleena was placed in March she has had amenorrhea but does have cyclic breast tenderness and PMS.  She usually checks for the string when the symptoms resolve.  Since the IUD was inserted she has noted cyclic painful cramps and dyspareunia.  Her  states that he feels the IUD.    She uses Tylenol and a heating pad for 2 days when she has the cramps.    She tolerated this because she had a plan in place for conception.  Recommendations were to wait a year after bariatric surgery before attempting pregnancy.  She plans on attempting conception in April.  Because of the ongoing problems she would like the IUD removed today.        The following portions of the patient's history were reviewed and updated as appropriate: She  has a past medical history of Gastritis, GERD (gastroesophageal reflux disease), Migraine, Obesity, Seasonal allergies, and Umbilical hernia.  Patient Active Problem List    Diagnosis Date Noted    Encounter for IUD removal 01/05/2024    Tachycardia 11/10/2023    Acute cough 11/10/2023    IUD  (intrauterine device) in place 08/20/2023    Status post gastric surgery 02/28/2023    GERD (gastroesophageal reflux disease)     Migraine     No blood products     Adjustment disorder with mixed anxiety and depressed mood 11/15/2022    Missed menses 08/08/2022    Encounter for annual routine gynecological examination 08/06/2022    Uses oral contraception 08/06/2022    Obesity, Class III, BMI 40-49.9 (morbid obesity) ([facility]) 06/23/2022    Pelvic pain 01/12/2022    Encounter for gynecological examination (general) (routine) without abnormal findings 07/30/2020    Oral contraceptive pill surveillance 07/30/2020    Gastritis 02/22/2018    Shellfish allergy 02/22/2018    Umbilical hernia without obstruction and without gangrene 02/22/2018    Acquired renal cyst of left kidney 02/22/2018    Acquired acanthosis nigricans 07/15/2013    Obesity 04/26/2013   PMH:  Menarche  12, stopped and restarted at 15  G0  Gardasil  Morbid obesity  Pelvic pain 6/21 - nl US but not done until 1/22  Amenorrhea on OC's - fear of pregnancy, 8/22 increased OC to [place]- cycled  Kyleena 3/23  EGD/Gastric Sleeve 1/9/23- lost 83#'s as of 8/23    She  has a past surgical history that includes [place] tooth extraction (Bilateral, 03/2020); pr laps gstrc rstrictiv px longitudinal gastrectomy (N/A, 01/09/2023); and INSERTION OF INTRAUTERINE DEVICE (IUD).  Her family history includes Allergic rhinitis in her mother; Anxiety disorder in her maternal aunt; Asthma in her brother and paternal grandfather; Bipolar disorder in her maternal aunt; Breast cancer in her maternal grandmother; Depression in her maternal aunt and paternal grandmother; Diabetes in her mother; Hyperlipidemia in her father; Hypertension in her father and mother; Lung cancer in her paternal grandfather; No Known Problems in her brother, brother, brother, brother, sister, and sister.  She  reports that she has never smoked. She has never used smokeless tobacco. She reports current  alcohol use of about 1.0 standard drink of alcohol per week. She reports that she does not use drugs.  Current Outpatient Medications   Medication Sig Dispense Refill    acetaminophen (TYLENOL) 500 mg tablet Take 500 mg by mouth every 6 (six) hours as needed for mild pain      BIOTIN PO Take 1 tablet by mouth daily      calcium carbonate (TUMS) 500 mg chewable tablet Chew 1 tablet daily      fluticasone-salmeterol (Advair HFA) 230-21 MCG/ACT inhaler Inhale 1 puff 2 (two) times a day Rinse mouth after use. 8 g 0    folic acid (FOLVITE) 400 mcg tablet Take 1 tablet (400 mcg total) by mouth daily 30 tablet 6    pediatric multivitamin-iron (POLY-VI-SOL with IRON) 15 MG chewable tablet Chew 1 tablet daily      EPINEPHrine (EPIPEN) 0.3 mg/0.3 mL SOAJ Inject 0.3 mL (0.3 mg total) into a muscle once for 1 dose 0.6 mL 0     No current facility-administered medications for this visit.     Current Outpatient Medications on File Prior to Visit   Medication Sig    acetaminophen (TYLENOL) 500 mg tablet Take 500 mg by mouth every 6 (six) hours as needed for mild pain    BIOTIN PO Take 1 tablet by mouth daily    calcium carbonate (TUMS) 500 mg chewable tablet Chew 1 tablet daily    fluticasone-salmeterol (Advair HFA) 230-21 MCG/ACT inhaler Inhale 1 puff 2 (two) times a day Rinse mouth after use.    folic acid (FOLVITE) 400 mcg tablet Take 1 tablet (400 mcg total) by mouth daily    pediatric multivitamin-iron (POLY-VI-SOL with IRON) 15 MG chewable tablet Chew 1 tablet daily    EPINEPHrine (EPIPEN) 0.3 mg/0.3 mL SOAJ Inject 0.3 mL (0.3 mg total) into a muscle once for 1 dose     No current facility-administered medications on file prior to visit.     She is allergic to cat hair extract, contrast [iodinated contrast media], fish-derived products - food allergy, pollen extract, and shellfish-derived products - food allergy..    Review of Systems   Constitutional: Negative.    Genitourinary:  Positive for dyspareunia and pelvic pain.  "Negative for difficulty urinating, dysuria, flank pain, frequency, menstrual problem and vaginal pain.         Objective:    Vitals:    01/05/24 1439   BP: 120/80   BP Location: Left arm   Patient Position: Sitting   Cuff Size: Standard   Weight: 73.8 kg (162 lb 9.6 oz)            Physical Exam  Vitals and nursing note reviewed. Exam conducted with a chaperone present.   Constitutional:       Appearance: Normal appearance.   HENT:      Head: Normocephalic and atraumatic.   Genitourinary:     General: Normal vulva.      Comments: The IUD string is present and of normal length.  It is coiled below the cervix.  The cervix is nulliparous in appearance.  Musculoskeletal:         General: No swelling.   Skin:     General: Skin is warm and dry.   Neurological:      Mental Status: She is alert.   Psychiatric:         Mood and Affect: Mood normal.         Behavior: Behavior normal.         Thought Content: Thought content normal.         Judgment: Judgment normal.       Iud removal    Date/Time: 1/5/2024 2:40 PM    Performed by: Danish Benson MD  Authorized by: Danish Benson MD  Universal Protocol:  Consent: Verbal consent obtained.  Risks and benefits: risks, benefits and alternatives were discussed  Consent given by: patient  Time out: Immediately prior to procedure a \"time out\" was called to verify the correct patient, procedure, equipment, support staff and site/side marked as required.  Patient understanding: patient states understanding of the procedure being performed  Patient consent: the patient's understanding of the procedure matches consent given  Patient identity confirmed: verbally with patient    Procedure:     Removed with no complications: yes      Other reason for removal:  Pain, desire for conception  Comments:      The IUD string was grinding a ring forcep and outward traction was applied.  The IUD was easily removed and intact.      "

## 2024-01-05 ENCOUNTER — TELEPHONE (OUTPATIENT)
Dept: OBGYN CLINIC | Facility: CLINIC | Age: 29
End: 2024-01-05

## 2024-01-05 ENCOUNTER — OFFICE VISIT (OUTPATIENT)
Dept: OBGYN CLINIC | Facility: CLINIC | Age: 29
End: 2024-01-05
Payer: COMMERCIAL

## 2024-01-05 VITALS — BODY MASS INDEX: 30.72 KG/M2 | DIASTOLIC BLOOD PRESSURE: 80 MMHG | SYSTOLIC BLOOD PRESSURE: 120 MMHG | WEIGHT: 162.6 LBS

## 2024-01-05 DIAGNOSIS — Z31.69 PRE-CONCEPTION COUNSELING: ICD-10-CM

## 2024-01-05 DIAGNOSIS — N94.10 DYSPAREUNIA IN FEMALE: ICD-10-CM

## 2024-01-05 DIAGNOSIS — R10.2 PELVIC PAIN: Primary | ICD-10-CM

## 2024-01-05 DIAGNOSIS — Z30.432 ENCOUNTER FOR IUD REMOVAL: ICD-10-CM

## 2024-01-05 PROCEDURE — 99212 OFFICE O/P EST SF 10 MIN: CPT | Performed by: OBSTETRICS & GYNECOLOGY

## 2024-01-05 PROCEDURE — 58301 REMOVE INTRAUTERINE DEVICE: CPT | Performed by: OBSTETRICS & GYNECOLOGY

## 2024-01-05 NOTE — TELEPHONE ENCOUNTER
Patient saw Dr Benson today, and he wanted her to take folic acid 1000mcg.  She was in touch with pharmacy and they stated they do not have that without a script.  They only have 800mcg in stock OTC.  Patient seeking RX for folic acid

## 2024-01-08 DIAGNOSIS — Z98.890 STATUS POST GASTRIC SURGERY: Primary | ICD-10-CM

## 2024-01-08 DIAGNOSIS — Z31.69 PRE-CONCEPTION COUNSELING: ICD-10-CM

## 2024-01-08 RX ORDER — FOLIC ACID 1 MG/1
1 TABLET ORAL DAILY
Qty: 90 TABLET | Refills: 3 | Status: SHIPPED | OUTPATIENT
Start: 2024-01-08

## 2024-01-09 PROBLEM — R05.1 ACUTE COUGH: Status: RESOLVED | Noted: 2023-11-10 | Resolved: 2024-01-09

## 2024-01-16 ENCOUNTER — TELEMEDICINE (OUTPATIENT)
Dept: PSYCHIATRY | Facility: CLINIC | Age: 29
End: 2024-01-16
Payer: COMMERCIAL

## 2024-01-16 DIAGNOSIS — F41.9 ANXIETY DISORDER, UNSPECIFIED TYPE: Primary | ICD-10-CM

## 2024-01-16 PROCEDURE — 90837 PSYTX W PT 60 MINUTES: CPT | Performed by: SOCIAL WORKER

## 2024-01-16 NOTE — BH CRISIS PLAN
Client Name: Noy Palomo       Client YOB: 1995    [place] Safety Plan         Step 1: Warning Signs:   Isolating myself from any social event.. withdrawing from my pets; not eating; being in my head; Any topic of infidelity in movies; Dads'         Step 2: Internal Coping Strategies:   Working out; listening to music; and cooking; and reading!          Step 3: People and social settings that provide distraction:   The dogs; my  and my mom; and my brother..         Step 4: People whom I can ask for help during a crisis: My mom; my  if he isnt my trigger..       Step 5: Professionals or agencies I can contact during a crisis:   Aerial Damien-- [phone]     Crisis Phone Numbers:   Suicide Prevention Lifeline: Call or Text  802 Crisis Text Line: Text HOME to 337-956   Please note: Some [facility] do not have a separate number for Child/Adolescent specific crisis. If your county is not listed under Child/Adolescent, please call the adult number for your county      Adult Crisis Numbers: Child/Adolescent Crisis Numbers   [facility]: [phone] [facility]: [phone]   [facility]: [phone] [facility]: [phone]   [facility]: [phone] [place]: [phone]   [facility]: [phone] [facility]: [phone]   [facility]: [phone]   [facility]: [phone]   [facility]: [phone]   [facility]: [phone] (daytime) [phone] (after hours, weekends, holidays)      Step 6: Making the environment safer (plan for lethal means safety): NO weapons in the house       Optional: What is most important to me and worth living for?      [place] Safety Plan. Alana Driscoll and Abdirashid Jauregui. Used with permission of the authors.

## 2024-01-17 NOTE — PSYCH
"Behavioral Health Psychotherapy Progress Note    Psychotherapy Provided: Individual Psychotherapy     No diagnosis found.    Goals addressed in session: Goal 1     DATA: Clinician explored client's current stressors-- discussed her fears regarding growing her family with her -- clinician encouraged client to recognize the ways that her  is practicing vulnerability with her and discussed the ways they have both grown throughout their relationship.   During this session, this clinician used the following therapeutic modalities: Client-centered Therapy, Cognitive Behavioral Therapy, Solution-Focused Therapy, and Supportive Psychotherapy    Substance Abuse was not addressed during this session. If the client is diagnosed with a co-occurring substance use disorder, please indicate any changes in the frequency or amount of use: NA. Stage of change for addressing substance use diagnoses: No substance use/Not applicable    ASSESSMENT:  Noy Palomo presents with a Euthymic/ normal and Anxious mood.     her affect is Normal range and intensity and Tearful, which is congruent, with her mood and the content of the session. The client has made progress on their goals.     Noy Palomo presents with a none risk of suicide, none risk of self-harm, and none risk of harm to others.    For any risk assessment that surpasses a \"low\" rating, a safety plan must be developed.    A safety plan was indicated: no  If yes, describe in detail NA    PLAN: Between sessions, Noy Palomo will practice recognizing her 's new found vulnerability. At the next session, the therapist will use Client-centered Therapy to address ongoing stressors.    Behavioral Health Treatment Plan and Discharge Planning: Noy Palomo is aware of and agrees to continue to work on their treatment plan. They have identified and are working toward their discharge goals. yes    Visit start and stop times:    01/16/24  Start Time: " 1800  Stop Time: 1909  Total Visit Time: 69 minutes  Virtual Regular Visit    Verification of patient location:    Patient is located at Home in the following state in which I hold an active license [place]      Assessment/Plan:    Problem List Items Addressed This Visit    None      Goals addressed in session: Goal 1          Reason for visit is No chief complaint on file.       Encounter provider Aerial ERASMO Quezada    Provider [facility] [place] [address] [phone]      Recent Visits  Date Type Provider Dept   01/16/24 Telemedicine Aerial ERASMO Quezada [facility]   Showing recent visits within past 7 days and meeting all other requirements  Future Appointments  No visits were found meeting these conditions.  Showing future appointments within next 150 days and meeting all other requirements       The patient was identified by name and date of birth. Noy Palomo was informed that this is a telemedicine visit and that the visit is being conducted throughthe [employer] platform. She agrees to proceed..  My office door was closed. No one else was in the room.  She acknowledged consent and understanding of privacy and security of the video platform. The patient has agreed to participate and understands they can discontinue the visit at any time.    Patient is aware this is a billable service.     Subjective  Noy Palomo is a 28 y.o. female  .      HPI     Past Medical History:   Diagnosis Date    Gastritis     GERD (gastroesophageal reflux disease)     Migraine     Obesity     Seasonal allergies     Umbilical hernia        Past Surgical History:   Procedure Laterality Date    INSERTION OF INTRAUTERINE DEVICE (IUD)      [place] LAPS GSTRC RSTRICTIV PX LONGITUDINAL GASTRECTOMY N/A 01/09/2023    Procedure: GASTRECTOMY SLEEVE LAP & INTRAOPERATIVE EGD;  Surgeon: Leif Tolliver MD;   Location: [facility];  Service: Bariatrics    WISDOM TOOTH EXTRACTION Bilateral 03/2020       Current Outpatient Medications   Medication Sig Dispense Refill    folic acid (KP Folic Acid) 1 mg tablet Take 1 tablet (1 mg total) by mouth daily 90 tablet 3    acetaminophen (TYLENOL) 500 mg tablet Take 500 mg by mouth every 6 (six) hours as needed for mild pain      BIOTIN PO Take 1 tablet by mouth daily      calcium carbonate (TUMS) 500 mg chewable tablet Chew 1 tablet daily      EPINEPHrine (EPIPEN) 0.3 mg/0.3 mL SOAJ Inject 0.3 mL (0.3 mg total) into a muscle once for 1 dose 0.6 mL 0    fluticasone-salmeterol (Advair HFA) 230-21 MCG/ACT inhaler Inhale 1 puff 2 (two) times a day Rinse mouth after use. 8 g 0    folic acid (FOLVITE) 400 mcg tablet Take 1 tablet (400 mcg total) by mouth daily 30 tablet 6    pediatric multivitamin-iron (POLY-VI-SOL with IRON) 15 MG chewable tablet Chew 1 tablet daily       No current facility-administered medications for this visit.        Allergies   Allergen Reactions    Cat Hair Extract Shortness Of Breath    Contrast [Iodinated Contrast Media] Anaphylaxis     throat closure    Fish-Derived Products - Food Allergy Anaphylaxis     Anaphylaxis    Pollen Extract Other (See Comments)     hives, throat closure    Shellfish-Derived Products - Food Allergy        Review of Systems    Video Exam    There were no vitals filed for this visit.    Physical Exam

## 2024-02-13 ENCOUNTER — TELEMEDICINE (OUTPATIENT)
Dept: PSYCHIATRY | Facility: CLINIC | Age: 29
End: 2024-02-13
Payer: COMMERCIAL

## 2024-02-13 DIAGNOSIS — F41.9 ANXIETY DISORDER, UNSPECIFIED TYPE: Primary | ICD-10-CM

## 2024-02-13 PROCEDURE — 90837 PSYTX W PT 60 MINUTES: CPT | Performed by: SOCIAL WORKER

## 2024-02-15 NOTE — PSYCH
"Behavioral Health Psychotherapy Progress Note    Psychotherapy Provided: Individual Psychotherapy     No diagnosis found.    Goals addressed in session: Goal 1     DATA: Clinician explored client's current stressors-- discussed her happiness of having her home to herself and planning on future children. Clinician and client explored her fears-- affirmed what she knows and the efforts she is putting into compartmentalizing-- identifying hard times in her marriage and  them from the bad. Clinician and client brainstormed ways to implement boundaries at work  During this session, this clinician used the following therapeutic modalities: Client-centered Therapy, Cognitive Behavioral Therapy, Solution-Focused Therapy, and Supportive Psychotherapy    Substance Abuse was not addressed during this session. If the client is diagnosed with a co-occurring substance use disorder, please indicate any changes in the frequency or amount of use: NA. Stage of change for addressing substance use diagnoses: No substance use/Not applicable    ASSESSMENT:  Noy Palomo presents with a Euthymic/ normal mood.     her affect is Normal range and intensity, which is congruent, with her mood and the content of the session. The client has made progress on their goals.     Noy Palomo presents with a none risk of suicide, none risk of self-harm, and none risk of harm to others.    For any risk assessment that surpasses a \"low\" rating, a safety plan must be developed.    A safety plan was indicated: no  If yes, describe in detail NA    PLAN: Between sessions, Noy Palomo will continue to set boundaries. At the next session, the therapist will use Client-centered Therapy to address ongoing stressors.    Behavioral Health Treatment Plan and Discharge Planning: Noy Palomo is aware of and agrees to continue to work on their treatment plan. They have identified and are working toward their discharge goals. yes    Visit " start and stop times:    02/13/24  Start Time: 1807  Stop Time: 1903  Total Visit Time: 56 minutes  Virtual Regular Visit    Verification of patient location:    Patient is located at Home in the following state in which I hold an active license [place]      Assessment/Plan:    Problem List Items Addressed This Visit    None      Goals addressed in session: Goal 1          Reason for visit is No chief complaint on file.       Encounter provider Aerial ERASMO Quezada    Provider [facility]  [place] [address] [phone]      Recent Visits  Date Type Provider Dept   02/13/24 Telemedicine [facility]erial ERASMO Quezada [facility]   Showing recent visits within past 7 days and meeting all other requirements  Future Appointments  No visits were found meeting these conditions.  Showing future appointments within next 150 days and meeting all other requirements       The patient was identified by name and date of birth. Noy Palomo was informed that this is a telemedicine visit and that the visit is being conducted throughthe [employer] platform. She agrees to proceed..  My office door was closed. No one else was in the room.  She acknowledged consent and understanding of privacy and security of the video platform. The patient has agreed to participate and understands they can discontinue the visit at any time.    Patient is aware this is a billable service.     Subjective  Noy Palomo is a 28 y.o. female  .      HPI     Past Medical History:   Diagnosis Date    Gastritis     GERD (gastroesophageal reflux disease)     Migraine     Obesity     Seasonal allergies     Umbilical hernia        Past Surgical History:   Procedure Laterality Date    INSERTION OF INTRAUTERINE DEVICE (IUD)      AL LAPS GSTRC RSTRICTIV PX LONGITUDINAL GASTRECTOMY N/A 01/09/2023    Procedure: GASTRECTOMY SLEEVE LAP &  INTRAOPERATIVE EGD;  Surgeon: Leif Tolliver MD;  [facility];  Service: Bariatrics    WISDOM TOOTH EXTRACTION Bilateral 03/2020       Current Outpatient Medications   Medication Sig Dispense Refill    acetaminophen (TYLENOL) 500 mg tablet Take 500 mg by mouth every 6 (six) hours as needed for mild pain      BIOTIN PO Take 1 tablet by mouth daily      calcium carbonate (TUMS) 500 mg chewable tablet Chew 1 tablet daily      EPINEPHrine (EPIPEN) 0.3 mg/0.3 mL SOAJ Inject 0.3 mL (0.3 mg total) into a muscle once for 1 dose 0.6 mL 0    fluticasone-salmeterol (Advair HFA) 230-21 MCG/ACT inhaler Inhale 1 puff 2 (two) times a day Rinse mouth after use. 8 g 0    folic acid (FOLVITE) 400 mcg tablet Take 1 tablet (400 mcg total) by mouth daily 30 tablet 6    folic acid (KP Folic Acid) 1 mg tablet Take 1 tablet (1 mg total) by mouth daily 90 tablet 3    pediatric multivitamin-iron (POLY-VI-SOL with IRON) 15 MG chewable tablet Chew 1 tablet daily       No current facility-administered medications for this visit.        Allergies   Allergen Reactions    Cat Hair Extract Shortness Of Breath    Contrast [Iodinated Contrast Media] Anaphylaxis     throat closure    Fish-Derived Products - Food Allergy Anaphylaxis     Anaphylaxis    Pollen Extract Other (See Comments)     hives, throat closure    Shellfish-Derived Products - Food Allergy        Review of Systems    Video Exam    There were no vitals filed for this visit.    Physical Exam

## 2024-02-21 PROBLEM — Z01.419 ENCOUNTER FOR GYNECOLOGICAL EXAMINATION (GENERAL) (ROUTINE) WITHOUT ABNORMAL FINDINGS: Status: RESOLVED | Noted: 2020-07-30 | Resolved: 2024-02-21

## 2024-02-21 PROBLEM — Z01.419 ENCOUNTER FOR ANNUAL ROUTINE GYNECOLOGICAL EXAMINATION: Status: RESOLVED | Noted: 2022-08-06 | Resolved: 2024-02-21

## 2024-03-12 ENCOUNTER — TELEMEDICINE (OUTPATIENT)
Dept: PSYCHIATRY | Facility: CLINIC | Age: 29
End: 2024-03-12
Payer: COMMERCIAL

## 2024-03-12 DIAGNOSIS — F41.9 ANXIETY DISORDER, UNSPECIFIED TYPE: Primary | ICD-10-CM

## 2024-03-12 PROCEDURE — 90837 PSYTX W PT 60 MINUTES: CPT | Performed by: SOCIAL WORKER

## 2024-03-12 NOTE — BH CRISIS PLAN
Client Name: Noy Palomo       Client YOB: 1995    [place] Safety Plan         Step 1: Warning Signs:   I withdraw.. ignore other people; And I stop eating; I'm in my head.          Step 2: Internal Coping Strategies:   Prayer; I started reading.. Taking a nap;          Step 3: People and social settings that provide distraction:   Brother-- Mike; the park;          Step 4: People whom I can ask for help during a crisis: My mom; my aunt;       Step 5: Professionals or agencies I can contact during a crisis: Brie Damien [phone]        Crisis Phone Numbers:   Suicide Prevention Lifeline: Call or Text  731 Crisis Text Line: Text HOME to 781-730   Please note: Some [facility] do not have a separate number for Child/Adolescent specific crisis. If your county is not listed under Child/Adolescent, please call the adult number for your county      Adult Crisis Numbers: Child/Adolescent Crisis Numbers   [facility]: [phone] [facility]: [phone]   [facility]: [phone] [facility]: [phone]   [facility]: [phone] [place]: [phone]   [facility]: [phone] [facility]: [phone]   [facility]: [phone]   [facility]: [phone]   [facility]: [phone]   [facility]: [phone] (daytime) [phone] (after hours, weekends, holidays)      Step 6: Making the environment safer (plan for lethal means safety):      Optional: What is most important to me and worth living for? My sprituality.       [place] Safety Plan. Alana Driscoll and Abdirashid Jauregui. Used with permission of the authors.

## 2024-03-12 NOTE — PSYCH
"Behavioral Health Psychotherapy Progress Note    Psychotherapy Provided: Individual Psychotherapy     No diagnosis found.    Goals addressed in session: Goal 1     DATA: Clinician explored client's current stressors-- Clinician affirmed her decision to run a 5k; Clinician explored client's emotions regarding her relationship with her father. Clinician encouraged client to challenge her father's responses and ideals.   During this session, this clinician used the following therapeutic modalities: Client-centered Therapy, Cognitive Behavioral Therapy, Solution-Focused Therapy, and Supportive Psychotherapy    Substance Abuse was not addressed during this session. If the client is diagnosed with a co-occurring substance use disorder, please indicate any changes in the frequency or amount of use: NA. Stage of change for addressing substance use diagnoses: No substance use/Not applicable    ASSESSMENT:  Noy Palomo presents with a Euthymic/ normal mood.     her affect is Normal range and intensity, which is congruent, with her mood and the content of the session. The client has made progress on their goals.     Noy Palomo presents with a none risk of suicide, none risk of self-harm, and none risk of harm to others.    For any risk assessment that surpasses a \"low\" rating, a safety plan must be developed.    A safety plan was indicated: no  If yes, describe in detail NA    PLAN: Between sessions, oNy Palomo will continue to challenge her relationship with her father. At the next session, the therapist will use Client-centered Therapy to address ongoing stressors.    Behavioral Health Treatment Plan and Discharge Planning: Noy Palomo is aware of and agrees to continue to work on their treatment plan. They have identified and are working toward their discharge goals. yes    Visit start and stop times:    03/12/24  Start Time: 1804  Stop Time: 1906  Total Visit Time: 62 minutes  Virtual Regular " Visit    Verification of patient location:    Patient is located at Home in the following state in which I hold an active license [place]      Assessment/Plan:    Problem List Items Addressed This Visit    None      Goals addressed in session: Goal 1          Reason for visit is No chief complaint on file.       Encounter provider Aerial ERASMO Quezada    [facility] [address] [place] [address] [phone]      Recent Visits  No visits were found meeting these conditions.  Showing recent visits within past 7 days and meeting all other requirements  Today's Visits  Date Type Provider Dept   03/12/24 Telemedicine Aerial ERASMO Quezada [facility]   Showing today's visits and meeting all other requirements  Future Appointments  No visits were found meeting these conditions.  Showing future appointments within next 150 days and meeting all other requirements       The patient was identified by name and date of birth. Noy Palomo was informed that this is a telemedicine visit and that the visit is being conducted throughthe [employer] platform. She agrees to proceed..  My office door was closed. No one else was in the room.  She acknowledged consent and understanding of privacy and security of the video platform. The patient has agreed to participate and understands they can discontinue the visit at any time.    Patient is aware this is a billable service.     Subjective  Noy Palomo is a 28 y.o. female  .      HPI     Past Medical History:   Diagnosis Date    Gastritis     GERD (gastroesophageal reflux disease)     Migraine     Obesity     Seasonal allergies     Umbilical hernia        Past Surgical History:   Procedure Laterality Date    INSERTION OF INTRAUTERINE DEVICE (IUD)      WA LAPS GSTRC RSTRICTIV PX LONGITUDINAL GASTRECTOMY N/A 01/09/2023    Procedure: GASTRECTOMY SLEEVE LAP &  INTRAOPERATIVE EGD;  Surgeon: Leif Tolliver MD;  Location[facility];  Service: Bariatrics    WISDOM TOOTH EXTRACTION Bilateral 03/2020       Current Outpatient Medications   Medication Sig Dispense Refill    acetaminophen (TYLENOL) 500 mg tablet Take 500 mg by mouth every 6 (six) hours as needed for mild pain      BIOTIN PO Take 1 tablet by mouth daily      calcium carbonate (TUMS) 500 mg chewable tablet Chew 1 tablet daily      EPINEPHrine (EPIPEN) 0.3 mg/0.3 mL SOAJ Inject 0.3 mL (0.3 mg total) into a muscle once for 1 dose 0.6 mL 0    fluticasone-salmeterol (Advair HFA) 230-21 MCG/ACT inhaler Inhale 1 puff 2 (two) times a day Rinse mouth after use. 8 g 0    folic acid (FOLVITE) 400 mcg tablet Take 1 tablet (400 mcg total) by mouth daily 30 tablet 6    folic acid (KP Folic Acid) 1 mg tablet Take 1 tablet (1 mg total) by mouth daily 90 tablet 3    pediatric multivitamin-iron (POLY-VI-SOL with IRON) 15 MG chewable tablet Chew 1 tablet daily       No current facility-administered medications for this visit.        Allergies   Allergen Reactions    Cat Hair Extract Shortness Of Breath    Contrast [Iodinated Contrast Media] Anaphylaxis     throat closure    Fish-Derived Products - Food Allergy Anaphylaxis     Anaphylaxis    Pollen Extract Other (See Comments)     hives, throat closure    Shellfish-Derived Products - Food Allergy        Review of Systems    Video Exam    There were no vitals filed for this visit.    Physical Exam

## 2024-03-29 ENCOUNTER — NURSE TRIAGE (OUTPATIENT)
Age: 29
End: 2024-03-29

## 2024-03-29 NOTE — TELEPHONE ENCOUNTER
"Noy called to request bloodwork to confirm pregnancy. +HPT this morning. Advised HPT are considered accurate.  Denies bleeding/spotting. +breast tenderness.  Reports intermittent right side pelvic cramping(like menstrual cramp). Last episode of right pelvic cramp was yesterday.  Unknown blood type.  Advised will review HCG request with on-call provider.  Provided ectopic precautions.    Reason for Disposition   Pregnant     Appt scheduled per [facility] for 4/29/2024 D/V scan at 8-10 weeks  Message to on call provider for recommendation to intermittent right pelvic carmping    Answer Assessment - Initial Assessment Questions  1. LMP:  \"When did your last menstrual period begin?\"      02/29/2024  2. DAYS LATE: \"How many days late is your menstrual period?\"      2-3 days,  3. REGULARITY: \"How regular are your periods?\"      Regular 28 day  4. PREGNANCY: \"Is there any chance you are pregnant?\" (e.g., unprotected intercourse, missed birth control pill, broken condom) \"Have you used a home pregnancy test?\"      Yes, received + HPT  5. BREASTFEEDING: \"Are you breastfeeding?\"      denies  6. BIRTH CONTROL PILLS: \"Are you taking birth control pills, or have you stopped recently?\"      denies  7. DEPOPROVERA: \"Has your doctor given you a shot to prevent pregnancy?\" (e.g., Depoprovera injection)      denies  8. CAUSE: \"What do you think caused the missed period?\" (e.g., stress, rapid weight loss, excessive exercise)      Attempting pregnancy, +HPT  9. OTHER SYMPTOMS: \"Do you have any other symptoms?\" (e.g., abdominal pain)      Intermittent right pelvic cramping-last episode was yesterday    Protocols used: Menstrual Period - Missed or Late-ADULT-[place]    "

## 2024-04-15 ENCOUNTER — TELEPHONE (OUTPATIENT)
Dept: HEMATOLOGY ONCOLOGY | Facility: CLINIC | Age: 29
End: 2024-04-15

## 2024-04-15 NOTE — TELEPHONE ENCOUNTER
Message left on voicemail as a reminder to complete labs ordered before upcoming appointment with  Dora.

## 2024-04-16 ENCOUNTER — TELEMEDICINE (OUTPATIENT)
Dept: PSYCHIATRY | Facility: CLINIC | Age: 29
End: 2024-04-16
Payer: COMMERCIAL

## 2024-04-16 ENCOUNTER — APPOINTMENT (OUTPATIENT)
Dept: LAB | Facility: CLINIC | Age: 29
End: 2024-04-16
Payer: COMMERCIAL

## 2024-04-16 DIAGNOSIS — F41.9 ANXIETY DISORDER, UNSPECIFIED TYPE: Primary | ICD-10-CM

## 2024-04-16 PROCEDURE — 90837 PSYTX W PT 60 MINUTES: CPT | Performed by: SOCIAL WORKER

## 2024-04-16 NOTE — PSYCH
"Behavioral Health Psychotherapy Progress Note    Psychotherapy Provided: Individual Psychotherapy     No diagnosis found.    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 Clinician and client discussed her emotions regarding her impending life changes.  During this session, this clinician used the following therapeutic modalities: Client-centered Therapy    Substance Abuse was addressed during this session. If the client is diagnosed with a co-occurring substance use disorder, please indicate any changes in the frequency or amount of use: NA. Stage of change for addressing substance use diagnoses: No substance use/Not applicable    ASSESSMENT:  Noy Palomo presents with a Euthymic/ normal mood.     her affect is Normal range and intensity, which is congruent, with her mood and the content of the session. The client has made progress on their goals.     Noy Palomo presents with a none risk of suicide, none risk of self-harm, and none risk of harm to others.    For any risk assessment that surpasses a \"low\" rating, a safety plan must be developed.    A safety plan was indicated: no  If yes, describe in detail NA    PLAN: Between sessions, Noy Palomo will practice utilizing pros and cons tomake her decision. At the next session, the therapist will use Client-centered Therapy to address ongoing stressors.    Behavioral Health Treatment Plan and Discharge Planning: Noy Palomo is aware of and agrees to continue to work on their treatment plan. They have identified and are working toward their discharge goals. yes    Visit start and stop times:    04/16/24  Start Time: 1704  Virtual Regular Visit    Verification of patient location:    Patient is located at Home in the following state in " which I hold an active license [place]      Assessment/Plan:    Problem List Items Addressed This Visit    None      Goals addressed in session: Goal 1          Reason for visit is No chief complaint on file.       Encounter provider Aerial ERASMO Quezada    Provider located at [facility] [address]  [place] [address] [phone]      Recent Visits  No visits were found meeting these conditions.  Showing recent visits within past 7 days and meeting all other requirements  Today's Visits  Date Type Provider Dept   04/16/24 Telemedicine Aerial ERASMO Quezada [facility]   Showing today's visits and meeting all other requirements  Future Appointments  No visits were found meeting these conditions.  Showing future appointments within next 150 days and meeting all other requirements       The patient was identified by name and date of birth. Noy Palomo was informed that this is a telemedicine visit and that the visit is being conducted throughthe [employer] platform. She agrees to proceed..  My office door was closed. No one else was in the room.  She acknowledged consent and understanding of privacy and security of the video platform. The patient has agreed to participate and understands they can discontinue the visit at any time.    Patient is aware this is a billable service.     Subjective  Noy Palomo is a 28 y.o. female  .      HPI     Past Medical History:   Diagnosis Date    Gastritis     GERD (gastroesophageal reflux disease)     Migraine     Obesity     Seasonal allergies     Umbilical hernia        Past Surgical History:   Procedure Laterality Date    INSERTION OF INTRAUTERINE DEVICE (IUD)      [place] LAPS GSTRC RSTRICTIV PX LONGITUDINAL GASTRECTOMY N/A 01/09/2023    Procedure: GASTRECTOMY SLEEVE LAP & INTRAOPERATIVE EGD;  Surgeon: Leif Tolliver MD;  Location: [facility] OR;  Service: Bariatrics    [place]  TOOTH EXTRACTION Bilateral 03/2020       Current Outpatient Medications   Medication Sig Dispense Refill    acetaminophen (TYLENOL) 500 mg tablet Take 500 mg by mouth every 6 (six) hours as needed for mild pain      BIOTIN PO Take 1 tablet by mouth daily      calcium carbonate (TUMS) 500 mg chewable tablet Chew 1 tablet daily      EPINEPHrine (EPIPEN) 0.3 mg/0.3 mL SOAJ Inject 0.3 mL (0.3 mg total) into a muscle once for 1 dose 0.6 mL 0    fluticasone-salmeterol (Advair HFA) 230-21 MCG/ACT inhaler Inhale 1 puff 2 (two) times a day Rinse mouth after use. 8 g 0    folic acid (FOLVITE) 400 mcg tablet Take 1 tablet (400 mcg total) by mouth daily 30 tablet 6    folic acid (KP Folic Acid) 1 mg tablet Take 1 tablet (1 mg total) by mouth daily 90 tablet 3    pediatric multivitamin-iron (POLY-VI-SOL with IRON) 15 MG chewable tablet Chew 1 tablet daily       No current facility-administered medications for this visit.        Allergies   Allergen Reactions    Cat Hair Extract Shortness Of Breath    Contrast [Iodinated Contrast Media] Anaphylaxis     throat closure    Fish-Derived Products - Food Allergy Anaphylaxis     Anaphylaxis    Pollen Extract Other (See Comments)     hives, throat closure    Shellfish-Derived Products - Food Allergy        Review of Systems    Video Exam    There were no vitals filed for this visit.    Physical Exam

## 2024-04-18 ENCOUNTER — OFFICE VISIT (OUTPATIENT)
Dept: HEMATOLOGY ONCOLOGY | Facility: CLINIC | Age: 29
End: 2024-04-18
Payer: COMMERCIAL

## 2024-04-18 VITALS
SYSTOLIC BLOOD PRESSURE: 122 MMHG | OXYGEN SATURATION: 100 % | DIASTOLIC BLOOD PRESSURE: 78 MMHG | WEIGHT: 163 LBS | BODY MASS INDEX: 30.8 KG/M2 | HEART RATE: 75 BPM | TEMPERATURE: 98.1 F

## 2024-04-18 DIAGNOSIS — Z3A.01 7 WEEKS GESTATION OF PREGNANCY: ICD-10-CM

## 2024-04-18 DIAGNOSIS — Z98.890 STATUS POST GASTRIC SURGERY: Primary | ICD-10-CM

## 2024-04-18 DIAGNOSIS — D53.9 NUTRITIONAL ANEMIA: ICD-10-CM

## 2024-04-18 PROCEDURE — 99214 OFFICE O/P EST MOD 30 MIN: CPT | Performed by: NURSE PRACTITIONER

## 2024-04-18 NOTE — PROGRESS NOTES
[facility] [place]  [address] [address]  [phone]  Hematology Ambulatory Follow-Up  Noy Palomo, 1995, [id number]  4/18/2024    Assessment/Plan:    1. Status post gastric surgery  2. 7 weeks gestation of pregnancy  3. Nutritional anemia  Patient is a female with a history of gastritis, obesity, GERD, anxiety, heavy menses, and nutritional deficiencies status post gastric sleeve in January 2023.  She is currently 7 weeks pregnant.  She is taking the bariatric multivitamin as well as folic acid.  She was increased from 400 mcg to 1 mg since testing positive for pregnancy.  Labs from yesterday show a WBC count of 12.37, normalhemoglobin 12.0, MCV 86, MCHC and MCH decreased.  I suspect WBC is increased secondary to pregnancy.  Iron saturation 29% ferritin level 122, folate 18.8.  We discussed continuing oral supplements since she is tolerating it.  I do not think she needs iron infusions at this time.  We will monitor throughout pregnancy with labs every 3  months and plan to see her in 6 months.  If patient becomes deficient later on in pregnancy we could potentially give her an iron infusion with Venofer.  Patient verbalized understanding and is in agreement to the plan. Patient knows to call the Hematology/Oncology office with any questions and concerns regarding the above.    - CBC; Standing  - Iron Panel (Includes Ferritin, Iron Sat%, Iron, and TIBC); Standing        Barrier(s) to care: None  The patient is able to self care.    Dora WILLSON  Medical Oncology/Hematology  [facility]    Interval history: clinically stable    Review of Systems   Constitutional:  Negative for activity change, appetite change, fatigue, fever and unexpected weight change.   Respiratory:  Negative for cough and shortness of breath.    Cardiovascular:  Negative for chest pain and leg swelling.   Gastrointestinal:  Negative  for abdominal pain, constipation, diarrhea and nausea.   Endocrine: Negative for cold intolerance and heat intolerance.   Musculoskeletal:  Negative for arthralgias and myalgias.   Skin: Negative.    Neurological:  Negative for dizziness, weakness and headaches.   Hematological:  Negative for adenopathy. Does not bruise/bleed easily.     Past Medical History:   Diagnosis Date    Gastritis     GERD (gastroesophageal reflux disease)     Migraine     Obesity     Seasonal allergies     Umbilical hernia      Past Surgical History:   Procedure Laterality Date    INSERTION OF INTRAUTERINE DEVICE (IUD)      SD LAPS GSTRC RSTRICTIV PX LONGITUDINAL GASTRECTOMY N/A 01/09/2023    Procedure: GASTRECTOMY SLEEVE LAP & INTRAOPERATIVE EGD;  Surgeon: Leif Tolliver MD;  Location: [facility];  Service: Bariatrics    WISDOM TOOTH EXTRACTION Bilateral 03/2020     Current Outpatient Medications:     acetaminophen (TYLENOL) 500 mg tablet, Take 500 mg by mouth every 6 (six) hours as needed for mild pain, Disp: , Rfl:     calcium carbonate (TUMS) 500 mg chewable tablet, Chew 1 tablet daily, Disp: , Rfl:     folic acid ( Folic Acid) 1 mg tablet, Take 1 tablet (1 mg total) by mouth daily, Disp: 90 tablet, Rfl: 3    Multiple Vitamins-Minerals (BARIATRIC MULTIVITAMINS/IRON PO), Take by mouth, Disp: , Rfl:     EPINEPHrine (EPIPEN) 0.3 mg/0.3 mL SOAJ, Inject 0.3 mL (0.3 mg total) into a muscle once for 1 dose, Disp: 0.6 mL, Rfl: 0    fluticasone-salmeterol (Advair HFA) 230-21 MCG/ACT inhaler, Inhale 1 puff 2 (two) times a day Rinse mouth after use. (Patient not taking: Reported on 4/18/2024), Disp: 8 g, Rfl: 0    Allergies   Allergen Reactions    Cat Hair Extract Shortness Of Breath    Contrast [Iodinated Contrast Media] Anaphylaxis     throat closure    Fish-Derived Products - Food Allergy Anaphylaxis     Anaphylaxis    Pollen Extract Other (See Comments)     hives, throat closure    Shellfish-Derived Products - Food Allergy       Objective:  /78 (BP Location: Left arm, Patient Position: Sitting, Cuff Size: Standard)   Pulse 75   Temp 98.1 °F (36.7 °C) (Temporal)   Wt 73.9 kg (163 lb)   LMP 02/02/2024   SpO2 100%   BMI 30.80 kg/m²    Physical Exam  Vitals reviewed.   Constitutional:       Appearance: Normal appearance. She is well-developed.   HENT:      Head: Normocephalic and atraumatic.   Eyes:      Conjunctiva/sclera: Conjunctivae normal.      Pupils: Pupils are equal, round, and reactive to light.   Pulmonary:      Effort: Pulmonary effort is normal. No respiratory distress.   Musculoskeletal:         General: Normal range of motion.      Cervical back: Normal range of motion.   Lymphadenopathy:      Cervical: No cervical adenopathy.   Skin:     General: Skin is dry.   Neurological:      Mental Status: She is alert and oriented to person, place, and time.   Psychiatric:         Behavior: Behavior normal.     Result Review  Labs:  Lab Results   Component Value Date    WBC 12.37 (H) 04/16/2024    HGB 12.0 04/16/2024    HCT 38.9 04/16/2024    MCV 86 04/16/2024     04/16/2024     Lab Results   Component Value Date     04/15/2018    SODIUM 136 11/10/2023    K 3.7 11/10/2023     11/10/2023    CO2 25 11/10/2023    ANIONGAP 10 04/15/2018    AGAP 9 11/10/2023    BUN 8 11/10/2023    CREATININE 0.73 11/10/2023    GLUC 100 11/10/2023    GLUF 74 08/28/2023    CALCIUM 9.5 11/10/2023    AST 15 04/12/2023    ALT 20 04/12/2023    ALKPHOS 80 04/12/2023    PROT 7.0 04/15/2018    TP 7.4 04/12/2023    BILITOT 0.2 04/15/2018    TBILI 0.42 04/12/2023    EGFR 113 11/10/2023     Imaging:  No results found.    Please note:  This report has been generated by a voice recognition software system. Therefore there may be syntax, spelling, and/or grammatical errors. Please call if you have any questions.

## 2024-04-26 ENCOUNTER — OFFICE VISIT (OUTPATIENT)
Dept: BARIATRICS | Facility: CLINIC | Age: 29
End: 2024-04-26
Payer: COMMERCIAL

## 2024-04-26 VITALS
DIASTOLIC BLOOD PRESSURE: 62 MMHG | HEIGHT: 62 IN | WEIGHT: 161.5 LBS | BODY MASS INDEX: 29.72 KG/M2 | HEART RATE: 99 BPM | SYSTOLIC BLOOD PRESSURE: 116 MMHG | TEMPERATURE: 97.6 F

## 2024-04-26 DIAGNOSIS — Z48.815 ENCOUNTER FOR SURGICAL AFTERCARE FOLLOWING SURGERY OF DIGESTIVE SYSTEM: Primary | ICD-10-CM

## 2024-04-26 DIAGNOSIS — K91.2 POSTSURGICAL MALABSORPTION: ICD-10-CM

## 2024-04-26 DIAGNOSIS — Z98.84 BARIATRIC SURGERY STATUS: ICD-10-CM

## 2024-04-26 DIAGNOSIS — E66.9 OBESITY, CLASS I, BMI 30-34.9: ICD-10-CM

## 2024-04-26 DIAGNOSIS — Z3A.08 8 WEEKS GESTATION OF PREGNANCY: ICD-10-CM

## 2024-04-26 PROCEDURE — 99214 OFFICE O/P EST MOD 30 MIN: CPT | Performed by: NURSE PRACTITIONER

## 2024-04-26 NOTE — PATIENT INSTRUCTIONS
- start on calcium citrate - 500 mg three times per day - the goal of calcium per day is 3912-2680 mg/day  - switch to bariatric prenatals - procare brand.

## 2024-04-26 NOTE — PROGRESS NOTES
Assessment/Plan:     Patient ID: Noy Palomo is a 28 y.o. female.     Bariatric Surgery Status/8 weeks gestation of pregnancy    -s/p Vertical Sleeve Gastrectomy with Dr. Tolliver on 01/09/2023. Presents to the office today for annual post op visit. Overall doing well - recently conceived and is about 8 weeks pregnant. Tolerating a regular diet. Denies having any abdominal pain, D/C, regurgitation, reflux or dysphagia. Is having nausea and vomiting but secondary to pregnancy. Taking her bariatric MVI daily.     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 Enjoying her job so far and is more active.      PLAN:      - recommend to switch her bariatric vitamins to prenatals. Information provided.   - f/u with our RD as needed for concerns of caloric intake if needed.   - Routine follow up in 6 months for annual visit.   - Continue with healthy lifestyle, adequate protein intake of 60 gm, fluid intake of at least 64 oz.   - Continue with MVI daily.  Advised to start on calcium.   - Activity as tolerated.   - Labs ordered and will adjust accordingly if any deficiency.   - Follow up with RD and SW as needed.       Continued/Maintain healthy weight loss with good nutrition intakes.  Adequate hydration with at least 64oz. fluid intake.  Follow diet as discussed.  Follow vitamin and mineral recommendations as reviewed with you.  Exercise as tolerated.    Colonoscopy referral made: n/a  Mammogram- N/A    Follow-up in 6 months for 18 month Post op visit. We kindly ask that your arrive 15 minutes before your scheduled appointment time with your provider to allow our staff to room you, get your vital signs and update your chart.    Get lab work done prior to visit. Please call the office if you need a script.  It is recommended to check with your insurance BEFORE getting labs done to make sure they are covered by your policy.      Call our office if you have any problems with abdominal pain  especially associated with fever, chills, nausea, vomiting or any other concerns.    All  Post-bariatric surgery patients should be aware that very small quantities of any alcohol can cause impairment and it is very possible not to feel the effect. The effect can be in the system for several hours.  It is also a stomach irritant.     It is advised to AVOID alcohol, Nonsteroidal antiinflammatory drugs (NSAIDS) and nicotine of all forms . Any of these can cause stomach irritation/pain.    Discussed the effects of alcohol on a bariatric patient and the increased impairment risk.     Keep up the good work!     Postsurgical Malabsorption   -At risk for malabsorption of vitamins/minerals secondary to malabsorption and restriction of intake from bariatric surgery  -Currently taking adequate postop bariatric surgery vitamin supplementation  -Last set of bariatric labs completed on 10/17/2023 and showed low folic acid with recent normalized levels.   -Next set of bariatric labs ordered for approximately 2 weeks  -Patient received education about the importance of adhering to a lifelong supplementation regimen to avoid vitamin/mineral deficiencies      Diagnoses and all orders for this visit:    Encounter for surgical aftercare following surgery of digestive system  -     Comprehensive metabolic panel; Future  -     PTH, intact; Future  -     Vitamin A; Future  -     Vitamin B1, whole blood; Future  -     Vitamin B12; Future  -     Vitamin D 25 hydroxy; Future  -     Zinc; Future    Bariatric surgery status  -     Comprehensive metabolic panel; Future  -     PTH, intact; Future  -     Vitamin A; Future  -     Vitamin B1, whole blood; Future  -     Vitamin B12; Future  -     Vitamin D 25 hydroxy; Future  -     Zinc; Future    Postsurgical malabsorption  -     Comprehensive metabolic panel; Future  -     PTH, intact; Future  -     Vitamin A; Future  -     Vitamin B1, whole blood; Future  -     Vitamin B12; Future  -     Vitamin  D 25 hydroxy; Future  -     Zinc; Future    Obesity, Class I, BMI 30-34.9  -     Comprehensive metabolic panel; Future  -     PTH, intact; Future  -     Vitamin A; Future  -     Vitamin B1, whole blood; Future  -     Vitamin B12; Future  -     Vitamin D 25 hydroxy; Future  -     Zinc; Future    BMI 30.0-30.9,adult  -     Comprehensive metabolic panel; Future  -     PTH, intact; Future  -     Vitamin A; Future  -     Vitamin B1, whole blood; Future  -     Vitamin B12; Future  -     Vitamin D 25 hydroxy; Future  -     Zinc; Future    8 weeks gestation of pregnancy         Subjective:      Patient ID: Noy Palomo is a 28 y.o. female.    -s/p Vertical Sleeve Gastrectomy with Dr. Tolliver on 01/09/2023. Presents to the office today for annual post op visit. Overall doing well - recently conceived and is about 8 weeks pregnant. Tolerating a regular diet. Denies having any abdominal pain, D/C, regurgitation, reflux or dysphagia. Is having nausea and vomiting but secondary to pregnancy. Taking her bariatric MVI daily.     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 Enjoying her job so far and is more active.     Initial: 261 lbs  Current 161.5 lbs  EWL: (Weight loss is ahead of schedule at this post surgical period.)  Harpreet: current   Current BMI is Body mass index is 30.02 kg/m².    Tolerating a regular diet-yes  Eating at least 60 grams of protein per day-yes  Following 30/60 minute rule with liquids-yes  Drinking at least 64 ounces of fluid per day-yes  Drinking carbonated beverages- rarely  Sufficient exercise-yes  Using NSAIDs regularly-no  Using nicotine-no  Using alcohol-no  Supplements: Multivitamins, Iron, and Calcium     EWL is 78%, which places the patient ahead of schedule for expected post surgical weight loss at this time.     The following portions of the patient's history were reviewed and updated as appropriate: allergies, current medications, past family history, past  "medical history, past social history, past surgical history and problem list.    Review of Systems   Constitutional: Negative.    Respiratory: Negative.     Cardiovascular: Negative.    Gastrointestinal:  Positive for nausea and vomiting.   Musculoskeletal: Negative.    Neurological: Negative.    Psychiatric/Behavioral: Negative.           Objective:    /62   Pulse 99   Temp 97.6 °F (36.4 °C) (Tympanic)   Ht 5' 1.5\" (1.562 m)   Wt 73.3 kg (161 lb 8 oz)   LMP 02/02/2024   BMI 30.02 kg/m²      Physical Exam  Vitals and nursing note reviewed.   Constitutional:       Appearance: Normal appearance.   Cardiovascular:      Rate and Rhythm: Normal rate and regular rhythm.      Pulses: Normal pulses.      Heart sounds: Normal heart sounds.   Pulmonary:      Effort: Pulmonary effort is normal.      Breath sounds: Normal breath sounds.   Abdominal:      General: Bowel sounds are normal.      Palpations: Abdomen is soft.      Tenderness: There is no abdominal tenderness.   Musculoskeletal:         General: Normal range of motion.   Skin:     General: Skin is warm and dry.   Neurological:      General: No focal deficit present.      Mental Status: She is alert and oriented to person, place, and time.   Psychiatric:         Mood and Affect: Mood normal.         Behavior: Behavior normal.         Thought Content: Thought content normal.         Judgment: Judgment normal.             "

## 2024-04-29 ENCOUNTER — ULTRASOUND (OUTPATIENT)
Dept: OBGYN CLINIC | Facility: CLINIC | Age: 29
End: 2024-04-29
Payer: COMMERCIAL

## 2024-04-29 VITALS
WEIGHT: 162.6 LBS | SYSTOLIC BLOOD PRESSURE: 110 MMHG | BODY MASS INDEX: 30.7 KG/M2 | DIASTOLIC BLOOD PRESSURE: 50 MMHG | HEIGHT: 61 IN

## 2024-04-29 DIAGNOSIS — Z3A.08 8 WEEKS GESTATION OF PREGNANCY: ICD-10-CM

## 2024-04-29 DIAGNOSIS — N91.2 AMENORRHEA: Primary | ICD-10-CM

## 2024-04-29 DIAGNOSIS — Z36.89 ENCOUNTER TO ESTABLISH GESTATIONAL AGE USING ULTRASOUND: ICD-10-CM

## 2024-04-29 PROBLEM — Z30.41 USES ORAL CONTRACEPTION: Status: RESOLVED | Noted: 2022-08-06 | Resolved: 2024-04-29

## 2024-04-29 PROBLEM — Z30.41 ORAL CONTRACEPTIVE PILL SURVEILLANCE: Status: RESOLVED | Noted: 2020-07-30 | Resolved: 2024-04-29

## 2024-04-29 PROCEDURE — PNV: Performed by: NURSE PRACTITIONER

## 2024-04-29 NOTE — PROGRESS NOTES
"   Subjective  Patient ID: Noy Palomo is a 28 y.o. female here for Pregnancy Ultrasound (Lab: [facility]///Blood Type: Unknown/Last Pap: 21 -/Breastfeeding?: No/LMP: 24/Conception on BC? 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 giving her an JENNY of 24 and a gestational age of  8 weeks 4 days (based on LMP)    Accompanied by  Aaron.     Menstrual cycle: regular, cycle length:  28 days  Pregnancy was planned.   She has started taking a prenatal vitamin    She is C/O amenorrhea  Signs and symptoms of pregnancy:   Breast tenderness: yes  Fatigue: yes  Cramping or Pelvic Pain: yes  Spotting or Vaginal Bleeding: no  Nausea or vomiting: yes. Last occurrence of vomiting was one week ago. No needs for medication currently.     OB History    Para Term  AB Living   1 0 0 0 0 0   SAB IAB Ectopic Multiple Live Births   0 0 0 0 0      # Outcome Date GA Lbr Kushal/2nd Weight Sex Delivery Anes PTL Lv   1 Current                 The following portions of the patient's history were reviewed and updated as appropriate: allergies, current medications, past family history, past medical history, past social history, past surgical history, and problem list.    Perinent hx that may affect pregnancy:  Hx of depression-follows with a therapist monthly   Gastric sleeve       Review of Systems    See HPI for pertinent positives.             /50 (BP Location: Left arm, Patient Position: Sitting, Cuff Size: Standard)   Ht 5' 1.25\" (1.556 m)   Wt 73.8 kg (162 lb 9.6 oz)   LMP 2024   BMI 30.47 kg/m²   OBGyn Exam  Results for orders placed or performed in visit on 24   CBC   Result Value Ref Range    WBC 12.37 (H) 4.31 - 10.16 Thousand/uL    RBC 4.52 3.81 - 5.12 Million/uL    " Hemoglobin 12.0 11.5 - 15.4 g/dL    Hematocrit 38.9 34.8 - 46.1 %    MCV 86 82 - 98 fL    MCH 26.5 (L) 26.8 - 34.3 pg    MCHC 30.8 (L) 31.4 - 37.4 g/dL    RDW 13.8 11.6 - 15.1 %    Platelets 218 149 - 390 Thousands/uL    MPV 12.3 8.9 - 12.7 fL   Folate   Result Value Ref Range    Folate 18.8 >5.9 ng/mL   TIBC Panel (incl. Iron, TIBC, % Iron Saturation)   Result Value Ref Range    Iron Saturation 29 15 - 50 %    TIBC 254 250 - 450 ug/dL    Iron 74 50 - 212 ug/dL    UIBC 180 155 - 355 ug/dL   Ferritin   Result Value Ref Range    Ferritin 122 11 - 307 ng/mL       FIRST TRIMESTER OBSTETRIC ULTRASOUND     Patient's last menstrual period was 2024.    INDICATION: Establish Gestational Age       FINDINGS:  See imaging report for details     Additional Findings: none   1FHR: [id number]  IMPRESSION:    Single intrauterine pregnancy of 9 weeks 0 days gestational age  Fetal cardiac activity detected.  No adnexal masses seen.  EDC by LMP: 24  EDC by this Ultrasound: 24    Assigning a Final JENNY  Please choose how you are assigning the JENNY: The gestational age by LMP is 9w 0d - 13w 6d and demonstrates 7 or fewer days difference from the gestational age by CRL, therefore the final JENNY will be based on the LMP    Final JENNY: 24 by LMP.    ANAMIKA Hoffamn  [address] [place] [address] [facility] [address] [address]  Dept: [phone]  Dept Fax: [phone]    Ultrasound Probe Disinfection    A transvaginal ultrasound was performed.   Prior to use, disinfection was performed with High Level Disinfection Process ([employer])  Probe serial number [id number]:  [id number] was used    ANAMIKA Hoffman  24  8:55 AM              Assessment/Plan:         1. Amenorrhea  -     AMB US OB < 14 weeks single or first gestation level 1    2. Encounter to establish gestational age using ultrasound  -     AMB US OB < 14 weeks single or first gestation  level 1        Orders Placed This Encounter   Procedures    AMB US OB < 14 weeks single or first gestation level 1

## 2024-04-29 NOTE — PATIENT INSTRUCTIONS
"Again, congratulations on your pregnancy!  NEXT STEPS  Get Prenatal bloodwork  Call MFM to schedule next ultrasound (done 12-13 wks)   Contact information for [facility] ([facility])- Main Number [phone]   Return to our office in 1-2 weeks for an OB intake and initial prenatal Exam(or sooner if any problems/concerns arise- see packet for things to report)  Check out [facility] website to read the \"Pregnancy Essentials Guide\" -this has lots of great early pregnancy information     It can be found by going to [employer]-->select services-->select women's health-->select Obstetrics  [email or web address]     Below is a variety of information that is useful in early pregnancy   Genetic screening can be very confusing for most people   We do recommend genetic screening universally for all pregnant women. Our goal is to have the most healthy uncomplicated pregnancy possible and this is one way to identify possible complications early.  Common disorders we can screen for include: Down Syndrome, Neural tube defects ( like spina bifida or gastroschisis) and trisomy 13, even possibly more  There are several options we will talk more about. Below is some information to get you started thinking about this.  We will discuss this more at the next appt.      GUIDE TO GENETIC TESTING     Aneuploidy Testing  Trisomy 21 (Down Syndrome), Trisomy 18, and Open Neural Tube Defects (Spina Bifida).  You may choose one of the following options: See below for CPT/Diagnostic codes  NIPT (Non-Invasive Prenatal Testing or Cell Free- Fetal DNA): This simple and accurate non-invasive prenatal screening blood test offers results for early risk assessment of Down syndrome (Trisomy 21), or Trisomy 18, trisomy 13 and other aneuploidy conditions.  The test also offers an optional analysis for fetal sex.  The test analyzes the relative amount of 21, 18, 13; X and Y " chromosome material in circulating cell-free DNA from a maternal blood sample.  This test can be performed at any time after 10 weeks gestation.  If you elect this test, you will also have an AFP (alpha-fetoprotein) blood test to test for open neural tube defects.  Recommended follow up to a positive result is genetic counseling and prenatal diagnosis.     Sequential Screening with Nuchal Translucency: This is a two-step test to detect whether a fetus is at increased risk for trisomy 21, trisomy 18, trisomy 13 and open neural tube defects.  The test has a narrow window for testing (the first step must be performed between 10 and 13 weeks gestation).  It includes two blood draws and an ultrasound.  The ultrasound measures the amount of fluid behind the baby’s neck called the nuchal translucency (NT).  The blood tests measures three different maternal hormone levels, -- pregnancy associated plasma protein (SHANTE-A), human chorionic gonadotrophin (hCG), and dimeric inhibin A (TANK).  These measurements in combination with some maternal information such as height and weight are used to calculate whether the baby is at increased risk for Down Syndrome or Trisomy 18.  An alpha-fetoprotein test (AFP) is also included to test for open neural tube defects.  Recommended follow up to a positive result is additional testing that is more definitive, and referral for genetic counseling and prenatal diagnosis.    Quad Screen: This test is also known as the quadruple marker test.  It is a test that measures levels of four substances in a pregnant woman’s blood--Alpha-fetoprotein (AFP), a protein made by the developing baby; Human chorionic gonadotropin (hCG), a hormone made by the placenta; Estriol, a hormone made by the placenta and the baby’s liver; and Inhibin A, another hormone made by the placenta.  Typically, the quad screen is done between weeks 15 and 20 of pregnancy--the second trimester and the results indicate whether the  baby is at higher risk for Down Syndrome (Trisomy 21), Trisomy 18, and open neural tube defects.  This is a screening test.  Recommended follow up to a positive result is additional testing that is more definitive, as well as referral for genetic counseling and prenatal diagnosis.         Trisomy 21 Trisomy 18 Trisomy 13   NIPT  (FPR 0.1%) <99% <98% 99%   Sequential Screening (FPR 3.5%) 92% 90% N/A   Quad Screen   (FPR 5%) 83% 80% N/A   (FPR is False Positive Rate)        Additional Screening Tests Offered  Cystic Fibrosis: Cystic Fibrosis is the most common inherited disease of children and young adults.  The carrier frequency is 1 in 24, to 1 in 97.  Both parents need to be carriers for a child to be affected (25% chance).  One in 3500, children born are affected.  Cystic fibrosis is a disorder of mucus production and produces abnormally thick mucus leading to life threatening lung infections, digestion problems, poor growth, infertility, and more.  Symptoms range from mild to severe, but individuals with severe disease may die in childhood.  With treatments today, people with Cystic Fibrosis can live into their 30’s.  CF does not affect intelligence.  Recommended follow up to a positive result is testing of the baby’s father.  Spinal Muscular Atrophy (SMA): SMA is the most common inherited cause of early childhood death.  The carrier frequency is 1 in 47 to 1 in 72 in the [place] and both parents need to be carriers for a child to be affected (25% chance).  1 in 11,000 children are affected.  SMA is a progressive degeneration of lower motor neurons.  Muscle weakness is the most common type with respiratory failure by the age of 2 years old.  Muscles responsible for crawling, walking, swallowing, and head and neck control are most severely affected.  Recommended follow up to a positive result is testing of the baby’s father.      Fragile X Syndrome (the most common inherited cause of developmental delays): Fragile X  syndrome is an “X-linked” genetic disease, which means it is only carried by the mom.  Unfortunately, 1 in 250 females are carriers and a child has a 50% chance of being affected if this is the case.  1 in 4000 boys is affected with Fragile X and 1 in 8000 girls is affected.  Approximately 1/3 of all children born with Fragile X also have autism and hyperactivity.  Recommended follow up to a positive result is genetic counseling and prenatal diagnosis.       Guide To Insurance Coverage For Genetic Screening  Due to the complexity of coverage guidelines, we are unable to quote benefits or guarantee insurance coverage for any of these tests.  Insurance benefits are plan-specific and offer vastly different coverage based on your policy.  The handout attached explains the benefits to each test, and also provides the billing (CPT) codes for each test.  Even if the testing is covered, it could be applied to any unmet deductibles, and copays may apply, resulting in a bill.  You are encouraged to contact your insurance company to obtain your benefits based on your age and risk factors, so that there are no surprises.       Test Insurance Procedure (CPT) code   NIPT-cell free fetal DNA Most accurate non-invasive test- not always covered w/o risk factors 74451   Sequential Screen w/ NT US Current standard test-should be covered Part 1: (if lab is [facility]) 54184,26253   (If lab is [facility]) 02825  Part 2: (if lab is [facility])40399,19896,23405, 61247  (If lab is [facility]) 31566   Quad screen Old standard-use if past 1st trimester 52161, 23547, 78654, 88890   CF- Cystic Fibrosis   51508   SMA- Spinal Musc. Atrophy   80851   Fragile X   18259         Diagnosis code used Varies based on history- But will be one of these:  Encounter for  screening for other genetic defect Z36.8  Advanced Maternal Age (over 35) O09.529  Family History of Genetic Disease Carrier Z84.81     Please contact your insurance company with the appropriate  CPT code from the attached list, and diagnosis code applicable to your situation.     We ask that you review this information and decide what testing you would like to have performed.  Please note that the Sequential Screening with Nuchal Translucency has a smaller window of time to be performed during pregnancy (Prior to 14 wks).           Warning Signs During Pregnancy  The list below includes warning signs your providers would like you to be aware of.  If you experience any of these at any time during your pregnancy, please call us as soon as possible.      Vaginal bleeding   Sharp abdominal pain that does not go away   Fever (more than 100.4?F and is not relieved with Tylenol)   Persistent vomiting lasting greater than 24 hours   Chest pain   Pain or burning when you urinate   Severe headache that doesn’t resolve with Tylenol   Blurred vision or seeing spots in your vision   Sudden swelling of your face or hands   Redness, swelling or pain in a leg   A sudden weight gain in just a few days   Decrease in your baby’s movements (after 28 weeks or the 6th month of pregnancy)   A loss of watery fluid from your vagina - can be a gush, a trickle or  continuous wetness   After 20 weeks of pregnancy, rhythmic cramping (greater than 4 per hour)  or menstrual like low/pelvic pain     Call the OFFICE [phone] for any questions/emergencies.  At night or on the weekend, please indicate it is an emergency and the DOCTOR on call will be paged.     Discomforts of Early Pregnancy     Tips for coping with nausea and vomiting during pregnancy   Eat meals and snacks slowly   Eat every 1-2 hours to avoid a full stomach   Don’t skip meals, avoid empty stomach   Eat a snack prior to getting out of bed   Avoid food and beverages with a strong aroma   Avoid dehydration - drink enough fluid to keep the urine pale yellow   Drink fluids before a meal to minimize the effect of a full stomach   Limit the amount of coffee and beverages  that contain caffeine   Eliminate spicy, odorous, high fat (fried foods), acidic (tomato products) and sweet foods   Fluids that contain lemon (lemonade), mint (tea) or orange can usually be well tolerated   Snacks and meals that contain low-fat protein (lean meats, fish, poultry and eggs) along with eating easily digestible carbs (fruit, rice, toast, crackers and dry cereal) may be tolerated better   Foods with ginger may be well tolerated. May use ginger root powder, capsules or extract (up to 1000 mg per day)   Drink liquids in small amounts     If symptoms persist, please contact your provider.        Tips for coping with constipation during pregnancy   Increase fiber and fluids.  - Drink 8-10 cups of liquid, like water or low-sugar juice daily  - Keep urine pale with fluids (water, milk), fruit and vegetables   Eat a well-balanced diet that contains high fiber food (fruits, vegetables, whole grain breads and cereals, bran and dried beans)   Take a 30-minute walk daily   You may take a mild stool softener such as Colace®     If symptoms persist, please contact your provider.        For any emergencies, PLEASE CALL THE OFFICE at [phone]. If the office is closed, the doctor on call will be paged by the answering service.     Medications and Pregnancy- see Pregnancy Essentials guide online- page 9     Expected Weight Gain During Pregnancy  If you have a healthy BMI (18-25) prior to pregnancy:  The recommended weight gain is between 25-35 pounds. Approximate weight gain  in the first trimester is 1-4.5 pounds. An expected weight gain during the second and  third trimester is approximately one pound per week.  If you have a BMI of less than 18 prior to pregnancy,  you are considered underweight:  The recommended weight gain is between 28-40 pounds. Approximate weight gain  in the first trimester is 1-4.5 pounds. An expected weight gain during the second and  third trimester is just over one pound per week.  If  your BMI is 25 to 29.9: you are considered overweight:  You should gain 1/2 to 2/3 pound during the second and third trimester, for a total  weight gain of 15 to 25 pounds.  If you have a BMI of greater than 30 prior to pregnancy,  you are considered overweight:  The recommended weight gain is between 15-25 pounds. Approximate weight gain  in the first trimester is 1-4.5 pounds. An expected weight gain during the second  and third trimester is approximately 0.5 pound per week.     Foods to avoid during pregnancy:   Unpasteurized milk, juice and cheese  - Soft cheeses like feta or brie (if made with UNPASTEURIZED milk)   Unheated deli meats like lunchmeat and hotdogs   Undercooked poultry, beef, pork, seafood including raw sushi     What fish is safe to eat during pregnancy?  Eat 8 to 12 ounces of fish a week. Pick from this group frequently, especially if you follow  the American Heart Association’s recommendation to eat fish at least 2 times a week.     AVOID HIGH MERCURY FISH  A single meal of the following fish can put  you over the Environmental Protection  Agency’s safe limit for the month.  High mercury fish:  Shark  Swordfish  Javier Mackerel  Tile Fish     Caffeine and Pregnancy     The March of [employer] and American College of Obstetrics and Gynecologists (ACOG) urge pregnant  women to limit their caffeine consumption to no more than 200 milligrams (mg) per day. This is  comparable to having one 12-ounce cup of coffee a day. This level has been shown not to increase risk  of miscarriage, growth or  labor complications. Effects of higher levels are not known.     Exercise During Pregnancy  A daily exercise program that consists of 30 minutes a day is recommended.   Low impact exercises like walking and swimming are great exercises throughout  all of pregnancy   If you’re an avid strength  avoid lifting very heavy weights - nothing more  than 30 pounds     Drink plenty of fluids while exercising to  stay hydrated.  Be careful to avoid overheating.     ACTIVITIES TO AVOID   Exercises that can make you lose your balance.   Activities that can put your baby at risk i.e. horseback riding, scuba diving, skiing  or snowboarding. Any other sport that puts you at risk for getting hit in the  abdominal area.   Do not use saunas, steam rooms or hot tubs (that have a higher temperature  than 100F)   After the first trimester, avoid exercises that require you to lay flat on your back.   Avoid exceeding a heart rate greater than 140 beats per minute. As long as you are  able to hold a conversation while exercising your heart rate is likely acceptable

## 2024-05-07 ENCOUNTER — TELEMEDICINE (OUTPATIENT)
Dept: PSYCHIATRY | Facility: CLINIC | Age: 29
End: 2024-05-07
Payer: COMMERCIAL

## 2024-05-07 DIAGNOSIS — F41.9 ANXIETY DISORDER, UNSPECIFIED TYPE: Primary | ICD-10-CM

## 2024-05-07 PROCEDURE — 90837 PSYTX W PT 60 MINUTES: CPT | Performed by: SOCIAL WORKER

## 2024-05-07 NOTE — PSYCH
"Behavioral Health Psychotherapy Progress Note    Psychotherapy Provided: Individual Psychotherapy     1. Anxiety disorder, unspecified type            Goals addressed in session: Goal 1     DATA:Clinician explored client's current stressors-- allowed a safe place for ventilation regarding her boss and misunderstandings. Clinician and client discussed her emotions during her pregnancy, having doubts, low self esteem, but allowing her  to be an emotional support for her. .   During this session, this clinician used the following therapeutic modalities: Client-centered Therapy, Cognitive Behavioral Therapy, Solution-Focused Therapy, and Supportive Psychotherapy    Substance Abuse was not addressed during this session. If the client is diagnosed with a co-occurring substance use disorder, please indicate any changes in the frequency or amount of use: NA. Stage of change for addressing substance use diagnoses: No substance use/Not applicable    ASSESSMENT:  Noy Palomo presents with a Euthymic/ normal mood.     her affect is Normal range and intensity, which is congruent, with her mood and the content of the session. The client has made progress on their goals.     Noy Palomo presents with a none risk of suicide, none risk of self-harm, and none risk of harm to others.    For any risk assessment that surpasses a \"low\" rating, a safety plan must be developed.    A safety plan was indicated: no  If yes, describe in detail NA    PLAN: Between sessions, Noy Palomo will continue to utilize her support system. At the next session, the therapist will use Client-centered Therapy, Cognitive Behavioral Therapy, Solution-Focused Therapy, and Supportive Psychotherapy to address ongoing stressors.    Behavioral Health Treatment Plan and Discharge Planning: Noy Palomo is aware of and agrees to continue to work on their treatment plan. They have identified and are working toward their discharge goals. " yes    Visit start and stop times:    05/07/24  Start Time: 1700  Stop Time: 1801  Total Visit Time: 61 minutes  Virtual Regular Visit    Verification of patient location:    Patient is located at Home in the following state in which I hold an active license [place]      Assessment/Plan:    Problem List Items Addressed This Visit    None  Visit Diagnoses       Anxiety disorder, unspecified type    -  Primary            Goals addressed in session: Goal 1          Reason for visit is No chief complaint on file.       Encounter provider [facility] ERAS[facility] Quezada      Recent Visits  Date Type Provider Dept   05/07/24 Telemedicine [facility]   Showing recent visits within past 7 days and meeting all other requirements  Future Appointments  No visits were found meeting these conditions.  Showing future appointments within next 150 days and meeting all other requirements       The patient was identified by name and date of birth. Noy Palomo was informed that this is a telemedicine visit and that the visit is being conducted throughthe [employer] platform. She agrees to proceed..  My office door was closed. No one else was in the room.  She acknowledged consent and understanding of privacy and security of the video platform. The patient has agreed to participate and understands they can discontinue the visit at any time.    Patient is aware this is a billable service.     Subjective  Noy Palomo is a 28 y.o. female  .      HPI     Past Medical History:   Diagnosis Date    Gastritis     GERD (gastroesophageal reflux disease)     Migraine     Obesity     Seasonal allergies     Umbilical hernia        Past Surgical History:   Procedure Laterality Date    INSERTION OF INTRAUTERINE DEVICE (IUD)      AR LAPS [facility] RSTRICTIV PX LONGITUDINAL GASTRECTOMY N/A 01/09/2023    Procedure: GASTRECTOMY SLEEVE LAP & INTRAOPERATIVE EGD;  Surgeon: Leif Tolliver MD;  Location: [facility] OR;  Service:  Bariatrics    WISDOM TOOTH EXTRACTION Bilateral 03/2020       Current Outpatient Medications   Medication Sig Dispense Refill    acetaminophen (TYLENOL) 500 mg tablet Take 500 mg by mouth every 6 (six) hours as needed for mild pain      calcium carbonate (TUMS) 500 mg chewable tablet Chew 1 tablet daily      EPINEPHrine (EPIPEN) 0.3 mg/0.3 mL SOAJ Inject 0.3 mL (0.3 mg total) into a muscle once for 1 dose (Patient taking differently: Inject 0.3 mg into a muscle once PRN) 0.6 mL 0    fluticasone-salmeterol (Advair HFA) 230-21 MCG/ACT inhaler Inhale 1 puff 2 (two) times a day Rinse mouth after use. 8 g 0    folic acid ( Folic Acid) 1 mg tablet Take 1 tablet (1 mg total) by mouth daily 90 tablet 3    Multiple Vitamins-Minerals (BARIATRIC MULTIVITAMINS/IRON PO) Take by mouth       No current facility-administered medications for this visit.        Allergies   Allergen Reactions    Cat Hair Extract Shortness Of Breath    Contrast [Iodinated Contrast Media] Anaphylaxis     throat closure    Fish-Derived Products - Food Allergy Anaphylaxis     Anaphylaxis    Pollen Extract Other (See Comments)     hives, throat closure    Shellfish-Derived Products - Food Allergy        Review of Systems    Video Exam    There were no vitals filed for this visit.    Physical Exam

## 2024-05-13 ENCOUNTER — TELEPHONE (OUTPATIENT)
Dept: OBGYN CLINIC | Facility: CLINIC | Age: 29
End: 2024-05-13

## 2024-05-13 ENCOUNTER — INITIAL PRENATAL (OUTPATIENT)
Dept: OBGYN CLINIC | Facility: CLINIC | Age: 29
End: 2024-05-13

## 2024-05-13 ENCOUNTER — TELEPHONE (OUTPATIENT)
Facility: HOSPITAL | Age: 29
End: 2024-05-13

## 2024-05-13 DIAGNOSIS — Z34.01 ENCOUNTER FOR SUPERVISION OF NORMAL FIRST PREGNANCY IN FIRST TRIMESTER: ICD-10-CM

## 2024-05-13 DIAGNOSIS — Z31.41 FERTILITY TESTING: ICD-10-CM

## 2024-05-13 DIAGNOSIS — Z90.3 H/O GASTRIC SLEEVE: ICD-10-CM

## 2024-05-13 DIAGNOSIS — Z90.3 H/O GASTRIC SLEEVE: Primary | ICD-10-CM

## 2024-05-13 DIAGNOSIS — Z31.430 ENCOUNTER OF FEMALE FOR TESTING FOR GENETIC DISEASE CARRIER STATUS FOR PROCREATIVE MANAGEMENT: Primary | ICD-10-CM

## 2024-05-13 DIAGNOSIS — Z36.9 UNSPECIFIED ANTENATAL SCREENING: ICD-10-CM

## 2024-05-13 NOTE — PATIENT INSTRUCTIONS
Congratulations!! Please review our Pregnancy Essential Guide and [facility] L&D Virtual tour from our networks website.     [facility] Pregnancy Essentials Guide  [facility] ([email or web address])     [facility] - Virtual Tour ([email or web address])        Pregnancy at 11 to 14 Weeks   AMBULATORY CARE:   Changes happening to your body:  You are now at the end of your first trimester and entering your second trimester. Morning sickness usually goes away by this time. You may have other symptoms such as fatigue, frequent urination, and headaches. You may have gained 2 to 4 pounds by now.  Seek care immediately if:   You have pain or cramping in your abdomen or low back.    You have heavy vaginal bleeding or clotting.    You pass material that looks like tissue or large clots. Collect the material and bring it with you.    Call your doctor or obstetrician if:   You cannot keep food or drinks down, and you are losing weight.    You have light vaginal bleeding.    You have chills or a fever.    You have vaginal itching, burning, or pain.    You have yellow, green, white, or foul-smelling vaginal discharge.    You have pain or burning when you urinate, less urine than usual, or pink or bloody urine.    You have questions or concerns about your condition or care.    How to care for yourself at this stage of your pregnancy:       Get plenty of rest.  You may feel more tired than normal. You may need to take naps or go to bed earlier.    Manage nausea and vomiting.  Avoid fatty and spicy foods. Eat small meals throughout the day instead of large meals. Darby may help to decrease nausea. Ask your healthcare provider about other ways of decreasing nausea and vomiting.    Eat a variety of healthy foods.  Healthy foods include fruits, vegetables, whole-grain breads, low-fat dairy foods, beans, lean meats, and fish. Drink liquids as directed. Ask how much liquid to drink each day and which liquids are best for you.  Limit caffeine to less than 200 milligrams each day. Limit your intake of fish to 2 servings each week. Choose fish low in mercury such as canned light tuna, shrimp, salmon, cod, or tilapia. Do not  eat fish high in mercury such as swordfish, tilefish, surjit mackerel, and shark.         Take prenatal vitamins as directed.  Your need for certain vitamins and minerals, such as folic acid, increases during pregnancy. Prenatal vitamins provide some of the extra vitamins and minerals you need. Prenatal vitamins may also help to decrease the risk of certain birth defects.         Do not smoke.  Smoking increases your risk of a miscarriage and other health problems during your pregnancy. Smoking can cause your baby to be born too early or weigh less at birth. Ask your healthcare provider for information if you need help quitting.    Do not drink alcohol.  Alcohol passes from your body to your baby through the placenta. It can affect your baby's brain development and cause fetal alcohol syndrome (FAS). FAS is a group of conditions that causes mental, behavior, and growth problems.    Talk to your healthcare provider before you take any medicines.  Many medicines may harm your baby if you take them when you are pregnant. Do not take any medicines, vitamins, herbs, or supplements without first talking to your healthcare provider. Never use illegal or street drugs (such as marijuana or cocaine) while you are pregnant.  Safety tips during pregnancy:   Avoid hot tubs and saunas.  Do not use a hot tub or sauna while you are pregnant, especially during your first trimester. Hot tubs and saunas may raise your baby's temperature and increase the risk of birth defects.    Avoid toxoplasmosis.  This is an infection caused by eating raw meat or being around infected cat feces. It can cause birth defects, miscarriages, and other problems. Wash your hands after you touch raw meat. Make sure any meat is well-cooked before you eat it. Avoid  raw eggs and unpasteurized milk. Use gloves or ask someone else to clean your cat's litter box while you are pregnant.    Changes happening with your baby:  Your baby has fully formed fingernails and toenails. Your baby's heartbeat can now be heard. Ask your healthcare provider if you can listen to your baby's heartbeat. By week 14, your baby is over 4 inches long from the top of the head to the rump (baby's bottom). Your baby weighs over 3 ounces.  Prenatal care:  Prenatal care is a series of visits with your healthcare provider throughout your pregnancy. During the first 28 weeks of your pregnancy, you will see your healthcare provider 1 time each month. Prenatal care can help prevent problems during pregnancy and childbirth. Your healthcare provider will check your blood pressure and weight. Your baby's heart rate will also be checked. You may also need the following at some visits:  A pelvic exam  allows your healthcare provider to see your cervix (the bottom part of your uterus). Your healthcare provider will use a speculum to open your vagina. He or she will check the size and shape of your uterus.    Blood tests  may be done to check for any of the following:    Gestational diabetes or anemia (low iron level)    Blood type or Rh factor, or certain birth defects    Immunity to certain diseases, such as chickenpox or rubella    An infection, such as a sexually transmitted infection, HIV, or hepatitis B    Hepatitis B  may need to be prevented or treated. Hepatitis B is inflammation of the liver caused by the hepatitis B virus (HBV). HBV can spread from a mother to her baby during delivery. You will be checked for HBV as early as possible in the first trimester of each pregnancy. You need the test even if you received the hepatitis B vaccine or were tested before. You may need to have an HBV infection treated before you give birth.    Urine tests  may also be done to check for sugar and protein. These can be  signs of gestational diabetes or preeclampsia. Urine tests may also be done to check for signs of infection.    A fetal ultrasound  shows pictures of your baby inside your uterus. The pictures are used to check your baby's development, movement, and position.         Genetic disorder screening tests  may be offered to you. These tests check your baby's risk for genetic disorders such as Down syndrome. A screening test includes a blood test and ultrasound.    Follow up with your doctor or obstetrician as directed:  Go to all prenatal visits. Write down your questions so you remember to ask them during your visits.  © Copyright [employer] 2023 Information is for End User's use only and may not be sold, redistributed or otherwise used for commercial purposes.  The above information is an  only. It is not intended as medical advice for individual conditions or treatments. Talk to your doctor, nurse or pharmacist before following any medical regimen to see if it is safe and effective for you.

## 2024-05-13 NOTE — PROGRESS NOTES
OB INTAKE INTERVIEW  Patient is 28 y.o.y.o. who presents for OB intake at 10-4 wks  She is accompanied by FOB during this encounter  The father of her baby (Aaron) is involved in the pregnancy and  they are .  .      Last Menstrual Period: 24  Ultrasound: Measured 8 weeks 4 days on 24  Estimated Date of Delivery: 24 via LMP     Signs/Symptoms of Pregnancy  Current pregnancy symptoms: headaches, N & V, fatigue  Constipation yes- miralax  Headaches YES  Cramping/spotting YES  PICA cravings no    Diabetes-    If patient has 1 or more, please order early 1 hour GTT  History of GDM no  BMI >35 no  History of PCOS or current metformin use no  History of LGA/macrosomic infant (4000g/9lbs) no    If patient has 2 or more, please order early 1 hour GTT  BMI>30 YES  AMA no  First degree relative with type 2 diabetes YES- pt's Mom  History of chronic HTN, hyperlipidemia, elevated A1C no  High risk race (, , ,  or ) YES    Hypertension- if you answer yes to any of the following, please order baseline preeclampsia labs (cbc, comprehensive metabolic panel, urine protein creatinine ratio, uric acid)  History of of chronic HTN no  History of gestational HTN no  History of preeclampsia, eclampsia, or HELLP syndrome no  History of diabetes no  History of lupus, autoimmune disease, kidney disease no    Thyroid- if yes order TSH with reflex T4  History of thyroid disease no    Bleeding Disorder or Hx of DVT-patient or first degree relative with history of. Order the following if not done previously.   (Factor V, antithrombin III, prothrombin gene mutation, protein C and S Ag, lupus anticoagulant, anticardiolipin, beta-2 glycoprotein)   no    OB/GYN-  History of abnormal pap smear no       Date of last pap smear   wnl  History of HPV no  History of Herpes/HSV no  History of other STI (gonorrhea, chlamydia, trich) no  History of prior  no  History of  prior  no  History of  delivery prior to 36 weeks 6 days no  History of blood transfusion no  Ok for blood transfusion no    Substance screening- if yes outside of tobacco for her or anyone in her home-order urine drug screen  History of tobacco use no  Currently using tobacco no  Currently using alcohol no  Presently using drugs no  Past drug use  no  IV drug use- no  Partner drug use no  Parent/Family drug use no    MRSA Screening-   Does the pt have a hx of MRSA? no    Immunizations:  Influenza vaccine given this season yes  Discussed Tdap vaccine yes  Discussed COVID Vaccine yes    Genetic/MFM-  Do you or your partner have a history of any of the following in yourselves or first degree relatives?  Cystic fibrosis no  Spinal muscular atrophy no  Hemoglobinopathy/Sickle Cell/Thalassemia no  Fragile X Intellectual Disability no    If yes, discuss Carrier Screening and recommend consultation with M/Genetic Counseling and place specific [facility] Referral for.    If no, discuss Carrier Screening being completed once in a lifetime as a standard of care lab test. Place orders for Cystic Fibrosis Gene Test (XMI303) and Spinal Muscular Atrophy DNA (SGY1603)      Appointment for Nuchal Translucency Ultrasound at [facility] scheduled for 24    Interview education  [facility] Pregnancy Essentials Book reviewed, discussed and attached to their AVS yes    Nurse/Family Partnership- patient may qualify no; referral placed no     Prenatal lab work scripts yes  Extra labs ordered:yes  CF,SMA, Gastric sleeve labs    Aspirin/Preeclampsia Screen    Risk Level Risk Factor Recommendation   LOW Prior Uncomplicated full-term delivery no No Aspirin recommendation        MODERATE Nulliparity YES Recommend low-dose aspirin if     BMI>30 YES 2 or more moderate risk factors    Family History Preeclampsia (mother/sister) no     35yr old or greater no      or Low Socioeconomic no     IVF Pregnancy  no     Personal  History Risks (low birth weight, prior adverse preg outcome, >10yr preg interval) no         HIGH History of Preeclampsia no Recommend low-dose aspirin if     Multifetal gestation no 1 or more high risk factors    Chronic HTN no     Type 1 or 2 Diabetes no     Renal Disease no     Autoimmune Disease  no      Contraindications to ASA therapy:  NSAID/ ASA allergy: no  Nasal polyps: no  Asthma with history of ASA induced bronchospasm: no  Relative contraindications:  History of GI bleed: no  Active peptic ulcer disease: no  Severe hepatic dysfunction: no    Patient should be recommended to take ASA 162mg during this pregnancy from 12-36wks to lower her risk of preeclampsia: yes- informed pt       The patient has a history now or in prior pregnancy notable for:   Hx of depression,- no meds, sees a therapist,    Hx of gastric sleeve,  BMI 30.4  Hx of migraines,  Hx of borderline anemia,  Hx of acid reflux.  Hx of umbilical hernia.         Details that I feel the provider should be aware of: Pt  requesting CF & SMA.  Pt is employed by [employer], as a Hospice Nurse.     PN1 visit scheduled. The patient was oriented to our practice, reviewed delivering physicians and [facility] for Delivery. All questions were answered. Pt & , Aaron, present for PN interview.  Happy with pregnancy.  PN bldwk, including a CF & SMA , gastric sleeve labs, ordered in [employer].  Pt to await authorization phone call prior to having bldwk drawn.  Pt has appts scheduled for PNC, 24, & PN1, 24.  Advised pt to call with any further questions/concerns.       Interviewed by: Clair Wu RN, CLC

## 2024-05-22 ENCOUNTER — TELEPHONE (OUTPATIENT)
Age: 29
End: 2024-05-22

## 2024-05-28 ENCOUNTER — ROUTINE PRENATAL (OUTPATIENT)
Dept: PERINATAL CARE | Facility: CLINIC | Age: 29
End: 2024-05-28
Payer: COMMERCIAL

## 2024-05-28 VITALS
DIASTOLIC BLOOD PRESSURE: 70 MMHG | BODY MASS INDEX: 30.55 KG/M2 | SYSTOLIC BLOOD PRESSURE: 105 MMHG | HEIGHT: 61 IN | WEIGHT: 161.8 LBS | HEART RATE: 79 BPM

## 2024-05-28 DIAGNOSIS — N91.2 AMENORRHEA: ICD-10-CM

## 2024-05-28 DIAGNOSIS — Z36.0 ENCOUNTER FOR ANTENATAL SCREENING FOR CHROMOSOMAL ANOMALIES: ICD-10-CM

## 2024-05-28 DIAGNOSIS — O36.80X0 ENCOUNTER TO DETERMINE FETAL VIABILITY OF PREGNANCY, SINGLE OR UNSPECIFIED FETUS: Primary | ICD-10-CM

## 2024-05-28 DIAGNOSIS — Z3A.12 12 WEEKS GESTATION OF PREGNANCY: ICD-10-CM

## 2024-05-28 DIAGNOSIS — Z36.89 ENCOUNTER TO ESTABLISH GESTATIONAL AGE USING ULTRASOUND: ICD-10-CM

## 2024-05-28 DIAGNOSIS — Z3A.08 8 WEEKS GESTATION OF PREGNANCY: ICD-10-CM

## 2024-05-28 DIAGNOSIS — Z33.1 PREGNANT STATE, INCIDENTAL: ICD-10-CM

## 2024-05-28 DIAGNOSIS — Z36.9 UNSPECIFIED ANTENATAL SCREENING: ICD-10-CM

## 2024-05-28 DIAGNOSIS — Z36.82 ENCOUNTER FOR NUCHAL TRANSLUCENCY TESTING: ICD-10-CM

## 2024-05-28 PROBLEM — E66.01 OBESITY, CLASS III, BMI 40-49.9 (MORBID OBESITY) (HCC): Status: RESOLVED | Noted: 2022-06-23 | Resolved: 2024-05-28

## 2024-05-28 PROBLEM — E66.813 OBESITY, CLASS III, BMI 40-49.9 (MORBID OBESITY): Status: RESOLVED | Noted: 2022-06-23 | Resolved: 2024-05-28

## 2024-05-28 PROCEDURE — 76813 OB US NUCHAL MEAS 1 GEST: CPT | Performed by: OBSTETRICS & GYNECOLOGY

## 2024-05-28 PROCEDURE — 76801 OB US < 14 WKS SINGLE FETUS: CPT | Performed by: OBSTETRICS & GYNECOLOGY

## 2024-05-28 PROCEDURE — 36415 COLL VENOUS BLD VENIPUNCTURE: CPT | Performed by: OBSTETRICS & GYNECOLOGY

## 2024-05-28 PROCEDURE — 99203 OFFICE O/P NEW LOW 30 MIN: CPT | Performed by: OBSTETRICS & GYNECOLOGY

## 2024-05-28 NOTE — PROGRESS NOTES
Noy presents today for a genetic screening ultrasound.  This is her first pregnancy.  She has a history of acid reflux and depression not currently requiring medications.  She had a gastric sleeve procedure performed 18 months ago.  She has no other significant contributory medical or substance use history.  Family history significant for 1 family member with Down syndrome.  Her  also has a family member with Down syndrome.  Both of these family members were born to mothers of advanced maternal age.  There is a history of diabetes in the family.  A review of systems is otherwise negative.    We discussed the options for genetic screening, including but not limited to first trimester screening, second trimester screening, combined first and second trimester screening, noninvasive prenatal screening (NIPS) for patients at high risk and diagnostic screening through the use of CVS and amniocentesis. We discussed the risks and benefits of each approach including the sensitivities and false positive rates as well as the difference between a screening test and a diagnostic test. At the conclusion of our discussion the patient elected noninvasive prenatal screening utilizing the MaterniT 21 plus test. The patient had this bloowork drawn in the office.   The results should be available in approximately 7-10 days.    Metabolic and nutritional abnormalities can occur after bariatric surgery, particularly after malabsorptive procedures. Reduced oral intake and alterations in digestive anatomy results in malabsorption of various micronutrients and minerals, particularly iron, folate, vitamin B 12, calcium, and vitamin D. To reduce the risk of complications from micronutrient deficiency, specific supplementation regimens need to be tailored to the individual patient and the type of bariatric procedure performed. Commonly, micronutrient supplementation after Mely-en-Y gastric bypass should include vitamin B1, vitamin  D, vitamin K, zinc, biotin, iron, folate, calcium citrate, and vitamin B 12. These daily requirements can typically be met with a prenatal vitamin in addition to calcium and vitamin B12 supplementation. Additional iron and folate may also be required. Screening for micronutrient deficiencies to individualize therapy and adjust doses as needed is recommended. Suggested laboratory studies early in pregnancy should include a CBC, ferritin, iron, vitamin B 12, thiamine, folate, calcium, and vitamin D levels. Identified deficiencies should be corrected and monitored with monthly assessments. Further surveillance of blood count, iron, ferritin, vitamin B 12, calcium, and vitamin D should be performed every trimester.    Given an increased risk for FGR, periodic evaluation of fetal growth is recommended during the second half the pregnancy.  With respect to screening for gestational diabetes, the glucose challenge test used to screen for gestational diabetes is typically not well tolerated for women with a prior Mely-en-Y gastric bypass due to dumping syndrome, which occurs in about 50% of these patients. The standard 50 g glucose challenge test should be avoided for women following this type of surgery. Instead, fasting and post-breakfast blood glucose values should be evaluated for one week as an alternative. Optimal weight gain during pregnancy in women who have undergone bariatric surgery has not been studied. At present, weight gain recommendations are based on [place] of [facility] guidelines, which are based on prepregnancy BMI. Caloric restriction during pregnancy is not recommended, even if the patient continues to be overweight after bariatric surgery, due to concerns that caloric restriction might impair fetal growth.  delivery is performed for the usual obstetrical indications.    We discussed follow-up in detail and I recommend an anatomy ultrasound be scheduled for 20 weeks gestation.    Thank you  "very much for allowing us to participate in the care of this very nice patient. Should you have any questions, please do not hesitate to contact me.    Portions of the record may have been created with voice recognition software. Occasional wrong word or \"sound a like\" substitutions may have occurred due to the inherent limitations of voice recognition software. Read the chart carefully and recognize, using context, where substitutions have occurred.  "

## 2024-05-28 NOTE — PROGRESS NOTES
Patient chose to have [facility] [id number] Non-Invasive Prenatal Screen [id number] PLUS w/ SCA, WITH fetal sex.  Patient choose to be billed through insurance.     Patient given brochure and is aware [facility] will contact patient's insurance and coordinate coverage.  Provided [facility] contact information. General inquiries [phone], Cost estimates [phone] and [facility] Billing [phone]. Website [employer].     Blood collection tubes labeled with patient identifiers (name, medical record number, and date of birth).     Filled out [facility] order form. Patient chose to have blood drawn in our office at time of visit. NIPS was drawn from left arm with a butterfly needle by YOBANI Hollingsworth R[facility]-OB . .        Maternal Fetal Medicine will have results in approximately 5-7 business days and will call patient or notify via [employer].  Patient aware viewing lab result online will reveal fetal sex if ordered.    Patient verbalized understanding of all instructions and no questions at this time.

## 2024-06-01 LAB
CFDNA.FET/CFDNA.TOTAL SFR FETUS: NORMAL %
CITATION REF LAB TEST: NORMAL
FET 13+18+21+X+Y ANEUP PLAS.CFDNA: NEGATIVE
FET CHR 21 TS PLAS.CFDNA QL: NEGATIVE
FET CHR 21 TS PLAS.CFDNA QL: NEGATIVE
FET MS X RISK WBC.DNA+CFDNA QL: NOT DETECTED
FET SEX PLAS.CFDNA DOSAGE CFDNA: NORMAL
FET TS 13 RISK PLAS.CFDNA QL: NEGATIVE
FET X + Y ANEUP RISK PLAS.CFDNA SEQ-IMP: NOT DETECTED
GA EST FROM CONCEPTION DATE: NORMAL D
GESTATIONAL AGE > 9:: YES
LAB DIRECTOR NAME PROVIDER: NORMAL
LAB DIRECTOR NAME PROVIDER: NORMAL
LABORATORY COMMENT REPORT: NORMAL
LIMITATIONS OF THE TEST: NORMAL
NEGATIVE PREDICTIVE VALUE: NORMAL
PERFORMANCE CHARACTERISTICS: NORMAL
POSITIVE PREDICTIVE VALUE: NORMAL
REF LAB TEST METHOD: NORMAL
SL AMB NOTE:: NORMAL
TEST PERFORMANCE INFO SPEC: NORMAL

## 2024-06-03 ENCOUNTER — APPOINTMENT (OUTPATIENT)
Dept: LAB | Facility: CLINIC | Age: 29
End: 2024-06-03
Payer: COMMERCIAL

## 2024-06-03 ENCOUNTER — TELEMEDICINE (OUTPATIENT)
Dept: PSYCHIATRY | Facility: CLINIC | Age: 29
End: 2024-06-03
Payer: COMMERCIAL

## 2024-06-03 DIAGNOSIS — Z31.430 ENCOUNTER OF FEMALE FOR TESTING FOR GENETIC DISEASE CARRIER STATUS FOR PROCREATIVE MANAGEMENT: ICD-10-CM

## 2024-06-03 DIAGNOSIS — Z34.01 ENCOUNTER FOR SUPERVISION OF NORMAL FIRST PREGNANCY IN FIRST TRIMESTER: ICD-10-CM

## 2024-06-03 DIAGNOSIS — F41.9 ANXIETY DISORDER, UNSPECIFIED TYPE: Primary | ICD-10-CM

## 2024-06-03 DIAGNOSIS — Z90.3 H/O GASTRIC SLEEVE: ICD-10-CM

## 2024-06-03 DIAGNOSIS — Z36.9 UNSPECIFIED ANTENATAL SCREENING: ICD-10-CM

## 2024-06-03 LAB
ABO GROUP BLD: NORMAL
BASOPHILS # BLD AUTO: 0.04 THOUSANDS/ÂΜL (ref 0–0.1)
BASOPHILS NFR BLD AUTO: 0 % (ref 0–1)
BILIRUB UR QL STRIP: NEGATIVE
BLD GP AB SCN SERPL QL: NEGATIVE
CALCIUM SERPL-MCNC: 9 MG/DL (ref 8.4–10.2)
CLARITY UR: NORMAL
COLOR UR: NORMAL
EOSINOPHIL # BLD AUTO: 0.09 THOUSAND/ÂΜL (ref 0–0.61)
EOSINOPHIL NFR BLD AUTO: 1 % (ref 0–6)
ERYTHROCYTE [DISTWIDTH] IN BLOOD BY AUTOMATED COUNT: 13.9 % (ref 11.6–15.1)
FOLATE SERPL-MCNC: 19.1 NG/ML
GLUCOSE UR STRIP-MCNC: NEGATIVE MG/DL
HBV SURFACE AB SER-ACNC: >500 MIU/ML
HBV SURFACE AG SER QL: NORMAL
HCT VFR BLD AUTO: 37.3 % (ref 34.8–46.1)
HCV AB SER QL: NORMAL
HGB BLD-MCNC: 11.8 G/DL (ref 11.5–15.4)
HGB UR QL STRIP.AUTO: NEGATIVE
HIV 1+2 AB+HIV1 P24 AG SERPL QL IA: NORMAL
HIV 2 AB SERPL QL IA: NORMAL
HIV1 AB SERPL QL IA: NORMAL
HIV1 P24 AG SERPL QL IA: NORMAL
IMM GRANULOCYTES # BLD AUTO: 0.02 THOUSAND/UL (ref 0–0.2)
IMM GRANULOCYTES NFR BLD AUTO: 0 % (ref 0–2)
IRON SERPL-MCNC: 165 UG/DL (ref 50–212)
KETONES UR STRIP-MCNC: NEGATIVE MG/DL
LEUKOCYTE ESTERASE UR QL STRIP: NEGATIVE
LYMPHOCYTES # BLD AUTO: 2.44 THOUSANDS/ÂΜL (ref 0.6–4.47)
LYMPHOCYTES NFR BLD AUTO: 24 % (ref 14–44)
MCH RBC QN AUTO: 27.1 PG (ref 26.8–34.3)
MCHC RBC AUTO-ENTMCNC: 31.6 G/DL (ref 31.4–37.4)
MCV RBC AUTO: 86 FL (ref 82–98)
MONOCYTES # BLD AUTO: 0.43 THOUSAND/ÂΜL (ref 0.17–1.22)
MONOCYTES NFR BLD AUTO: 4 % (ref 4–12)
NEUTROPHILS # BLD AUTO: 7.35 THOUSANDS/ÂΜL (ref 1.85–7.62)
NEUTS SEG NFR BLD AUTO: 71 % (ref 43–75)
NITRITE UR QL STRIP: NEGATIVE
NRBC BLD AUTO-RTO: 0 /100 WBCS
PH UR STRIP.AUTO: 7 [PH]
PLATELET # BLD AUTO: 204 THOUSANDS/UL (ref 149–390)
PMV BLD AUTO: 11.3 FL (ref 8.9–12.7)
PROT UR STRIP-MCNC: NEGATIVE MG/DL
RBC # BLD AUTO: 4.36 MILLION/UL (ref 3.81–5.12)
RH BLD: POSITIVE
RUBV IGG SERPL IA-ACNC: 44.3 IU/ML
SP GR UR STRIP.AUTO: 1.01 (ref 1–1.03)
SPECIMEN EXPIRATION DATE: NORMAL
TREPONEMA PALLIDUM IGG+IGM AB [PRESENCE] IN SERUM OR PLASMA BY IMMUNOASSAY: NORMAL
UROBILINOGEN UR STRIP-ACNC: <2 MG/DL
VIT B12 SERPL-MCNC: 349 PG/ML (ref 180–914)
WBC # BLD AUTO: 10.37 THOUSAND/UL (ref 4.31–10.16)

## 2024-06-03 PROCEDURE — 81220 CFTR GENE COM VARIANTS: CPT

## 2024-06-03 PROCEDURE — 86780 TREPONEMA PALLIDUM: CPT

## 2024-06-03 PROCEDURE — 86850 RBC ANTIBODY SCREEN: CPT

## 2024-06-03 PROCEDURE — 86706 HEP B SURFACE ANTIBODY: CPT

## 2024-06-03 PROCEDURE — 87389 HIV-1 AG W/HIV-1&-2 AB AG IA: CPT

## 2024-06-03 PROCEDURE — 86901 BLOOD TYPING SEROLOGIC RH(D): CPT

## 2024-06-03 PROCEDURE — 86803 HEPATITIS C AB TEST: CPT

## 2024-06-03 PROCEDURE — 87340 HEPATITIS B SURFACE AG IA: CPT

## 2024-06-03 PROCEDURE — 36415 COLL VENOUS BLD VENIPUNCTURE: CPT

## 2024-06-03 PROCEDURE — 82746 ASSAY OF FOLIC ACID SERUM: CPT

## 2024-06-03 PROCEDURE — 90837 PSYTX W PT 60 MINUTES: CPT | Performed by: SOCIAL WORKER

## 2024-06-03 PROCEDURE — 82607 VITAMIN B-12: CPT

## 2024-06-03 PROCEDURE — 83540 ASSAY OF IRON: CPT

## 2024-06-03 PROCEDURE — 85025 COMPLETE CBC W/AUTO DIFF WBC: CPT

## 2024-06-03 PROCEDURE — 87086 URINE CULTURE/COLONY COUNT: CPT

## 2024-06-03 PROCEDURE — 86762 RUBELLA ANTIBODY: CPT

## 2024-06-03 PROCEDURE — 81329 SMN1 GENE DOS/DELETION ALYS: CPT

## 2024-06-03 PROCEDURE — 86900 BLOOD TYPING SEROLOGIC ABO: CPT

## 2024-06-03 PROCEDURE — 81003 URINALYSIS AUTO W/O SCOPE: CPT

## 2024-06-03 PROCEDURE — 82652 VIT D 1 25-DIHYDROXY: CPT

## 2024-06-03 NOTE — PSYCH
"Behavioral Health Psychotherapy Progress Note    Psychotherapy Provided: Individual Psychotherapy     No diagnosis found.    Goals addressed in session: Goal 1     DATA: Clinician explored client's current stressors-- discussed her worries regarding the health of her pregnancy and sharing it publicly with her community. Clinician and client identified worst case scenarios and reassured her that she can share with whoever.   During this session, this clinician used the following therapeutic modalities: Client-centered Therapy, Cognitive Behavioral Therapy, Solution-Focused Therapy, and Supportive Psychotherapy    Substance Abuse was not addressed during this session. If the client is diagnosed with a co-occurring substance use disorder, please indicate any changes in the frequency or amount of use: NA. Stage of change for addressing substance use diagnoses: No substance use/Not applicable    ASSESSMENT:  Noy Palomo presents with a Euthymic/ normal mood.     her affect is Normal range and intensity, which is congruent, with her mood and the content of the session. The client has made progress on their goals.     Noy Palomo presents with a none risk of suicide, none risk of self-harm, and none risk of harm to others.    For any risk assessment that surpasses a \"low\" rating, a safety plan must be developed.    A safety plan was indicated: no  If yes, describe in detail NA    PLAN: Between sessions, Noy Palomo will continue to challenge her worst case scenarios. At the next session, the therapist will use Client-centered Therapy to address ongoing stressors.    Behavioral Health Treatment Plan and Discharge Planning: Noy Palomo is aware of and agrees to continue to work on their treatment plan. They have identified and are working toward their discharge goals. yes    Visit start and stop times:    06/03/24  Start Time: 1500  Stop Time: 1558  Total Visit Time: 58 minutes  Virtual Regular " Visit    Verification of patient location:    Patient is located at Home in the following state in which I hold an active license [place]      Assessment/Plan:    Problem List Items Addressed This Visit    None      Goals addressed in session: Goal 1          Reason for visit is No chief complaint on file.       Encounter provider [facility] ERAS[facility] Quezada      Recent Visits  Date Type Provider Dept   06/03/24 Telemedicine [facility]   Showing recent visits within past 7 days and meeting all other requirements  Future Appointments  No visits were found meeting these conditions.  Showing future appointments within next 150 days and meeting all other requirements       The patient was identified by name and date of birth. Noy Palomo was informed that this is a telemedicine visit and that the visit is being conducted throughthe [employer] platform. She agrees to proceed..  My office door was closed. No one else was in the room.  She acknowledged consent and understanding of privacy and security of the video platform. The patient has agreed to participate and understands they can discontinue the visit at any time.    Patient is aware this is a billable service.     Subjective  Noy Palomo is a 28 y.o. female  .      HPI     Past Medical History:   Diagnosis Date    Anemia     takes irondaly    Asthma     Chronic kidney disease     sm cyst on left kidney diag 10 yrs ago    Gastritis     GERD (gastroesophageal reflux disease)     Hx of gastritis     Migraine     Obesity     Seasonal allergies     Umbilical hernia     Varicella     had vaccines       Past Surgical History:   Procedure Laterality Date    INSERTION OF INTRAUTERINE DEVICE (IUD)      MO LAPS GSTRC RSTRICTIV PX LONGITUDINAL GASTRECTOMY N/A 01/09/2023    Procedure: GASTRECTOMY SLEEVE LAP & INTRAOPERATIVE EGD;  Surgeon: Leif Tolliver MD;  Location: [facility];  Service: Bariatrics    WISDOM TOOTH EXTRACTION Bilateral  03/2020       Current Outpatient Medications   Medication Sig Dispense Refill    acetaminophen (TYLENOL) 500 mg tablet Take 500 mg by mouth every 6 (six) hours as needed for mild pain      calcium carbonate (TUMS) 500 mg chewable tablet Chew 1 tablet daily (Patient not taking: Reported on 6/5/2024)      EPINEPHrine (EPIPEN) 0.3 mg/0.3 mL SOAJ Inject 0.3 mL (0.3 mg total) into a muscle once for 1 dose (Patient taking differently: Inject 0.3 mg into a muscle once PRN) 0.6 mL 0    fluticasone-salmeterol (Advair HFA) 230-21 MCG/ACT inhaler Inhale 1 puff 2 (two) times a day Rinse mouth after use. 8 g 0    folic acid ( Folic Acid) 1 mg tablet Take 1 tablet (1 mg total) by mouth daily 90 tablet 3    Multiple Vitamins-Minerals (BARIATRIC MULTIVITAMINS/IRON PO) Take by mouth       No current facility-administered medications for this visit.        Allergies   Allergen Reactions    Cat Hair Extract Shortness Of Breath    Contrast [Iodinated Contrast Media] Anaphylaxis     throat closure    Fish-Derived Products - Food Allergy Anaphylaxis     Anaphylaxis    Pollen Extract Other (See Comments)     hives, throat closure    Shellfish-Derived Products - Food Allergy        Review of Systems    Video Exam    There were no vitals filed for this visit.    Physical Exam

## 2024-06-03 NOTE — BH TREATMENT PLAN
Outpatient Behavioral Health Psychotherapy Treatment Plan    Noy Palomo  1995     Date of Initial Psychotherapy Assessment: 11/14/2022   Date of Current Treatment Plan: 6/3/24  Treatment Plan Target Date: 6/8/25  Treatment Plan Expiration Date: 12/3/24    Diagnosis:   1. Anxiety disorder, unspecified type                Area(s) of Need:client reports wanting to incorporate a routine with gym included; client also wants to decrease her anxiety regarding the trauma within her relationship--not dwelling on the past. 6/3/24--anxiety is more geared toward the baby and not him.. he's so involved I'm not worried.. ill look at his location, he is where he says he is--he volunteers information I took a different route.. he's here. Client expresses that she is no longer focused on strengthening the relationship with her father.     Right now I am having a lot of anxiety about the baby.. I want to make sure the baby is healthy.. I'm postponing it, I have told my friends... I just havent told the public..     Long Term Goal 1 (in the client's own words): . I want to better my relationship with my father     Stage of Change: Maintenance    Target Date for completion: 5/23/24     Anticipated therapeutic modalities: compassion focused;  solution focused; CBT; Emotion focused;      People identified to complete this goal: Client and clinician       Objective 1: (identify the means of measuring success in meeting the objective):    Client will explore the complexities of her relationship wit hehr father; client will identify at least 3 feelings she feels toward her father; client will identify the type of relationship that she wants to achieve with her father--will align with her intentions. Client will practice expressing her feelings to her father 5/5x   Objective 2: (identify the means of measuring success in meeting the objective): NA      Long Term Goal 2 (in the client's own words): I want to go to the gym and give  everything that I do--school, work, self care-- it's own time.     Stage of Change: Action    Target Date for completion: 5/23/24     Anticipated therapeutic modalities: solution focused; collaborative; constructivist therapy     People identified to complete this goal: Client and clinician       Objective 1: (identify the means of measuring success in meeting the objective): Client will construct a routine to include her work, gym, hobbies, her spiritual commitments, time with her , and time with her family. Client will follow the routine 5 days a week.       Objective 2: (identify the means of measuring success in meeting the objective): NA     Long Term Goal 3 (in the client's own words): I want to accept the infidelity as a whole.. I dont want to reopen it or relive it. Complete-- 6/3/24    I want to decrease my anxiety regarding the baby and the pregnancy.     Stage of Change: Action    Target Date for completion: 5/23/2024     Anticipated therapeutic modalities: breathwork; somatic therapy; trauma focused; attachment based      People identified to complete this goal: Client and clinician       Objective 1: (identify the means of measuring success in meeting the objective): Client will practice challenging worst case scenarios 5/5x; client will practice puttig her nelson in God-- trusting His plan for her and her family; client will exercise advocating for her preferences as the mother 5/5x; client will practice validating her emotions, decision making regarding her new baby at least once a week. Client will continue to utilize her support system for acts of service, encouragment, and emotional support.       Objective 2: (identify the means of measuring success in meeting the objective): NA     I am currently under the care of a [facility] psychiatric provider: no    My [facility] psychiatric provider is: NA    I am currently taking psychiatric medications: No    I feel that I will be ready for  discharge from mental health care when I reach the following : When I can manage my anxiety regarding my baby.     For children and adults who have a legal guardian:   Has there been any change to custody orders and/or guardianship status? NA. If yes, attach updated documentation.    I have created my Crisis Plan and have been offered a copy of this plan    Behavioral Health Treatment Plan [facility]: Diagnosis and Treatment Plan explained to Noy Palomo acknowledges an understanding of their diagnosis. Noy Palomo agrees to this treatment plan.    I have been offered a copy of this Treatment Plan. yes

## 2024-06-03 NOTE — RESULT ENCOUNTER NOTE
I have reviewed the results of the NIPS which are low risk.  Please call patient and notify her of these reassuring results if she has not viewed on MyChart. Please ensure she is notified of recommendation of MSAFP to be ordered and followed up through her primary Obstetrician's office.      Thank you, Harjit Garcia MD

## 2024-06-04 LAB — 1,25(OH)2D3 SERPL-MCNC: 91.7 PG/ML (ref 24.8–81.5)

## 2024-06-05 ENCOUNTER — INITIAL PRENATAL (OUTPATIENT)
Dept: OBGYN CLINIC | Facility: CLINIC | Age: 29
End: 2024-06-05
Payer: COMMERCIAL

## 2024-06-05 VITALS
BODY MASS INDEX: 30.47 KG/M2 | OXYGEN SATURATION: 96 % | HEART RATE: 93 BPM | DIASTOLIC BLOOD PRESSURE: 62 MMHG | WEIGHT: 161.4 LBS | HEIGHT: 61 IN | SYSTOLIC BLOOD PRESSURE: 102 MMHG

## 2024-06-05 DIAGNOSIS — Z53.1 TRANSFUSION OF BLOOD PRODUCT DECLINED DUE TO RELIGIOUS REASON: ICD-10-CM

## 2024-06-05 DIAGNOSIS — Z3A.13 13 WEEKS GESTATION OF PREGNANCY: ICD-10-CM

## 2024-06-05 DIAGNOSIS — J45.20 MILD INTERMITTENT ASTHMA WITHOUT COMPLICATION: ICD-10-CM

## 2024-06-05 DIAGNOSIS — Z33.1 PREGNANT STATE, INCIDENTAL: ICD-10-CM

## 2024-06-05 DIAGNOSIS — Z36.9 UNSPECIFIED ANTENATAL SCREENING: ICD-10-CM

## 2024-06-05 DIAGNOSIS — Z82.79 FAMILY HISTORY OF DOWN SYNDROME: ICD-10-CM

## 2024-06-05 DIAGNOSIS — O99.210 OBESITY AFFECTING PREGNANCY, ANTEPARTUM, UNSPECIFIED OBESITY TYPE: ICD-10-CM

## 2024-06-05 DIAGNOSIS — Z34.01 PRENATAL CARE, FIRST PREGNANCY, FIRST TRIMESTER: Primary | ICD-10-CM

## 2024-06-05 DIAGNOSIS — Z86.59 HISTORY OF DEPRESSION: ICD-10-CM

## 2024-06-05 DIAGNOSIS — Z98.890 STATUS POST GASTRIC SURGERY: ICD-10-CM

## 2024-06-05 DIAGNOSIS — Z11.3 SCREEN FOR STD (SEXUALLY TRANSMITTED DISEASE): ICD-10-CM

## 2024-06-05 DIAGNOSIS — K21.9 GASTROESOPHAGEAL REFLUX DISEASE WITHOUT ESOPHAGITIS: ICD-10-CM

## 2024-06-05 PROBLEM — Z30.432 ENCOUNTER FOR IUD REMOVAL: Status: RESOLVED | Noted: 2024-01-05 | Resolved: 2024-06-05

## 2024-06-05 PROBLEM — N92.6 MISSED MENSES: Status: RESOLVED | Noted: 2022-08-08 | Resolved: 2024-06-05

## 2024-06-05 LAB
BACTERIA UR CULT: NORMAL
SL AMB  POCT GLUCOSE, UA: NORMAL
SL AMB POCT URINE PROTEIN: NORMAL

## 2024-06-05 PROCEDURE — G0145 SCR C/V CYTO,THINLAYER,RESCR: HCPCS | Performed by: PHYSICIAN ASSISTANT

## 2024-06-05 PROCEDURE — 87591 N.GONORRHOEAE DNA AMP PROB: CPT | Performed by: PHYSICIAN ASSISTANT

## 2024-06-05 PROCEDURE — PNV: Performed by: PHYSICIAN ASSISTANT

## 2024-06-05 PROCEDURE — 81002 URINALYSIS NONAUTO W/O SCOPE: CPT | Performed by: PHYSICIAN ASSISTANT

## 2024-06-05 PROCEDURE — 87491 CHLMYD TRACH DNA AMP PROBE: CPT | Performed by: PHYSICIAN ASSISTANT

## 2024-06-05 NOTE — PROGRESS NOTES
28 y.o.  at 13w6d with Estimated Date of Delivery: 24 by LMP 24 consistent w/ 1st trimester US.     She denies nausea/vomiting and bleeding/cramping.     Prenatal labs: complete and wnl; urine culture in process but appears normal based on preliminary studies     Has appt with DP for diabetic testing supplies to perform one week of fingerstick checks instead of 1 hour GTT due to gastric sleeve - scheduled with DP    Genetic screening: Completed NT with MFM; NIPT low risk    OB history: none    GYN history: Last pap smear 2021 negative.     History of STDs: no      Past medical history: GERD, depression, migraines, BMI 30, asthma     Past surgical history: hx of gastric sleeve 18 months ago, wisdom teeth    Social history: Denies tobacco, alcohol, or illicit drug use.    Hindu - declines blood products; has filled out blood products form in the past; will complete updated form    Family history:   DVT/PE: none  Cancer: MGM (breast)  Heart disease: none  Stroke: MGF    Fhx of Down Syndrome: Maternal uncle of FOB    Physical exam:     Fetal heart tones: 145 bpm    Patient appears well and is not in distress  Neck is supple without masses  Breasts are symmetrical without mass, tenderness, nipple discharge, skin changes or adenopathy.   Abdomen is soft and nontender without masses.   External genitals are normal without lesions or rashes.  Vagina is normal without discharge or bleeding.   Cervix is normal without discharge or lesion.   Uterus is normal for gestational age.   Adnexa are normal, nontender, without palpable mass.        Discussed as well during this visit was diet, prenatal vitamins, prenatal visits, lab testing, breast feeding, vaccinations, maternal fetal medicine consultations, and lifestyle.      ASSESSMENT/PLAN   Problem List Items Addressed This Visit       GERD (gastroesophageal reflux disease)     Worsening nausea and heartburn  Advised Pepcid 20 mg BID          Transfusion of blood product declined due to Holiness reason     [facility]  Provided blood products consent form at [facility] which pt will fill out and return         Status post gastric surgery     Needs fingerstick checks instead of 1 hour GTT  Has appt with DP tomorrow to obtain supplies         13 weeks gestation of pregnancy     Continue PNV  Start ASA  NT complete   NIPT low risk  AFP orders placed         Mild intermittent asthma without complication     Has advair to be used when having flare; has not required recently          Maternal obesity affecting pregnancy, antepartum     Appt with DP for early fingerstick checks          History of depression     Stable without meds at this time         Family history of Down syndrome     FOB' maternal uncle with down syndrome  Low risk NIPT          Other Visit Diagnoses       Prenatal care, first pregnancy, first trimester    -  Primary    Relevant Orders    POCT urine dip (Completed)    Liquid-based pap, screening    Chlamydia/GC amplified DNA by PCR    Screen for STD (sexually transmitted disease)        Relevant Orders    Chlamydia/GC amplified DNA by PCR    Unspecified  screening        Relevant Orders    Alpha fetoprotein, maternal    Pregnant state, incidental        Relevant Orders    Alpha fetoprotein, maternal            1 - Gonorrhea and Chlamydia cultures obtained today  2 - Pap smear with was done today  3 - Prenatal labs reviewed -- complete; will complete fingerstick checks - has appt with DP  4 - Genetic screening -- completed NT; low risk NIPT; AFP ordered and reviewed test timing  5 - RTO in 4 weeks for routine PN visit    Blue packet given and reviewed     All questions were answered & Noy expressed understanding.

## 2024-06-06 ENCOUNTER — PATIENT MESSAGE (OUTPATIENT)
Dept: PERINATAL CARE | Facility: CLINIC | Age: 29
End: 2024-06-06

## 2024-06-06 ENCOUNTER — DOCUMENTATION (OUTPATIENT)
Dept: PERINATAL CARE | Facility: CLINIC | Age: 29
End: 2024-06-06

## 2024-06-06 ENCOUNTER — TELEMEDICINE (OUTPATIENT)
Dept: PERINATAL CARE | Facility: CLINIC | Age: 29
End: 2024-06-06
Payer: COMMERCIAL

## 2024-06-06 DIAGNOSIS — Z3A.14 14 WEEKS GESTATION OF PREGNANCY: ICD-10-CM

## 2024-06-06 DIAGNOSIS — O99.212 OTHER OBESITY DUE TO EXCESS CALORIES AFFECTING PREGNANCY IN SECOND TRIMESTER: ICD-10-CM

## 2024-06-06 DIAGNOSIS — Z13.1 DIABETES MELLITUS SCREENING: Primary | ICD-10-CM

## 2024-06-06 DIAGNOSIS — O99.842 PREGNANCY COMPLICATED BY PREVIOUS BARIATRIC SURGERY, SECOND TRIMESTER: Primary | ICD-10-CM

## 2024-06-06 DIAGNOSIS — E66.09 OTHER OBESITY DUE TO EXCESS CALORIES AFFECTING PREGNANCY IN SECOND TRIMESTER: ICD-10-CM

## 2024-06-06 DIAGNOSIS — Z90.3 H/O GASTRIC SLEEVE: ICD-10-CM

## 2024-06-06 DIAGNOSIS — Z91.013 SHELLFISH ALLERGY: ICD-10-CM

## 2024-06-06 PROCEDURE — G0108 DIAB MANAGE TRN  PER INDIV: HCPCS | Performed by: DIETITIAN, REGISTERED

## 2024-06-06 RX ORDER — BLOOD-GLUCOSE METER
EACH MISCELLANEOUS
Qty: 1 KIT | Refills: 0 | Status: SHIPPED | OUTPATIENT
Start: 2024-06-06 | End: 2024-12-05

## 2024-06-06 RX ORDER — LANCETS
EACH MISCELLANEOUS
Qty: 100 EACH | Refills: 0 | Status: SHIPPED | OUTPATIENT
Start: 2024-06-06 | End: 2024-12-05

## 2024-06-06 NOTE — PROGRESS NOTES
Demographics:  Language: English  Ethnicity: / / Swedish   Country of Origin: [place]  Highest grade completed: College Degree  Occupation: Professional/ Managerial Traveling hospice RN  Employer Name:[employer]  Hours worked per week: Full Time (40 hours/week)  Shift worked: Morning (Tuesd, Wednesday & Friday for 12 hour days)    Personal & Family History:  Personal history of diabetes? no  Family members with diabetes: Mother and Grandmother  How many total pregnancies have you had? 1  How many children do you have? 0  Are you having swelling or fluid retention? no  Have you been placed on bedrest? no    Nutrition & Physical Activity:  Do you exercise? no  Type of Exercise: Other to begin walking & doing yoga now that her N/V is declining  How many meals do you eat daily? 3  List times of meals: Breakfast: 7-8:30 AM/ Lunch: 12 PM/ Dinner: 6 PM  How many snacks do you eat daily? 4  List times of snacks: AM Snack: 10 AM / PM Snack: 2 & 4 PM/ Bedtime Snack: 8 PM  What type of diet are you following at home? Regular with foods she can tolerate after bariastric surgery  Do you have special Sabianism or ethnic dietary preferences? yes Arabic foods  Do you have any food allergies or intolerances? yes fish & shellfish  Do you receive WIC or food stamps? no    Learning preferences:  How do you learn best?  Hands-on & visual   How do you rate your health? Good  Who is your primary support person? Spouse  How do you cope with stress? Therapy & oils

## 2024-06-06 NOTE — PROGRESS NOTES
Virtual Regular Visit    Verification of patient location: [place], [place]    Patient is located at Home in the following state in which I hold an active license [place]      Assessment/Plan:    Problem List Items Addressed This Visit       Shellfish allergy     Other Visit Diagnoses       Pregnancy complicated by previous bariatric surgery, second trimester    -  Primary    H/O gastric sleeve        Other obesity due to excess calories affecting pregnancy in second trimester        14 weeks gestation of pregnancy                     Reason for visit is   Chief Complaint   Patient presents with    Virtual Regular Visit          Encounter provider Tamy Damian      Recent Visits  No visits were found meeting these conditions.  Showing recent visits within past 7 days and meeting all other requirements  Today's Visits  Date Type Provider Dept   24 Telemedicine [facility]   Showing today's visits and meeting all other requirements  Future Appointments  No visits were found meeting these conditions.  Showing future appointments within next 150 days and meeting all other requirements       The patient was identified by name and date of birth. Noy Palomo was informed that this is a telemedicine visit and that the visit is being conducted through the Epic Embedded platform. She agrees to proceed..  My office door was closed. No one else was in the room.  She acknowledged consent and understanding of privacy and security of the video platform. The patient has agreed to participate and understands they can discontinue the visit at any time.    Patient is aware this is a billable service.     Subjective  Noy Palomo is a 28 y.o. female pregnant patient .      HPI     Past Medical History:   Diagnosis Date    Anemia     takes irondaly    Asthma     Chronic kidney disease     sm cyst on left kidney diag 10 yrs ago    Gastritis     GERD (gastroesophageal reflux disease)     Hx of  gastritis     Migraine     Obesity     Seasonal allergies     Umbilical hernia     Varicella     had vaccines       Past Surgical History:   Procedure Laterality Date    INSERTION OF INTRAUTERINE DEVICE (IUD)      VT LAPS GSTRC RSTRICTIV PX LONGITUDINAL GASTRECTOMY N/A 01/09/2023    Procedure: GASTRECTOMY SLEEVE LAP & INTRAOPERATIVE EGD;  Surgeon: Leif Tolliver MD;  Location: [facility];  Service: Bariatrics    WISDOM TOOTH EXTRACTION Bilateral 03/2020       Current Outpatient Medications   Medication Sig Dispense Refill    acetaminophen (TYLENOL) 500 mg tablet Take 500 mg by mouth every 6 (six) hours as needed for mild pain      calcium carbonate (TUMS) 500 mg chewable tablet Chew 1 tablet daily (Patient not taking: Reported on 6/5/2024)      EPINEPHrine (EPIPEN) 0.3 mg/0.3 mL SOAJ Inject 0.3 mL (0.3 mg total) into a muscle once for 1 dose (Patient taking differently: Inject 0.3 mg into a muscle once PRN) 0.6 mL 0    fluticasone-salmeterol (Advair HFA) 230-21 MCG/ACT inhaler Inhale 1 puff 2 (two) times a day Rinse mouth after use. 8 g 0    folic acid ( Folic Acid) 1 mg tablet Take 1 tablet (1 mg total) by mouth daily 90 tablet 3    Multiple Vitamins-Minerals (BARIATRIC MULTIVITAMINS/IRON PO) Take by mouth       No current facility-administered medications for this visit.        Allergies   Allergen Reactions    Cat Hair Extract Shortness Of Breath    Contrast [Iodinated Contrast Media] Anaphylaxis     throat closure    Fish-Derived Products - Food Allergy Anaphylaxis     Anaphylaxis    Pollen Extract Other (See Comments)     hives, throat closure    Shellfish-Derived Products - Food Allergy        Review of Systems    Video Exam    There were no vitals filed for this visit.    Physical Exam --not perfomred    Visit Time  Total Visit Duration: 30 minutes      Thank you for referring your patient to [facility]’s Maternal Fetal Medicine Diabetes Education Program. Noy Palomo is a  28 y.o. female who presents  "today for MNT counseling/ meter education. Patient is at 14w0d gestation, Estimated Date of Delivery: 12/5/24.     Reviewed and updated the following from patients medical record: PMH, Problem List, Allergies, and Current Medications.    Visit Diagnosis:  s/p bariatric surgery--had gastric sleeve in 1/23.     PMH/PSH:  Past Medical History:   Diagnosis Date    Anemia     takes irondaly    Asthma     Chronic kidney disease     sm cyst on left kidney diag 10 yrs ago    Gastritis     GERD (gastroesophageal reflux disease)     Hx of gastritis     Migraine     Obesity     Seasonal allergies     Umbilical hernia     Varicella     had vaccines     Past Surgical History:   Procedure Laterality Date    INSERTION OF INTRAUTERINE DEVICE (IUD)      IA LAPS GSTRC RSTRICTIV PX LONGITUDINAL GASTRECTOMY N/A 01/09/2023    Procedure: GASTRECTOMY SLEEVE LAP & INTRAOPERATIVE EGD;  Surgeon: Leif Tolliver MD;  Location: [facility];  Service: Bariatrics    WISDOM TOOTH EXTRACTION Bilateral 03/2020       Labs:  No components found for: \"HGA1C\"  Lab Results   Component Value Date    GLUC 100 11/10/2023     No results found for: \"[id number]\"  Lab Results   Component Value Date    LYD1PSHGABEJ 0.716 11/10/2023         Medications:  Prenatal multivitamin mineral supplement and folic acid & Tums    Anthropometrics:  Ht Readings from Last 3 Encounters:   06/05/24 5' 1\" (1.549 m)   05/28/24 5' 1\" (1.549 m)   04/29/24 5' 1.25\" (1.556 m)     Wt Readings from Last 3 Encounters:   06/05/24 73.2 kg (161 lb 6.4 oz)   05/28/24 73.4 kg (161 lb 12.8 oz)   04/29/24 73.8 kg (162 lb 9.6 oz)     Pre-gravid Weight: 162 pounds  Pre-gravid BMI: 30.63  Weight Change: Lost 0.6 pounds  Weight gain recommendations: for BMI >30--gain 11-20 pounds   Comments: maintain present weight    Recent Ultrasound for Growth Shows:   Date:5/28/24  Fetal Growth: Normal  BILLY: Normal  Next US: 7/25/24      Daily Calorie and Protein Requirements:  Calories: 1430 minimal-1920 " "maximum  Protein: 66 gm    24- hr Diet Recall:  B (7-8:30 AM): 2 scrambled eggs & 1 sausage link   S (10 AM): 1 string cheese & 1 cup blueberries  L (12 PM): 6\" ayaz from [facility]  S (2 PM & 4 PM):chips or 1 orange  D (6 PM):  3/4 cup rice, 2.5 oz meat & 3/4 cup beans :  S (8 PM): 1 slice bread with either penaut butter or butter    Diet Review: Allergy to fish & shellfish. Eats her protein foods first & waits to drink between meals or snacks. Can eat 1-1.5 cups of foods at 1 time depending on the type of food she is eating. Total intake = 1920 calories & 116 gm protein--adequate calories & protein. Has N/V in the evening, but is getting better. Traveling hospice RN.& carries foods with or or gets food at [facility]. If unable if did not take afternoon snack with her  will drink Boost High Protein or Ensure High Protein. No need to drink a protein shake on a daily basis.        Diet Instruction:  1. Encouraged meal pattern of 3 meals and 3 snacks daily  2. Basic review of macronutrient's with appropriate amounts of carbohydrate, protein and fat at each meal and snack  3. Reviewed proper portion sizes of CHO, PRO and Fat via food models  4. Instruction on how to read a food label  5. Provided suggested meal/snack options to increase nutrition and maintain consistent  meal and snack intakes  6. Instructed on how to keep a 3-day food diary to be brought to next appointment    Physical Activity Assessment:  Exercise (type/frequency): Early in pregnant ran a marathon, then developed N/V in the evening  & stopped running. Beginning to eat again & plans to begin walking & returning to yoga.      Discussed benefits of physical activity in maintaining healthy weight in pregnancy, and encouraged activity as physically able. Always consult a physician prior to starting an exercise program. Recommend 20-30 minutes daily.      Blood Glucose Monitoring:   Glucose Meter: Contour Next   Instructed on testing blood sugars: 4 x per day " (Fasting, 1 hour after start of each meal) for 1 week now & again for 1 week between 24 -28 weeks    Gave instruction on site selection, skin preparation, loading strips and lancet device, meter activation, obtaining blood sample, test strip and lancet disposal and storage, and recording log book entries. Patient has good understanding of material covered and was able to test their own blood sugar in office today.     Instruction for reporting blood sugar results weekly via:  To her OB physician    Goal Blood Sugar Ranges:   Fastin-90 mg/dL  1 hour after the start of each meal: 140 mg/dL or less    Goals:  1. Appropriate weight gain in pregnancy  2. Compliance to meal plan evert by consuming adequate calories & protein  3. Compliance to exercise plan (if applicable)    Learner/s Present:Learners Present: Patient   Educational Materials Provided:  GDM Discharge Instructions  Barriers to Learning/Change: no barriers    Expected Compliance: good    Begin Time: 8 AM  End Time: 8:30 AM    F/U: As needed    It was a pleasure working with them today. Please feel free to call with any questions or concerns.    Tamy Damian, MS, RD, [facility]  Diabetes Educator  [facility] Maternal Fetal Medicine  Diabetes in Pregnancy Program  [address], [address]  [place] [address]

## 2024-06-07 LAB
C TRACH DNA SPEC QL NAA+PROBE: NEGATIVE
CITATION REF LAB TEST: NORMAL
CLINICAL INFO: NORMAL
ETHNIC BACKGROUND STATED: NORMAL
GENE DIS ANL CARRIER INTERP-IMP: NORMAL
GENE MUT TESTED BLD/T: NORMAL
LAB DIRECTOR NAME PROVIDER: NORMAL
N GONORRHOEA DNA SPEC QL NAA+PROBE: NEGATIVE
REASON FOR REFERRAL (NARRATIVE): NORMAL
RECOMMENDATION PATIENT DOC-IMP: NORMAL
REF LAB TEST METHOD: NORMAL
SERVICE CMNT-IMP: NORMAL
SMN1 GENE MUT ANL BLD/T: NORMAL
SPECIMEN SOURCE: NORMAL

## 2024-06-13 DIAGNOSIS — O09.892 CYSTIC FIBROSIS CARRIER IN SECOND TRIMESTER, ANTEPARTUM: Primary | ICD-10-CM

## 2024-06-13 DIAGNOSIS — Z14.1 CYSTIC FIBROSIS CARRIER IN SECOND TRIMESTER, ANTEPARTUM: Primary | ICD-10-CM

## 2024-06-13 LAB
CFTR FULL MUT ANL BLD/T SEQ: ABNORMAL
CITATION REF LAB TEST: ABNORMAL
CLINICAL INFO: ABNORMAL
ETHNIC BACKGROUND STATED: ABNORMAL
GENE DIS ANL CARRIER INTERP-IMP: ABNORMAL
INDICATION: ABNORMAL
LAB DIRECTOR NAME PROVIDER: ABNORMAL
RECOMMENDATION PATIENT DOC-IMP: ABNORMAL
REF LAB TEST METHOD: ABNORMAL
SERVICE CMNT-IMP: ABNORMAL
SPECIMEN SOURCE: ABNORMAL

## 2024-06-14 LAB
LAB AP GYN PRIMARY INTERPRETATION: NORMAL
LAB AP LMP: NORMAL
Lab: NORMAL

## 2024-06-17 ENCOUNTER — TELEMEDICINE (OUTPATIENT)
Dept: PERINATAL CARE | Facility: CLINIC | Age: 29
End: 2024-06-17

## 2024-06-17 DIAGNOSIS — O09.892 CYSTIC FIBROSIS CARRIER IN SECOND TRIMESTER, ANTEPARTUM: Primary | ICD-10-CM

## 2024-06-17 DIAGNOSIS — O35.2XX0 HEREDITARY DISEASE IN FAMILY POSSIBLY AFFECTING FETUS, AFFECTING MANAGEMENT OF MOTHER IN PREGNANCY, SINGLE OR UNSPECIFIED FETUS: ICD-10-CM

## 2024-06-17 DIAGNOSIS — Z31.5 ENCOUNTER FOR PROCREATIVE GENETIC COUNSELING: ICD-10-CM

## 2024-06-17 DIAGNOSIS — Z14.1 CYSTIC FIBROSIS CARRIER IN SECOND TRIMESTER, ANTEPARTUM: Primary | ICD-10-CM

## 2024-06-17 PROCEDURE — NC001 PR NO CHARGE: Performed by: GENETIC COUNSELOR, MS

## 2024-06-17 NOTE — PROGRESS NOTES
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  Showing recent visits within past 7 days and meeting all other requirements  Today's Visits  Date Type Provider Dept   24 Telemedicine Talya Scott [facility]   Showing today's visits and meeting all other requirements  Future Appointments  No visits were found meeting these conditions.  Showing future appointments within next 150 days and meeting all other requirements       The patient was identified by name and date of birth. Noy Palomo was informed that this is a telemedicine visit and that the visit is being conducted through the Epic Embedded platform. She agrees to proceed..  My office door was closed. The patient was notified the following individuals were present in the room Genetic Counseling student [employer].  She acknowledged consent and understanding of privacy and security of the video platform. The patient has agreed to  "participate and understands they can discontinue the visit at any time.      Subjective  Noy is a 28 y.o. female who presented with her partner for genetic counseling to discuss her abnormal cystic fibrosis carrier screening results.    We reviewed the diagnosis and prognosis as well as the associated morbidity and mortality of cystic fibrosis.  We also reviewed the autosomal recessive inheritance pattern.  Should the father of the baby be a carrier, the pregnancy is at 25% risk to be affected and prenatal diagnosis, such as amniocentesis is available.  We reviewed  screening in the [facility] for cystic fibrosis.  The benefits and limitations of cystic fibrosis carrier screening for Noy's partner was reviewed.  We also reviewed the option of expanded carrier screening.  We discussed that the panels can test for carrier status for over 500 autosomal recessive and X-linked diseases. All of the diseases included on the panel are life threatening, life limiting, or have treatments available.     We discussed the availability of [place] single gene NIPT bloodwork for Noy.  This bloodwork can screen for both aneuploidy and single gene disorders including beta-globinopathies, alpha thalassemia, cystic fibrosis, and spinal muscular atrophy without needing a paternal sample or known paternal carrier status. We discussed that the test is designed to first screen the pregnant individual for pathogenic variants, then provide a fetal risk estimate for any relevant conditions, expressed as a probability (e.g. \"1 in 2 chance of being affected\" or \"less than a 1 in 3200 chance\"). This approach lacks the accuracy of diagnostic testing but does provide some information about fetal risk without increasing the risk of miscarriage.  We discussed that as Noy already had NIPS screening (which was negative) her insurance may not cover a similar test and out of pocket cost is $350.     After reviewing " "the available prenatal testing and screening options the couple declined amniocentesis and [place] screening.  Aaron and Noy declined doing expanded carrier screening at this time stating it may create too much anxiety.  They prefer to just screen for the disease in question, thus Aaron will just pursue the cystic fibrosis carrier screening.  As he prefers saliva collection versus blood draw an order will be placed for [facility] CF screening and a collection kit will be sent directly to him.  Results take about 14 days and they will be notified when they are available.    Histories for the patient and her partner's family were taken during our session.  Noy reports several half nieces and nephews (from her paternal siblings) with autism, etiology unknown.  We reviewed the general population risk for a diagnosis of autism, which is estimated at approximately 1/.  We also reviewed the possible etiologies of autism, including multifactorial and genetic.  Autism may be secondary to a chromosomal abnormality or single gene disorder; a genetic cause can now be identified in up to approximately 35-40% of cases.  However there are over 800 genes associated with autism that have been identified to date, making a prenatal diagnosis and risk assessment difficult.  After reviewing the benefits and limitations of Fragile X carrier screening, Noy declined screening at this time.    Aaron reports a maternal uncle with Down syndrome, stating that his grandmother was \"older\" during the pregnancy.  We reviewed that approximately 95% of cases of Down syndrome are considered sporadic secondary to chromosomal nondisjunction.  Therefore the risk would not be increased over the patient's age related risk for the pregnancy.  Another 2-3% of cases are secondary to chromosomal translocations.  Should Aaron be a carrier of a balanced translocation the risk for a pregnancy to be affected could be substantially higher.  " Noy had NIPS screening performed earlier in the pregnancy with was negative/low risk.  We reviewed that this significantly decreases the chance for an affected pregnancy.  Further review of family history for the patient and her partner was noncontributory.  The family history was not significant for other genetic diseases or disorders, intellectual disability, birth defects, fetal loss, or consanguinity.    Patient reports being of [place]// descent and that her  is of //[place] descent.  She denies either of them having known Ashkenazi [place] ancestry.  The patient had Spinal muscular atrophy (SMA) carrier screening which was negative. Hemoglobin electrophoresis to screen for hemoglobinopathies is recommended through her referring OB provider if not previously performed.    We reviewed that as cell free DNA screening does not detect open neural tube defects, MSAFP screening is available at 15-20 weeks gestation.  We reviewed that level II anatomy ultrasound is typically performed at approximately 20 weeks gestation.  Level II ultrasound evaluation is between 60-80% accurate in detecting major physical birth defects and variations in fetal development that may be associated with chromosome/genetic abnormalities.  Level II ultrasound evaluation is not able to detect all birth defects or health problems.  Noy is planing on attending her scheduled ultrasound with [facility].    Lastly, we discussed the fact that everyone in the general population regardless of age, family history, or medical background has approximately a 3-5% risk of having a child with some type of congenital anomaly, genetic disease or intellectual disability. Currently there are no tests available to rule out all birth defects or health problems.    Noy was provided with our contact information.  I encouraged her to call with any questions or concerns.    Plan/Tests Ordered:  1) Patient  declined amniocentesis, [place] screening, Fragile X carrier screening and expanded carrier screening.  2) Patient and partner elected CF carrier screening for FOB - order placed in [employer]'s portal and saliva kit will be sent to him.  3) MSAFP screening by 20 weeks gestation - ordered by patient's OB office.  4) Level II anatomy ultrasound scheduled for 7/25/24.      HPI     Past Medical History:   Diagnosis Date    Anemia     takes irondaly    Asthma     Chronic kidney disease     sm cyst on left kidney diag 10 yrs ago    Gastritis     GERD (gastroesophageal reflux disease)     Hx of gastritis     Migraine     Obesity     Seasonal allergies     Umbilical hernia     Varicella     had vaccines       Past Surgical History:   Procedure Laterality Date    INSERTION OF INTRAUTERINE DEVICE (IUD)      HI LAPS GSTRC RSTRICTIV PX LONGITUDINAL GASTRECTOMY N/A 01/09/2023    Procedure: GASTRECTOMY SLEEVE LAP & INTRAOPERATIVE EGD;  Surgeon: Leif Tolliver MD;  Location: [facility];  Service: Bariatrics    WISDOM TOOTH EXTRACTION Bilateral 03/2020       Current Outpatient Medications   Medication Sig Dispense Refill    acetaminophen (TYLENOL) 500 mg tablet Take 500 mg by mouth every 6 (six) hours as needed for mild pain      Blood Glucose Monitoring Suppl (Contour Next EZ) w/Device KIT Test 4 times daily for 1 week 1 kit 0    calcium carbonate (TUMS) 500 mg chewable tablet Chew 1 tablet daily (Patient not taking: Reported on 6/5/2024)      Contour Next Test test strip Test 4 times daily for 1 week. 100 strip 0    EPINEPHrine (EPIPEN) 0.3 mg/0.3 mL SOAJ Inject 0.3 mL (0.3 mg total) into a muscle once for 1 dose (Patient taking differently: Inject 0.3 mg into a muscle once PRN) 0.6 mL 0    fluticasone-salmeterol (Advair HFA) 230-21 MCG/ACT inhaler Inhale 1 puff 2 (two) times a day Rinse mouth after use. 8 g 0    folic acid ( Folic Acid) 1 mg tablet Take 1 tablet (1 mg total) by mouth daily 90 tablet 3    Microlet Lancets MISC  Test 4 times daily for 1 week. 100 each 0    Multiple Vitamins-Minerals (BARIATRIC MULTIVITAMINS/IRON PO) Take by mouth       No current facility-administered medications for this visit.        Allergies   Allergen Reactions    Cat Hair Extract Shortness Of Breath    Contrast [Iodinated Contrast Media] Anaphylaxis     throat closure    Fish-Derived Products - Food Allergy Anaphylaxis     Anaphylaxis    Pollen Extract Other (See Comments)     hives, throat closure    Shellfish-Derived Products - Food Allergy        Review of Systems    Video Exam    There were no vitals filed for this visit.    Physical Exam     Visit Time  Total Visit Duration: 60 minutes

## 2024-06-18 NOTE — PROGRESS NOTES
I supervised the genetic counselor  I was present in the office and reviewed and agree with the note on 6/17/2024.    Nellie Garza MD 06/18/24

## 2024-06-21 ENCOUNTER — NURSE TRIAGE (OUTPATIENT)
Dept: OTHER | Facility: OTHER | Age: 29
End: 2024-06-21

## 2024-06-21 NOTE — TELEPHONE ENCOUNTER
"Regardin weks pregnant/having muscle spasms on rt side / back area  ----- Message from Kristina GONZALEZ sent at 2024  6:01 PM EDT -----  \"I am 16 weeks pregnant and I am having muscle spasms on rt side and back area. I am not sure if this is normal \"    "

## 2024-06-21 NOTE — TELEPHONE ENCOUNTER
"Reason for Disposition   [1] Constant abdominal pain AND [2] present > 2 hours    Answer Assessment - Initial Assessment Questions  1. LOCATION: \"Where does it hurt?\"       Hip pain   2. RADIATION: \"Does the pain shoot anywhere else?\" (e.g., chest, back, shoulder)      Lower back   3. ONSET: \"When did the pain begin?\" (e.g., minutes, hours or days ago)       This morning   4. ONSET: \"Gradual or sudden onset?\"      Gradual   5. PATTERN \"Does the pain come and go, or has it been constant since it started?\"       Constant   6. SEVERITY: \"How bad is the pain?\" \"What does it keep you from doing?\"  (e.g., Scale 1-10; mild, moderate, or severe)    - MILD (1-3): doesn't interfere with normal activities, abdomen soft and not tender to touch     - MODERATE (4-7): interferes with normal activities or awakens from sleep, tender to touch     - SEVERE (8-10): excruciating pain, doubled over, unable to do any normal activities    Aching and pulling   7. RECURRENT SYMPTOM: \"Have you ever had this type of stomach pain before?\" If Yes, ask: \"When was the last time?\" and \"What happened that time?\"     Denies   8. CAUSE: \"What do you think is causing the stomach pain?\"      Unsure   9. RELIEVING/AGGRAVATING FACTORS: \"What makes it better or worse?\" (e.g., antacids, bowel movement, movement)      Alleviates with movement but then returns   10. OTHER SYMPTOMS: \"Has there been any vaginal bleeding, fever, vomiting, diarrhea, or urine problems?\"        Clear discharge when wiping   11. JENNY: \"What date are you expecting to deliver?\"        Dec 5th     Muscle spasm on top of the stomach and right side   First baby  Staying hydrating -    Protocols used: Pregnancy - Abdominal Pain Less Than 20 Weeks EGA-ADULT-  Provider recommend rest, hydration and try Tylenol. Advised to call back if symptoms dont improve .  "

## 2024-07-01 ENCOUNTER — TELEMEDICINE (OUTPATIENT)
Dept: PSYCHIATRY | Facility: CLINIC | Age: 29
End: 2024-07-01
Payer: COMMERCIAL

## 2024-07-01 DIAGNOSIS — F41.9 ANXIETY DISORDER, UNSPECIFIED TYPE: Primary | ICD-10-CM

## 2024-07-01 PROCEDURE — 90834 PSYTX W PT 45 MINUTES: CPT | Performed by: SOCIAL WORKER

## 2024-07-01 NOTE — PSYCH
"Behavioral Health Psychotherapy Progress Note    Psychotherapy Provided: Individual Psychotherapy     No diagnosis found.    Goals addressed in session: Goal 1     DATA: Clinician explored client's current stressors discussed client's concerns for her baby-- identified pending tests that have idenitified abnormalities. Clinician and client challenged worst case scenarios-- situations and solutions that she would have to utilize. Clinician reassured client that God does not give her anything that she cannot handle. Discussed her options-- and the benefits of waiting to see her baby as she is not going to abort the baby.     During this session, this clinician used the following therapeutic modalities: Client-centered Therapy, Cognitive Behavioral Therapy, Solution-Focused Therapy, and Supportive Psychotherapy    Substance Abuse was not addressed during this session. If the client is diagnosed with a co-occurring substance use disorder, please indicate any changes in the frequency or amount of use: NA. Stage of change for addressing substance use diagnoses: No substance use/Not applicable    ASSESSMENT:  Erika Palomo presents with a Euthymic/ normal, Anxious, and Dysthymic mood.     her affect is Normal range and intensity and Tearful, which is congruent, with her mood and the content of the session. The client has made progress on their goals.     Erika Palomo presents with a none risk of suicide, none risk of self-harm, and none risk of harm to others.    For any risk assessment that surpasses a \"low\" rating, a safety plan must be developed.    A safety plan was indicated: no  If yes, describe in detail NA    PLAN: Between sessions, Erika Palomo will practice self care, thought stopping, and utilizing her support system. At the next session, the therapist will use Client-centered Therapy to address ongoing stressors    Behavioral Health Treatment Plan and Discharge Planning: Erika Palomo is aware of and " agrees to continue to work on their treatment plan. They have identified and are working toward their discharge goals. yes    Visit start and stop times:    07/01/24  Start Time: 1010  Stop Time: 1056  Total Visit Time: 46 minutes  Virtual Regular Visit    Verification of patient location:    Patient is located at Home in the following state in which I hold an active license [place]      Assessment/Plan:    Problem List Items Addressed This Visit    None      Goals addressed in session: Goal 1          Reason for visit is   Chief Complaint   Patient presents with    Virtual Regular Visit        Encounter provider Aerial ERASMO Quezada      Recent Visits  Date Type Provider Dept   07/01/24 Telemedicine Aerial Damien, [facility]   Showing recent visits within past 7 days and meeting all other requirements  Future Appointments  No visits were found meeting these conditions.  Showing future appointments within next 150 days and meeting all other requirements       The patient was identified by name and date of birth. Noy Palomo was informed that this is a telemedicine visit and that the visit is being conducted throughthe B[employer] platform. She agrees to proceed..  My office door was closed. No one else was in the room.  She acknowledged consent and understanding of privacy and security of the video platform. The patient has agreed to participate and understands they can discontinue the visit at any time.    Patient is aware this is a billable service.     Subjective  Noy Palomo is a 28 y.o. female  .      HPI     Past Medical History:   Diagnosis Date    Anemia     takes irondaly    Asthma     Chronic kidney disease     sm cyst on left kidney diag 10 yrs ago    Gastritis     GERD (gastroesophageal reflux disease)     Hx of gastritis     Migraine     Obesity     Seasonal allergies     Umbilical hernia     Varicella     had vaccines       Past Surgical History:   Procedure Laterality  Date    INSERTION OF INTRAUTERINE DEVICE (IUD)      ID LAPS [facility] RSTRICTIV PX LONGITUDINAL GASTRECTOMY N/A 01/09/2023    Procedure: GASTRECTOMY SLEEVE LAP & INTRAOPERATIVE EGD;  Surgeon: Leif Tolliver MD;  Location: [facility];  Service: Bariatrics    WISDOM TOOTH EXTRACTION Bilateral 03/2020       Current Outpatient Medications   Medication Sig Dispense Refill    acetaminophen (TYLENOL) 500 mg tablet Take 500 mg by mouth every 6 (six) hours as needed for mild pain      Blood Glucose Monitoring Suppl (Contour Next EZ) w/Device KIT Test 4 times daily for 1 week 1 kit 0    calcium carbonate (TUMS) 500 mg chewable tablet Chew 1 tablet daily (Patient not taking: Reported on 6/5/2024)      Contour Next Test test strip Test 4 times daily for 1 week. 100 strip 0    EPINEPHrine (EPIPEN) 0.3 mg/0.3 mL SOAJ Inject 0.3 mL (0.3 mg total) into a muscle once for 1 dose (Patient taking differently: Inject 0.3 mg into a muscle once PRN) 0.6 mL 0    fluticasone-salmeterol (Advair HFA) 230-21 MCG/ACT inhaler Inhale 1 puff 2 (two) times a day Rinse mouth after use. 8 g 0    folic acid ( Folic Acid) 1 mg tablet Take 1 tablet (1 mg total) by mouth daily 90 tablet 3    Microlet Lancets MISC Test 4 times daily for 1 week. 100 each 0    Multiple Vitamins-Minerals (BARIATRIC MULTIVITAMINS/IRON PO) Take by mouth       No current facility-administered medications for this visit.        Allergies   Allergen Reactions    Cat Hair Extract Shortness Of Breath    Contrast [Iodinated Contrast Media] Anaphylaxis     throat closure    Fish-Derived Products - Food Allergy Anaphylaxis     Anaphylaxis    Pollen Extract Other (See Comments)     hives, throat closure    Shellfish-Derived Products - Food Allergy        Review of Systems    Video Exam    There were no vitals filed for this visit.    Physical Exam

## 2024-07-11 ENCOUNTER — APPOINTMENT (OUTPATIENT)
Dept: LAB | Facility: CLINIC | Age: 29
End: 2024-07-11
Payer: COMMERCIAL

## 2024-07-11 ENCOUNTER — ROUTINE PRENATAL (OUTPATIENT)
Dept: OBGYN CLINIC | Facility: CLINIC | Age: 29
End: 2024-07-11
Payer: COMMERCIAL

## 2024-07-11 VITALS
HEART RATE: 73 BPM | BODY MASS INDEX: 31.74 KG/M2 | WEIGHT: 168 LBS | DIASTOLIC BLOOD PRESSURE: 66 MMHG | SYSTOLIC BLOOD PRESSURE: 116 MMHG | OXYGEN SATURATION: 99 %

## 2024-07-11 DIAGNOSIS — O99.210 OTHER OBESITY AFFECTING PREGNANCY, ANTEPARTUM: ICD-10-CM

## 2024-07-11 DIAGNOSIS — Z33.1 PREGNANT STATE, INCIDENTAL: ICD-10-CM

## 2024-07-11 DIAGNOSIS — Z98.890 STATUS POST GASTRIC SURGERY: ICD-10-CM

## 2024-07-11 DIAGNOSIS — Z36.9 UNSPECIFIED ANTENATAL SCREENING: ICD-10-CM

## 2024-07-11 DIAGNOSIS — Z53.1 TRANSFUSION OF BLOOD PRODUCT DECLINED DUE TO RELIGIOUS REASON: ICD-10-CM

## 2024-07-11 DIAGNOSIS — Z3A.19 19 WEEKS GESTATION OF PREGNANCY: ICD-10-CM

## 2024-07-11 DIAGNOSIS — E66.8 OTHER OBESITY AFFECTING PREGNANCY, ANTEPARTUM: ICD-10-CM

## 2024-07-11 DIAGNOSIS — Z34.02 PRENATAL CARE, FIRST PREGNANCY IN SECOND TRIMESTER: Primary | ICD-10-CM

## 2024-07-11 DIAGNOSIS — Z00.8 ENCOUNTER FOR OTHER GENERAL EXAMINATION: ICD-10-CM

## 2024-07-11 LAB
CHOLEST SERPL-MCNC: 209 MG/DL
EST. AVERAGE GLUCOSE BLD GHB EST-MCNC: 103 MG/DL
HBA1C MFR BLD: 5.2 %
HDLC SERPL-MCNC: 94 MG/DL
LDLC SERPL CALC-MCNC: 101 MG/DL (ref 0–100)
NONHDLC SERPL-MCNC: 115 MG/DL
SL AMB  POCT GLUCOSE, UA: NORMAL
SL AMB POCT URINE PROTEIN: NORMAL
TRIGL SERPL-MCNC: 71 MG/DL

## 2024-07-11 PROCEDURE — 36415 COLL VENOUS BLD VENIPUNCTURE: CPT

## 2024-07-11 PROCEDURE — 82105 ALPHA-FETOPROTEIN SERUM: CPT

## 2024-07-11 PROCEDURE — 83036 HEMOGLOBIN GLYCOSYLATED A1C: CPT

## 2024-07-11 PROCEDURE — 80061 LIPID PANEL: CPT

## 2024-07-11 PROCEDURE — PNV: Performed by: PHYSICIAN ASSISTANT

## 2024-07-11 PROCEDURE — 81002 URINALYSIS NONAUTO W/O SCOPE: CPT | Performed by: PHYSICIAN ASSISTANT

## 2024-07-11 NOTE — ASSESSMENT & PLAN NOTE
Noy  is a 28 y.o.  @19w0d who presents for routine prenatal visit.      Some dizziness/lightheadedness with prolonged standing/exertion. She did have some low blood sugar reading when doing BG monitoring for early GDM screen. Reviewed small frequent meals, adequate hydration, use of compression stockings, taking freq breaks/rest. Precautions given.     NIPT - low risk  AFP -  ordered - reviewed timing recommendations for completion  Level II - scheduled     TWG 2.722 kg (6 lb)    Good fetal movement.  Denies contractions, cramping, leakage of fluid or vaginal bleeding.     Reviewed PTL precautions and reasons to call.

## 2024-07-11 NOTE — PROGRESS NOTES
Prenatal visit  GA  19w 0d   Denies any vaginal bleeding or leaking of fluid.  Patient reports mild right sided pelvic pain and back pain. She also reports dizziness about 2 weeks ago along with persistent lightheadedness.   Normal  Fetal movement   Prenatal  labs completed 06/03/2024  AFP not completed   Level II scheduled 07/25/2024

## 2024-07-11 NOTE — ASSESSMENT & PLAN NOTE
TWG 6 lbs. Completed BG monitoring with DPP. Reports some low sugar but otherwise in range. To send in.

## 2024-07-11 NOTE — PROGRESS NOTES
Problem List Items Addressed This Visit       Transfusion of blood product declined due to Latter day reason    Status post gastric surgery    19 weeks gestation of pregnancy     Noy  is a 28 y.o.  @19w0d who presents for routine prenatal visit.      Some dizziness/lightheadedness with prolonged standing/exertion. She did have some low blood sugar reading when doing BG monitoring for early GDM screen. Reviewed small frequent meals, adequate hydration, use of compression stockings, taking freq breaks/rest. Precautions given.     NIPT - low risk  AFP -  ordered - reviewed timing recommendations for completion  Level II - scheduled     TWG 2.722 kg (6 lb)    Good fetal movement.  Denies contractions, cramping, leakage of fluid or vaginal bleeding.     Reviewed PTL precautions and reasons to call.             Maternal obesity affecting pregnancy, antepartum     TWG 6 lbs. Completed BG monitoring with DPP. Reports some low sugar but otherwise in range. To send in.           Other Visit Diagnoses       Prenatal care, first pregnancy in second trimester    -  Primary    Relevant Orders    POCT urine dip (Completed)

## 2024-07-13 LAB
2ND TRIMESTER 4 SCREEN SERPL-IMP: NORMAL
AFP ADJ MOM SERPL: 1.13
AFP INTERP AMN-IMP: NORMAL
AFP INTERP SERPL-IMP: NORMAL
AFP INTERP SERPL-IMP: NORMAL
AFP SERPL-MCNC: 54.9 NG/ML
AGE AT DELIVERY: 28.1 YR
GA METHOD: NORMAL
GA: 19 WEEKS
IDDM PATIENT QL: NO
MULTIPLE PREGNANCY: NO
NEURAL TUBE DEFECT RISK FETUS: 8106 %

## 2024-07-15 ENCOUNTER — TELEPHONE (OUTPATIENT)
Dept: OBGYN CLINIC | Facility: CLINIC | Age: 29
End: 2024-07-15

## 2024-07-15 NOTE — TELEPHONE ENCOUNTER
.Overall how are you doing? good    Compliant with routine OB care appointments? yes    Have you completed your 1st trimester labs? yes    If you had NIPS with MFM, do you have a order for MSAFP? yes   Can be completed 15w-22w9d, ideally 16w-18w    Have you seen MFM and do you have your detailed US scheduled? yes    Pregnancy Education-have you had a chance to review the classes offered and registered? She did but their is no openings    EPDS Score 0

## 2024-07-24 PROBLEM — O99.840 PREVIOUS BARIATRIC SURGERY AFFECTING PREGNANCY, ANTEPARTUM: Status: ACTIVE | Noted: 2024-07-24

## 2024-07-24 PROBLEM — R10.2 PELVIC PAIN: Status: RESOLVED | Noted: 2022-01-12 | Resolved: 2024-07-24

## 2024-07-25 ENCOUNTER — ROUTINE PRENATAL (OUTPATIENT)
Dept: PERINATAL CARE | Facility: CLINIC | Age: 29
End: 2024-07-25
Payer: COMMERCIAL

## 2024-07-25 ENCOUNTER — TELEMEDICINE (OUTPATIENT)
Dept: PSYCHIATRY | Facility: CLINIC | Age: 29
End: 2024-07-25
Payer: COMMERCIAL

## 2024-07-25 VITALS
SYSTOLIC BLOOD PRESSURE: 98 MMHG | OXYGEN SATURATION: 99 % | BODY MASS INDEX: 29.06 KG/M2 | HEIGHT: 64 IN | HEART RATE: 92 BPM | WEIGHT: 170.2 LBS | DIASTOLIC BLOOD PRESSURE: 58 MMHG

## 2024-07-25 DIAGNOSIS — Z36.86 ENCOUNTER FOR ANTENATAL SCREENING FOR CERVICAL LENGTH: ICD-10-CM

## 2024-07-25 DIAGNOSIS — O99.840 PREVIOUS BARIATRIC SURGERY AFFECTING PREGNANCY, ANTEPARTUM: Primary | ICD-10-CM

## 2024-07-25 DIAGNOSIS — F41.9 ANXIETY DISORDER, UNSPECIFIED TYPE: Primary | ICD-10-CM

## 2024-07-25 DIAGNOSIS — Z3A.21 21 WEEKS GESTATION OF PREGNANCY: ICD-10-CM

## 2024-07-25 DIAGNOSIS — Z33.1 PREGNANT STATE, INCIDENTAL: ICD-10-CM

## 2024-07-25 DIAGNOSIS — Z36.3 ENCOUNTER FOR ANTENATAL SCREENING FOR MALFORMATIONS: ICD-10-CM

## 2024-07-25 PROCEDURE — 76805 OB US >/= 14 WKS SNGL FETUS: CPT | Performed by: OBSTETRICS & GYNECOLOGY

## 2024-07-25 PROCEDURE — 76817 TRANSVAGINAL US OBSTETRIC: CPT | Performed by: OBSTETRICS & GYNECOLOGY

## 2024-07-25 PROCEDURE — 99213 OFFICE O/P EST LOW 20 MIN: CPT | Performed by: OBSTETRICS & GYNECOLOGY

## 2024-07-25 PROCEDURE — 90837 PSYTX W PT 60 MINUTES: CPT | Performed by: SOCIAL WORKER

## 2024-07-25 RX ORDER — ASPIRIN 81 MG/1
162 TABLET, CHEWABLE ORAL DAILY
COMMUNITY

## 2024-07-25 NOTE — PROGRESS NOTES
"[facility]: Ms. Palomo was seen today for anatomic survey and cervical length screening ultrasound.  See ultrasound report under \"OB Procedures\" tab.   The time spent on this established patient on the encounter date included 5 minutes previsit service time reviewing records and precharting, 5 minutes face-to-face service time counseling regarding results and coordinating care, and  4 minutes charting, totalling 14 minutes.  Please don't hesitate to contact our office with any concerns or questions.  -Bethany Gonzalez MD      "

## 2024-07-25 NOTE — PROGRESS NOTES
Ultrasound Probe Disinfection    A transvaginal ultrasound was performed.   Prior to use, disinfection was performed with High Level Disinfection Process ([employer]).  Probe serial number [id number] DOMINIC was used.      Tami Hutchison  07/25/24  8:02 AM

## 2024-07-25 NOTE — PSYCH
"Behavioral Health Psychotherapy Progress Note    Psychotherapy Provided: Individual Psychotherapy     No diagnosis found.    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 Clinician and client identified problems-- brainstormed solutions regarding aligning with the team as well as managing her time with her family.   During this session, this clinician used the following therapeutic modalities: Client-centered Therapy, Solution-Focused Therapy, and Supportive Psychotherapy    Substance Abuse was not addressed during this session. If the client is diagnosed with a co-occurring substance use disorder, please indicate any changes in the frequency or amount of use: NA. Stage of change for addressing substance use diagnoses: No substance use/Not applicable    ASSESSMENT:  Erika Palomo presents with a Euthymic/ normal mood.     her affect is Normal range and intensity, which is congruent, with her mood and the content of the session. The client has made progress on their goals.     Erika Palomo presents with a none risk of suicide, none risk of self-harm, and none risk of harm to others.    For any risk assessment that surpasses a \"low\" rating, a safety plan must be developed.    A safety plan was indicated: no  If yes, describe in detail NA    PLAN: Between sessions, Erika Palomo will continue to practice self care, setting boundaries on work. At the next session, the therapist will use Client-centered Therapy to address ongoing stressors.    Behavioral Health Treatment Plan and Discharge Planning: Erika Palomo is aware of and agrees to continue to work on their treatment plan. They have identified and are working toward their discharge goals. yes    Visit start and stop times:    07/25/24     Virtual Regular Visit    Verification of patient location:    Patient is located at " Home in the following state in which I hold an active license [place]      Assessment/Plan:    Problem List Items Addressed This Visit    None      Goals addressed in session: Goal 1          Reason for visit is No chief complaint on file.       Encounter provider Brie Quezada LCSW      Recent Visits  No visits were found meeting these conditions.  Showing recent visits within past 7 days and meeting all other requirements  Future Appointments  No visits were found meeting these conditions.  Showing future appointments within next 150 days and meeting all other requirements       The patient was identified by name and date of birth. Noy Palomo was informed that this is a telemedicine visit and that the visit is being conducted throughthe [employer] platform. She agrees to proceed..  My office door was closed. No one else was in the room.  She acknowledged consent and understanding of privacy and security of the video platform. The patient has agreed to participate and understands they can discontinue the visit at any time.    Patient is aware this is a billable service.     Subjective  Noy Palomo is a 28 y.o. female  .      HPI     Past Medical History:   Diagnosis Date    Anemia     takes irondaly    Asthma     Chronic kidney disease     sm cyst on left kidney diag 10 yrs ago    Gastritis     GERD (gastroesophageal reflux disease)     Hx of gastritis     Migraine     Obesity     Seasonal allergies     Umbilical hernia     Varicella     had vaccines       Past Surgical History:   Procedure Laterality Date    INSERTION OF INTRAUTERINE DEVICE (IUD)      ID LAPS GSTRC RSTRICTIV PX LONGITUDINAL GASTRECTOMY N/A 01/09/2023    Procedure: GASTRECTOMY SLEEVE LAP & INTRAOPERATIVE EGD;  Surgeon: Leif Tolliver MD;  Location: [facility];  Service: Bariatrics    WISDOM TOOTH EXTRACTION Bilateral 03/2020       Current Outpatient Medications   Medication Sig Dispense Refill    acetaminophen (TYLENOL) 500 mg tablet Take  500 mg by mouth every 6 (six) hours as needed for mild pain      aspirin 81 mg chewable tablet Chew 162 mg daily      Blood Glucose Monitoring Suppl (Contour Next EZ) w/Device KIT Test 4 times daily for 1 week (Patient not taking: Reported on 7/11/2024) 1 kit 0    calcium carbonate (TUMS) 500 mg chewable tablet Chew 1 tablet daily      Contour Next Test test strip Test 4 times daily for 1 week. (Patient not taking: Reported on 7/11/2024) 100 strip 0    EPINEPHrine (EPIPEN) 0.3 mg/0.3 mL SOAJ Inject 0.3 mL (0.3 mg total) into a muscle once for 1 dose (Patient taking differently: Inject 0.3 mg into a muscle once PRN) 0.6 mL 0    fluticasone-salmeterol (Advair HFA) 230-21 MCG/ACT inhaler Inhale 1 puff 2 (two) times a day Rinse mouth after use. 8 g 0    folic acid ( Folic Acid) 1 mg tablet Take 1 tablet (1 mg total) by mouth daily 90 tablet 3    Microlet Lancets MISC Test 4 times daily for 1 week. (Patient not taking: Reported on 7/11/2024) 100 each 0    Multiple Vitamins-Minerals (BARIATRIC MULTIVITAMINS/IRON PO) Take by mouth       No current facility-administered medications for this visit.        Allergies   Allergen Reactions    Cat Hair Extract Shortness Of Breath    Contrast [Iodinated Contrast Media] Anaphylaxis     throat closure    Fish-Derived Products - Food Allergy Anaphylaxis     Anaphylaxis    Pollen Extract Other (See Comments)     hives, throat closure    Shellfish-Derived Products - Food Allergy        Review of Systems    Video Exam    There were no vitals filed for this visit.    Physical Exam

## 2024-08-08 ENCOUNTER — ROUTINE PRENATAL (OUTPATIENT)
Dept: OBGYN CLINIC | Facility: CLINIC | Age: 29
End: 2024-08-08
Payer: COMMERCIAL

## 2024-08-08 VITALS
DIASTOLIC BLOOD PRESSURE: 60 MMHG | BODY MASS INDEX: 30.38 KG/M2 | SYSTOLIC BLOOD PRESSURE: 122 MMHG | OXYGEN SATURATION: 99 % | WEIGHT: 177 LBS | HEART RATE: 108 BPM

## 2024-08-08 DIAGNOSIS — O99.210 OBESITY AFFECTING PREGNANCY, ANTEPARTUM, UNSPECIFIED OBESITY TYPE: ICD-10-CM

## 2024-08-08 DIAGNOSIS — Z3A.23 23 WEEKS GESTATION OF PREGNANCY: ICD-10-CM

## 2024-08-08 DIAGNOSIS — Z34.02 PRENATAL CARE, FIRST PREGNANCY IN SECOND TRIMESTER: Primary | ICD-10-CM

## 2024-08-08 DIAGNOSIS — Z53.1 TRANSFUSION OF BLOOD PRODUCT DECLINED DUE TO RELIGIOUS REASON: ICD-10-CM

## 2024-08-08 DIAGNOSIS — O99.840 PREVIOUS BARIATRIC SURGERY AFFECTING PREGNANCY, ANTEPARTUM: ICD-10-CM

## 2024-08-08 LAB
SL AMB  POCT GLUCOSE, UA: NORMAL
SL AMB POCT URINE PROTEIN: NORMAL

## 2024-08-08 PROCEDURE — 81002 URINALYSIS NONAUTO W/O SCOPE: CPT | Performed by: STUDENT IN AN ORGANIZED HEALTH CARE EDUCATION/TRAINING PROGRAM

## 2024-08-08 PROCEDURE — PNV: Performed by: STUDENT IN AN ORGANIZED HEALTH CARE EDUCATION/TRAINING PROGRAM

## 2024-08-08 NOTE — ASSESSMENT & PLAN NOTE
27yo  at 23.0wks presents for routine prenatal visit     Pt denies contractions, vaginal bleeding, leakage of fluid. Endorses fetal movement.    -continue PNVs   -28wk labs ordered today, plan to do 1 week fingerstick testing   - labor precautions provided  -return in 3 weeks

## 2024-08-08 NOTE — PROGRESS NOTES
23 weeks gestation of pregnancy  27yo  at 23.0wks presents for routine prenatal visit     Pt denies contractions, vaginal bleeding, leakage of fluid. Endorses fetal movement.    -continue PNVs   -28wk labs ordered today, plan to do 1 week fingerstick testing   - labor precautions provided  -return in 3 weeks     Maternal obesity affecting pregnancy, antepartum  -continue ASA    Previous bariatric surgery affecting pregnancy, antepartum  -plan to do 1 week fingerstick testing with 28wk labs    Transfusion of blood product declined due to Rastafari reason  -encouraged to bring in blood product consent form to be reviewed

## 2024-08-12 ENCOUNTER — TELEPHONE (OUTPATIENT)
Age: 29
End: 2024-08-12

## 2024-08-12 ENCOUNTER — TELEMEDICINE (OUTPATIENT)
Dept: PSYCHIATRY | Facility: CLINIC | Age: 29
End: 2024-08-12
Payer: COMMERCIAL

## 2024-08-12 DIAGNOSIS — F41.9 ANXIETY DISORDER, UNSPECIFIED TYPE: Primary | ICD-10-CM

## 2024-08-12 PROCEDURE — 90837 PSYTX W PT 60 MINUTES: CPT | Performed by: SOCIAL WORKER

## 2024-08-12 NOTE — PSYCH
"Behavioral Health Psychotherapy Progress Note    Psychotherapy Provided: Individual Psychotherapy     1. Anxiety disorder, unspecified type            Goals addressed in session: Goal 1     DATA: Clinician explored client's current stressors-- discussed her work life balance; brainstormed possible solutions to address the burn out but also to maintain her prospects and her future maternity leave. Clinician and client identified the progress made within the house and within her partner and her's communication.   During this session, this clinician used the following therapeutic modalities: Client-centered Therapy, Cognitive Behavioral Therapy, Solution-Focused Therapy, and Supportive Psychotherapy    Substance Abuse was not addressed during this session. If the client is diagnosed with a co-occurring substance use disorder, please indicate any changes in the frequency or amount of use: NA. Stage of change for addressing substance use diagnoses: No substance use/Not applicable    ASSESSMENT:  Erika Palomo presents with a Euthymic/ normal mood.     her affect is Normal range and intensity, which is congruent, with her mood and the content of the session. The client has made progress on their goals.     Erika Palomo presents with a none risk of suicide, none risk of self-harm, and none risk of harm to others.    For any risk assessment that surpasses a \"low\" rating, a safety plan must be developed.    A safety plan was indicated: no  If yes, describe in detail NA                              PLAN: Between sessions, Erika Palomo will continue to utilize self care . At the next session, the therapist will use Cognitive Behavioral Therapy, Solution-Focused Therapy, and Supportive Psychotherapy to address ongoing stressors.    Behavioral Health Treatment Plan and Discharge Planning: Erika Palomo is aware of and agrees to continue to work on their treatment plan. They have identified and are working toward their " discharge goals. yes    Visit start and stop times:    08/12/24  Start Time: 1004  Stop Time: 1101  Total Visit Time: 57 minutes  Virtual Regular Visit    Verification of patient location:    Patient is located at Home in the following state in which I hold an active license [place]      Assessment/Plan:    Problem List Items Addressed This Visit    None  Visit Diagnoses       Anxiety disorder, unspecified type    -  Primary            Goals addressed in session: Goal 1          Reason for visit is No chief complaint on file.       Encounter provider Aerial Damien, SAGARW      Recent Visits  Date Type Provider Dept   08/12/24 Telemedicine Aerial Damien, LCSW [facility]   Showing recent visits within past 7 days and meeting all other requirements  Future Appointments  No visits were found meeting these conditions.  Showing future appointments within next 150 days and meeting all other requirements       The patient was identified by name and date of birth. Noy Palomo was informed that this is a telemedicine visit and that the visit is being conducted throughthe epacube platform. She agrees to proceed..  My office door was closed. No one else was in the room.  She acknowledged consent and understanding of privacy and security of the video platform. The patient has agreed to participate and understands they can discontinue the visit at any time.    Patient is aware this is a billable service.     Subjective  Noy Palomo is a 28 y.o. female  .      HPI     Past Medical History:   Diagnosis Date    Anemia     takes irondaly    Asthma     Chronic kidney disease     sm cyst on left kidney diag 10 yrs ago    Gastritis     GERD (gastroesophageal reflux disease)     Hx of gastritis     Migraine     Obesity     Seasonal allergies     Umbilical hernia     Varicella     had vaccines       Past Surgical History:   Procedure Laterality Date    INSERTION OF INTRAUTERINE DEVICE (IUD)      NM LAPS [facility]  RSTRICTIV PX LONGITUDINAL GASTRECTOMY N/A 01/09/2023    Procedure: GASTRECTOMY SLEEVE LAP & INTRAOPERATIVE EGD;  Surgeon: Leif Tolliver MD;  Location: [facility];  Service: Bariatrics    WISDOM TOOTH EXTRACTION Bilateral 03/2020       Current Outpatient Medications   Medication Sig Dispense Refill    acetaminophen (TYLENOL) 500 mg tablet Take 500 mg by mouth every 6 (six) hours as needed for mild pain      aspirin 81 mg chewable tablet Chew 162 mg daily      Blood Glucose Monitoring Suppl (Contour Next EZ) w/Device KIT Test 4 times daily for 1 week (Patient not taking: Reported on 7/11/2024) 1 kit 0    calcium carbonate (TUMS) 500 mg chewable tablet Chew 1 tablet daily      Contour Next Test test strip Test 4 times daily for 1 week. (Patient not taking: Reported on 7/11/2024) 100 strip 0    EPINEPHrine (EPIPEN) 0.3 mg/0.3 mL SOAJ Inject 0.3 mL (0.3 mg total) into a muscle once for 1 dose (Patient taking differently: Inject 0.3 mg into a muscle once PRN) 0.6 mL 0    fluticasone-salmeterol (Advair HFA) 230-21 MCG/ACT inhaler Inhale 1 puff 2 (two) times a day Rinse mouth after use. 8 g 0    folic acid ( Folic Acid) 1 mg tablet Take 1 tablet (1 mg total) by mouth daily 90 tablet 3    Microlet Lancets MISC Test 4 times daily for 1 week. (Patient not taking: Reported on 7/11/2024) 100 each 0    Multiple Vitamins-Minerals (BARIATRIC MULTIVITAMINS/IRON PO) Take by mouth       No current facility-administered medications for this visit.        Allergies   Allergen Reactions    Cat Hair Extract Shortness Of Breath    Contrast [Iodinated Contrast Media] Anaphylaxis     throat closure    Fish-Derived Products - Food Allergy Anaphylaxis     Anaphylaxis    Pollen Extract Other (See Comments)     hives, throat closure    Shellfish-Derived Products - Food Allergy        Review of Systems    Video Exam    There were no vitals filed for this visit.    Physical Exam

## 2024-08-17 ENCOUNTER — CLINICAL SUPPORT (OUTPATIENT)
Age: 29
End: 2024-08-17

## 2024-08-17 DIAGNOSIS — Z32.2 ENCOUNTER FOR CHILDBIRTH INSTRUCTION: Primary | ICD-10-CM

## 2024-08-22 ENCOUNTER — ULTRASOUND (OUTPATIENT)
Dept: PERINATAL CARE | Facility: CLINIC | Age: 29
End: 2024-08-22
Payer: COMMERCIAL

## 2024-08-22 VITALS
HEIGHT: 61 IN | BODY MASS INDEX: 34.06 KG/M2 | HEART RATE: 102 BPM | WEIGHT: 180.4 LBS | DIASTOLIC BLOOD PRESSURE: 62 MMHG | SYSTOLIC BLOOD PRESSURE: 108 MMHG | OXYGEN SATURATION: 99 %

## 2024-08-22 DIAGNOSIS — E66.09 OTHER OBESITY DUE TO EXCESS CALORIES AFFECTING PREGNANCY, ANTEPARTUM: ICD-10-CM

## 2024-08-22 DIAGNOSIS — Z3A.25 25 WEEKS GESTATION OF PREGNANCY: ICD-10-CM

## 2024-08-22 DIAGNOSIS — O99.840 PREVIOUS BARIATRIC SURGERY AFFECTING PREGNANCY, ANTEPARTUM: Primary | ICD-10-CM

## 2024-08-22 DIAGNOSIS — O99.210 OTHER OBESITY DUE TO EXCESS CALORIES AFFECTING PREGNANCY, ANTEPARTUM: ICD-10-CM

## 2024-08-22 DIAGNOSIS — Z14.1 CYSTIC FIBROSIS CARRIER: ICD-10-CM

## 2024-08-22 PROBLEM — Z82.79 FAMILY HISTORY OF DOWN SYNDROME: Status: RESOLVED | Noted: 2024-06-05 | Resolved: 2024-08-22

## 2024-08-22 PROCEDURE — 76816 OB US FOLLOW-UP PER FETUS: CPT | Performed by: OBSTETRICS & GYNECOLOGY

## 2024-08-22 PROCEDURE — 99213 OFFICE O/P EST LOW 20 MIN: CPT | Performed by: OBSTETRICS & GYNECOLOGY

## 2024-08-22 RX ORDER — OMEPRAZOLE 10 MG/1
10 CAPSULE, DELAYED RELEASE ORAL DAILY
Qty: 90 CAPSULE | Refills: 2 | Status: SHIPPED | OUTPATIENT
Start: 2024-08-22

## 2024-08-22 NOTE — LETTER
August 22, 2024     ANAMIKA Jarrett  [address]   [address]  [facility] [address]    Patient: Noy Palomo   YOB: 1995   Date of Visit: 8/22/2024       Dear Dr. Cantor:    Thank you for referring Noy Palomo to me for evaluation. Below are my notes for this consultation.    If you have questions, please do not hesitate to call me. I look forward to following your patient along with you.         Sincerely,        Nellie Garza MD        CC: No Recipients    Nellie Garza MD  8/22/2024 11:12 AM  Sign when Signing Visit  Noy Palomo  has no complaints today at 25w0d. She reports fetal movements and does not report any vaginal bleeding or signs of labor.  Her recently completed fetal testing revealed a normal NIPT and MSAFP. She is here today for an ultrasound for fetal growth and missed anatomy    Problem list:  Prior gastric sleeve with normal vitamin studies on 6/3/2024  Maternal asthma  Obesity based on a pregravid BMI of 30  CF carrier-she met with genetic counseling and her partner Aaron reports his screening returned as normal showing he is not a carrier  Mandaen-she reports she is receiving an updated blood products consent form from my office which she will fill out and return.    Ultrasound findings:  The ultrasound today shows normal interval fetal growth and fluid.  The prior missed anatomy was reviewed and appears normal although views of the fetal profile and cardiac septum are fair due to fetal position and should be repeated with her next scan.     Pregnancy ultrasound has limitations and is unable to detect all forms of fetal congenital abnormalities.      Follow up recommended:   Recommend a follow-up ultrasound for growth at 32 weeks along with repeat views of the fetal profile and fetal cardiac septum.  Recommend repeat vitamin studies with her 28-week labs.  She has a glucometer to screen for GDM at 28 weeks.    Pre visit time  reviewing her records   10 minutes  Face to face time 5 minutes  Post visit time on documentation of note, updating her problem list, adding orders and prescriptions 10 minutes.  Procedures that were completed today were charged separately.   The level of decision making was low level complexity.    Nellie Garza MD

## 2024-08-22 NOTE — PROGRESS NOTES
Noy Palomo  has no complaints today at 25w0d. She reports fetal movements and does not report any vaginal bleeding or signs of labor.  Her recently completed fetal testing revealed a normal NIPT and MSAFP. She is here today for an ultrasound for fetal growth and missed anatomy    Problem list:  Prior gastric sleeve with normal vitamin studies on 6/3/2024  Maternal asthma  Obesity based on a pregravid BMI of 30  CF carrier-she met with genetic counseling and her partner Aaron reports his screening returned as normal showing he is not a carrier  Buddhist-she reports she is receiving an updated blood products consent form from my office which she will fill out and return.    Ultrasound findings:  The ultrasound today shows normal interval fetal growth and fluid.  The prior missed anatomy was reviewed and appears normal although views of the fetal profile and cardiac septum are fair due to fetal position and should be repeated with her next scan.     Pregnancy ultrasound has limitations and is unable to detect all forms of fetal congenital abnormalities.      Follow up recommended:   Recommend a follow-up ultrasound for growth at 32 weeks along with repeat views of the fetal profile and fetal cardiac septum.  Recommend repeat vitamin studies with her 28-week labs.  She has a glucometer to screen for GDM at 28 weeks.    Pre visit time reviewing her records   10 minutes  Face to face time 5 minutes  Post visit time on documentation of note, updating her problem list, adding orders and prescriptions 10 minutes.  Procedures that were completed today were charged separately.   The level of decision making was low level complexity.    Nellie Garza MD

## 2024-09-05 ENCOUNTER — ROUTINE PRENATAL (OUTPATIENT)
Dept: OBGYN CLINIC | Facility: CLINIC | Age: 29
End: 2024-09-05
Payer: COMMERCIAL

## 2024-09-05 ENCOUNTER — TELEPHONE (OUTPATIENT)
Dept: PSYCHIATRY | Facility: CLINIC | Age: 29
End: 2024-09-05

## 2024-09-05 VITALS — WEIGHT: 184 LBS | DIASTOLIC BLOOD PRESSURE: 52 MMHG | SYSTOLIC BLOOD PRESSURE: 104 MMHG | BODY MASS INDEX: 34.77 KG/M2

## 2024-09-05 DIAGNOSIS — Z3A.27 27 WEEKS GESTATION OF PREGNANCY: ICD-10-CM

## 2024-09-05 DIAGNOSIS — O99.210 OBESITY AFFECTING PREGNANCY, ANTEPARTUM, UNSPECIFIED OBESITY TYPE: ICD-10-CM

## 2024-09-05 DIAGNOSIS — O99.840 PREVIOUS BARIATRIC SURGERY AFFECTING PREGNANCY, ANTEPARTUM: ICD-10-CM

## 2024-09-05 DIAGNOSIS — Z34.02 PRENATAL CARE, FIRST PREGNANCY IN SECOND TRIMESTER: Primary | ICD-10-CM

## 2024-09-05 DIAGNOSIS — Z53.1 TRANSFUSION OF BLOOD PRODUCT DECLINED DUE TO RELIGIOUS REASON: ICD-10-CM

## 2024-09-05 LAB
SL AMB  POCT GLUCOSE, UA: NORMAL
SL AMB POCT URINE PROTEIN: NORMAL

## 2024-09-05 PROCEDURE — PNV: Performed by: STUDENT IN AN ORGANIZED HEALTH CARE EDUCATION/TRAINING PROGRAM

## 2024-09-05 PROCEDURE — 81002 URINALYSIS NONAUTO W/O SCOPE: CPT | Performed by: STUDENT IN AN ORGANIZED HEALTH CARE EDUCATION/TRAINING PROGRAM

## 2024-09-05 NOTE — ASSESSMENT & PLAN NOTE
-blood product consent form signed today and scanned into chart. Pt consented to limited transfusion of blood or blood products. Okay with cell saver.

## 2024-09-05 NOTE — TELEPHONE ENCOUNTER
Left detailed message for patient to cx 9/5 appt with Aerial Damien. Provider is out. Left TA number.

## 2024-09-05 NOTE — PROGRESS NOTES
27 weeks gestation of pregnancy  29yo  at 27.0wks presents for routine prenatal visit      Pt denies contractions, vaginal bleeding, leakage of fluid. Endorses fetal movement.     -continue PNVs   -encouraged to complete 28wk labs    - labor precautions provided  -return in 2 weeks     Maternal obesity affecting pregnancy, antepartum  -continue ASA  -32wk growth    Previous bariatric surgery affecting pregnancy, antepartum  -will perform 1 week glucose testing, [facility] through values     Transfusion of blood product declined due to Denominational reason  -blood product consent form signed today and scanned into chart. Pt consented to limited transfusion of blood or blood products. Okay with cell saver.

## 2024-09-05 NOTE — ASSESSMENT & PLAN NOTE
29yo  at 27.0wks presents for routine prenatal visit      Pt denies contractions, vaginal bleeding, leakage of fluid. Endorses fetal movement.     -continue PNVs   -encouraged to complete 28wk labs    - labor precautions provided  -return in 2 weeks

## 2024-09-13 ENCOUNTER — TELEPHONE (OUTPATIENT)
Dept: OBGYN CLINIC | Facility: CLINIC | Age: 29
End: 2024-09-13

## 2024-09-13 NOTE — TELEPHONE ENCOUNTER
----- Message from Clair DELGADO sent at 2024 10:50 AM EDT -----  Regardinnd trimester call  -24    Attempted to call pt for 2nd trimester assessment,  lmtcb  EPDS sent via my chart,   Overall how are you doing? good    Compliant with routine OB care appointments? yes    Have you completed your 1st trimester labs? yes    If you had NIPS with MFM, do you have a order for MSAFP? yes   Can be completed 15w-22w9d, ideally 16w-18w    Have you seen MFM and do you have your detailed US scheduled? yes    Pregnancy Education-have you had a chance to review the classes offered and registered? Had a tour,  birthing class, lactation classes     EPDS Score 1

## 2024-09-17 ENCOUNTER — APPOINTMENT (OUTPATIENT)
Dept: LAB | Facility: CLINIC | Age: 29
End: 2024-09-17
Payer: COMMERCIAL

## 2024-09-17 DIAGNOSIS — Z34.02 PRENATAL CARE, FIRST PREGNANCY IN SECOND TRIMESTER: ICD-10-CM

## 2024-09-17 LAB
BASOPHILS # BLD AUTO: 0.03 THOUSANDS/ΜL (ref 0–0.1)
BASOPHILS NFR BLD AUTO: 0 % (ref 0–1)
EOSINOPHIL # BLD AUTO: 0.12 THOUSAND/ΜL (ref 0–0.61)
EOSINOPHIL NFR BLD AUTO: 1 % (ref 0–6)
ERYTHROCYTE [DISTWIDTH] IN BLOOD BY AUTOMATED COUNT: 13.7 % (ref 11.6–15.1)
FERRITIN SERPL-MCNC: 44 NG/ML (ref 11–307)
HCT VFR BLD AUTO: 32.1 % (ref 34.8–46.1)
HGB BLD-MCNC: 10.3 G/DL (ref 11.5–15.4)
IMM GRANULOCYTES # BLD AUTO: 0.09 THOUSAND/UL (ref 0–0.2)
IMM GRANULOCYTES NFR BLD AUTO: 1 % (ref 0–2)
LYMPHOCYTES # BLD AUTO: 2.4 THOUSANDS/ΜL (ref 0.6–4.47)
LYMPHOCYTES NFR BLD AUTO: 19 % (ref 14–44)
MCH RBC QN AUTO: 28.1 PG (ref 26.8–34.3)
MCHC RBC AUTO-ENTMCNC: 32.1 G/DL (ref 31.4–37.4)
MCV RBC AUTO: 88 FL (ref 82–98)
MONOCYTES # BLD AUTO: 0.5 THOUSAND/ΜL (ref 0.17–1.22)
MONOCYTES NFR BLD AUTO: 4 % (ref 4–12)
NEUTROPHILS # BLD AUTO: 9.48 THOUSANDS/ΜL (ref 1.85–7.62)
NEUTS SEG NFR BLD AUTO: 75 % (ref 43–75)
NRBC BLD AUTO-RTO: 0 /100 WBCS
PLATELET # BLD AUTO: 207 THOUSANDS/UL (ref 149–390)
PMV BLD AUTO: 11.3 FL (ref 8.9–12.7)
RBC # BLD AUTO: 3.67 MILLION/UL (ref 3.81–5.12)
TREPONEMA PALLIDUM IGG+IGM AB [PRESENCE] IN SERUM OR PLASMA BY IMMUNOASSAY: NORMAL
WBC # BLD AUTO: 12.62 THOUSAND/UL (ref 4.31–10.16)

## 2024-09-17 PROCEDURE — 86780 TREPONEMA PALLIDUM: CPT

## 2024-09-17 PROCEDURE — 85025 COMPLETE CBC W/AUTO DIFF WBC: CPT

## 2024-09-17 PROCEDURE — 82728 ASSAY OF FERRITIN: CPT

## 2024-09-17 PROCEDURE — 36415 COLL VENOUS BLD VENIPUNCTURE: CPT

## 2024-09-18 PROBLEM — Z3A.29 29 WEEKS GESTATION OF PREGNANCY: Status: ACTIVE | Noted: 2024-05-28

## 2024-09-18 NOTE — ASSESSMENT & PLAN NOTE
Noy Palomo is a 28 y.o.   29w0d who presents for routine PNV.  Not taking ASA at this time, we discussed rationale for taking aspirin to prevent preeclampsia in pregnancy  28 week labs reviewed: anemic 10.3/32.1,hx of gastric bypass will need iron infusions, message sent to OB navigator   Pt completed finger sticks for 1 week, fasting's 78,  , did not submit values yet, advised to send in via my chart for review and medical decision  TWG 11.3 kg (25 lb) we discussed expected weight gain  Good fetal movement. Denies contractions, cramping, leakage of fluid or vaginal bleeding.   Mentions increased GERD, sciatic pain, we discussed interventions for both, patient may go to physical therapy if needed.  Will follow-up at next visit  Tdap vaccine given today   Reviewed  labor precautions and FKCs.   Advised to start Perineal massage at 34-36 weeks   Pregnancy Essential guide and Baby and Me web site recommended     The patient was counseled about the labor process. She was counseled regarding potential reasons that she may need a  section including arrest of dilation/descent, non-reassuring fetal status, or worsening maternal status.   She was counseled on the risks of  including bleeding, infection, and injury to surrounding structures including the bowel and bladder. She was counseled that there are medical and surgical methods to manage excessive postpartum bleeding. She was counseled that in the event of excessive blood loss, she may require blood transfusion which includes a small risk of blood borne diseases such as hepatitis and HIV. The patient is NOT OK with receiving a blood transfusion if necessary.    She was counseled about the possible need for operative delivery using the vacuum and the indications for doing so.  The patient had an opportunity to ask questions and signed consent.

## 2024-09-18 NOTE — PATIENT INSTRUCTIONS
"Patient Education     How to tell when labor starts   The Basics   Written by the doctors and editors at [facility]   What is labor? -- When a person is pregnant, labor is the process that leads to birth of the baby. Contractions of the uterus are the main sign of labor.  Labor usually starts on its own between 37 and 42 weeks of pregnancy. Your \"due date\" is at 40 weeks.  A pregnancy that lasts 37 to 42 weeks is called a \"term\" pregnancy. When labor starts before 37 weeks, doctors call it \"\" labor.  What are the signs that labor is starting? -- The signs that labor is starting, or about to start, can include the following:   The baby moves lower (or \"drops\") in your belly.   You have increased vaginal discharge that is thick, mucus-like, or slightly bloody. (\"Vaginal discharge\" is the term doctors use to describe the fluid that comes out of the vagina.) The increased vaginal discharge is sometimes called a \"mucus plug\" or a \"bloody show.\"   Your \"water breaks.\" During pregnancy, your baby is in a sac in your uterus and surrounded by a fluid called \"amniotic fluid.\" This sac typically breaks open sometime before your baby is born. When it breaks open, the fluid inside comes out of your vagina. This can feel like a big gush or just a trickle of fluid.   You have low back pain or belly cramps.   You start having contractions. During a contraction, the uterus tightens. This can be painful and make your belly feel hard. After a contraction, the uterus relaxes and the pain goes away. Some people have \"Baker-Salguero contractions\" or \"false-labor contractions.\" These feel like contractions, but they are not true contractions. They do not mean that you are in labor.  How can I tell if I'm having true contractions? -- It can be hard to tell if you are having true contractions or Edgar-Salguero contractions. But here are some ways to help tell the difference:   True contractions come every few minutes and get more " "frequent over time. Edgar-Salguero contractions can come every few minutes, but they don't get more frequent over time.   True contractions don't go away, even when you rest. Edgar-Salguero contractions usually go away when you rest.   True contractions get stronger and more painful over time. Shasta-Salguero contractions usually don't get stronger or more painful over time.   True contractions might be felt in your back and front. Edgar-Salguero contractions are usually only in front.  If you are still not sure whether you are having true contractions, call your doctor or midwife.  What should I do if I start having contractions? -- If you start having contractions, time them to see how far apart they are. That way, you can tell if they get more frequent.  You can time your contractions by keeping track of the time when each contraction starts. If you have a clock with a second hand or a timer on your smartphone, you can also time how long each contraction lasts. Your doctor or midwife will want to know how far apart your contractions are and how long they last.  When should I call my doctor or midwife? -- Call your doctor or midwife if you think that you are in labor. You should also call if any of the following things happen:   You have blood, mucus, or fluid leaking from your vagina.   You have 6 or more contractions in 1 hour. (This means that your contractions are 10 minutes apart or less.)   Your contractions are getting stronger and are painful.  Your doctor or midwife will probably want to see you to do an exam. To tell if you are in labor, they will check your cervix to see if it is opening (\"dilated\") and thinning out. They will see how frequent your contractions are. They might also do other tests.  What if my labor starts too soon? -- If you start having any symptoms of labor before 37 weeks, call your doctor right away. They might want to give you medicine to try to stop your labor or help the baby if it is " "born early.  What if my labor doesn't start on its own? -- If your labor doesn't start on its own, your doctor will talk to you about your options. They might try to start your labor with medicines. This is called \"inducing labor.\"  How long will my labor last? -- If it's your first baby, your labor will probably last for many hours. If it's not your first baby, your labor will probably be shorter.  All topics are updated as new evidence becomes available and our peer review process is complete.  This topic retrieved from [employer] on: Feb 26, 2024.  Topic 16136 Version 12.0  Release: 32.2.4 - C32.56  © 2024 UpToDate, Inc. and/or its affiliates. All rights reserved.  Consumer Information Use and Disclaimer   Disclaimer: This generalized information is a limited summary of diagnosis, treatment, and/or medication information. It is not meant to be comprehensive and should be used as a tool to help the user understand and/or assess potential diagnostic and treatment options. It does NOT include all information about conditions, treatments, medications, side effects, or risks that may apply to a specific patient. It is not intended to be medical advice or a substitute for the medical advice, diagnosis, or treatment of a health care provider based on the health care provider's examination and assessment of a patient's specific and unique circumstances. Patients must speak with a health care provider for complete information about their health, medical questions, and treatment options, including any risks or benefits regarding use of medications. This information does not endorse any treatments or medications as safe, effective, or approved for treating a specific patient. UpToDate, Inc. and its affiliates disclaim any warranty or liability relating to this information or the use thereof.The use of this information is governed by the Terms of Use, available at " "https://www.woltersCentrouwer.com/en/know/clinical-effectiveness-terms. 2024© UpToDate, Inc. and its affiliates and/or licensors. All rights reserved.  Copyright   © 2024 UpToDate, Inc. and/or its affiliates. All rights reserved.  Patient Education     Exercises for sciatic pain   The Basics   Written by the doctors and editors at [facility]   What is sciatica? -- The sciatic nerve is a large nerve that starts in the lower back. It runs all the way down the back of the leg.  Something like a disc or bone spur can pinch or damage the sciatic nerve. Tight, inflamed muscles can also pinch or damage it. This can cause pain, weakness, numbness, or tingling that goes from the buttock down the leg toward the heel. When these symptoms happen, people often call it \"sciatica.\" The medical name for this is \"radiculopathy.\"  You can have sciatic pain on 1 side or both. Most of the time, it gets better without surgery.  Why do I need to do exercises if I have sciatica? -- Stretching and strengthening exercises can help ease back pain. It might also help prevent future back pain. Long term, it is important to strengthen the muscles in your lower back, buttocks, and belly. These are your \"core muscles.\" Stretching exercises are also important to keep your muscles flexible.  Below are some stretching and strengthening exercises that might help you. Other forms of movement can help ease or prevent back pain, too. For example, some people like to walk or do aerobic exercise, yoga, or donovan chi. The most important thing is to move your body. Your doctor, nurse, or physical therapist can help you find different types of activity that work for you.  What stretching exercises should I do? -- Warm up your muscles before stretching. This helps prevent injury. To warm up, you can walk, jog, or cycle. Below are some examples of stretching exercises.  Start by repeating each of these stretches 2 to 3 times. For your body to make changes, try to hold " each stretch for 5 to 10 seconds. Try to do the stretches 2 to 3 times each day. Breathe slowly and deeply as you do the exercises. Never bounce when doing stretches.   Single knee-to-chest stretches (figure 1) ? While lying on your back, bend your knees with your feet flat on the floor. Pull 1 knee toward your chest until you feel a stretch in your lower back and buttock area. Lower, and repeat with the other knee. If you have knee problems, pull your knee up by grabbing the back of your thigh instead of the front of your knee. You can also do this exercise by grabbing both knees at the same time.   Deep hip stretches lying down (figure 2) ? Lie on your back, and bend 1 knee, keeping that foot flat on the floor. Cross the other leg over your knee. Grab the thigh of the leg that has the foot on the floor. Slowly, pull the bottom leg toward your chest until you feel a stretch in the other buttock. Repeat using the opposite leg as the bottom leg.   Deep hip stretches sitting (figure 3) ? Sit on the floor with both legs straight. Take 1 leg and cross it over the other leg so that the ankle or foot of your top leg is next to your other knee. Now, take the elbow on the opposite side of your bent knee and bring it to the outside of the bent knee. With your elbow, slowly push the bent knee further across your body to get a good stretch in the hip.   Sit backs (figure 4) - Start on your hands and knees. Your hands should be directly under your shoulders. Your knees should be spread slightly and directly under your hips. Slowly stretch your back as you bring your hips toward your ankles. Your arms are extended forward in a relaxed position, as your upper body sinks toward the floor.  What strengthening exercises should I do? -- Below are some examples of strengthening exercises.  Start by doing each exercise 2 to 3 times. Work up to doing each exercise 10 times. Hold each exercise for 3 to 5 seconds. Try to do the exercises  2 to 3 times each day. Do all exercises slowly.   Pelvic tilts (figure 5) ? Lie on your back with your knees bent and feet flat on the floor. Breathe slowly and deeply. Press your lower back down to the floor. Tighten your stomach muscles as you breathe deeply and slowly, then relax.   Hip lifts (figure 6) ? Lie on your back with your knees bent and feet flat on the floor. Breathe deeply and slowly. Tighten your stomach muscles, keep your back flat, and lift your buttocks off the floor. Relax. You should feel this in your buttocks, not in your lower back.  What else should I know?    Exercise, including stretching, might be slightly uncomfortable, but you should not have sharp or severe pain. If you do get severe pain, stop what you are doing. If severe pain continues, call your doctor or nurse.   Do not hold your breath when exercising. If you tend to hold your breath, try counting out loud when exercising.   Always warm up your muscles before exercising. Stretching before warming up can lead to injury.   Doing exercises before a meal can help you get into a routine.  All topics are updated as new evidence becomes available and our peer review process is complete.  This topic retrieved from [employer] on: May 15, 2024.  Topic 795100 Version 1.0  Release: 32.4.3 - C32.134  © 2024 UpToDate, Inc. and/or its affiliates. All rights reserved.  figure 1: Single knee-to-chest stretch     Lie on your back, bend your knees, and have your feet flat on the floor. Pull 1 knee toward your chest until you feel a stretch in your lower back and buttock area. Repeat with the other knee. If you have knee problems, pull your knee up by grabbing the back of your thigh instead of the front of your knee. You can also do this exercise by grabbing both knees at the same time.  Graphic [id number] Version 1.0  figure 2: Deep hip stretch lying down     Lieon your back, and bend 1 knee, keeping that foot flat on the floor. Cross theother leg over  your knee. Grab the thigh of the leg that has the foot on thefloor. Slowly, pull the bottom leg toward your chest until you feel a stretchin the other buttock. Repeat using the opposite leg as the bottom leg.  Graphic [id number] Version 1.0  figure 3: Deep hip stretch sitting     Siton the floor with both legs straight. Take 1 leg and cross it over the otherleg so that the foot of your top leg is next to your outer knee. Now, take theelbow on the opposite side of your bent knee and bring it to the outside of thebent knee. With your elbow, slowly push the bent knee further across your bodyto get a good stretch in the hip.  Graphic [id number] Version 1.0  figure 4: Sit backs     Starton your hands and knees. Your hands should be directly under your shoulders.Your knees should be spread slightly and directly under your hips. Slowly stretchyour back as you bring your hips toward your ankles. Your arms should be extendedforward in a relaxed position, as your upper body sinks toward the floor.  Graphic [id number] Version 1.0  figure 5: Pelvic tilts     Lie on your back with your knees bent and feet flat on the floor. Tighten your stomach muscles and press your lower back down to the floor. Relax.  Graphic [id number] Version 1.0  figure 6: Hip lifts     Lie on your back with your knees bent and feet flat on the floor. Tighten your stomach muscles and lift your buttocks off of the floor. Relax.  Graphic [id number] Version 1.0  Consumer Information Use and Disclaimer   Disclaimer: This generalized information is a limited summary of diagnosis, treatment, and/or medication information. It is not meant to be comprehensive and should be used as a tool to help the user understand and/or assess potential diagnostic and treatment options. It does NOT include all information about conditions, treatments, medications, side effects, or risks that may apply to a specific patient. It is not intended to be medical advice or a substitute for the medical  advice, diagnosis, or treatment of a health care provider based on the health care provider's examination and assessment of a patient's specific and unique circumstances. Patients must speak with a health care provider for complete information about their health, medical questions, and treatment options, including any risks or benefits regarding use of medications. This information does not endorse any treatments or medications as safe, effective, or approved for treating a specific patient. UpToDate, Inc. and its affiliates disclaim any warranty or liability relating to this information or the use thereof.The use of this information is governed by the Terms of Use, available at https://www.Notrefamille.com.vLine/en/know/clinical-effectiveness-terms. 2024© UpToDate, Inc. and its affiliates and/or licensors. All rights reserved.  Copyright   © 2024 UpToDate, Inc. and/or its affiliates. All rights reserved.  Patient Education     Pregnancy - The Seventh Month   About this topic   It is important for you to learn how to take care of yourself to help you have a healthy baby and safe delivery. It is good to have health care throughout your pregnancy.  The seventh month of your pregnancy starts around week 29 and lasts through week 32. By knowing how far along you are, you can learn what is normal for this stage of your pregnancy and plan for what is next.  General   Your body   During the seventh month of your pregnancy, here are some things you can expect.  You may:  Have weight gain of about 15 to 20 pounds (6.8 to 9 kg) total in your first 7 months.  Have more trouble moving about and sleeping as the baby gets bigger  Have very vivid dreams  Notice more vaginal discharge  Notice a creamy, watery substance leaking from your nipples  Need a shot called Rh immune globulin if your blood type may be different from your baby's  Your baby's growth and development:  Your baby is gaining weight and adding layers of fat.  Your baby  turns to be head down, into the position for delivery.  They are able to respond to loud noises and to dream.  Your baby moves at least 10 times in 2 hours. If your baby isn't moving this much, talk to your doctor right away.  Your baby is about 16 inches (42 cm) long and weighs about 4 pounds (1,800 gm). Your baby is about the size of squash.  Things to Think About   Avoid alcohol, drugs, tobacco products, and second hand smoke  Think about what you want your baby's birth to be like. Who do you want with you?  Find out about what lactation services are covered by your insurance.  Look into lactation consultants and breastfeeding classes.  Where do you want to deliver? It’s time to make a plan for labor and delivery.  Plan on getting a car seat and other things for your baby.  Talk to your doctor if you plan to travel or get on a plane.  When do I need to call the doctor?   Contractions every 10 minutes or more often that do not go away with drinking water or position changes.  Low, dull back pain that does not go away  Feeling unusually dizzy or tired  Pressure in your pelvis that feels like your baby is pushing down  A gush or constant trickle of watery or bloody fluid leaking from your vagina  Cramps in your lower belly that come and go or are constant  Little to no movement felt by baby in 2 hours. Your baby should be moving at least 10 times every 2 hours.  Headache that does not go away; blurry vision; seeing spots or halos; increase in swelling in your hands, feet, or face; and pain under your ribs on the right side  Vaginal bleeding with or without pain  Fever of 100.4°F (38°C) or higher  After a car accident, fall, or any trauma to your belly  Having thoughts of harming yourself or others, or do not feel safe at home  Last Reviewed Date   2020-05-06  Consumer Information Use and Disclaimer   This generalized information is a limited summary of diagnosis, treatment, and/or medication information. It is not  "meant to be comprehensive and should be used as a tool to help the user understand and/or assess potential diagnostic and treatment options. It does NOT include all information about conditions, treatments, medications, side effects, or risks that may apply to a specific patient. It is not intended to be medical advice or a substitute for the medical advice, diagnosis, or treatment of a health care provider based on the health care provider's examination and assessment of a patient’s specific and unique circumstances. Patients must speak with a health care provider for complete information about their health, medical questions, and treatment options, including any risks or benefits regarding use of medications. This information does not endorse any treatments or medications as safe, effective, or approved for treating a specific patient. UpToDate, Inc. and its affiliates disclaim any warranty or liability relating to this information or the use thereof. The use of this information is governed by the Terms of Use, available at https://www.[employer].GetJar/en/know/clinical-effectiveness-terms   Copyright   Copyright © 2024 UpToDate, Inc. and its affiliates and/or licensors. All rights reserved.  Patient Education     Your baby's movement before birth   The Basics   Written by the doctors and editors at [facility]   When should I start feeling my baby move? -- It depends. Most people first feel their baby moving in the uterus between about 16 and 20 weeks of pregnancy. It might take longer to feel movement if this is your first pregnancy or if the placenta is in the front of your uterus.  What kinds of movements should I feel? -- When you first feel your baby move, it might feel like a gentle flutter in your belly. This is sometimes called \"quickening.\" As the baby grows, their movements will get stronger. You will probably feel them kicking, rolling, and stretching. Later in pregnancy, you might be able to see and feel " "the baby moving from the outside.  You might notice that your baby is more active at certain times of the day or night. Even before birth, babies have periods of being asleep and awake. When your baby is sleeping, you might notice that they do not move as much.  Should I keep track of my baby's movements? -- If your pregnancy is healthy, you probably do not need to count or record your baby's movements. Feeling regular movement is a good sign that the baby is doing well.  In some cases, your doctor or midwife might ask you to keep track of your baby's movements. If so, they will tell you how to do this and when to call them.  A change in your baby's movements does not always mean that there is a problem. But in some cases, it can be a sign that the baby is having trouble. If your doctor or midwife is concerned, they can do tests to check on the baby.  If I am asked to track movement, how should I do it? -- There are different ways of tracking your baby's movement. This is sometimes called \"kick counting.\"  Your doctor or midwife will tell you exactly what to track. For example, they might ask you to write down:   How long it takes to feel 10 kicks or movements   How many times your baby moves in 1 hour  Many experts consider at least 10 movements in 2 hours to be a sign that the baby is doing well. But there is no specific cutoff for exactly how much movement is healthy or unhealthy. Some babies are more active than others, and some pregnant people feel movement more easily than others. The main goal of kick counting is to get to know your baby's normal patterns so you can tell if anything changes.  If you are doing kick counting:   Choose a time of day when your baby is usually active.   Find a quiet place where you will not be distracted.   Lie down on your side in a comfortable position.   Check the clock, or set a timer.   Each time you feel your baby move or kick, write down the time. Some people use a " smartphone kathi to keep track.   If your baby seems less active than usual, try moving around, eating a snack, and emptying your bladder. This can help wake the baby up if they are asleep.   Stop counting after you have felt 10 kicks, or after the length of time your doctor or midwife told you.  When should I call the doctor? -- Call your doctor or midwife for advice if:   You have concerns about your baby's movement.   Your baby is moving less than they normally do.   You notice a sudden change in the pattern of your baby's movements.   You have any other symptoms that worry you.  All topics are updated as new evidence becomes available and our peer review process is complete.  This topic retrieved from [employer] on: Feb 26, 2024.  Topic 891205 Version 1.0  Release: 32.2.4 - C32.56  © 2024 UpToDate, Inc. and/or its affiliates. All rights reserved.  Consumer Information Use and Disclaimer   Disclaimer: This generalized information is a limited summary of diagnosis, treatment, and/or medication information. It is not meant to be comprehensive and should be used as a tool to help the user understand and/or assess potential diagnostic and treatment options. It does NOT include all information about conditions, treatments, medications, side effects, or risks that may apply to a specific patient. It is not intended to be medical advice or a substitute for the medical advice, diagnosis, or treatment of a health care provider based on the health care provider's examination and assessment of a patient's specific and unique circumstances. Patients must speak with a health care provider for complete information about their health, medical questions, and treatment options, including any risks or benefits regarding use of medications. This information does not endorse any treatments or medications as safe, effective, or approved for treating a specific patient. UpToDate, Inc. and its affiliates disclaim any warranty or liability  relating to this information or the use thereof.The use of this information is governed by the Terms of Use, available at https://www.wolterskluwer.com/en/know/clinical-effectiveness-terms. 2024© UpToDate, Inc. and its affiliates and/or licensors. All rights reserved.  Copyright   © 2024 UpToDate, Inc. and/or its affiliates. All rights reserved.      Perineal Massage    Perineal massage is recommended starting after 34 weeks in order to reduce risks of perineal tearing during childbirth.  You have been provided and instructional sheet in your yellow 28 week prenatal packet.

## 2024-09-19 ENCOUNTER — TELEPHONE (OUTPATIENT)
Dept: OBGYN CLINIC | Facility: CLINIC | Age: 29
End: 2024-09-19

## 2024-09-19 ENCOUNTER — ROUTINE PRENATAL (OUTPATIENT)
Dept: OBGYN CLINIC | Facility: CLINIC | Age: 29
End: 2024-09-19
Payer: COMMERCIAL

## 2024-09-19 VITALS
BODY MASS INDEX: 35.3 KG/M2 | DIASTOLIC BLOOD PRESSURE: 68 MMHG | SYSTOLIC BLOOD PRESSURE: 110 MMHG | HEIGHT: 61 IN | WEIGHT: 187 LBS

## 2024-09-19 DIAGNOSIS — Z3A.29 29 WEEKS GESTATION OF PREGNANCY: Primary | ICD-10-CM

## 2024-09-19 DIAGNOSIS — Z23 NEED FOR DIPHTHERIA-TETANUS-PERTUSSIS (TDAP) VACCINE: ICD-10-CM

## 2024-09-19 LAB
DME PARACHUTE DELIVERY DATE REQUESTED: NORMAL
DME PARACHUTE ITEM DESCRIPTION: NORMAL
DME PARACHUTE ORDER STATUS: NORMAL
DME PARACHUTE SUPPLIER NAME: NORMAL
DME PARACHUTE SUPPLIER PHONE: NORMAL
SL AMB  POCT GLUCOSE, UA: NORMAL
SL AMB POCT URINE PROTEIN: NORMAL

## 2024-09-19 PROCEDURE — 90715 TDAP VACCINE 7 YRS/> IM: CPT | Performed by: OBSTETRICS & GYNECOLOGY

## 2024-09-19 PROCEDURE — 81002 URINALYSIS NONAUTO W/O SCOPE: CPT | Performed by: OBSTETRICS & GYNECOLOGY

## 2024-09-19 PROCEDURE — 90471 IMMUNIZATION ADMIN: CPT | Performed by: OBSTETRICS & GYNECOLOGY

## 2024-09-19 PROCEDURE — PNV: Performed by: OBSTETRICS & GYNECOLOGY

## 2024-09-19 RX ORDER — SODIUM CHLORIDE 9 MG/ML
20 INJECTION, SOLUTION INTRAVENOUS ONCE
OUTPATIENT
Start: 2024-09-30

## 2024-09-19 NOTE — TELEPHONE ENCOUNTER
Called pt to confirm site for venofer infusions.  Pt is ok for [facility] site.  Prefers Monday or Thursday.  Venofer infusion order entered in [facility] epic.- venofer 200 over one hour x 5  infusions - one infusion per week.    [facility]- pt scheduled for first venofer infusion , Thursday, 10/3 at 130 pm.  Called pt to inform of date & time   -reviewed directions.  Pt verbalized understanding.

## 2024-09-19 NOTE — PROGRESS NOTES
Problem   29 Weeks Gestation of Pregnancy    First OB labs -complete  Gc/chlamydia -6/5 neg   Pap - 6/5 NILM  Blue folder - given    Genetic screening - low risk  AFP - ordered    28 week labs -H&H 10.3/32.1 will set up iron infusions   COVID vaccine -   TDAP - 24  Delivery consent - signed   Yellow folder - given     Breast pump -   Pediatrician -   Perineal massage -   GBS -          29 weeks gestation of pregnancy  Noy Palomo is a 28 y.o.   29w0d who presents for routine PNV.  Not taking ASA at this time, we discussed rationale for taking aspirin to prevent preeclampsia in pregnancy  28 week labs reviewed: anemic 10.332.1,hx of gastric bypass will need iron infusions, message sent to OB navigator   Pt completed finger sticks for 1 week, fasting's 78,  , did not submit values yet, advised to send in via my chart for review and medical decision  TWG 11.3 kg (25 lb) we discussed expected weight gain  Good fetal movement. Denies contractions, cramping, leakage of fluid or vaginal bleeding.   Mentions increased GERD, sciatic pain, we discussed interventions for both, patient may go to physical therapy if needed.  Will follow-up at next visit  Tdap vaccine given today   Reviewed  labor precautions and FKCs.   Advised to start Perineal massage at 34-36 weeks   Pregnancy Essential guide and Baby and Me web site recommended     The patient was counseled about the labor process. She was counseled regarding potential reasons that she may need a  section including arrest of dilation/descent, non-reassuring fetal status, or worsening maternal status.   She was counseled on the risks of  including bleeding, infection, and injury to surrounding structures including the bowel and bladder. She was counseled that there are medical and surgical methods to manage excessive postpartum bleeding. She was counseled that in the event of excessive blood loss, she may require blood  transfusion which includes a small risk of blood borne diseases such as hepatitis and HIV. The patient is NOT OK with receiving a blood transfusion if necessary.    She was counseled about the possible need for operative delivery using the vacuum and the indications for doing so.  The patient had an opportunity to ask questions and signed consent.

## 2024-09-19 NOTE — PROGRESS NOTES
29w0d  Pap 2024.  Neg GC/CT:2024 Neg / Neg   PN1 Labs: 2024  Blood Type: B Positive    MFM Level 1:2024  MFM Level 2:2024  AFP: 2024  28 Week Labs:  TDap: given today   Flu:  GBS:   Blue Folder: given   Yellow Folder: given today   Ped Referral :  Breast pump: order at today's visit   L&D forms:  Delivery consent: Done     Patient reports positive fetal movements with no concerns at this time .

## 2024-09-19 NOTE — TELEPHONE ENCOUNTER
----- Message from ANAMIKA Ortiz sent at 9/19/2024  8:59 AM EDT -----  This patient has a diagnosis of anemia  and needs IV iron treatment - please schedule for infusions     Venofer 200 mg in 100 mL sodium chloride 0.9%  Once  weekly   Total of 5 five treatments   Please include infusion appointment request, oncology nurse communication, and monitor patient afterwards.   Needs CBC 4 weeks after completing infusions

## 2024-09-25 LAB
DME PARACHUTE DELIVERY DATE REQUESTED: NORMAL
DME PARACHUTE ITEM DESCRIPTION: NORMAL
DME PARACHUTE ORDER STATUS: NORMAL
DME PARACHUTE SUPPLIER NAME: NORMAL
DME PARACHUTE SUPPLIER PHONE: NORMAL

## 2024-09-30 ENCOUNTER — ROUTINE PRENATAL (OUTPATIENT)
Dept: OBGYN CLINIC | Facility: CLINIC | Age: 29
End: 2024-09-30
Payer: COMMERCIAL

## 2024-09-30 VITALS
WEIGHT: 192.4 LBS | DIASTOLIC BLOOD PRESSURE: 66 MMHG | HEART RATE: 96 BPM | SYSTOLIC BLOOD PRESSURE: 110 MMHG | BODY MASS INDEX: 36.35 KG/M2 | OXYGEN SATURATION: 99 %

## 2024-09-30 DIAGNOSIS — Z3A.30 30 WEEKS GESTATION OF PREGNANCY: Primary | ICD-10-CM

## 2024-09-30 DIAGNOSIS — D50.8 IRON DEFICIENCY ANEMIA SECONDARY TO INADEQUATE DIETARY IRON INTAKE: ICD-10-CM

## 2024-09-30 DIAGNOSIS — O99.210 OTHER OBESITY DUE TO EXCESS CALORIES AFFECTING PREGNANCY, ANTEPARTUM: ICD-10-CM

## 2024-09-30 DIAGNOSIS — E66.09 OTHER OBESITY DUE TO EXCESS CALORIES AFFECTING PREGNANCY, ANTEPARTUM: ICD-10-CM

## 2024-09-30 DIAGNOSIS — Z34.03 PRENATAL CARE, FIRST PREGNANCY IN THIRD TRIMESTER: ICD-10-CM

## 2024-09-30 LAB
SL AMB  POCT GLUCOSE, UA: NORMAL
SL AMB POCT URINE PROTEIN: NORMAL

## 2024-09-30 PROCEDURE — PNV: Performed by: OBSTETRICS & GYNECOLOGY

## 2024-09-30 PROCEDURE — 81002 URINALYSIS NONAUTO W/O SCOPE: CPT | Performed by: OBSTETRICS & GYNECOLOGY

## 2024-09-30 RX ORDER — ASPIRIN 81 MG/1
162 TABLET, CHEWABLE ORAL DAILY
COMMUNITY

## 2024-09-30 NOTE — PROGRESS NOTES
Prenatal visit  GA 30w 4d  Denies any vaginal bleeding, Leaking of fluid  Normal Fetal movement   28 wk labs completed 09/17/24 WNL  S/p Tdap vaccine 09/19/24  Delivery consent signed on 09/19/24  Breast pump ordered   Peds referral   Patient reports occasional BHX contractions and some pelvic pressure from standing for long periods. She reports a high pulse in the 120's and SOB.

## 2024-09-30 NOTE — PROGRESS NOTES
Problem List Items Addressed This Visit       30 weeks gestation of pregnancy - Primary     Pt doing well today, no complaints  +FM. Denies ctx, vb and lof.   She is up to date on care, starting venofer this week for anemia.   Had vagal episode yesterday with associated GI upset- resolved spontaneously but did have HR up to 120. Feeling well today. Advised to let us know if happens again  BG checks completed for GDM screening - within normal limits. Logs scanned into media. No GDM.   Received tdap last visit  Will bring birth plan to next visit   Reviewed breast pump selection today  Precautions reviewed. RTO in 2 weeks           Relevant Orders    POCT urine dip (Completed)    Maternal obesity affecting pregnancy, antepartum     TWG this pregnancy 30 lbs  32w growth scan          Iron deficiency anemia secondary to inadequate dietary iron intake     Venofer infusions ordered, first infusion scheduled 10/3          Other Visit Diagnoses       Prenatal care, first pregnancy in third trimester        Relevant Orders    POCT urine dip (Completed)            Noy Palomo is a 28 y.o.  at 30w4d who presents today for routine prenatal visit.     /66 (BP Location: Left arm, Patient Position: Sitting, Cuff Size: Standard)   Pulse 96   Wt 87.3 kg (192 lb 6.4 oz)   LMP 2024   SpO2 99%   BMI 36.35 kg/m²       FH 30 cm

## 2024-09-30 NOTE — ASSESSMENT & PLAN NOTE
Pt doing well today, no complaints  +FM. Denies ctx, vb and lof.   She is up to date on care, starting venofer this week for anemia.   Had vagal episode yesterday with associated GI upset- resolved spontaneously but did have HR up to 120. Feeling well today. Advised to let us know if happens again  BG checks completed for GDM screening - within normal limits. Logs scanned into media. No GDM.   Received tdap last visit  Will bring birth plan to next visit   Reviewed breast pump selection today  Precautions reviewed. RTO in 2 weeks

## 2024-10-02 ENCOUNTER — TELEPHONE (OUTPATIENT)
Dept: INFUSION CENTER | Facility: HOSPITAL | Age: 29
End: 2024-10-02

## 2024-10-02 ENCOUNTER — NURSE TRIAGE (OUTPATIENT)
Dept: OTHER | Facility: OTHER | Age: 29
End: 2024-10-02

## 2024-10-02 NOTE — TELEPHONE ENCOUNTER
VM left for pt in reference to pt's upcoming appt tomorrow (10/3) at 1330, reviewed Infusion policies, [facility] infusion location, and left call back number if any questions.

## 2024-10-03 ENCOUNTER — HOSPITAL ENCOUNTER (OUTPATIENT)
Dept: INFUSION CENTER | Facility: HOSPITAL | Age: 29
Discharge: HOME | End: 2024-10-03
Attending: STUDENT IN AN ORGANIZED HEALTH CARE EDUCATION/TRAINING PROGRAM
Payer: COMMERCIAL

## 2024-10-03 VITALS
DIASTOLIC BLOOD PRESSURE: 68 MMHG | RESPIRATION RATE: 16 BRPM | SYSTOLIC BLOOD PRESSURE: 102 MMHG | HEART RATE: 82 BPM | TEMPERATURE: 97.5 F

## 2024-10-03 DIAGNOSIS — Z3A.30 30 WEEKS GESTATION OF PREGNANCY: ICD-10-CM

## 2024-10-03 DIAGNOSIS — D50.8 IRON DEFICIENCY ANEMIA SECONDARY TO INADEQUATE DIETARY IRON INTAKE: Primary | ICD-10-CM

## 2024-10-03 PROCEDURE — 96365 THER/PROPH/DIAG IV INF INIT: CPT

## 2024-10-03 RX ORDER — SODIUM CHLORIDE 9 MG/ML
20 INJECTION, SOLUTION INTRAVENOUS ONCE
Status: COMPLETED | OUTPATIENT
Start: 2024-10-03 | End: 2024-10-03

## 2024-10-03 RX ORDER — SODIUM CHLORIDE 9 MG/ML
20 INJECTION, SOLUTION INTRAVENOUS ONCE
Status: CANCELLED | OUTPATIENT
Start: 2024-10-10

## 2024-10-03 RX ADMIN — IRON SUCROSE 200 MG: 20 INJECTION, SOLUTION INTRAVENOUS at 14:06

## 2024-10-03 RX ADMIN — SODIUM CHLORIDE 20 ML/HR: 0.9 INJECTION, SOLUTION INTRAVENOUS at 14:05

## 2024-10-03 NOTE — TELEPHONE ENCOUNTER
"Regardin weeks and 6 days pregnant/ pain on my right side  ----- Message from Kristina GONZALEZ sent at 10/2/2024  9:09 PM EDT -----  \"I am 30 weeks and 6 days pregnant. 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 I am not sure what to do\"    "

## 2024-10-03 NOTE — PROGRESS NOTES
Noy Palomo  tolerated treatment well with no complications.      Noy Palomo is aware of future appt on 10/10/24 at 1330.     AVS declined by Noy Palomo

## 2024-10-03 NOTE — TELEPHONE ENCOUNTER
"Reason for Disposition  • [1] MILD abdominal pain (e.g., doesn't interfere with normal activities) or tightening AND [2] constant AND [3] present > 2 hours    Answer Assessment - Initial Assessment Questions  1. LOCATION: \"Where does it hurt?\"    Upper right lateral side of abdomen    2. RADIATION: \"Does the pain shoot anywhere else?\" (e.g., chest, back)      Groin and lower back    3. ONSET: \"When did the pain begin?\" (Minutes, hours or days ago)       This morning    4. SUDDEN: \"Gradual or sudden onset?\"      Gradual     5. PATTERN: \"Does the pain come and go, or has it been constant since it started?\"       Comes and goes but the last 1-2 hours has been constant    6. SEVERITY: \"How bad is the pain?\" \"What does it keep you from doing?\"  (e.g., Scale 1-10; mild, moderate, or severe)      3/10    7. RECURRENT SYMPTOM: \"Have you ever had this type of stomach pain before?\" If Yes, ask: \"When was the last time?\" and \"What happened that time?\"       Yes, at the beginning of pregnancy     8. CAUSE: \"What do you think is causing the stomach pain?      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 RELIEVING/AGGRAVATING FACTORS: \"What makes it better or worse?\" (e.g., antacids, bowel movement, movement)     Nothing makes the pain better    10. FETAL MOVEMENT: \"Has the baby's movement decreased or changed significantly from normal?\"        Denies    11. OTHER SYMPTOMS: \"Do you have any other symptoms?\" (e.g., back pain, contractions, diarrhea, fever, headache, urination pain, vaginal bleeding, vaginal discharge, vomiting)        Denies     12. JENNY: \"What date are you expecting to deliver?\"        12/5/24    Drinking plenty of fluids today and tried hot shower with no relief in symptoms    On call provider stated  recommend trying 1000mg tylenol. Mild heating pad on upper back is okay but not on belly. Okay to use bengay also on the upper intercostals. She can try pepcid or tums and simethicone in addition as " the distribution does seem GI related; however, if pain worsens she can come in for an evaluation. Patient notified and verbalized understanding.    Protocols used: Pregnancy - Abdominal Pain Greater Than 20 Weeks EGA-Adult-AH

## 2024-10-07 ENCOUNTER — ULTRASOUND (OUTPATIENT)
Dept: PERINATAL CARE | Facility: CLINIC | Age: 29
End: 2024-10-07
Payer: COMMERCIAL

## 2024-10-07 VITALS
WEIGHT: 191.8 LBS | BODY MASS INDEX: 36.21 KG/M2 | OXYGEN SATURATION: 98 % | HEART RATE: 84 BPM | HEIGHT: 61 IN | DIASTOLIC BLOOD PRESSURE: 64 MMHG | SYSTOLIC BLOOD PRESSURE: 116 MMHG

## 2024-10-07 DIAGNOSIS — Z53.1 TRANSFUSION OF BLOOD PRODUCT DECLINED DUE TO RELIGIOUS REASON: ICD-10-CM

## 2024-10-07 DIAGNOSIS — O99.210 OBESITY AFFECTING PREGNANCY, ANTEPARTUM, UNSPECIFIED OBESITY TYPE: ICD-10-CM

## 2024-10-07 DIAGNOSIS — Z36.2 ENCOUNTER FOR FOLLOW-UP ULTRASOUND OF FETAL ANATOMY: ICD-10-CM

## 2024-10-07 DIAGNOSIS — O99.840 PREVIOUS BARIATRIC SURGERY AFFECTING PREGNANCY, ANTEPARTUM: ICD-10-CM

## 2024-10-07 DIAGNOSIS — Z36.89 ENCOUNTER FOR ULTRASOUND TO CHECK FETAL GROWTH: ICD-10-CM

## 2024-10-07 DIAGNOSIS — Z3A.31 31 WEEKS GESTATION OF PREGNANCY: Primary | ICD-10-CM

## 2024-10-07 LAB
DME PARACHUTE DELIVERY DATE ACTUAL: NORMAL
DME PARACHUTE DELIVERY DATE REQUESTED: NORMAL
DME PARACHUTE ITEM DESCRIPTION: NORMAL
DME PARACHUTE ORDER STATUS: NORMAL
DME PARACHUTE SUPPLIER NAME: NORMAL
DME PARACHUTE SUPPLIER PHONE: NORMAL

## 2024-10-07 PROCEDURE — 76816 OB US FOLLOW-UP PER FETUS: CPT | Performed by: STUDENT IN AN ORGANIZED HEALTH CARE EDUCATION/TRAINING PROGRAM

## 2024-10-07 PROCEDURE — 99213 OFFICE O/P EST LOW 20 MIN: CPT | Performed by: STUDENT IN AN ORGANIZED HEALTH CARE EDUCATION/TRAINING PROGRAM

## 2024-10-07 NOTE — PROGRESS NOTES
"[facility]: Ms. Palomo was seen today for fetal growth and followup missed anatomy ultrasound.  See ultrasound report under \"OB Procedures\" tab.      MDM:   I. Diagnoses/Problems addressed:  minimal  II.  Data:  glucose log, blood refusal form  III.  Risk of morbidity:  minimal    Please don't hesitate to contact our office with any concerns or questions.  -Alexsandra Gomez MD    "

## 2024-10-10 ENCOUNTER — HOSPITAL ENCOUNTER (OUTPATIENT)
Dept: INFUSION CENTER | Facility: HOSPITAL | Age: 29
Discharge: HOME | End: 2024-10-10
Attending: STUDENT IN AN ORGANIZED HEALTH CARE EDUCATION/TRAINING PROGRAM
Payer: COMMERCIAL

## 2024-10-10 VITALS
TEMPERATURE: 97.7 F | HEART RATE: 96 BPM | SYSTOLIC BLOOD PRESSURE: 103 MMHG | DIASTOLIC BLOOD PRESSURE: 68 MMHG | RESPIRATION RATE: 16 BRPM

## 2024-10-10 DIAGNOSIS — Z3A.30 30 WEEKS GESTATION OF PREGNANCY: ICD-10-CM

## 2024-10-10 DIAGNOSIS — D50.8 IRON DEFICIENCY ANEMIA SECONDARY TO INADEQUATE DIETARY IRON INTAKE: Primary | ICD-10-CM

## 2024-10-10 PROCEDURE — 96365 THER/PROPH/DIAG IV INF INIT: CPT

## 2024-10-10 RX ORDER — SODIUM CHLORIDE 9 MG/ML
20 INJECTION, SOLUTION INTRAVENOUS ONCE
Status: CANCELLED | OUTPATIENT
Start: 2024-10-17

## 2024-10-10 RX ORDER — SODIUM CHLORIDE 9 MG/ML
20 INJECTION, SOLUTION INTRAVENOUS ONCE
Status: COMPLETED | OUTPATIENT
Start: 2024-10-10 | End: 2024-10-10

## 2024-10-10 RX ADMIN — IRON SUCROSE 200 MG: 20 INJECTION, SOLUTION INTRAVENOUS at 13:52

## 2024-10-10 RX ADMIN — SODIUM CHLORIDE 20 ML/HR: 0.9 INJECTION, SOLUTION INTRAVENOUS at 13:50

## 2024-10-12 ENCOUNTER — CLINICAL SUPPORT (OUTPATIENT)
Dept: POSTPARTUM | Facility: CLINIC | Age: 29
End: 2024-10-12

## 2024-10-12 DIAGNOSIS — Z32.2 ENCOUNTER FOR CHILDBIRTH INSTRUCTION: Primary | ICD-10-CM

## 2024-10-17 ENCOUNTER — ROUTINE PRENATAL (OUTPATIENT)
Dept: OBGYN CLINIC | Facility: CLINIC | Age: 29
End: 2024-10-17
Payer: COMMERCIAL

## 2024-10-17 ENCOUNTER — HOSPITAL ENCOUNTER (OUTPATIENT)
Dept: INFUSION CENTER | Facility: HOSPITAL | Age: 29
Discharge: HOME | End: 2024-10-17
Attending: STUDENT IN AN ORGANIZED HEALTH CARE EDUCATION/TRAINING PROGRAM
Payer: COMMERCIAL

## 2024-10-17 VITALS
OXYGEN SATURATION: 98 % | SYSTOLIC BLOOD PRESSURE: 118 MMHG | DIASTOLIC BLOOD PRESSURE: 62 MMHG | HEART RATE: 100 BPM | WEIGHT: 198 LBS | BODY MASS INDEX: 37.41 KG/M2

## 2024-10-17 DIAGNOSIS — D50.8 IRON DEFICIENCY ANEMIA SECONDARY TO INADEQUATE DIETARY IRON INTAKE: Primary | ICD-10-CM

## 2024-10-17 DIAGNOSIS — O99.210 OBESITY AFFECTING PREGNANCY, ANTEPARTUM, UNSPECIFIED OBESITY TYPE: ICD-10-CM

## 2024-10-17 DIAGNOSIS — D50.8 IRON DEFICIENCY ANEMIA SECONDARY TO INADEQUATE DIETARY IRON INTAKE: ICD-10-CM

## 2024-10-17 DIAGNOSIS — Z3A.33 33 WEEKS GESTATION OF PREGNANCY: ICD-10-CM

## 2024-10-17 DIAGNOSIS — Z3A.30 30 WEEKS GESTATION OF PREGNANCY: ICD-10-CM

## 2024-10-17 DIAGNOSIS — Z34.03 PRENATAL CARE, FIRST PREGNANCY IN THIRD TRIMESTER: Primary | ICD-10-CM

## 2024-10-17 LAB
SL AMB  POCT GLUCOSE, UA: NORMAL
SL AMB POCT URINE PROTEIN: NORMAL

## 2024-10-17 PROCEDURE — 96365 THER/PROPH/DIAG IV INF INIT: CPT

## 2024-10-17 PROCEDURE — 81002 URINALYSIS NONAUTO W/O SCOPE: CPT | Performed by: STUDENT IN AN ORGANIZED HEALTH CARE EDUCATION/TRAINING PROGRAM

## 2024-10-17 PROCEDURE — PNV: Performed by: STUDENT IN AN ORGANIZED HEALTH CARE EDUCATION/TRAINING PROGRAM

## 2024-10-17 PROCEDURE — 90471 IMMUNIZATION ADMIN: CPT | Performed by: STUDENT IN AN ORGANIZED HEALTH CARE EDUCATION/TRAINING PROGRAM

## 2024-10-17 PROCEDURE — 90656 IIV3 VACC NO PRSV 0.5 ML IM: CPT | Performed by: STUDENT IN AN ORGANIZED HEALTH CARE EDUCATION/TRAINING PROGRAM

## 2024-10-17 RX ORDER — SODIUM CHLORIDE 9 MG/ML
20 INJECTION, SOLUTION INTRAVENOUS ONCE
Status: CANCELLED | OUTPATIENT
Start: 2024-10-24

## 2024-10-17 RX ORDER — SODIUM CHLORIDE 9 MG/ML
20 INJECTION, SOLUTION INTRAVENOUS ONCE
Status: COMPLETED | OUTPATIENT
Start: 2024-10-17 | End: 2024-10-17

## 2024-10-17 RX ADMIN — IRON SUCROSE 200 MG: 20 INJECTION, SOLUTION INTRAVENOUS at 12:17

## 2024-10-17 RX ADMIN — SODIUM CHLORIDE 20 ML/HR: 0.9 INJECTION, SOLUTION INTRAVENOUS at 12:16

## 2024-10-17 NOTE — ASSESSMENT & PLAN NOTE
27yo  at 33.0wks presents for routine prenatal visit     Pt denies contractions, vaginal bleeding, leakage of fluid. Endorses fetal movement.    -continue PNVs   -flu vaccine given today, s/p tdap, RSV next visit   -birth plan signed today   -EPDS 3  - labor precautions provided   -return in 3 weeks

## 2024-10-17 NOTE — PROGRESS NOTES
33 weeks gestation of pregnancy  29yo  at 33.0wks presents for routine prenatal visit     Pt denies contractions, vaginal bleeding, leakage of fluid. Endorses fetal movement.    -continue PNVs   -flu vaccine given today, s/p tdap, RSV next visit   -birth plan signed today   -EPDS 3  - labor precautions provided   -return in 3 weeks     Maternal obesity affecting pregnancy, antepartum  -continue ASA    Iron deficiency anemia secondary to inadequate dietary iron intake  -continue iron infusions  -repeat CBC to be performed around ~36wks

## 2024-10-17 NOTE — PROGRESS NOTES
Noy Palomo  tolerated treatment well with no complications.      Noy Palomo is aware of future appt on 10/24/24 at 130pm.     AVS printed and given to Noy Palomo:    No (Declined by Noy Palomo)

## 2024-10-18 ENCOUNTER — TELEPHONE (OUTPATIENT)
Dept: OBGYN CLINIC | Facility: CLINIC | Age: 29
End: 2024-10-18

## 2024-10-18 NOTE — TELEPHONE ENCOUNTER
----- Message from Clair DELGADO sent at 5/13/2024 10:51 AM EDT -----  Regarding: 3rd trimester  9/26-10/24    3rd trimester call completed. 9/2024

## 2024-10-21 ENCOUNTER — TELEPHONE (OUTPATIENT)
Dept: BARIATRICS | Facility: CLINIC | Age: 29
End: 2024-10-21

## 2024-10-21 ENCOUNTER — CLINICAL SUPPORT (OUTPATIENT)
Dept: POSTPARTUM | Facility: CLINIC | Age: 29
End: 2024-10-21

## 2024-10-21 DIAGNOSIS — Z32.2 ENCOUNTER FOR CHILDBIRTH INSTRUCTION: Primary | ICD-10-CM

## 2024-10-24 ENCOUNTER — TELEPHONE (OUTPATIENT)
Dept: HEMATOLOGY ONCOLOGY | Facility: CLINIC | Age: 29
End: 2024-10-24

## 2024-10-24 ENCOUNTER — HOSPITAL ENCOUNTER (OUTPATIENT)
Dept: INFUSION CENTER | Facility: HOSPITAL | Age: 29
Discharge: HOME | End: 2024-10-24
Attending: STUDENT IN AN ORGANIZED HEALTH CARE EDUCATION/TRAINING PROGRAM
Payer: COMMERCIAL

## 2024-10-24 VITALS
RESPIRATION RATE: 16 BRPM | HEART RATE: 82 BPM | TEMPERATURE: 97.3 F | SYSTOLIC BLOOD PRESSURE: 116 MMHG | DIASTOLIC BLOOD PRESSURE: 63 MMHG

## 2024-10-24 DIAGNOSIS — D50.8 IRON DEFICIENCY ANEMIA SECONDARY TO INADEQUATE DIETARY IRON INTAKE: Primary | ICD-10-CM

## 2024-10-24 DIAGNOSIS — Z3A.33 33 WEEKS GESTATION OF PREGNANCY: ICD-10-CM

## 2024-10-24 PROCEDURE — 96365 THER/PROPH/DIAG IV INF INIT: CPT

## 2024-10-24 RX ORDER — SODIUM CHLORIDE 9 MG/ML
20 INJECTION, SOLUTION INTRAVENOUS ONCE
Status: CANCELLED | OUTPATIENT
Start: 2024-10-31

## 2024-10-24 RX ORDER — SODIUM CHLORIDE 9 MG/ML
20 INJECTION, SOLUTION INTRAVENOUS ONCE
Status: COMPLETED | OUTPATIENT
Start: 2024-10-24 | End: 2024-10-24

## 2024-10-24 RX ADMIN — SODIUM CHLORIDE 20 ML/HR: 9 INJECTION, SOLUTION INTRAVENOUS at 13:46

## 2024-10-24 RX ADMIN — IRON SUCROSE 200 MG: 20 INJECTION, SOLUTION INTRAVENOUS at 13:51

## 2024-10-24 NOTE — PROGRESS NOTES
Noy Palomo  tolerated Venofer well with no complications.      Noy Palomo is aware of future appt on 10/31 at 1:30pm.     AVS printed and given to Noy Palomo:  No (Declined by Noy Palomo)     Left unit in stable condition.

## 2024-10-24 NOTE — PLAN OF CARE
Problem: HEMATOLOGIC - ADULT  Goal: Maintains hematologic stability  Description: INTERVENTIONS  - Monitor labs  - Administer Venofer as ordered  Outcome: Progressing

## 2024-10-31 ENCOUNTER — HOSPITAL ENCOUNTER (OUTPATIENT)
Dept: INFUSION CENTER | Facility: HOSPITAL | Age: 29
Discharge: HOME | End: 2024-10-31
Attending: STUDENT IN AN ORGANIZED HEALTH CARE EDUCATION/TRAINING PROGRAM
Payer: COMMERCIAL

## 2024-10-31 ENCOUNTER — ROUTINE PRENATAL (OUTPATIENT)
Dept: OBGYN CLINIC | Facility: CLINIC | Age: 29
End: 2024-10-31
Payer: COMMERCIAL

## 2024-10-31 VITALS
BODY MASS INDEX: 37.68 KG/M2 | HEART RATE: 96 BPM | DIASTOLIC BLOOD PRESSURE: 76 MMHG | WEIGHT: 199.4 LBS | OXYGEN SATURATION: 97 % | SYSTOLIC BLOOD PRESSURE: 108 MMHG

## 2024-10-31 DIAGNOSIS — Z3A.35 35 WEEKS GESTATION OF PREGNANCY: ICD-10-CM

## 2024-10-31 DIAGNOSIS — Z34.03 PRENATAL CARE, FIRST PREGNANCY IN THIRD TRIMESTER: Primary | ICD-10-CM

## 2024-10-31 DIAGNOSIS — D50.8 IRON DEFICIENCY ANEMIA SECONDARY TO INADEQUATE DIETARY IRON INTAKE: Primary | ICD-10-CM

## 2024-10-31 DIAGNOSIS — O99.210 OBESITY AFFECTING PREGNANCY, ANTEPARTUM, UNSPECIFIED OBESITY TYPE: ICD-10-CM

## 2024-10-31 DIAGNOSIS — D50.8 IRON DEFICIENCY ANEMIA SECONDARY TO INADEQUATE DIETARY IRON INTAKE: ICD-10-CM

## 2024-10-31 LAB
SL AMB  POCT GLUCOSE, UA: NORMAL
SL AMB POCT URINE PROTEIN: NORMAL

## 2024-10-31 PROCEDURE — 96365 THER/PROPH/DIAG IV INF INIT: CPT

## 2024-10-31 PROCEDURE — 81002 URINALYSIS NONAUTO W/O SCOPE: CPT | Performed by: STUDENT IN AN ORGANIZED HEALTH CARE EDUCATION/TRAINING PROGRAM

## 2024-10-31 PROCEDURE — PNV: Performed by: STUDENT IN AN ORGANIZED HEALTH CARE EDUCATION/TRAINING PROGRAM

## 2024-10-31 RX ORDER — SODIUM CHLORIDE 9 MG/ML
20 INJECTION, SOLUTION INTRAVENOUS ONCE
Status: CANCELLED | OUTPATIENT
Start: 2024-10-31

## 2024-10-31 RX ORDER — SODIUM CHLORIDE 9 MG/ML
20 INJECTION, SOLUTION INTRAVENOUS ONCE
Status: COMPLETED | OUTPATIENT
Start: 2024-10-31 | End: 2024-10-31

## 2024-10-31 RX ADMIN — IRON SUCROSE 200 MG: 20 INJECTION, SOLUTION INTRAVENOUS at 14:04

## 2024-10-31 RX ADMIN — SODIUM CHLORIDE 20 ML/HR: 9 INJECTION, SOLUTION INTRAVENOUS at 14:12

## 2024-10-31 NOTE — PROGRESS NOTES
35 weeks gestation of pregnancy  30yo  at 35.0wks presents for routine prenatal visit     Pt denies contractions, vaginal bleeding, leakage of fluid. Endorses fetal movement.    -continue PNVs   -s/p tdap and flu vaccine   - labor precautions provided  -return in 1 week     Maternal obesity affecting pregnancy, antepartum  -continue ASA    Iron deficiency anemia secondary to inadequate dietary iron intake  -last infusion scheduled this week, will perform CBC prior to next visit

## 2024-10-31 NOTE — ASSESSMENT & PLAN NOTE
30yo  at 35.0wks presents for routine prenatal visit     Pt denies contractions, vaginal bleeding, leakage of fluid. Endorses fetal movement.    -continue PNVs   -s/p tdap and flu vaccine   - labor precautions provided  -return in 1 week

## 2024-11-07 ENCOUNTER — ROUTINE PRENATAL (OUTPATIENT)
Dept: OBGYN CLINIC | Facility: CLINIC | Age: 29
End: 2024-11-07
Payer: COMMERCIAL

## 2024-11-07 VITALS
BODY MASS INDEX: 38.21 KG/M2 | DIASTOLIC BLOOD PRESSURE: 82 MMHG | SYSTOLIC BLOOD PRESSURE: 110 MMHG | HEART RATE: 92 BPM | OXYGEN SATURATION: 98 % | WEIGHT: 202.2 LBS

## 2024-11-07 DIAGNOSIS — Z3A.36 36 WEEKS GESTATION OF PREGNANCY: ICD-10-CM

## 2024-11-07 DIAGNOSIS — Z29.11 NEED FOR RSV IMMUNIZATION: ICD-10-CM

## 2024-11-07 DIAGNOSIS — O99.210 OBESITY AFFECTING PREGNANCY, ANTEPARTUM, UNSPECIFIED OBESITY TYPE: ICD-10-CM

## 2024-11-07 DIAGNOSIS — Z34.03 PRENATAL CARE, FIRST PREGNANCY IN THIRD TRIMESTER: Primary | ICD-10-CM

## 2024-11-07 LAB
SL AMB  POCT GLUCOSE, UA: NORMAL
SL AMB POCT URINE PROTEIN: NORMAL

## 2024-11-07 PROCEDURE — 81002 URINALYSIS NONAUTO W/O SCOPE: CPT | Performed by: OBSTETRICS & GYNECOLOGY

## 2024-11-07 PROCEDURE — 90678 RSV VACC PREF BIVALENT IM: CPT | Performed by: OBSTETRICS & GYNECOLOGY

## 2024-11-07 PROCEDURE — 90471 IMMUNIZATION ADMIN: CPT | Performed by: OBSTETRICS & GYNECOLOGY

## 2024-11-07 PROCEDURE — PNV: Performed by: OBSTETRICS & GYNECOLOGY

## 2024-11-07 PROCEDURE — 87150 DNA/RNA AMPLIFIED PROBE: CPT | Performed by: OBSTETRICS & GYNECOLOGY

## 2024-11-07 NOTE — PROGRESS NOTES
Prenatal visit  GA 36w 0d  Denies any vaginal bleeding, Leaking of fluid.   Patient reports BHX contractions and pelvic pressure and cramping yesterday.   Normal Fetal movement.   28 wk labs completed   S/p Tdap & Flu   RSV vaccine today.   Delivery consent signed on 2024  GBS collected today  Offered eIOL, reviewed arrive trial    Problem List Items Addressed This Visit       36 weeks gestation of pregnancy    Relevant Orders    POCT urine dip (Completed)    Strep B DNA probe, amplification    Maternal obesity affecting pregnancy, antepartum     Other Visit Diagnoses       Prenatal care, first pregnancy in third trimester    -  Primary    Relevant Orders    POCT urine dip (Completed)    Strep B DNA probe, amplification    Need for RSV immunization        Relevant Orders    Respiratory Syncytial Virus (RSV) vaccine (recombinant) (Abrysvo) (Completed)              Pre- Vitals      Flowsheet Row Most Recent Value   Prenatal Assessment    Fetal Heart Rate 150   Fundal Height (cm) 27 cm   Movement Present   Presentation Vertex   Prenatal Vitals    Blood Pressure 110/82   Weight - Scale 91.7 kg (202 lb 3.2 oz)   Urine Albumin/Glucose    Dilation/Effacement/Station    Cervical Dilation 0   Cervical Effacement 0   Fetal Station -5   Vaginal Drainage    Draining Fluid No   Edema

## 2024-11-07 NOTE — PROGRESS NOTES
RSV Vaccine Administration  VIS given.   R Deltoid.  Tolerated well.   Lot#: [id number]  Exp: 06/30/2025

## 2024-11-09 ENCOUNTER — NURSE TRIAGE (OUTPATIENT)
Dept: OTHER | Facility: OTHER | Age: 29
End: 2024-11-09

## 2024-11-09 ENCOUNTER — HOSPITAL ENCOUNTER (OUTPATIENT)
Facility: HOSPITAL | Age: 29
Discharge: HOME | End: 2024-11-09
Attending: OBSTETRICS & GYNECOLOGY | Admitting: OBSTETRICS & GYNECOLOGY
Payer: COMMERCIAL

## 2024-11-09 VITALS
TEMPERATURE: 98.5 F | BODY MASS INDEX: 38.14 KG/M2 | SYSTOLIC BLOOD PRESSURE: 119 MMHG | RESPIRATION RATE: 16 BRPM | HEART RATE: 88 BPM | WEIGHT: 202 LBS | DIASTOLIC BLOOD PRESSURE: 71 MMHG | HEIGHT: 61 IN

## 2024-11-09 PROBLEM — O47.9 UTERINE CONTRACTIONS: Status: ACTIVE | Noted: 2024-11-09

## 2024-11-09 PROCEDURE — 99214 OFFICE O/P EST MOD 30 MIN: CPT

## 2024-11-09 PROCEDURE — NC001 PR NO CHARGE: Performed by: OBSTETRICS & GYNECOLOGY

## 2024-11-10 NOTE — PROGRESS NOTES
Progress Note - OB/GYN   Name: Noy Palomo 29 y.o. female I MRN: [id number]  Unit/Bed#: LD TRIAGE  I Date of Admission: 2024   Date of Service: 2024 I Hospital Day: 0       L&D Triage Note - OB/GYN  Noy Palomo 29 y.o. female MRN: [id number]  Unit/Bed#: LD TRIAGE  Encounter: [id number]      ASSESSMENT:    Noy Palomo is a 29 y.o.  at 36w2d presenting with uterine contractions that started at 7pm.  She otherwise denies leakage of fluid, vaginal bleeding, and decreased fetal movement..     PLAN:    1) Uterine contractions  - NST - reactive, TOCO irregular contractions  - SVE 0.5/thick/high    2) 36w2d weeks gestation of pregnancy  - Continue routine prenatal care  - Discharge from OB triage with  labor precautions    - Reviewed rupture of membranes, false vs true labor, decreased fetal movement, and vaginal bleeding   - Pt to call provider with any concerns and follow up at her next scheduled prenatal appointment    - Case discussed with Dr. Silveira    SUBJECTIVE:    Noy Palomo 29 y.o.  at 36w2d with an Estimated Date of Delivery: 24 who presents to triage for uterine contractions that started at 7pm.  She otherwise denies leakage of fluid, vaginal bleeding, and decreased fetal movement.    Her current obstetrical history is significant for Anemia, Cystic fibrosis carrier, previous bariatric surgery    Her past obstetrical history is not significant.    OBJECTIVE:    Vitals:    24 2145   BP:    Pulse:    Resp: 16   Temp: 98.5 °F (36.9 °C)       ROS:  Constitutional: Negative  Respiratory: Negative  Cardiovascular: Negative    Gastrointestinal: Negative    General Physical Exam:  General: Well appearing, no distress  Respiratory: Unlabored breathing  Cardiovascular: Regular rate.  Abdomen: Soft, gravid, nontender  Fundal Height: Appropriate for gestational age.  Extremities: Warm and well perfused.  Non tender.      Cervical Exam    SVE:  0.5/thick/high    FETAL ASSESSMENT:  Fetal heart rate: Baseline Rate (FHR): 130 bpm  Variability: Moderate  Accelerations: 15 x 15 or greater, At variable times  Decelerations: None  [place]: Contraction Frequency (minutes): 3-8  Contraction Duration (seconds): 40-90  Contraction Intensity: Mild      MD SONYA Esparza, PGY-1  11/09/24  11:02 PM

## 2024-11-10 NOTE — TELEPHONE ENCOUNTER
Esc to Dr Angy Fajardo: Pt  is 36 weeks having intense vaginal pressure that is not relieving and intermittent q 6 min contractions.

## 2024-11-10 NOTE — TELEPHONE ENCOUNTER
[facility]  madi  Vocera Web Console      Interval History :   Nursing and provider notes from last 24 hours reviewed.    In the last 24 hours Genia Gonzalez is remained  "36 weeks vaginal pressure and 6 minutes apart contractions. In consistent.  Reason for Disposition  • [1] Contractions < 10 minutes apart AND [2] persist > 1 hour  (i.e., 6 or more contractions an hour)    Answer Assessment - Initial Assessment Questions  1. ONSET: \"When did the symptoms begin?\"         One hour  2. CONTRACTIONS: \"Describe the contractions that you are having.\" (e.g., duration, frequency, regularity, severity)      6 minutes apart  3. PREGNANCY: \"How many weeks pregnant are you?\"      36 weeks  4. JENNY: \"What date are you expecting to deliver?\"      2024  5. PARITY: \"Have you had a baby before?\" If Yes, ask: \"How long did the labor last?\"      # 1  6. FETAL MOVEMENT: \"Has the baby's movement decreased or changed significantly from normal?\"      Baby is moving well.  7. OTHER SYMPTOMS: \"Do you have any other symptoms?\" (e.g., leaking fluid from vagina, fever, hand/facial swelling)      Vaginal pressure is not letting up with rest.    Protocols used: Pregnancy - Labor - -Adult-AH    " "Zak Enciso MD

## 2024-11-11 LAB — GP B STREP DNA SPEC QL NAA+PROBE: NEGATIVE

## 2024-11-12 ENCOUNTER — NURSE TRIAGE (OUTPATIENT)
Age: 29
End: 2024-11-12

## 2024-11-12 ENCOUNTER — HOSPITAL ENCOUNTER (OUTPATIENT)
Facility: HOSPITAL | Age: 29
Discharge: HOME | End: 2024-11-12
Attending: OBSTETRICS & GYNECOLOGY | Admitting: OBSTETRICS & GYNECOLOGY
Payer: COMMERCIAL

## 2024-11-12 ENCOUNTER — TELEPHONE (OUTPATIENT)
Age: 29
End: 2024-11-12

## 2024-11-12 VITALS
HEIGHT: 61 IN | BODY MASS INDEX: 38.14 KG/M2 | SYSTOLIC BLOOD PRESSURE: 120 MMHG | DIASTOLIC BLOOD PRESSURE: 70 MMHG | WEIGHT: 202 LBS | HEART RATE: 92 BPM | TEMPERATURE: 98.9 F | OXYGEN SATURATION: 96 % | RESPIRATION RATE: 18 BRPM

## 2024-11-12 PROBLEM — R19.8 RECTAL PRESSURE: Status: ACTIVE | Noted: 2024-11-12

## 2024-11-12 PROCEDURE — 99213 OFFICE O/P EST LOW 20 MIN: CPT

## 2024-11-12 PROCEDURE — NC001 PR NO CHARGE: Performed by: OBSTETRICS & GYNECOLOGY

## 2024-11-12 NOTE — TELEPHONE ENCOUNTER
"Patient calling in stating that she's 36w5d , pt stating that she's irregular contractions since Saturday and pt was seen in L&D On 24 and was discharged. Pt reporting she was a fingertip dilated. Pt reporting the contractions are coming every 12-20 min apart yesterday, and today about 30 minutes apart. Pt reporting having vaginal pressure that comes and goes and pt reporting having rectal pressure as well. Pt reporting decreased fetal movement but baby is meeting kick counts.  Pt denies  leaking fluid from vagina, vaginal bleeding, fever, hand/facial swelling      Patient is advised to go to [facility] L&D for further evaluation, pt stating it will take her about a half hour to arrive.     Epic Secure Chat sent to Dr Morgan- on call   Epic Secure Chat sent to Eve Fallon RN charge   Shruthi Rivera RN Charge       Reason for Disposition   Baby moving less today (e.g., kick count < 5 in 1 hour or < 10 in 2 hours) and 23 or more weeks pregnant    Answer Assessment - Initial Assessment Questions  1. ONSET: \"When did the symptoms begin?\"         Since 24  2. CONTRACTIONS: \"Describe the contractions that you are having.\" (e.g., duration, frequency, regularity, severity)      Pt reporting the contractions are coming every 12-20 min apart yesterday, and today about 30 minutes apart. Pt reporting having vaginal pressure that comes and goes   3. PREGNANCY: \"How many weeks pregnant are you?\"      36w5d  4. JENNY: \"What date are you expecting to deliver?\"      24   5. PARITY: \"Have you had a baby before?\" If Yes, ask: \"How long did the labor last?\"        6. FETAL MOVEMENT: \"Has the baby's movement decreased or changed significantly from normal?\"      Pt reporting decreased fetal movement   7. OTHER SYMPTOMS: \"Do you have any other symptoms?\" (e.g., leaking fluid from vagina, vaginal bleeding, fever, hand/facial swelling)      Pt denies  leaking fluid from vagina, vaginal bleeding, " fever, hand/facial swelling    Protocols used: Pregnancy - Labor-Adult-[place]

## 2024-11-12 NOTE — TELEPHONE ENCOUNTER
Pt called in stating she was seen in L&D this morning. She is wondering if she can have a note for work as well as a note stating she can only do light duty. Advised will review. Pt thankful.

## 2024-11-12 NOTE — DISCHARGE INSTRUCTIONS
Pregnancy at 35 to 38 Weeks   WHAT YOU NEED TO KNOW:   What changes are happening with my body?  You are considered full term at the beginning of 37 weeks. Your breathing may be easier if your baby has moved down into a head-down position. You may need to urinate more often because the baby may be pressing on your bladder. You may also feel more discomfort and get tired easily.  How do I care for myself at this stage of my pregnancy?       Eat a variety of healthy foods.  Healthy foods include fruits, vegetables, whole-grain breads, low-fat dairy foods, beans, lean meats, and fish. Drink liquids as directed. Ask how much liquid to drink each day and which liquids are best for you. Limit caffeine to less than 200 milligrams each day. Limit your intake of fish to 2 servings each week. Choose fish low in mercury such as canned light tuna, shrimp, salmon, cod, or tilapia. Do not  eat fish high in mercury such as swordfish, tilefish, surjit mackerel, and shark.         Take prenatal vitamins as directed.  Your need for certain vitamins and minerals, such as folic acid, increases during pregnancy. Prenatal vitamins provide some of the extra vitamins and minerals you need. Prenatal vitamins may also help to decrease the risk of certain birth defects.         Rest as needed.  Put your feet up if you have swelling in your ankles and feet.         Talk to your healthcare provider about exercise.  Moderate exercise can help you stay fit. Your healthcare provider will help you plan an exercise program that is safe for you during pregnancy.         Do not smoke.  Smoking increases your risk of a miscarriage and other health problems during your pregnancy. Smoking can cause your baby to be born early or weigh less at birth. Ask your healthcare provider for information if you need help quitting.    Do not drink alcohol.  Alcohol passes from your body to your baby through the placenta. It can affect your baby's brain development and  cause fetal alcohol syndrome (FAS). FAS is a group of conditions that causes mental, behavior, and growth problems.    Talk to your healthcare provider before you take any medicines.  Many medicines may harm your baby if you take them when you are pregnant. Do not take any medicines, vitamins, herbs, or supplements without first talking to your healthcare provider. Never use illegal or street drugs (such as marijuana or cocaine) while you are pregnant.    What are some safety tips during pregnancy?   Avoid hot tubs and saunas.  Do not use a hot tub or sauna while you are pregnant, especially during your first trimester. Hot tubs and saunas may raise your baby's temperature and increase the risk of birth defects.    Avoid toxoplasmosis.  This is an infection caused by eating raw meat or being around infected cat feces. It can cause birth defects, miscarriages, and other problems. Wash your hands after you touch raw meat. Make sure any meat is well-cooked before you eat it. Avoid raw eggs and unpasteurized milk. Use gloves or ask someone else to clean your cat's litter box while you are pregnant.         Ask your healthcare provider about travel.  The most comfortable time to travel is during the second trimester. Ask your provider if you can travel after 36 weeks. You may not be able to travel in an airplane after 36 weeks. He or she may also recommend you avoid long road trips.    What changes are happening with my baby?  By 38 weeks, your baby may weigh between 6 and 9 pounds. Your baby may be about 14 inches long from the top of the head to the rump (baby's bottom). Your baby hears well enough to know your voice. As your baby gets larger, you may feel fewer kicks and more stretching and rolling. Your baby may move into a head-down position. Your baby will also rest lower in your abdomen.  What do I need to know about prenatal care?  Your healthcare provider will check your blood pressure and weight. You may also  need the following:  A urine test  may also be done to check for sugar and protein. These can be signs of gestational diabetes or infection. Protein in your urine may also be a sign of preeclampsia. Preeclampsia is a condition that can develop during week 20 or later of your pregnancy. It causes high blood pressure, and it can cause problems with your kidneys and other organs.    A gestational diabetes screen  may be done. Your healthcare provider may order either a 1-step or 2-step oral glucose tolerance test (OGTT).     1-step OGTT:  Your blood sugar level will be tested after you have not eaten for 8 hours (fasting). You will then be given a glucose drink. Your level will be tested again 1 hour and 2 hours after you finish the drink.    2-step OGTT:  You do not have to fast for the first part of the test. You will have the glucose drink at any time of day. Your blood sugar level will be checked 1 hour later. If your blood sugar is higher than a certain level, another test will be ordered. You will fast and your blood sugar level will be tested. You will have the glucose drink. Your blood will be tested again 1 hour, 2 hours, and 3 hours after you finish the glucose drink.    A blood test  may be done to check for anemia (low iron level).    A Tdap vaccine  may be recommended by your healthcare provider.    A group B strep test  is a test that is done to check for group B strep infection. Group B strep is a type of bacteria that may be found in the vagina or rectum. It can be passed to your baby during delivery if you have it. Your healthcare provider will take swab your vagina or rectum and send the sample to the lab for tests.    Fundal height  is a measurement of your uterus to check your baby's growth. This number is usually the same as the number of weeks that you have been pregnant. Your healthcare provider may also check your baby's position.    Your baby's heart rate  will be checked.    When should I seek  immediate care?   You develop a severe headache that does not go away.    You have new or increased vision changes, such as blurred or spotted vision.    You have new or increased swelling in your face or hands.    You have vaginal spotting or bleeding.    Your water broke or you feel warm water gushing or trickling from your vagina.    When should I call my obstetrician?   You have more than 5 contractions in 1 hour.    You notice any changes in your baby's movements.    You have abdominal cramps, pressure, or tightening.    You have a change in vaginal discharge.    You have chills or a fever.    You have vaginal itching, burning, or pain.    You have yellow, green, white, or foul-smelling vaginal discharge.    You have pain or burning when you urinate, less urine than usual, or pink or bloody urine.    You have questions or concerns about your condition or care.    CARE AGREEMENT:   You have the right to help plan your care. Learn about your health condition and how it may be treated. Discuss treatment options with your healthcare providers to decide what care you want to receive. You always have the right to refuse treatment. The above information is an  only. It is not intended as medical advice for individual conditions or treatments. Talk to your doctor, nurse or pharmacist before following any medical regimen to see if it is safe and effective for you.  © Copyright [employer] 2022 Information is for End User's use only and may not be sold, redistributed or otherwise used for commercial purposes. All illustrations and images included in CareNotes® are the copyrighted property of [employer] or [employer]

## 2024-11-12 NOTE — PROGRESS NOTES
L&D Triage Note - OB/GYN  Noy Palomo 29 y.o. female MRN: [id number]  Unit/Bed#:  TRIAGE 3-01 Encounter: [id number]      ASSESSMENT:    Noy Palomo is a 29 y.o.  at 36w5d who was evaluated today in triage for irregular contractions. SVE 0.5/0/-4 with occasional contractions on tocometer. NST reactive and reassuring. Patient now feeling typical fetal movement. The patient does not appear to be in labor and it is safe to discharge home.     PLAN:    1) Contractions  - SVE: 0.5/0/-4  - [place]: Occasional rare contractions  - Encouraged PO hydration, warm bath/shower, reassurance    2) 36 weeks gestation of pregnancy  - Continue routine prenatal care  - Discharge from OB triage with labor precautions    - Reviewed rupture of membranes, false vs true labor, decreased fetal movement, and vaginal bleeding   - Pt to call provider with any concerns and follow up at her next scheduled prenatal appointment    - Case discussed with Dr. Morgan    SUBJECTIVE:    Noy Palomo 29 y.o.  at 36w5d with an Estimated Date of Delivery: 24 presenting with irregular contractions and pelvic pressure. She has been having contractions since the weekend and was seen several days ago for a similar complaint, at the time cervix was closed. Since then she reports periods of time where she will have contractions every 20-30 minutes and then they will pass. She denies leakage of fluid and vaginal bleeding. She reports earlier today baby was moving less, however she is still meeting kick counts and now feels movement is typical.  She denies leakage of fluid and vaginal bleeding.    Contractions: irregular  Leakage of fluid: Denies  Vaginal Bleeding: Denies  Fetal movement: present    OBJECTIVE:    Vitals:    24 1124   BP: 120/70   Pulse: 92   Resp:    Temp:    SpO2: 96%       ROS:  Constitutional: Negative  Respiratory: Negative  Cardiovascular: Negative    Gastrointestinal: Negative    General Physical  Exam:  General: Well appearing, no distress  Respiratory: Unlabored breathing  Cardiovascular: Regular rate.  Abdomen: Soft, gravid, nontender  Fundal Height: Appropriate for gestational age.  Extremities: Warm and well perfused.  Non tender.      Fetal monitoring:  Fetal heart rate: Baseline Rate (FHR): 140 bpm  Variability: Moderate  Accelerations: 15 x 15 or greater, At variable times  Decelerations: None  [place]: Contraction Duration (seconds): 70-90      Cervical Exam  SVE: 0.5/0/-4        Casi Hameed MD  PGY 2, Obstetrics and Gynecology  11/12/2024  2:42 PM

## 2024-11-13 ENCOUNTER — APPOINTMENT (OUTPATIENT)
Dept: LAB | Facility: CLINIC | Age: 29
End: 2024-11-13
Payer: COMMERCIAL

## 2024-11-13 DIAGNOSIS — D50.8 IRON DEFICIENCY ANEMIA SECONDARY TO INADEQUATE DIETARY IRON INTAKE: ICD-10-CM

## 2024-11-13 LAB
BASOPHILS # BLD MANUAL: 0 THOUSAND/UL (ref 0–0.1)
BASOPHILS NFR MAR MANUAL: 0 % (ref 0–1)
EOSINOPHIL # BLD MANUAL: 0.22 THOUSAND/UL (ref 0–0.4)
EOSINOPHIL NFR BLD MANUAL: 2 % (ref 0–6)
ERYTHROCYTE [DISTWIDTH] IN BLOOD BY AUTOMATED COUNT: 13.9 % (ref 11.6–15.1)
FERRITIN SERPL-MCNC: 472 NG/ML (ref 11–307)
HCT VFR BLD AUTO: 37.7 % (ref 34.8–46.1)
HGB BLD-MCNC: 11.8 G/DL (ref 11.5–15.4)
IRON SATN MFR SERPL: 58 % (ref 15–50)
IRON SERPL-MCNC: 182 UG/DL (ref 50–212)
LYMPHOCYTES # BLD AUTO: 13 % (ref 14–44)
LYMPHOCYTES # BLD AUTO: 2.25 THOUSAND/UL (ref 0.6–4.47)
MCH RBC QN AUTO: 27.6 PG (ref 26.8–34.3)
MCHC RBC AUTO-ENTMCNC: 31.3 G/DL (ref 31.4–37.4)
MCV RBC AUTO: 88 FL (ref 82–98)
MONOCYTES # BLD AUTO: 0.34 THOUSAND/UL (ref 0–1.22)
MONOCYTES NFR BLD: 3 % (ref 4–12)
NEUTROPHILS # BLD MANUAL: 8.42 THOUSAND/UL (ref 1.85–7.62)
NEUTS SEG NFR BLD AUTO: 75 % (ref 43–75)
PLATELET # BLD AUTO: 187 THOUSANDS/UL (ref 149–390)
PLATELET BLD QL SMEAR: ADEQUATE
PMV BLD AUTO: 12.4 FL (ref 8.9–12.7)
POIKILOCYTOSIS BLD QL SMEAR: PRESENT
RBC # BLD AUTO: 4.27 MILLION/UL (ref 3.81–5.12)
RBC MORPH BLD: PRESENT
TIBC SERPL-MCNC: 314 UG/DL (ref 250–450)
UIBC SERPL-MCNC: 132 UG/DL (ref 155–355)
VARIANT LYMPHS # BLD AUTO: 7 %
WBC # BLD AUTO: 11.23 THOUSAND/UL (ref 4.31–10.16)

## 2024-11-13 PROCEDURE — 85027 COMPLETE CBC AUTOMATED: CPT

## 2024-11-13 PROCEDURE — 85007 BL SMEAR W/DIFF WBC COUNT: CPT

## 2024-11-14 ENCOUNTER — RESULTS FOLLOW-UP (OUTPATIENT)
Dept: OBGYN CLINIC | Facility: CLINIC | Age: 29
End: 2024-11-14

## 2024-11-14 ENCOUNTER — ROUTINE PRENATAL (OUTPATIENT)
Dept: OBGYN CLINIC | Facility: CLINIC | Age: 29
End: 2024-11-14
Payer: COMMERCIAL

## 2024-11-14 ENCOUNTER — TELEPHONE (OUTPATIENT)
Dept: OBGYN CLINIC | Facility: CLINIC | Age: 29
End: 2024-11-14

## 2024-11-14 VITALS
WEIGHT: 204.2 LBS | BODY MASS INDEX: 38.58 KG/M2 | DIASTOLIC BLOOD PRESSURE: 70 MMHG | SYSTOLIC BLOOD PRESSURE: 108 MMHG | OXYGEN SATURATION: 97 %

## 2024-11-14 DIAGNOSIS — Z3A.37 37 WEEKS GESTATION OF PREGNANCY: ICD-10-CM

## 2024-11-14 DIAGNOSIS — O99.210 OBESITY AFFECTING PREGNANCY, ANTEPARTUM, UNSPECIFIED OBESITY TYPE: ICD-10-CM

## 2024-11-14 DIAGNOSIS — Z34.03 PRENATAL CARE, FIRST PREGNANCY IN THIRD TRIMESTER: Primary | ICD-10-CM

## 2024-11-14 DIAGNOSIS — D50.8 IRON DEFICIENCY ANEMIA SECONDARY TO INADEQUATE DIETARY IRON INTAKE: ICD-10-CM

## 2024-11-14 PROBLEM — O47.9 UTERINE CONTRACTIONS: Status: RESOLVED | Noted: 2024-11-09 | Resolved: 2024-11-14

## 2024-11-14 LAB
SL AMB  POCT GLUCOSE, UA: NORMAL
SL AMB POCT URINE PROTEIN: NORMAL

## 2024-11-14 PROCEDURE — PNV: Performed by: OBSTETRICS & GYNECOLOGY

## 2024-11-14 PROCEDURE — 81002 URINALYSIS NONAUTO W/O SCOPE: CPT | Performed by: OBSTETRICS & GYNECOLOGY

## 2024-11-14 NOTE — PROGRESS NOTES
Prenatal visit  G1 at 37w 0d      Problem List Items Addressed This Visit       37 weeks gestation of pregnancy    She was recently seen in triage for decreased fetal movement.  Her cervix was examined and was fingertip.  She has normal fetal movement today.  She is asking for a work note to limit her mobility with her job from 30 minutes to the hospital.  I explained to her that is safe for her to be within 1-2 hours of the hospital.  However she strongly desires to know and therefore I wrote a note for her to stay within 1 hour at the [facility] in the next few weeks.  She also desires induction of labor.  Consent form signed today.  Request submitted.         Relevant Orders    POCT urine dip (Completed)    Maternal obesity affecting pregnancy, antepartum    Iron deficiency anemia secondary to inadequate dietary iron intake    S/p IV venofer x5; cbc normal now and repeat iron studies reviewed          Other Visit Diagnoses         Prenatal care, first pregnancy in third trimester    -  Primary    Relevant Orders    POCT urine dip (Completed)

## 2024-11-14 NOTE — TELEPHONE ENCOUNTER
----- Message from Shelia Beltran MD sent at 11/14/2024 10:45 AM EST -----  Procedure to be scheduled (IOL or CS): IOL  JENNY: Estimated Date of Delivery: Estimated Date of Delivery: 12/5/24  Indication for delivery: elective   Requested date (s) of delivery: 39 wk  If requested date is unavailable, is there a date by which the pt must be delivered?  Physician preference: n/a  If IOL, anticipated method: ripening overngiht  If CS, with or without tubal: n/a

## 2024-11-14 NOTE — LETTER
November 14, 2024     Patient: Noy Palomo  YOB: 1995  Date of Visit: 11/14/2024      To Whom it May Concern:    Noy Palomo is under my professional care. Noy is due 12/5/2024 and needs to remain within 1 hour to the [facility] for work         Sincerely,      Shelia Beltran MD

## 2024-11-14 NOTE — ASSESSMENT & PLAN NOTE
She was recently seen in triage for decreased fetal movement.  Her cervix was examined and was fingertip.  She has normal fetal movement today.  She is asking for a work note to limit her mobility with her job from 30 minutes to the hospital.  I explained to her that is safe for her to be within 1-2 hours of the hospital.  However she strongly desires to know and therefore I wrote a note for her to stay within 1 hour at the [facility] in the next few weeks.  She also desires induction of labor.  Consent form signed today.  Request submitted.

## 2024-11-21 ENCOUNTER — TELEMEDICINE (OUTPATIENT)
Dept: PSYCHIATRY | Facility: CLINIC | Age: 29
End: 2024-11-21

## 2024-11-21 ENCOUNTER — ROUTINE PRENATAL (OUTPATIENT)
Dept: OBGYN CLINIC | Facility: CLINIC | Age: 29
End: 2024-11-21
Payer: COMMERCIAL

## 2024-11-21 VITALS — WEIGHT: 205 LBS | DIASTOLIC BLOOD PRESSURE: 64 MMHG | BODY MASS INDEX: 38.73 KG/M2 | SYSTOLIC BLOOD PRESSURE: 120 MMHG

## 2024-11-21 DIAGNOSIS — O99.210 OBESITY AFFECTING PREGNANCY, ANTEPARTUM, UNSPECIFIED OBESITY TYPE: ICD-10-CM

## 2024-11-21 DIAGNOSIS — Z34.03 PRENATAL CARE, FIRST PREGNANCY IN THIRD TRIMESTER: Primary | ICD-10-CM

## 2024-11-21 DIAGNOSIS — Z91.199 NO-SHOW FOR APPOINTMENT: Primary | ICD-10-CM

## 2024-11-21 DIAGNOSIS — O99.840 PREVIOUS BARIATRIC SURGERY AFFECTING PREGNANCY, ANTEPARTUM: ICD-10-CM

## 2024-11-21 DIAGNOSIS — Z3A.38 38 WEEKS GESTATION OF PREGNANCY: ICD-10-CM

## 2024-11-21 DIAGNOSIS — Z53.1 TRANSFUSION OF BLOOD PRODUCT DECLINED DUE TO RELIGIOUS REASON: ICD-10-CM

## 2024-11-21 DIAGNOSIS — D50.8 IRON DEFICIENCY ANEMIA SECONDARY TO INADEQUATE DIETARY IRON INTAKE: ICD-10-CM

## 2024-11-21 LAB
SL AMB  POCT GLUCOSE, UA: NEGATIVE
SL AMB POCT URINE PROTEIN: POSITIVE

## 2024-11-21 PROCEDURE — 81002 URINALYSIS NONAUTO W/O SCOPE: CPT | Performed by: OBSTETRICS & GYNECOLOGY

## 2024-11-21 PROCEDURE — PNV: Performed by: OBSTETRICS & GYNECOLOGY

## 2024-11-21 NOTE — PROGRESS NOTES
Problem List Items Addressed This Visit       Transfusion of blood product declined due to Scientology reason    Scientologist  Signed blood products consent form - declines nay products that come directly from whole blood and blood patch. Okay with cell saver.  : hgb 11.8         38 weeks gestation of pregnancy    She has been attempting to schedule her induction.  Will send another message to the OB navigator.  Offered to strip membranes however cervix was closed today.  Labor precautions reviewed.         Maternal obesity affecting pregnancy, antepartum    Previous bariatric surgery affecting pregnancy, antepartum    Iron deficiency anemia secondary to inadequate dietary iron intake     Other Visit Diagnoses         Prenatal care, first pregnancy in third trimester    -  Primary          Pre-Christian Vitals      Flowsheet Row Most Recent Value   Prenatal Assessment    Fetal Heart Rate 150   Fundal Height (cm) 38 cm   Movement Present   Presentation Vertex   Prenatal Vitals    Blood Pressure 120/64   Weight - Scale 93 kg (205 lb)   Urine Albumin/Glucose    Dilation/Effacement/Station    Cervical Dilation 0   Cervical Effacement 0   Fetal Station -5   Vaginal Drainage    Draining Fluid No   Edema

## 2024-11-21 NOTE — ASSESSMENT & PLAN NOTE
She has been attempting to schedule her induction.  Will send another message to the OB navigator.  Offered to strip membranes however cervix was closed today.  Labor precautions reviewed.

## 2024-11-21 NOTE — ASSESSMENT & PLAN NOTE
Holiness  Signed blood products consent form - declines nay products that come directly from whole blood and blood patch. Okay with cell saver.  11/13: hgb 11.8

## 2024-11-22 ENCOUNTER — TELEPHONE (OUTPATIENT)
Dept: PSYCHIATRY | Facility: CLINIC | Age: 29
End: 2024-11-22

## 2024-11-22 NOTE — PSYCH
No Call. No Show. No Charge    Noy Palomo no showed 11/22/24 appointment , staff left message to connect for the appointment     Treatment Plan not due at this session.

## 2024-11-23 ENCOUNTER — NURSE TRIAGE (OUTPATIENT)
Dept: OTHER | Facility: OTHER | Age: 29
End: 2024-11-23

## 2024-11-23 ENCOUNTER — HOSPITAL ENCOUNTER (OUTPATIENT)
Facility: HOSPITAL | Age: 29
Discharge: HOME | End: 2024-11-23
Attending: OBSTETRICS & GYNECOLOGY | Admitting: OBSTETRICS & GYNECOLOGY
Payer: COMMERCIAL

## 2024-11-23 VITALS
BODY MASS INDEX: 38.71 KG/M2 | DIASTOLIC BLOOD PRESSURE: 78 MMHG | SYSTOLIC BLOOD PRESSURE: 139 MMHG | WEIGHT: 205 LBS | TEMPERATURE: 97.9 F | HEIGHT: 61 IN | HEART RATE: 106 BPM | RESPIRATION RATE: 20 BRPM | OXYGEN SATURATION: 98 %

## 2024-11-23 PROCEDURE — 99213 OFFICE O/P EST LOW 20 MIN: CPT

## 2024-11-23 PROCEDURE — NC001 PR NO CHARGE: Performed by: OBSTETRICS & GYNECOLOGY

## 2024-11-23 NOTE — TELEPHONE ENCOUNTER
"Reason for Disposition   [1] Pregnant 37 or more weeks (term) AND [2] pinkish or brownish mucous discharge    Answer Assessment - Initial Assessment Questions  1. DISCHARGE: \"Describe the discharge.\" (e.g., white, yellow, green, gray, foamy, cottage cheese-like)      Mucusy pink discharge       3. ONSET: \"When did the discharge begin?\"      This morning   Believes it is bloody show     5. ABDOMEN PAIN: \"Are you having any abdomen pain?\" If Yes, ask: \"What does it feel like?\" (e.g., crampy, dull, intermittent, constant)       irregular contractions, has not timed them   A lot of vaginal/perineal pressure     6. ABDOMEN PAIN SEVERITY: If present, ask: \"How bad is it?\"  (e.g., Scale 1-10; mild, moderate, or severe)      Mild     7. CAUSE: \"What do you think is causing the discharge?\"      Mucus plug     9. JENNY: \"What date are you expecting to deliver?\"       24      10. PREGNANCY: \"How many weeks pregnant are you?\"        38w2d    Protocols used: Pregnancy - Vaginal Discharge-Adult-AH    "

## 2024-11-23 NOTE — PROGRESS NOTES
"L&D Triage Note - OB/GYN  Noy Palomo 29 y.o. female MRN: [id number]  Unit/Bed#: LD TRIAGE  Encounter: [id number]      ASSESSMENT/PLAN  Noy Palomo is a 29 y.o.  at 38w2d who is here to rule out labor. Cervical dilatation was fingertip. Uterine irritability  Offered 2 hour cervical check again, patient declined/will go home.  Mucus plug fell out without complications  Discharge instructions  - Patient instructed to call if experiencing worsening contractions, vaginal bleeding, loss of fluid or decreased fetal movement  - Will follow up with OBGYN in office      She is a patient of [facility]    ______________    SUBJECTIVE    JENNY: Estimated Date of Delivery: 24    HPI:  29 y.o.  38w2d presents with complaint of mucus plug falling out into toilet 5AM in the morning. Since, then patient has had vaginal spotting and mucus discharge. Patient notes contractions every 5-7 minutes. Otherwise no concerns.    Contractions: Patient reports contractions every 5-7 minutes  Leakage of fluid: slight spotting blood, mucus discharge after mucus plug fell into toilet  Vaginal bleeding: slight spotting blood  Fetal movement: present      ROS:  Cardiovascular: No chest pain  HEENT: Denies blurry vision, denies headache  Pulmonary: Denies cough, shortness of breath or dyspnea on exertion  GI: Abdominal pain with deep palpation and with yessica mcqueen contractions every 5-7 minutes  MSK: No MSK pain    Physical Exam:  General: oriented time place person situation. Uncomfortable every 5-7minutes during contractions  Cardiovascular: Normal rate and rhythm. No pitting edema  Lungs: non-labored breathing  Abdomen: Soft, Non-tender, Gravid  Lower extremities: nontender, no edema  Fundal height: Appropriate for gestational age  Extremities: Warm and well perfused, non-tender      OBJECTIVE:  /78 (BP Location: Left arm)   Pulse (!) 106   Temp 97.9 °F (36.6 °C) (Oral)   Resp 20   Ht 5' 1\" (1.549 m)   Wt 93 " "kg (205 lb)   LMP 02/29/2024   SpO2 98%   BMI 38.73 kg/m²   Body mass index is 38.73 kg/m².  Labs: No results found for this or any previous visit (from the past 24 hours).      SVE:  Cervical Dilation: Fingertip  Cervical Effacement: 0  Fetal Station: Floating  Method: Manual  OB Examiner: Alix    FHT:  Baseline Rate (FHR): 140 bpm  Variability: Moderate  Accelerations: 15 x 15 or greater, At variable times  Decelerations: None  FHR Category: Category I    TOCO:   Contraction Intensity: Mild      Chloe Lew MD  11/23/2024  11:26 AM      Portions of the record may have been created with voice recognition software.  Occasional wrong word or \"sound a like\" substitutions may have occurred due to the inherent limitations of voice recognition software.  Read the chart carefully and recognize, using context, where substitutions have occurred    "

## 2024-11-23 NOTE — TELEPHONE ENCOUNTER
"Reason for Disposition   [1] First baby (primipara) AND [2] contractions < 6 minutes apart  AND [3] present 2 hours    Answer Assessment - Initial Assessment Questions  1. ONSET: \"When did the symptoms begin?\"         Timing for about two hours; having off and on for 1.5 weeks.  2. CONTRACTIONS: \"Describe the contractions that you are having.\" (e.g., duration, frequency, regularity, severity)      Every 3-5 minutes, moderate strength, lasting 30-45s; lots of vaginal and rectal pressure    3. PREGNANCY: \"How many weeks pregnant are you?\"      38     4. JENNY: \"What date are you expecting to deliver?\"      24  5. PARITY: \"Have you had a baby before?\" If Yes, ask: \"How long did the labor last?\"        6. FETAL MOVEMENT: \"Has the baby's movement decreased or changed significantly from normal?\"      Baby moving    7. OTHER SYMPTOMS: \"Do you have any other symptoms?\" (e.g., leaking fluid from vagina, vaginal bleeding, fever, hand/facial swelling)      Mucous discharge overnight    Protocols used: Pregnancy - Labor-Adult-AH    "

## 2024-11-24 ENCOUNTER — HOSPITAL ENCOUNTER (INPATIENT)
Facility: HOSPITAL | Age: 29
LOS: 3 days | Discharge: HOME | End: 2024-11-27
Attending: STUDENT IN AN ORGANIZED HEALTH CARE EDUCATION/TRAINING PROGRAM | Admitting: STUDENT IN AN ORGANIZED HEALTH CARE EDUCATION/TRAINING PROGRAM
Payer: COMMERCIAL

## 2024-11-24 ENCOUNTER — ANESTHESIA (INPATIENT)
Dept: ANESTHESIOLOGY | Facility: HOSPITAL | Age: 29
End: 2024-11-24
Payer: COMMERCIAL

## 2024-11-24 ENCOUNTER — ANESTHESIA EVENT (INPATIENT)
Dept: ANESTHESIOLOGY | Facility: HOSPITAL | Age: 29
End: 2024-11-24
Payer: COMMERCIAL

## 2024-11-24 ENCOUNTER — NURSE TRIAGE (OUTPATIENT)
Dept: OTHER | Facility: OTHER | Age: 29
End: 2024-11-24

## 2024-11-24 DIAGNOSIS — Z3A.38 38 WEEKS GESTATION OF PREGNANCY: Primary | ICD-10-CM

## 2024-11-24 PROBLEM — O47.9 UTERINE CONTRACTIONS: Chronic | Status: ACTIVE | Noted: 2024-11-09

## 2024-11-24 LAB
ABO GROUP BLD: NORMAL
BLD GP AB SCN SERPL QL: NEGATIVE
ERYTHROCYTE [DISTWIDTH] IN BLOOD BY AUTOMATED COUNT: 13.6 % (ref 11.6–15.1)
HCT VFR BLD AUTO: 38.1 % (ref 34.8–46.1)
HGB BLD-MCNC: 12.2 G/DL (ref 11.5–15.4)
MCH RBC QN AUTO: 27.7 PG (ref 26.8–34.3)
MCHC RBC AUTO-ENTMCNC: 32 G/DL (ref 31.4–37.4)
MCV RBC AUTO: 86 FL (ref 82–98)
PLATELET # BLD AUTO: 160 THOUSANDS/UL (ref 149–390)
PMV BLD AUTO: 11.8 FL (ref 8.9–12.7)
RBC # BLD AUTO: 4.41 MILLION/UL (ref 3.81–5.12)
RH BLD: POSITIVE
SPECIMEN EXPIRATION DATE: NORMAL
WBC # BLD AUTO: 15.55 THOUSAND/UL (ref 4.31–10.16)

## 2024-11-24 PROCEDURE — 99214 OFFICE O/P EST MOD 30 MIN: CPT

## 2024-11-24 PROCEDURE — 86850 RBC ANTIBODY SCREEN: CPT

## 2024-11-24 PROCEDURE — TCMXX: Performed by: FAMILY MEDICINE

## 2024-11-24 PROCEDURE — 86901 BLOOD TYPING SEROLOGIC RH(D): CPT

## 2024-11-24 PROCEDURE — 86900 BLOOD TYPING SEROLOGIC ABO: CPT

## 2024-11-24 PROCEDURE — 4A1HXCZ MONITORING OF PRODUCTS OF CONCEPTION, CARDIAC RATE, EXTERNAL APPROACH: ICD-10-PCS | Performed by: OBSTETRICS & GYNECOLOGY

## 2024-11-24 PROCEDURE — 86780 TREPONEMA PALLIDUM: CPT

## 2024-11-24 PROCEDURE — 85027 COMPLETE CBC AUTOMATED: CPT

## 2024-11-24 PROCEDURE — NC001 PR NO CHARGE: Performed by: STUDENT IN AN ORGANIZED HEALTH CARE EDUCATION/TRAINING PROGRAM

## 2024-11-24 RX ORDER — ACETAMINOPHEN 325 MG/1
975 TABLET ORAL ONCE
Status: COMPLETED | OUTPATIENT
Start: 2024-11-24 | End: 2024-11-24

## 2024-11-24 RX ORDER — ONDANSETRON 2 MG/ML
4 INJECTION INTRAMUSCULAR; INTRAVENOUS EVERY 6 HOURS PRN
Status: DISCONTINUED | OUTPATIENT
Start: 2024-11-24 | End: 2024-11-25 | Stop reason: SDUPTHER

## 2024-11-24 RX ORDER — BUPIVACAINE HYDROCHLORIDE 2.5 MG/ML
30 INJECTION, SOLUTION EPIDURAL; INFILTRATION; INTRACAUDAL ONCE AS NEEDED
Status: DISCONTINUED | OUTPATIENT
Start: 2024-11-24 | End: 2024-11-25

## 2024-11-24 RX ORDER — SODIUM CHLORIDE, SODIUM LACTATE, POTASSIUM CHLORIDE, CALCIUM CHLORIDE 600; 310; 30; 20 MG/100ML; MG/100ML; MG/100ML; MG/100ML
125 INJECTION, SOLUTION INTRAVENOUS CONTINUOUS
Status: DISCONTINUED | OUTPATIENT
Start: 2024-11-24 | End: 2024-11-25

## 2024-11-24 RX ORDER — DIPHENHYDRAMINE HYDROCHLORIDE 50 MG/ML
50 INJECTION INTRAMUSCULAR; INTRAVENOUS ONCE
Status: COMPLETED | OUTPATIENT
Start: 2024-11-24 | End: 2024-11-24

## 2024-11-24 RX ORDER — LIDOCAINE HYDROCHLORIDE AND EPINEPHRINE 15; 5 MG/ML; UG/ML
INJECTION, SOLUTION EPIDURAL
Status: COMPLETED | OUTPATIENT
Start: 2024-11-24 | End: 2024-11-24

## 2024-11-24 RX ADMIN — LIDOCAINE HYDROCHLORIDE AND EPINEPHRINE 3 ML: 15; 5 INJECTION, SOLUTION EPIDURAL at 23:36

## 2024-11-24 RX ADMIN — ROPIVACAINE HYDROCHLORIDE: 2 INJECTION, SOLUTION EPIDURAL; INFILTRATION at 23:45

## 2024-11-24 RX ADMIN — MORPHINE SULFATE 2 MG: 2 INJECTION, SOLUTION INTRAMUSCULAR; INTRAVENOUS at 20:10

## 2024-11-24 RX ADMIN — ACETAMINOPHEN 975 MG: 325 TABLET, FILM COATED ORAL at 20:10

## 2024-11-24 RX ADMIN — DIPHENHYDRAMINE HYDROCHLORIDE 50 MG: 50 INJECTION, SOLUTION INTRAMUSCULAR; INTRAVENOUS at 20:10

## 2024-11-24 RX ADMIN — SODIUM CHLORIDE, SODIUM LACTATE, POTASSIUM CHLORIDE, AND CALCIUM CHLORIDE 999 ML/HR: .6; .31; .03; .02 INJECTION, SOLUTION INTRAVENOUS at 23:10

## 2024-11-24 RX ADMIN — SODIUM CHLORIDE, SODIUM LACTATE, POTASSIUM CHLORIDE, AND CALCIUM CHLORIDE 1000 ML: .6; .31; .03; .02 INJECTION, SOLUTION INTRAVENOUS at 20:09

## 2024-11-24 NOTE — TELEPHONE ENCOUNTER
"Regardin weeks/contractions/pressure  ----- Message from Maya HOFFMAN sent at 2024  2:03 PM EST -----  \"I am 38 weeks pregnant and I was told to call if my symptoms are getting stronger. I am having contractions every 5 minutes and I am feeling a lotof pressure in my vaginal and rectal area\"    "

## 2024-11-24 NOTE — PROGRESS NOTES
"L&D Triage Note - OB/GYN  Noy Palomo 29 y.o. female MRN: [id number]  Unit/Bed#:  TRIAGE 3-01 Encounter: [id number]    Patient is seen by DALTON.    ASSESSMENT  Noy Palomo is a 29 y.o.  at 38w3d here for r/o labor. Plan for 2 hour recheck to ensure continuing to make change.     PLAN  #1. Uterine contractions:   SVE 2.5/70/-3 (from 0.5/long/high)  Likely early labor  Patient does not desire pain medication at this time; would prefer to walk  Plan for 2 hour recheck to decide on disposition     D/w Dr. Wilson.  ______________    SUBJECTIVE    JENNY: Estimated Date of Delivery: 24    HPI:  29 y.o.  38w3d presents with complaint of uterine contractions. She was seen in triage yesterday for cramping, at which time SVE was 0.5/long/high. She says that the pain has only intensified since going home and she is feeling contractions every 3-5 minutes. She is otherwise without complaint.    Contractions: as above  Leakage of fluid: denies  Vaginal Bleeding: denies  Fetal movement: present    Her obstetrical history is not significant; this is her first pregnancy.    ROS:  Constitutional: Negative  Respiratory: Negative  Cardiovascular: Negative    Gastrointestinal: Negative    OBJECTIVE:    Vitals:  /79 (BP Location: Right arm)   Pulse (!) 113   Temp 98.9 °F (37.2 °C) (Oral)   Resp 18   Ht 5' 1\" (1.549 m)   Wt 93 kg (205 lb)   LMP 2024   SpO2 98%   BMI 38.73 kg/m²   Body mass index is 38.73 kg/m².    Physical Exam  Vitals reviewed.   Constitutional:       Appearance: Normal appearance.   Pulmonary:      Effort: Pulmonary effort is normal.   Abdominal:      Comments: Gravid, non-tender   Genitourinary:     General: Normal vulva.   Musculoskeletal:         General: Normal range of motion.   Skin:     General: Skin is warm and dry.   Neurological:      Mental Status: She is alert.         SVE:  Presentation: Vertex  Method: Manual  OB Examiner: Olimpia    FHT:  Baseline Rate " (FHR): 125 bpm  Variability: Moderate  Accelerations: 15 x 15 or greater, At variable times  Decelerations: None    TOCO:   Contraction Frequency (minutes): 0    Imaging:      TAUS   Presentation: vertex    Labs: No results found for this or any previous visit (from the past 24 hours).    Ondina Doan MD  11/24/2024  4:01 PM

## 2024-11-24 NOTE — QUICK NOTE
Patient due for recheck. SVE with minimal change, but patient much more uncomfortable than before. Talked about this being early labor and options to go home to get comfortable vs staying for therapeutic rest/recheck. Patient opts for therapeutic rest. Will order IVF, tylenol, benadryl, morphine. Continuous fetal monitoring. D/w Dr. Wilson.     Ondina Doan MD  OBGYN PGY2

## 2024-11-24 NOTE — TELEPHONE ENCOUNTER
"Reason for Disposition  • [1] First baby (primipara) AND [2] contractions < 6 minutes apart  AND [3] present 2 hours    Answer Assessment - Initial Assessment Questions  1. ONSET: \"When did the symptoms begin?\"         11/23    2. CONTRACTIONS: \"Describe the contractions that you are having.\" (e.g., duration, frequency, regularity, severity)      Every 5 minutes    3. PREGNANCY: \"How many weeks pregnant are you?\"      38wk3d    4. JENNY: \"What date are you expecting to deliver?\"      12/5/24  5. PARITY: \"Have you had a baby before?\" If Yes, ask: \"How long did the labor last?\"      First baby    6. FETAL MOVEMENT: \"Has the baby's movement decreased or changed significantly from normal?\"      No    7. OTHER SYMPTOMS: \"Do you have any other symptoms?\" (e.g., leaking fluid from vagina, vaginal bleeding, fever, hand/facial swelling)      Vaginal and rectal pressure    Protocols used: Pregnancy - Labor-Adult-AH    "

## 2024-11-25 LAB
BASE EXCESS BLDCOA CALC-SCNC: -3.8 MMOL/L (ref 3–11)
BASE EXCESS BLDCOV CALC-SCNC: -6 MMOL/L (ref 1–9)
HCO3 BLDCOA-SCNC: 21.6 MMOL/L (ref 17.3–27.3)
HCO3 BLDCOV-SCNC: 18.8 MMOL/L (ref 12.2–28.6)
HOLD SPECIMEN: NORMAL
O2 CT VFR BLDCOA CALC: 15.4 ML/DL
OXYHGB MFR BLDCOA: 67.1 %
OXYHGB MFR BLDCOV: 86.1 %
PCO2 BLDCOA: 40.8 MM[HG] (ref 30–60)
PCO2 BLDCOV: 35.5 MM HG (ref 27–43)
PH BLDCOA: 7.34 [PH] (ref 7.23–7.43)
PH BLDCOV: 7.34 [PH] (ref 7.19–7.49)
PO2 BLDCOA: 28.9 MM HG (ref 5–25)
PO2 BLDCOV: 42.4 MM HG (ref 15–45)
SAO2 % BLDCOV: 19.9 ML/DL
TREPONEMA PALLIDUM IGG+IGM AB [PRESENCE] IN SERUM OR PLASMA BY IMMUNOASSAY: NORMAL

## 2024-11-25 PROCEDURE — NC001 PR NO CHARGE: Performed by: STUDENT IN AN ORGANIZED HEALTH CARE EDUCATION/TRAINING PROGRAM

## 2024-11-25 PROCEDURE — 59400 OBSTETRICAL CARE: CPT | Performed by: OBSTETRICS & GYNECOLOGY

## 2024-11-25 PROCEDURE — 0KQM0ZZ REPAIR PERINEUM MUSCLE, OPEN APPROACH: ICD-10-PCS | Performed by: OBSTETRICS & GYNECOLOGY

## 2024-11-25 PROCEDURE — 82805 BLOOD GASES W/O2 SATURATION: CPT | Performed by: STUDENT IN AN ORGANIZED HEALTH CARE EDUCATION/TRAINING PROGRAM

## 2024-11-25 RX ORDER — CALCIUM CARBONATE 500 MG/1
1000 TABLET, CHEWABLE ORAL DAILY PRN
Status: DISCONTINUED | OUTPATIENT
Start: 2024-11-25 | End: 2024-11-25 | Stop reason: SDUPTHER

## 2024-11-25 RX ORDER — OXYTOCIN/RINGER'S LACTATE 30/500 ML
1-30 PLASTIC BAG, INJECTION (ML) INTRAVENOUS
Status: DISCONTINUED | OUTPATIENT
Start: 2024-11-25 | End: 2024-11-25

## 2024-11-25 RX ORDER — ONDANSETRON 2 MG/ML
4 INJECTION INTRAMUSCULAR; INTRAVENOUS EVERY 8 HOURS PRN
Status: DISCONTINUED | OUTPATIENT
Start: 2024-11-25 | End: 2024-11-27 | Stop reason: HOSPADM

## 2024-11-25 RX ORDER — IBUPROFEN 600 MG/1
600 TABLET, FILM COATED ORAL EVERY 6 HOURS PRN
Status: DISCONTINUED | OUTPATIENT
Start: 2024-11-25 | End: 2024-11-27 | Stop reason: HOSPADM

## 2024-11-25 RX ORDER — IBUPROFEN 600 MG/1
600 TABLET, FILM COATED ORAL EVERY 6 HOURS
Status: DISCONTINUED | OUTPATIENT
Start: 2024-11-25 | End: 2024-11-25

## 2024-11-25 RX ORDER — CALCIUM CARBONATE 500 MG/1
1000 TABLET, CHEWABLE ORAL 3 TIMES DAILY PRN
Status: DISCONTINUED | OUTPATIENT
Start: 2024-11-25 | End: 2024-11-25

## 2024-11-25 RX ORDER — OXYTOCIN/RINGER'S LACTATE 30/500 ML
250 PLASTIC BAG, INJECTION (ML) INTRAVENOUS ONCE
Status: DISCONTINUED | OUTPATIENT
Start: 2024-11-25 | End: 2024-11-27 | Stop reason: HOSPADM

## 2024-11-25 RX ORDER — SIMETHICONE 80 MG
80 TABLET,CHEWABLE ORAL 4 TIMES DAILY PRN
Status: DISCONTINUED | OUTPATIENT
Start: 2024-11-25 | End: 2024-11-27 | Stop reason: HOSPADM

## 2024-11-25 RX ORDER — PANTOPRAZOLE SODIUM 40 MG/1
40 TABLET, DELAYED RELEASE ORAL
Status: DISCONTINUED | OUTPATIENT
Start: 2024-11-26 | End: 2024-11-27 | Stop reason: HOSPADM

## 2024-11-25 RX ORDER — IBUPROFEN 600 MG/1
600 TABLET, FILM COATED ORAL EVERY 6 HOURS PRN
Status: DISCONTINUED | OUTPATIENT
Start: 2024-11-25 | End: 2024-11-25

## 2024-11-25 RX ORDER — ACETAMINOPHEN 325 MG/1
975 TABLET ORAL EVERY 8 HOURS PRN
Status: DISCONTINUED | OUTPATIENT
Start: 2024-11-25 | End: 2024-11-25

## 2024-11-25 RX ORDER — BENZOCAINE/MENTHOL 6 MG-10 MG
1 LOZENGE MUCOUS MEMBRANE DAILY PRN
Status: DISCONTINUED | OUTPATIENT
Start: 2024-11-25 | End: 2024-11-27 | Stop reason: HOSPADM

## 2024-11-25 RX ORDER — ACETAMINOPHEN 325 MG/1
650 TABLET ORAL EVERY 6 HOURS
Status: DISCONTINUED | OUTPATIENT
Start: 2024-11-25 | End: 2024-11-27 | Stop reason: HOSPADM

## 2024-11-25 RX ADMIN — IBUPROFEN 600 MG: 600 TABLET, FILM COATED ORAL at 23:15

## 2024-11-25 RX ADMIN — ACETAMINOPHEN 650 MG: 325 TABLET, FILM COATED ORAL at 21:21

## 2024-11-25 RX ADMIN — OXYTOCIN 2 MILLI-UNITS/MIN: 10 INJECTION INTRAVENOUS at 01:30

## 2024-11-25 RX ADMIN — IBUPROFEN 600 MG: 600 TABLET, FILM COATED ORAL at 11:03

## 2024-11-25 RX ADMIN — IBUPROFEN 600 MG: 600 TABLET, FILM COATED ORAL at 16:14

## 2024-11-25 RX ADMIN — ROPIVACAINE HYDROCHLORIDE: 2 INJECTION, SOLUTION EPIDURAL; INFILTRATION at 07:18

## 2024-11-25 RX ADMIN — ACETAMINOPHEN 650 MG: 325 TABLET, FILM COATED ORAL at 16:14

## 2024-11-25 RX ADMIN — BENZOCAINE AND LEVOMENTHOL 1 APPLICATION: 200; 5 SPRAY TOPICAL at 11:03

## 2024-11-25 RX ADMIN — HYDROCORTISONE 1 APPLICATION: 1 CREAM TOPICAL at 11:03

## 2024-11-25 RX ADMIN — ACETAMINOPHEN 650 MG: 325 TABLET, FILM COATED ORAL at 11:03

## 2024-11-25 RX ADMIN — SODIUM CHLORIDE, SODIUM LACTATE, POTASSIUM CHLORIDE, AND CALCIUM CHLORIDE 125 ML/HR: .6; .31; .03; .02 INJECTION, SOLUTION INTRAVENOUS at 00:55

## 2024-11-25 RX ADMIN — WITCH HAZEL 1 PAD: 500 SOLUTION RECTAL; TOPICAL at 11:03

## 2024-11-25 NOTE — LACTATION NOTE
This note was copied from a baby's chart.  CONSULT - LACTATION  Baby Boy Palomo (Margaret) 0 days male MRN: [id number]    [facility] AN NURSERY Room / Bed: (N)/(N) Encounter: [id number]    Maternal Information     MOTHER:  Erika Palomo  Maternal Age: 29 y.o.  OB History: # 1 - Date: 24, Sex: Male, Weight: 3055 g (6 lb 11.8 oz), GA: 38w4d, Type: Vaginal, Spontaneous, Apgar1: 9, Apgar5: 9, Living: Living, Birth Comments: None   Previouse breast reduction surgery? No    Lactation history:   Has patient previously breast fed: No   How long had patient previously breast fed:     Previous breast feeding complications:       Past Surgical History:   Procedure Laterality Date    INSERTION OF INTRAUTERINE DEVICE (IUD)      NM LAPS GSTRC RSTRICTIV PX LONGITUDINAL GASTRECTOMY N/A 2023    Procedure: GASTRECTOMY SLEEVE LAP & INTRAOPERATIVE EGD;  Surgeon: Leif Tolliver MD;  Location: [facility] OR;  Service: Bariatrics    WISDOM TOOTH EXTRACTION Bilateral 2020       Birth information:  YOB: 2023   Time of birth: 9:30 AM   Sex: male   Delivery type: Vaginal, Spontaneous   Birth Weight: 3055 g (6 lb 11.8 oz)   Percent of Weight Change: 0%     Gestational Age: 38w4d   [unfilled]    Assessment     Breast and nipple assessment:  tubular breasts with downward facing nipples     Rolla Assessment: normal assessment    Feeding assessment: feeding well  LATCH:  Latch: Grasps breast, tongue down, lips flanged, rhythmic sucking   Audible Swallowing: Spontaneous and intermittent (24 hours old)   Type of Nipple: Everted (After stimulation)   Comfort (Breast/Nipple): Soft/non-tender   Hold (Positioning): Partial assist, teach one side, mother does other, staff holds   LATCH Score: 9           24 1600   Lactation Consultation   Reason for Consult 20;20 min   Lactation Consultant Total Time 40   Maternal Information   Has mother  before? No   Infant  to breast within first hour of birth? Yes   Exclusive Pump and Bottle Feed No   LATCH Documentation   Latch 2   Audible Swallowing 2   Type of Nipple 2   Comfort (Breast/Nipple) 2   Hold (Positioning) 1   LATCH Score 9   Having latch problems? No   Position(s) Used Football   Breasts/Nipples   Left Breast Soft   Right Breast Soft   Left Nipple Everted   Right Nipple Everted   Intervention Hand expression   Breastfeeding Progress Not yet established   Breast Pump   Pump 3  (Spectra S2)   Patient Follow-Up   Lactation Consult Status 2   Follow-Up Type Inpatient;Call as needed   Other OB Lactation Documentation    Additional Problem Noted Initial Consult: Mom has baby latched upon arrival into room. Baby in football hold actively sucking with swallows. Reviewed proper position and alignment. Mom concerned she does not have enough milk. Education on establish milk supply, babies bellies and milk amounts. Enc to put baby to breast every feeding.       Feeding recommendations:  breast feed on demand  Initial Consult: Mom has baby latched upon arrival into room. Baby in football hold actively sucking with swallows. Reviewed proper position and alignment. Mom concerned she does not have enough milk. Education on establish milk supply, babies bellies and milk amounts. Enc to put baby to breast every feeding.    Provided demonstration, education and support of deep latch to breast by placing the nipple to the nose, dragging down to chin to achieve a wide latch. Bring baby to the breast, not breast to baby. Move your shoulders down and away from your ears. Look for ear, shoulder, hip alignment. Baby's upper and lower lip should be flanged on the breast.    Encouraged parents to call for assistance, questions, and concerns about breastfeeding.  Extension provided.    Vanessa Vaca MA 11/25/2024 4:52 PM

## 2024-11-25 NOTE — PLAN OF CARE
Problem: BIRTH - VAGINAL/ SECTION  Goal: Fetal and maternal status remain reassuring during the birth process  Description: INTERVENTIONS:  - Monitor vital signs  - Monitor fetal heart rate  - Monitor uterine activity  - Monitor labor progression (vaginal delivery)  - DVT prophylaxis  - Antibiotic prophylaxis  Outcome: Progressing  Goal: Emotionally satisfying birthing experience for mother/fetus  Description: Interventions:  - Assess, plan, implement and evaluate the nursing care given to the patient in labor  - Advocate the philosophy that each childbirth experience is a unique experience and support the family's chosen level of involvement and control during the labor process   - Actively participate in both the patient's and family's teaching of the birth process  - Consider cultural, Latter-day and age-specific factors and plan care for the patient in labor  Outcome: Progressing     Problem: Knowledge Deficit  Goal: Verbalizes understanding of labor plan  Description: Assess patient/family/caregiver's baseline knowledge level and ability to understand information.  Provide education via patient/family/caregiver's preferred learning method at appropriate level of understanding.     1. Provide teaching at level of understanding.  2. Provide teaching via preferred learning method(s).  Outcome: Progressing  Goal: Patient/family/caregiver demonstrates understanding of disease process, treatment plan, medications, and discharge instructions  Description: Complete learning assessment and assess knowledge base.  Interventions:  - Provide teaching at level of understanding  - Provide teaching via preferred learning methods  Outcome: Progressing     Problem: Labor & Delivery  Goal: Manages discomfort  Description: Assess and monitor for signs and symptoms of discomfort.  Assess patient's pain level regularly and per hospital policy.  Administer medications as ordered. Support use of nonpharmacological methods to  help control pain such as distraction, imagery, relaxation, and application of heat and cold.  Collaborate with interdisciplinary team and patient to determine appropriate pain management plan.    1. Include patient in decisions related to comfort.  2. Offer non-pharmacological pain management interventions.  3. Report ineffective pain management to physician.  Outcome: Progressing  Goal: Patient vital signs are stable  Description: 1. Assess vital signs - vaginal delivery.  Outcome: Progressing     Problem: PAIN - ADULT  Goal: Verbalizes/displays adequate comfort level or baseline comfort level  Description: Interventions:  - Encourage patient to monitor pain and request assistance  - Assess pain using appropriate pain scale  - Administer analgesics based on type and severity of pain and evaluate response  - Implement non-pharmacological measures as appropriate and evaluate response  - Consider cultural and social influences on pain and pain management  - Notify physician/advanced practitioner if interventions unsuccessful or patient reports new pain  Outcome: Progressing     Problem: INFECTION - ADULT  Goal: Absence or prevention of progression during hospitalization  Description: INTERVENTIONS:  - Assess and monitor for signs and symptoms of infection  - Monitor lab/diagnostic results  - Monitor all insertion sites, i.e. indwelling lines, tubes, and drains  - Monitor endotracheal if appropriate and nasal secretions for changes in amount and color  - [place] appropriate cooling/warming therapies per order  - Administer medications as ordered  - Instruct and encourage patient and family to use good hand hygiene technique  - Identify and instruct in appropriate isolation precautions for identified infection/condition  Outcome: Progressing  Goal: Absence of fever/infection during neutropenic period  Description: INTERVENTIONS:  - Monitor WBC    Outcome: Progressing     Problem: SAFETY ADULT  Goal: Patient will  remain free of falls  Description: INTERVENTIONS:  - Educate patient/family on patient safety including physical limitations  - Instruct patient to call for assistance with activity   - Consult OT/PT to assist with strengthening/mobility   - Keep Call bell within reach  - Keep bed low and locked with side rails adjusted as appropriate  - Keep care items and personal belongings within reach  - Initiate and maintain comfort rounds  - Make Fall Risk Sign visible to staff  - Offer Toileting every  Hours, in advance of need  - Initiate/Maintain alarm  - Obtain necessary fall risk management equipment:   - Apply yellow socks and bracelet for high fall risk patients  - Consider moving patient to room near nurses station  Outcome: Progressing  Goal: Maintain or return to baseline ADL function  Description: INTERVENTIONS:  -  Assess patient's ability to carry out ADLs; assess patient's baseline for ADL function and identify physical deficits which impact ability to perform ADLs (bathing, care of mouth/teeth, toileting, grooming, dressing, etc.)  - Assess/evaluate cause of self-care deficits   - Assess range of motion  - Assess patient's mobility; develop plan if impaired  - Assess patient's need for assistive devices and provide as appropriate  - Encourage maximum independence but intervene and supervise when necessary  - Involve family in performance of ADLs  - Assess for home care needs following discharge   - Consider OT consult to assist with ADL evaluation and planning for discharge  - Provide patient education as appropriate  Outcome: Progressing  Goal: Maintains/Returns to pre admission functional level  Description: INTERVENTIONS:  - Perform AM-PAC 6 Click Basic Mobility/ Daily Activity assessment daily.  - Set and communicate daily mobility goal to care team and patient/family/caregiver.   - Collaborate with rehabilitation services on mobility goals if consulted  - Perform Range of Motion  times a day.  - Reposition  patient every  hours.  - Dangle patient  times a day  - Stand patient  times a day  - Ambulate patient  times a day  - Out of bed to chair  times a day   - Out of bed for meals  times a day  - Out of bed for toileting  - Record patient progress and toleration of activity level   Outcome: Progressing     Problem: DISCHARGE PLANNING  Goal: Discharge to home or other facility with appropriate resources  Description: INTERVENTIONS:  - Identify barriers to discharge w/patient and caregiver  - Arrange for needed discharge resources and transportation as appropriate  - Identify discharge learning needs (meds, wound care, etc.)  - Arrange for interpretive services to assist at discharge as needed  - Refer to Case Management Department for coordinating discharge planning if the patient needs post-hospital services based on physician/advanced practitioner order or complex needs related to functional status, cognitive ability, or social support system  Outcome: Progressing

## 2024-11-25 NOTE — OB LABOR/OXYTOCIN SAFETY PROGRESS
Oxytocin Safety Progress Check Note - Noy Palomo 29 y.o. female MRN: [id number]    Unit/Bed#: -01 Encounter: [id number]    Dose (lor-units/min) Oxytocin: 2 lor-units/min  Contraction Frequency (minutes): 1.5-8  Contraction Intensity: Mild/Moderate  Uterine Activity Characteristics: Irregular  Cervical Dilation: 4        Cervical Effacement: 80  Fetal Station: -2  Baseline Rate (FHR): 140 bpm  Fetal Heart Rate (FHT): 135 BPM  FHR Category: II             Vital Signs:   Vitals:    24 0300   BP: 118/69   Pulse:    Resp:    Temp:    SpO2:      Notes/comments:   Pt resting comfortably. FHR Category II, recurrent late decelerations. Maternal repositioning ongoing; will administer fluid bolus. [place] with contractions q3-4min. SVE as above. Pit running at 2, continue to titrate to contractions. Will recheck in 2 hours or sooner if clinically indicated. Anticipate . Attending physician Dr. Wilson aware.     Ryanne Humphries MD 2024 3:37 AM

## 2024-11-25 NOTE — ANESTHESIA POSTPROCEDURE EVALUATION
Post-Op Assessment Note    CV Status:  Stable    Pain management: adequate      Post-op block assessment: site cleaned, no complications and catheter intact   Mental Status:  Alert and awake   Hydration Status:  Euvolemic   PONV Controlled:  Controlled   Airway Patency:  Patent     Post Op Vitals Reviewed: Yes    No anethesia notable event occurred.    Staff: Anesthesiologist, CRNA       Last Filed PACU Vitals:  Vitals Value Taken Time   Temp     Pulse 91 11/25/24 1034   /73 11/25/24 1034   Resp     SpO2         Modified Mere:  No data recorded

## 2024-11-25 NOTE — ASSESSMENT & PLAN NOTE
Patient presenting for labor. SVE 2.5/70/-3 -> 3.5/80/-3, toco with contractions q5min, FHR Category I  - admit to labor & delivery floor  - IV fluids, clear liquid diet  - labs: CBC, T/S, RPR  - analgesia at maternal request  - management: expectant, anticipate

## 2024-11-25 NOTE — ANESTHESIA PREPROCEDURE EVALUATION
Procedure:  LABOR ANALGESIA    Relevant Problems   ANESTHESIA (within normal limits)      CARDIO   (+) Migraine      GI/HEPATIC   (+) GERD (gastroesophageal reflux disease)      /RENAL   (+) Acquired renal cyst of left kidney      GYN   (+) 38 weeks gestation of pregnancy      HEMATOLOGY   (+) Iron deficiency anemia secondary to inadequate dietary iron intake      NEURO/PSYCH   (+) Migraine      Behavioral Health   (+) Adjustment disorder with mixed anxiety and depressed mood      Obstetrics/Gynecology   (+) Maternal obesity affecting pregnancy, antepartum   (+) Previous bariatric surgery affecting pregnancy, antepartum      Care Coordination   (+) Transfusion of blood product declined due to [employer] reason      Other   (+) Cystic fibrosis carrier        Physical Exam    Airway    Mallampati score: II         Dental       Cardiovascular      Pulmonary      Other Findings  post-pubertal.      Anesthesia Plan  ASA Score- 2     Anesthesia Type- epidural with ASA Monitors.         Additional Monitors:     Airway Plan:            Plan Factors-    Chart reviewed.   Existing labs reviewed. Patient summary reviewed.                  Induction-     Postoperative Plan-     Perioperative Resuscitation Plan - Level 1 - Full Code.       Informed Consent-   I personally reviewed this patient with the CRNA. Discussed and agreed on the Anesthesia Plan with the CRNA..

## 2024-11-25 NOTE — OB LABOR/OXYTOCIN SAFETY PROGRESS
Labor Progress Note - Noy Palomo 29 y.o. female MRN: [id number]    Unit/Bed#: -01 Encounter: [id number]       Contraction Frequency (minutes):  (unable to trace)  Contraction Intensity: Mild/Moderate  Uterine Activity Characteristics: Irregular  Cervical Dilation: 3-4        Cervical Effacement: 80  Fetal Station: -2  Baseline Rate (FHR): 135 bpm  Fetal Heart Rate (FHT): 140 BPM  FHR Category: I             Vital Signs:   Vitals:    24 0030   BP: 130/75   Pulse:    Resp:    Temp:    SpO2:      Notes/comments:   Pt resting comfortably with epidural. FHR Category I. [place] not tracing contractions well. SVE as above. Will augment with pitocin. Anticipate . Attending physician Dr. Wilson aware.     Ryanne Humphries MD 2024 12:44 AM

## 2024-11-25 NOTE — ASSESSMENT & PLAN NOTE
Rastafari. Signed blood products consent form - declines any products that come directly from whole blood and blood patch. Will accept cell saver

## 2024-11-25 NOTE — OB LABOR/OXYTOCIN SAFETY PROGRESS
Oxytocin Safety Progress Check Note - Noy Palomo 29 y.o. female MRN: [id number]    Unit/Bed#: -01 Encounter: [id number]    Dose (lor-units/min) Oxytocin: 4 lor-units/min  Contraction Frequency (minutes): 2-3  Contraction Intensity: Mild/Moderate  Uterine Activity Characteristics: Irregular  Cervical Dilation: 4        Cervical Effacement: 80  Fetal Station: -2  Baseline Rate (FHR): 135 bpm  Fetal Heart Rate (FHT): 138 BPM  FHR Category: I             Vital Signs:   Vitals:    24 0415   BP: 123/73   Pulse: 82   Resp: 18   Temp: 98.2 °F (36.8 °C)   SpO2: 99%     Notes/comments:   Pt reporting rectal pressure. FHR Category I. [place] not tracing contractions well. SVE as above. Pit running at 4, continue to titrate to contractions. Will recheck in 2 hours or sooner if clinically indicated. Anticipate . Attending physician Dr. Wilson aware.     Ryanne Humphries MD 2024 4:37 AM

## 2024-11-25 NOTE — ANESTHESIA PROCEDURE NOTES
Epidural Block    Patient location during procedure: OB/L&D  Start time: 11/24/2024 11:35 PM  Reason for block: procedure for pain  Staffing  Performed by: Nina Martel CRNA  Authorized by: Ori Perez MD    Preanesthetic Checklist  Completed: patient identified, IV checked, site marked, risks and benefits discussed, surgical consent, monitors and equipment checked, pre-op evaluation and timeout performed  Epidural  Patient position: sitting  Prep: ChloraPrep  Sedation Level: no sedation  Patient monitoring: frequent blood pressure checks, continuous pulse oximetry and heart rate  Approach: midline  Location: lumbar, L3-4  Injection technique: DAVID saline  Needle  Needle type: Tuohy   Needle gauge: 17 G  Needle insertion depth: 9 cm  Catheter type: multi-orifice  Catheter size: 19 G  Catheter at skin depth: 15 cm  Catheter securement method: stabilization device and clear occlusive dressing  Test dose: negativelidocaine-epinephrine (XYLOCAINE-MPF/EPINEPHRINE) 1.5 %-1:200,000 injection 3 mL - Epidural   3 mL - 11/24/2024 11:36:00 PM  Assessment  Sensory level: T10  Number of attempts: 1negative aspiration for CSF, negative aspiration for heme and no paresthesia on injection  patient tolerated the procedure well with no immediate complications

## 2024-11-25 NOTE — OB LABOR/OXYTOCIN SAFETY PROGRESS
Oxytocin Safety Progress Check Note - Noy Palomo 29 y.o. female MRN: [id number]    Unit/Bed#: -01 Encounter: [id number]    Dose (lor-units/min) Oxytocin: 6 lor-units/min  Contraction Frequency (minutes):  (difficult monitoring d.t pt position, toco adjusted)  Contraction Intensity: Moderate  Uterine Activity Characteristics: Irregular  Cervical Dilation: 10  Dilation Complete Date: 11/25/24  Dilation Complete Time: 0800  Cervical Effacement: 100  Fetal Station: 2  Baseline Rate (FHR): 150 bpm  Fetal Heart Rate (FHT): 150 BPM  FHR Category: II               Vital Signs:   Vitals:    11/25/24 0730   BP: 136/85   Pulse:    Resp:    Temp:    SpO2:        Notes/comments:   Patient is feeling intense constant pressure and the urge to push.  FHT intermittently category 2 with rare variables but overall reassuring with moderate variability.  SVE as above.  Plan to start pushing shortly. Dr. Silveira aware.     Evelyn Henderson MD 11/25/2024 8:03 AM

## 2024-11-25 NOTE — PLAN OF CARE
Problem: BIRTH - VAGINAL/ SECTION  Goal: Fetal and maternal status remain reassuring during the birth process  Description: INTERVENTIONS:  - Monitor vital signs  - Monitor fetal heart rate  - Monitor uterine activity  - Monitor labor progression (vaginal delivery)  - DVT prophylaxis  - Antibiotic prophylaxis  Outcome: Completed  Goal: Emotionally satisfying birthing experience for mother/fetus  Description: Interventions:  - Assess, plan, implement and evaluate the nursing care given to the patient in labor  - Advocate the philosophy that each childbirth experience is a unique experience and support the family's chosen level of involvement and control during the labor process   - Actively participate in both the patient's and family's teaching of the birth process  - Consider cultural, Confucianism and age-specific factors and plan care for the patient in labor  Outcome: Completed     Problem: POSTPARTUM  Goal: Experiences normal postpartum course  Description: INTERVENTIONS:  - Monitor maternal vital signs  - Assess uterine involution and lochia  Outcome: Progressing  Goal: Appropriate maternal -  bonding  Description: INTERVENTIONS:  - Identify family support  - Assess for appropriate maternal/infant bonding   -Encourage maternal/infant bonding opportunities  - Referral to  or  as needed  Outcome: Progressing  Goal: Establishment of infant feeding pattern  Description: INTERVENTIONS:  - Assess breast/bottle feeding  - Refer to lactation as needed  Outcome: Progressing     Problem: Knowledge Deficit  Goal: Verbalizes understanding of labor plan  Description: Assess patient/family/caregiver's baseline knowledge level and ability to understand information.  Provide education via patient/family/caregiver's preferred learning method at appropriate level of understanding.     1. Provide teaching at level of understanding.  2. Provide teaching via preferred learning  method(s).  Outcome: Completed  Goal: Patient/family/caregiver demonstrates understanding of disease process, treatment plan, medications, and discharge instructions  Description: Complete learning assessment and assess knowledge base.  Interventions:  - Provide teaching at level of understanding  - Provide teaching via preferred learning methods  Outcome: Completed     Problem: Labor & Delivery  Goal: Manages discomfort  Description: Assess and monitor for signs and symptoms of discomfort.  Assess patient's pain level regularly and per hospital policy.  Administer medications as ordered. Support use of nonpharmacological methods to help control pain such as distraction, imagery, relaxation, and application of heat and cold.  Collaborate with interdisciplinary team and patient to determine appropriate pain management plan.    1. Include patient in decisions related to comfort.  2. Offer non-pharmacological pain management interventions.  3. Report ineffective pain management to physician.  Outcome: Completed  Goal: Patient vital signs are stable  Description: 1. Assess vital signs - vaginal delivery.  Outcome: Completed

## 2024-11-25 NOTE — L&D DELIVERY NOTE
DELIVERY NOTE  Noy Palomo 29 y.o. female MRN: [id number]  Unit/Bed#: -01 Encounter: [id number]      Obstetrician:  Dr. Silveira  Assistant: None  Pre-Delivery Diagnosis:   28yo  @ 38w4d  Active Labor    Post-Delivery Diagnosis:   28yo  s/p   2nd degree laceration    Procedure:   Indications for instrumental delivery: none  Quantitative Blood Loss: 66nL  Complications:  none  Specimens: Cord, blood, gasses  Description of Delivery: Patient delivered a viable male  Apgars 9 @ 1 min and 9 @ 5 min    The patient was found to be complete and began pushing.  When delivery was imminent, the patient was placed in dorsal lithotomy position, prepped and draped in the usual sterile fahsing for a vaginal delivery.  The head delivered spontaneously over an intact perineum. There was no a nuchal cord which was not reduced.     With the assistance of maternal expulsive efforts and downward traction of fetal head, the anterior shoulder was delivered. The same manner was applied for the posterior shoulder but with upward traction. After delivery of the , the umbilical cord was doubly clamped and cut and the  was passed off to  staff for routine care. Umbilical cord blood and umbilical artery and venous gases were collected. Placenta was delivered with fundal massage and gentle traction on the cord. Placenta delivered intact with a central 3-vessel cord. Active management of the third stage of labor was undertaken with IV pitocin. Bleeding was noted to be under control.  Inspection of the perineum, vagina, labia, and urethra revealed 2nd laceration which was then repaired in standard fashion with 3-0 Vicryl rapid. The lacerations showed good tissue reapproximation and hemostasis.  Mother and baby are currently recovering nicely in stable condition.  All sponge, instrument and needle counts were correct.      Recent Results (from the past 6 hours)   CORD, Blood gas,  arterial    Collection Time: 11/25/24  9:36 AM   Result Value Ref Range    pH, Cord Art 7.342 7.230 - 7.430    pCO2, Cord Art 40.8 30.0 - 60.0    pO2, Cord Art 28.9 (H) 5.0 - 25.0 mm HG    HCO3, Cord Art 21.6 17.3 - 27.3 mmol/L    Base Exc, Cord Art -3.8 (L) 3.0 - 11.0 mmol/L    O2 Content, Cord Art 15.4 ml/dl    O2 Hgb, Arterial Cord 67.1 %   CORD, Blood gas, venous    Collection Time: 11/25/24  9:36 AM   Result Value Ref Range    pH, Cord [place] 7.342 7.190 - 7.490    pCO2, Cord [place] 35.5 27.0 - 43.0 mm HG    pO2, Cord [place] 42.4 15.0 - 45.0 mm HG    HCO3, Cord [place] 18.8 12.2 - 28.6 mmol/L    Base Exc, Cord [place] -6.0 (L) 1.0 - 9.0 mmol/L    O2 Cont, Cord [place] 19.9 mL/dL    O2 HGB,VENOUS CORD 86.1 %

## 2024-11-25 NOTE — DISCHARGE SUMMARY
Discharge Summary - OB/GYN  Noy Palomo 29 y.o. female MRN: [id number]  Unit/Bed#: -01 Encounter: [id number]    Admission Date: 2024     Discharge Date: 2024    Admitting Attending: Steve Mcgrath Attending: Raoul    Discharging Attending: Dr. Zafar    Principal Diagnosis: Pregnancy at 38w4d    Secondary Diagnosis:   1. Labor  2. 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 H/o Bariatric surgery   4. Refuses blood products    Procedures: spontaneous vaginal delivery    Anesthesia: epidural    Hospital course: Noy Palomo is a 29 y.o.  at 38w3d who was initially admitted for labor. She received an epidural for analgesia. She was augmented with pitocin. She progressed to complete and started pushing.     She delivered a viable male  on 2024 at 0930. Weight 6lbs 11.8oz via normal spontaneous vaginal delivery. She sustained a second degree perineal laceration during delivery which was adequately repaired. Apgars were 9 (1 min) and 9 (5 min).  was transferred to  nursery. Patient tolerated the procedure well.     Her post-delivery course was uncomplicated. Her postpartum pain was well controlled with oral analgesics.    On day of discharge, she was ambulating and able to reasonably perform all ADLs. She was voiding and had appropriate bowel function. Pain was well controlled. She was discharged home on postpartum day #2 without complications. Patient was instructed to follow up with her OB as an outpatient and was given appropriate warnings to call provider if she develops signs of infection or uncontrolled pain.    Complications: none apparent    Condition at discharge: good     Discharge instructions/Information to patient and family:   See after visit summary for information provided to patient and family.      Provisions for Follow-Up Care:  See after visit summary for information related to follow-up care and any pertinent home health orders.      Disposition: See  After Visit Summary for discharge disposition information.    Planned Readmission: No    Discharge medications and instructions:   Please see AVS for full list of medications upon discharge.      Evon Lowe MD  PGY-1 Family Medicine [place]  11/27/24 6:05 AM

## 2024-11-25 NOTE — OB LABOR/OXYTOCIN SAFETY PROGRESS
Oxytocin Safety Progress Check Note - Noy Palomo 29 y.o. female MRN: [id number]    Unit/Bed#: -01 Encounter: [id number]    Dose (lor-units/min) Oxytocin: 6 lor-units/min  Contraction Frequency (minutes): 2-4  Contraction Intensity: Moderate  Uterine Activity Characteristics: Irregular  Cervical Dilation: 10  Dilation Complete Date: 11/25/24  Dilation Complete Time: 0800  Cervical Effacement: 100  Fetal Station: 3  Baseline Rate (FHR): 145 bpm  Fetal Heart Rate (FHT): 150 BPM  FHR Category: 1               Vital Signs:   Vitals:    11/25/24 0800   BP: 129/75   Pulse:    Resp:    Temp: 98.3 °F (36.8 °C)   SpO2:        Notes/comments:   Has begun pushing. Continue to monitor progress     Erica Silveira MD 11/25/2024 8:42 AM

## 2024-11-25 NOTE — H&P
H & P- Obstetrics   Noy Palomo 29 y.o. female MRN: [id number]  Unit/Bed#: LD TRIAGE 3 Encounter: [id number]    Assessment: 29 y.o.  at 38w3d admitted for labor.  SVE: 3.5/80/-3  FHT: Category I  [place]: ctx q5min    Clinical EFW: 38%ile @31w4d  Presentation: cephalic, confirmed by US on 24 by Dr. Doan  GBS status: neg    Plan:   * Uterine contractions  Assessment & Plan  Patient presenting for labor. SVE 2.5/70/-3 -> 3.5/80/-3, toco with contractions q5min, FHR Category I  - admit to labor & delivery floor  - IV fluids, clear liquid diet  - labs: CBC, T/S, RPR  - analgesia at maternal request  - management: expectant, anticipate       Cystic fibrosis carrier  Assessment & Plan  Partner negative    Previous bariatric surgery affecting pregnancy, antepartum  Assessment & Plan  - avoid NSAIDs    38 weeks gestation of pregnancy  Assessment & Plan  FHT Category I  - continue to monitor in labor    Transfusion of blood product declined due to Islam reason  Assessment & Plan  Jainism. Signed blood products consent form - declines any products that come directly from whole blood and blood patch. Will accept cell saver      Discussed case and plan w/ Dr. Wilson    Chief Concern: contractions    HPI: Noy Palomo is a 29 y.o.  with an JENNY of 2024, by Last Menstrual Period at 38w3d who is being admitted for labor. History is significant for asthma (not on meds), migraine, bariatric surgery. Pregnancy is complicated by cystic fibrosis carrier (partner negative). She reports increasingly strong contractions, has no LOF, and reports no VB. She states she has felt good FM.    Patient Active Problem List   Diagnosis    Acquired acanthosis nigricans    Obesity    Shellfish allergy    Umbilical hernia without obstruction and without gangrene    Acquired renal cyst of left kidney    Adjustment disorder with mixed anxiety and depressed mood    GERD (gastroesophageal reflux  disease)    Migraine    Transfusion of blood product declined due to Yazidi reason    Tachycardia    38 weeks gestation of pregnancy    Maternal obesity affecting pregnancy, antepartum    Previous bariatric surgery affecting pregnancy, antepartum    Cystic fibrosis carrier    Iron deficiency anemia secondary to inadequate dietary iron intake    Uterine contractions    Rectal pressure     OB Hx:  OB History    Para Term  AB Living   1 0 0 0 0 0   SAB IAB Ectopic Multiple Live Births   0 0 0 0 0      # Outcome Date GA Lbr Kushal/2nd Weight Sex Type Anes PTL Lv   1 Current              Past Medical Hx:  Past Medical History:   Diagnosis Date    Anemia     takes irondaly    Asthma     Chronic kidney disease     sm cyst on left kidney diag 10 yrs ago    Family history of Down syndrome 2024    FOB' maternal uncle with down syndrome  Low risk NIPT      Gastritis     GERD (gastroesophageal reflux disease)     Hx of gastritis     Migraine     Obesity     Seasonal allergies     Umbilical hernia     Varicella     had vaccines     Past Surgical hx:  Past Surgical History:   Procedure Laterality Date    INSERTION OF INTRAUTERINE DEVICE (IUD)      ID LAPS GSTRC RSTRICTIV PX LONGITUDINAL GASTRECTOMY N/A 2023    Procedure: GASTRECTOMY SLEEVE LAP & INTRAOPERATIVE EGD;  Surgeon: Leif Tolliver MD;  Location: [facility];  Service: Bariatrics    WISDOM TOOTH EXTRACTION Bilateral 2020     Social History     Socioeconomic History    Marital status: /Civil Union     Spouse name: Not on file    Number of children: 0    Years of education: Not on file    Highest education level: Bachelor's degree (e.g., BA, AB, BS)   Occupational History    Occupation: Hospice RN     Employer: [employer]   Tobacco Use    Smoking status: Never    Smokeless tobacco: Never   Vaping Use    Vaping status: Never Used   Substance and Sexual Activity    Alcohol use: Not Currently     Alcohol/week: 1.0 standard  drink of alcohol     Types: 1 Standard drinks or equivalent per week     Comment: Occational drinker    Drug use: Never     Comment: flro uncles addiction drugs/etoh,  denies self, FOB denies,    Sexual activity: Yes     Partners: Male     Birth control/protection: OCP     Comment: with - holger     IUD removed 1/2024   Other Topics Concern    Not on file   Social History Narrative    Not on file     Social Drivers of Health     Financial Resource Strain: Not on file   Food Insecurity: No Food Insecurity (5/13/2024)    Nursing - Inadequate Food Risk Classification     Worried About Running Out of Food in the Last Year: Never true     Ran Out of Food in the Last Year: Never true     Ran Out of Food in the Last Year: Not on file   Transportation Needs: No Transportation Needs (5/13/2024)    PRAPARE - Transportation     Lack of Transportation (Medical): No     Lack of Transportation (Non-Medical): No   Physical Activity: Not on file   Stress: Not on file   Social Connections: Not on file   Intimate Partner Violence: Not on file   Housing Stability: Low Risk  (5/13/2024)    Housing Stability Vital Sign     Unable to Pay for Housing in the Last Year: No     Number of Times Moved in the Last Year: 1     Homeless in the Last Year: No     Allergies   Allergen Reactions    Cat Hair Extract Shortness Of Breath    Contrast [Iodinated Contrast Media] Anaphylaxis     throat closure    Fish-Derived Products - Food Allergy Anaphylaxis     Anaphylaxis    Pollen Extract Other (See Comments)     hives, throat closure    Shellfish-Derived Products - Food Allergy        Medications Prior to Admission:     acetaminophen (TYLENOL) 500 mg tablet    calcium carbonate (TUMS) 500 mg chewable tablet    folic acid (KP Folic Acid) 1 mg tablet    Multiple Vitamins-Minerals (BARIATRIC MULTIVITAMINS/IRON PO)    Objective:  Temp:  [98.9 °F (37.2 °C)] 98.9 °F (37.2 °C)  HR:  [] 95  BP: (133-135)/(79-83) 135/83  Resp:  [18] 18  SpO2:  [98  %] 98 %  Body mass index is 38.73 kg/m².     Physical Exam:  Physical Exam  Vitals reviewed.   Constitutional:       General: She is in acute distress (moderate distress with contractions).      Appearance: She is well-developed.   HENT:      Head: Normocephalic and atraumatic.   Cardiovascular:      Rate and Rhythm: Normal rate.   Pulmonary:      Effort: Pulmonary effort is normal. No respiratory distress.   Genitourinary:     Comments: Normal appearing external female genitalia  Normal appearing urethral meatus    Skin:     Findings: No rash (on exposed skin).   Neurological:      Mental Status: She is alert and oriented to person, place, and time.   Psychiatric:         Mood and Affect: Affect normal.         Speech: Speech normal.         Behavior: Behavior normal.        FHT:  Baseline Rate (FHR): 140 bpm  Variability: Moderate  Accelerations: 15 x 15 or greater, At variable times  Decelerations: None    TOCO:   Contraction Frequency (minutes): 0  Contraction Duration (seconds):     Lab Results   Component Value Date    WBC 11.23 (H) 11/13/2024    HGB 11.8 11/13/2024    HCT 37.7 11/13/2024     11/13/2024     Lab Results   Component Value Date     04/15/2018    K 3.7 11/10/2023     11/10/2023    CO2 25 11/10/2023    BUN 8 11/10/2023    CREATININE 0.73 11/10/2023    GLUCOSE 79 04/15/2018    AST 15 04/12/2023    ALT 20 04/12/2023     Prenatal Labs:   Blood type: B pos  Antibody: neg  GBS: neg  HIV: nr  Rubella: immune  Syphilis IgM/IgG: nr  HBsAg: nr  HCAb: nr  Chlamydia: neg  Gonorrhea: neg  HbA1c: 5.2  Diabetes 1 hour screen: n/a  3 hour glucose: n/a  Platelets: pending  Hgb: pending  Expected length of stay: >2 Midnights  Admission status: INPATIENT     Ryanne Humphries MD  OBGYN PGY-1  11/24/24  9:29 PM

## 2024-11-25 NOTE — OB LABOR/OXYTOCIN SAFETY PROGRESS
Oxytocin Safety Progress Check Note - Noy Palomo 29 y.o. female MRN: [id number]    Unit/Bed#: -01 Encounter: [id number]    Dose (lor-units/min) Oxytocin: 4 lor-units/min  Contraction Frequency (minutes): 2-3  Contraction Intensity: Mild/Moderate  Uterine Activity Characteristics: Irregular  Cervical Dilation: 8        Cervical Effacement: 90  Fetal Station: 0  Baseline Rate (FHR): 150 bpm  Fetal Heart Rate (FHT): 156 BPM  FHR Category: I             Vital Signs:   Vitals:    24 0630   BP: 122/71   Pulse:    Resp:    Temp:    SpO2:      Notes/comments:   Pt resting comfortably. FHR Category I. [place] not tracing contractions. SVE as above. Pit running at 4, continue to titrate to contractions. Will recheck in 2 hours or sooner if clinically indicated. Anticipate . Attending physician Dr. Wilson aware.     Ryanne Humphries MD 2024 6:39 AM

## 2024-11-26 PROBLEM — O47.9 UTERINE CONTRACTIONS: Chronic | Status: RESOLVED | Noted: 2024-11-09 | Resolved: 2024-11-26

## 2024-11-26 PROCEDURE — 99024 POSTOP FOLLOW-UP VISIT: CPT | Performed by: OBSTETRICS & GYNECOLOGY

## 2024-11-26 RX ADMIN — ACETAMINOPHEN 650 MG: 325 TABLET, FILM COATED ORAL at 20:13

## 2024-11-26 RX ADMIN — ACETAMINOPHEN 650 MG: 325 TABLET, FILM COATED ORAL at 13:59

## 2024-11-26 RX ADMIN — BENZOCAINE AND LEVOMENTHOL 1 APPLICATION: 200; 5 SPRAY TOPICAL at 22:00

## 2024-11-26 RX ADMIN — PANTOPRAZOLE SODIUM 40 MG: 40 TABLET, DELAYED RELEASE ORAL at 06:14

## 2024-11-26 RX ADMIN — ACETAMINOPHEN 650 MG: 325 TABLET, FILM COATED ORAL at 06:14

## 2024-11-26 RX ADMIN — IBUPROFEN 600 MG: 600 TABLET, FILM COATED ORAL at 18:38

## 2024-11-26 NOTE — PLAN OF CARE
Problem: PAIN - ADULT  Goal: Verbalizes/displays adequate comfort level or baseline comfort level  Description: Interventions:  - Encourage patient to monitor pain and request assistance  - Assess pain using appropriate pain scale  - Administer analgesics based on type and severity of pain and evaluate response  - Implement non-pharmacological measures as appropriate and evaluate response  - Consider cultural and social influences on pain and pain management  - Notify physician/advanced practitioner if interventions unsuccessful or patient reports new pain  Outcome: Progressing     Problem: INFECTION - ADULT  Goal: Absence or prevention of progression during hospitalization  Description: INTERVENTIONS:  - Assess and monitor for signs and symptoms of infection  - Monitor lab/diagnostic results  - Monitor all insertion sites, i.e. indwelling lines, tubes, and drains  - Monitor endotracheal if appropriate and nasal secretions for changes in amount and color  - [place] appropriate cooling/warming therapies per order  - Administer medications as ordered  - Instruct and encourage patient and family to use good hand hygiene technique  - Identify and instruct in appropriate isolation precautions for identified infection/condition  Outcome: Progressing  Goal: Absence of fever/infection during neutropenic period  Description: INTERVENTIONS:  - Monitor WBC    Outcome: Progressing     Problem: SAFETY ADULT  Goal: Patient will remain free of falls  Description: INTERVENTIONS:  - Educate patient/family on patient safety including physical limitations  - Instruct patient to call for assistance with activity   - Consult OT/PT to assist with strengthening/mobility   - Keep Call bell within reach  - Keep bed low and locked with side rails adjusted as appropriate  - Keep care items and personal belongings within reach  - Initiate and maintain comfort rounds  - Make Fall Risk Sign visible to staff  - Offer Toileting every  Hours,  in advance of need  - Initiate/Maintain alarm  - Obtain necessary fall risk management equipment:   - Apply yellow socks and bracelet for high fall risk patients  - Consider moving patient to room near nurses station  Outcome: Progressing  Goal: Maintain or return to baseline ADL function  Description: INTERVENTIONS:  -  Assess patient's ability to carry out ADLs; assess patient's baseline for ADL function and identify physical deficits which impact ability to perform ADLs (bathing, care of mouth/teeth, toileting, grooming, dressing, etc.)  - Assess/evaluate cause of self-care deficits   - Assess range of motion  - Assess patient's mobility; develop plan if impaired  - Assess patient's need for assistive devices and provide as appropriate  - Encourage maximum independence but intervene and supervise when necessary  - Involve family in performance of ADLs  - Assess for home care needs following discharge   - Consider OT consult to assist with ADL evaluation and planning for discharge  - Provide patient education as appropriate  Outcome: Progressing  Goal: Maintains/Returns to pre admission functional level  Description: INTERVENTIONS:  - Perform AM-PAC 6 Click Basic Mobility/ Daily Activity assessment daily.  - Set and communicate daily mobility goal to care team and patient/family/caregiver.   - Collaborate with rehabilitation services on mobility goals if consulted  - Perform Range of Motion  times a day.  - Reposition patient every  hours.  - Dangle patient  times a day  - Stand patient  times a day  - Ambulate patient  times a day  - Out of bed to chair  times a day   - Out of bed for meals times a day  - Out of bed for toileting  - Record patient progress and toleration of activity level   Outcome: Progressing     Problem: DISCHARGE PLANNING  Goal: Discharge to home or other facility with appropriate resources  Description: INTERVENTIONS:  - Identify barriers to discharge w/patient and caregiver  - Arrange for  needed discharge resources and transportation as appropriate  - Identify discharge learning needs (meds, wound care, etc.)  - Arrange for interpretive services to assist at discharge as needed  - Refer to Case Management Department for coordinating discharge planning if the patient needs post-hospital services based on physician/advanced practitioner order or complex needs related to functional status, cognitive ability, or social support system  Outcome: Progressing     Problem: POSTPARTUM  Goal: Experiences normal postpartum course  Description: INTERVENTIONS:  - Monitor maternal vital signs  - Assess uterine involution and lochia  Outcome: Progressing  Goal: Appropriate maternal -  bonding  Description: INTERVENTIONS:  - Identify family support  - Assess for appropriate maternal/infant bonding   -Encourage maternal/infant bonding opportunities  - Referral to  or  as needed  Outcome: Progressing  Goal: Establishment of infant feeding pattern  Description: INTERVENTIONS:  - Assess breast/bottle feeding  - Refer to lactation as needed  Outcome: Progressing  Goal: Incision(s), wounds(s) or drain site(s) healing without S/S of infection  Description: INTERVENTIONS  - Assess and document dressing, incision, wound bed, drain sites and surrounding tissue  - Provide patient and family education  - Perform skin care/dressing changes every   Outcome: Progressing

## 2024-11-26 NOTE — ASSESSMENT & PLAN NOTE
Pain well-controlled  Voiding spontaneously, passing flatus  Encouraged breastfeeding  Encouraged ambulation

## 2024-11-26 NOTE — LACTATION NOTE
This note was copied from a baby's chart.  CONSULT - LACTATION  Baby Boy Palomo (Margaret) 1 days male MRN: [id number]    [facility] AN NURSERY Room / Bed: (N)/(N) Encounter: [id number]    Maternal Information     MOTHER:  Erika Palomo  Maternal Age: 29 y.o.  OB History: # 1 - Date: 24, Sex: Male, Weight: 3055 g (6 lb 11.8 oz), GA: 38w4d, Type: Vaginal, Spontaneous, Apgar1: 9, Apgar5: 9, Living: Living, Birth Comments: None   Previouse breast reduction surgery? No    Lactation history:   Has patient previously breast fed: No   How long had patient previously breast fed:     Previous breast feeding complications:       Past Surgical History:   Procedure Laterality Date    INSERTION OF INTRAUTERINE DEVICE (IUD)      MS LAPS GSTRC RSTRICTIV PX LONGITUDINAL GASTRECTOMY N/A 2023    Procedure: GASTRECTOMY SLEEVE LAP & INTRAOPERATIVE EGD;  Surgeon: Leif Tolliver MD;  Location: [facility] OR;  Service: Bariatrics    WISDOM TOOTH EXTRACTION Bilateral 2020       Birth information:  YOB: 2023   Time of birth: 9:30 AM   Sex: male   Delivery type: Vaginal, Spontaneous   Birth Weight: 3055 g (6 lb 11.8 oz)   Percent of Weight Change: -2%     Gestational Age: 38w4d   [unfilled]    Assessment     Breast and nipple assessment:  tubular soft breasts with catalina nipples. Right nipple scab on nipple face, left nipple sore and red.     Hughes Assessment: normal assessment    Feeding assessment: feeding well  LATCH:  Latch: Grasps breast, tongue down, lips flanged, rhythmic sucking   Audible Swallowing: Spontaneous and intermittent (24 hours old)   Type of Nipple: Everted (After stimulation)   Comfort (Breast/Nipple): Soft/non-tender   Hold (Positioning): Partial assist, teach one side, mother does other, staff holds   LATCH Score: 9           24 1130   Lactation Consultation   Reason for Consult 20 m;20 min;5 mins   Maternal Information   Has mother   before? No   Infant to breast within first hour of birth? Yes   Exclusive Pump and Bottle Feed No   LATCH Documentation   Latch 2   Audible Swallowing 2   Type of Nipple 2   Comfort (Breast/Nipple) 2   Hold (Positioning) 1   LATCH Score 9   Having latch problems? No   Position(s) Used Football   Breasts/Nipples   Left Breast Soft   Right Breast Soft   Left Nipple Cracked;Sore;Everted   Right Nipple Sore;Everted   Intervention Hand expression;Lanolin   Breastfeeding Progress Not yet established   Breast Pump   Pump 3   Patient Follow-Up   Lactation Consult Status 2   Follow-Up Type Inpatient;Call as needed   Other OB Lactation Documentation    Additional Problem Noted F/up Consult: Assisted mom in latching baby to breast; deeper latch demonstrated with teach back. Mom c/o nipple pain, sore, cracked nipples. Moist wound healing. Enc to call for further assistance.     Feeding recommendations:  breast feed on demand    Baby had circ this am, mom attempted to feed earlier but unable to latch. Education given on how to wake sleepy baby. Woke baby, repositioned mom and baby in football hold on left breast. Demonstration with teach back of deep latch. Baby actively sucking with swallows noted. Manually demonstrated how to flange baby's top lip. Enc snug hold of baby. Moist wound healing applied to nipples to help heal. Enc mom to call for further assistance.     Provided demonstration, education and support of deep latch to breast by placing the nipple to the nose, dragging down to chin to achieve a wide latch. Bring baby to the breast, not breast to baby. Move your shoulders down and away from your ears. Look for ear, shoulder, hip alignment. Baby's upper and lower lip should be flanged on the breast.    C/O sore nipples.  Information given about sore nipples and how to correct with positioning techniques. Discussed maneuvers to latch infant on properly to avoid nipple pain and promote healing.  Discussed  treatments that could be utilized to promote healing. Hydro gel dressings given with instruction and verbalization of understanding of cleaning and duration of use.    Encouraged parents to call for assistance, questions, and concerns about breastfeeding.  Extension provided.      Vanessa Vaca MA 11/26/2024 12:21 PM

## 2024-11-26 NOTE — PLAN OF CARE
Problem: PAIN - ADULT  Goal: Verbalizes/displays adequate comfort level or baseline comfort level  Description: Interventions:  - Encourage patient to monitor pain and request assistance  - Assess pain using appropriate pain scale  - Administer analgesics based on type and severity of pain and evaluate response  - Implement non-pharmacological measures as appropriate and evaluate response  - Consider cultural and social influences on pain and pain management  - Notify physician/advanced practitioner if interventions unsuccessful or patient reports new pain  Outcome: Progressing     Problem: INFECTION - ADULT  Goal: Absence or prevention of progression during hospitalization  Description: INTERVENTIONS:  - Assess and monitor for signs and symptoms of infection  - Monitor lab/diagnostic results  - [place] appropriate cooling/warming therapies per order  - Administer medications as ordered  - Instruct and encourage patient and family to use good hand hygiene technique  - Identify and instruct in appropriate isolation precautions for identified infection/condition  Outcome: Progressing  Goal: Absence of fever/infection during neutropenic period  Description: INTERVENTIONS:  - Monitor WBC    Outcome: Progressing     Problem: SAFETY ADULT  Goal: Patient will remain free of falls  Description: INTERVENTIONS:  - Educate patient/family on patient safety including physical limitations  - Instruct patient to call for assistance with activity   - Consult OT/PT to assist with strengthening/mobility   - Keep Call bell within reach  - Keep bed low and locked with side rails adjusted as appropriate  - Keep care items and personal belongings within reach  - Initiate and maintain comfort rounds  - Make Fall Risk Sign visible to staff  - Offer Toileting every 2-3 Hours, in advance of need  - Initiate/Maintain alarm  - Obtain necessary fall risk management equipment:   - Apply yellow socks and bracelet for high fall risk  patients  - Consider moving patient to room near nurses station  Outcome: Progressing  Goal: Maintain or return to baseline ADL function  Description: INTERVENTIONS:  -  Assess patient's ability to carry out ADLs; assess patient's baseline for ADL function and identify physical deficits which impact ability to perform ADLs (bathing, care of mouth/teeth, toileting, grooming, dressing, etc.)  - Assess/evaluate cause of self-care deficits   - Assess range of motion  - Assess patient's mobility; develop plan if impaired  - Assess patient's need for assistive devices and provide as appropriate  - Encourage maximum independence but intervene and supervise when necessary  - Involve family in performance of ADLs  - Assess for home care needs following discharge   - Consider OT consult to assist with ADL evaluation and planning for discharge  - Provide patient education as appropriate  Outcome: Progressing  Goal: Maintains/Returns to pre admission functional level  Description: INTERVENTIONS:  - Perform AM-PAC 6 Click Basic Mobility/ Daily Activity assessment daily.  - Set and communicate daily mobility goal to care team and patient/family/caregiver.   - Collaborate with rehabilitation services on mobility goals if consulted  - Reposition patient every  hours.  - Dangle patient  times a day  - Stand patient  times a day  - Ambulate patient  times a day  - Out of bed to chair  times a day   - Out of bed for meals  times a day  - Out of bed for toileting  - Record patient progress and toleration of activity level   Outcome: Progressing     Problem: DISCHARGE PLANNING  Goal: Discharge to home or other facility with appropriate resources  Description: INTERVENTIONS:  - Identify barriers to discharge w/patient and caregiver  - Arrange for needed discharge resources and transportation as appropriate  - Identify discharge learning needs (meds, wound care, etc.)  - Arrange for interpretive services to assist at discharge as  needed  - Refer to Case Management Department for coordinating discharge planning if the patient needs post-hospital services based on physician/advanced practitioner order or complex needs related to functional status, cognitive ability, or social support system  Outcome: Progressing     Problem: POSTPARTUM  Goal: Experiences normal postpartum course  Description: INTERVENTIONS:  - Monitor maternal vital signs  - Assess uterine involution and lochia  Outcome: Progressing  Goal: Appropriate maternal -  bonding  Description: INTERVENTIONS:  - Identify family support  - Assess for appropriate maternal/infant bonding   -Encourage maternal/infant bonding opportunities  - Referral to  or  as needed  Outcome: Progressing  Goal: Establishment of infant feeding pattern  Description: INTERVENTIONS:  - Assess breast/bottle feeding  - Refer to lactation as needed  Outcome: Progressing  Goal: Incision(s), wounds(s) or drain site(s) healing without S/S of infection  Description: INTERVENTIONS  - Assess and document dressing, incision, wound bed, drain sites and surrounding tissue  - Provide patient and family education  - Perform skin care/dressing changes every   Outcome: Progressing

## 2024-11-26 NOTE — PROGRESS NOTES
"Progress Note - OB/GYN  Noy Palomo 29 y.o. female MRN: [id number]  Unit/Bed#: -01 Encounter: [id number]    Assessment and Plan     Noy Palomo is a patient of: [facility]. She is PPD# 1 s/p  spontaneous vaginal delivery  Recovering well and is stable       *  (spontaneous vaginal delivery)  Assessment & Plan  Pain well-controlled  Voiding spontaneously, passing flatus  Encouraged breastfeeding  Encouraged ambulation    Cystic fibrosis carrier  Assessment & Plan  Partner negative    Previous bariatric surgery affecting pregnancy, antepartum  Assessment & Plan  - avoid NSAIDs    Transfusion of blood product declined due to Yarsani reason  Assessment & Plan  [employer]. Signed blood products consent form - declines any products that come directly from whole blood and blood patch. Will accept cell saver      Disposition    - Anticipate discharge home on PPD# 1      Subjective/Objective     Chief Complaint: Postpartum State     Subjective:    Noy Palomo is PPD/POD#1 s/p  spontaneous vaginal delivery. She has no current complaints.  Pain is well controlled.  Patient is currently voiding.  She is ambulating.  Patient is currently passing flatus and has had bowel movement. She is tolerating PO, and denies nausea or vomitting. Patient denies fever, chills, chest pain, shortness of breath, or calf tenderness. Lochia is normal. She is  Breastfeeding. She is recovering well and is stable.       Vitals:   /67 (BP Location: Left arm)   Pulse 65   Temp 97.7 °F (36.5 °C) (Oral)   Resp 16   Ht 5' 1\" (1.549 m)   Wt 93 kg (205 lb)   LMP 2024   SpO2 99%   Breastfeeding Yes   BMI 38.73 kg/m²       Intake/Output Summary (Last 24 hours) at 2024 0744  Last data filed at 2024 1220  Gross per 24 hour   Intake --   Output 1266 ml   Net -1266 ml       Invasive Devices       None                   Physical Exam:   GEN: Noy Palomo appears well, alert and " oriented x 3, pleasant and cooperative   CARDIO: RRR, no murmurs or rubs  RESP:  CTAB, no wheezes or rales  ABDOMEN: soft, no tenderness, no distention, fundus below umbilicus  EXTREMITIES: SCDs on, non tender, no erythema, b/l Shira's sign negative      Labs:     Hemoglobin   Date Value Ref Range Status   11/24/2024 12.2 11.5 - 15.4 g/dL Final   11/13/2024 11.8 11.5 - 15.4 g/dL Final   04/15/2018 12.3 12.0 - 16.0 G/DL Final     WBC   Date Value Ref Range Status   11/24/2024 15.55 (H) 4.31 - 10.16 Thousand/uL Final   11/13/2024 11.23 (H) 4.31 - 10.16 Thousand/uL Final   04/15/2018 13.3 (H) 4.5 - 11.0 K/MCL Final     Platelets   Date Value Ref Range Status   11/24/2024 160 149 - 390 Thousands/uL Final   11/13/2024 187 149 - 390 Thousands/uL Final   04/15/2018 237 150 - 450 K/MCL Final     Creatinine   Date Value Ref Range Status   11/10/2023 0.73 0.60 - 1.30 mg/dL Final     Comment:     Standardized to IDMS reference method   08/28/2023 0.86 0.60 - 1.30 mg/dL Final     Comment:     Standardized to IDMS reference method     AST   Date Value Ref Range Status   04/12/2023 15 5 - 45 U/L Final     Comment:     Specimen collection should occur prior to Sulfasalazine administration due to the potential for falsely depressed results.    11/11/2022 17 5 - 45 U/L Final     Comment:     Specimen collection should occur prior to Sulfasalazine administration due to the potential for falsely depressed results.    04/15/2018 22 14 - 36 U/L Final     ALT   Date Value Ref Range Status   04/12/2023 20 12 - 78 U/L Final     Comment:     Specimen collection should occur prior to Sulfasalazine and/or Sulfapyridine administration due to the potential for falsely depressed results.    11/11/2022 37 12 - 78 U/L Final     Comment:     Specimen collection should occur prior to Sulfasalazine and/or Sulfapyridine administration due to the potential for falsely depressed results.    04/15/2018 29 9 - 52 U/L Final          Evon Lowe,  MD  11/26/2024  7:44 AM

## 2024-11-26 NOTE — ASSESSMENT & PLAN NOTE
[employer]. Signed blood products consent form - declines any products that come directly from whole blood and blood patch. Will accept cell saver

## 2024-11-27 VITALS
SYSTOLIC BLOOD PRESSURE: 133 MMHG | WEIGHT: 205 LBS | HEART RATE: 100 BPM | TEMPERATURE: 98.8 F | RESPIRATION RATE: 18 BRPM | OXYGEN SATURATION: 99 % | HEIGHT: 61 IN | BODY MASS INDEX: 38.71 KG/M2 | DIASTOLIC BLOOD PRESSURE: 88 MMHG

## 2024-11-27 PROCEDURE — 99024 POSTOP FOLLOW-UP VISIT: CPT | Performed by: STUDENT IN AN ORGANIZED HEALTH CARE EDUCATION/TRAINING PROGRAM

## 2024-11-27 RX ORDER — IBUPROFEN 600 MG/1
600 TABLET, FILM COATED ORAL EVERY 6 HOURS PRN
Start: 2024-11-27

## 2024-11-27 RX ORDER — BENZOCAINE/MENTHOL 6 MG-10 MG
1 LOZENGE MUCOUS MEMBRANE DAILY PRN
Start: 2024-11-27

## 2024-11-27 RX ADMIN — IBUPROFEN 600 MG: 600 TABLET, FILM COATED ORAL at 00:50

## 2024-11-27 RX ADMIN — IBUPROFEN 600 MG: 600 TABLET, FILM COATED ORAL at 06:00

## 2024-11-27 RX ADMIN — PANTOPRAZOLE SODIUM 40 MG: 40 TABLET, DELAYED RELEASE ORAL at 06:00

## 2024-11-27 RX ADMIN — ACETAMINOPHEN 650 MG: 325 TABLET, FILM COATED ORAL at 02:00

## 2024-11-27 RX ADMIN — ACETAMINOPHEN 650 MG: 325 TABLET, FILM COATED ORAL at 09:21

## 2024-11-27 NOTE — PROGRESS NOTES
"Progress Note - OB/GYN  Noy Palomo 29 y.o. female MRN: [id number]  Unit/Bed#: -01 Encounter: [id number]    Assessment and Plan     Noy Palomo is a patient of: [facility] beds at the [facility]. She is PPD# 2 s/p  spontaneous vaginal delivery  Recovering well and is stable       *  (spontaneous vaginal delivery)  Assessment & Plan  Pain well-controlled  Voiding spontaneously, passing flatus  Encouraged breastfeeding  Encouraged ambulation    Cystic fibrosis carrier  Assessment & Plan  Partner negative    Previous bariatric surgery affecting pregnancy, antepartum  Assessment & Plan  - avoid NSAIDs    Transfusion of blood product declined due to Mandaen reason  Assessment & Plan  [employer]. Signed blood products consent form - declines any products that come directly from whole blood and blood patch. Will accept cell saver      Disposition    - Anticipate discharge home on PPD# 2      Subjective/Objective     Chief Complaint: Postpartum State     Subjective:    Noy Palomo is PPD/POD#2 s/p  spontaneous vaginal delivery. She has no current complaints.  Pain is well controlled.  Patient is currently voiding.  She is ambulating.  Patient is currently passing flatus and has had bowel movement. She is tolerating PO, and denies nausea or vomitting. Patient denies fever, chills, chest pain, shortness of breath, or calf tenderness. Lochia is normal. She is  Breastfeeding. She is recovering well and is stable.       Vitals:   /71 (BP Location: Left arm)   Pulse 75   Temp 98 °F (36.7 °C) (Oral)   Resp 18   Ht 5' 1\" (1.549 m)   Wt 93 kg (205 lb)   LMP 2024   SpO2 99%   Breastfeeding Yes   BMI 38.73 kg/m²     No intake or output data in the 24 hours ending 24 0601    Invasive Devices       None                   Physical Exam:   GEN: Noy Palomo appears well, alert and oriented x 3, pleasant and cooperative   CARDIO: RRR, no murmurs or rubs  RESP:  CTAB, no wheezes or " rales  ABDOMEN: soft, no tenderness, no distention, fundus below umbilicus  EXTREMITIES: SCDs on, non tender, no erythema, b/l Shira's sign negative      Labs:     Hemoglobin   Date Value Ref Range Status   11/24/2024 12.2 11.5 - 15.4 g/dL Final   11/13/2024 11.8 11.5 - 15.4 g/dL Final   04/15/2018 12.3 12.0 - 16.0 G/DL Final     WBC   Date Value Ref Range Status   11/24/2024 15.55 (H) 4.31 - 10.16 Thousand/uL Final   11/13/2024 11.23 (H) 4.31 - 10.16 Thousand/uL Final   04/15/2018 13.3 (H) 4.5 - 11.0 K/MCL Final     Platelets   Date Value Ref Range Status   11/24/2024 160 149 - 390 Thousands/uL Final   11/13/2024 187 149 - 390 Thousands/uL Final   04/15/2018 237 150 - 450 K/MCL Final     Creatinine   Date Value Ref Range Status   11/10/2023 0.73 0.60 - 1.30 mg/dL Final     Comment:     Standardized to IDMS reference method   08/28/2023 0.86 0.60 - 1.30 mg/dL Final     Comment:     Standardized to IDMS reference method     AST   Date Value Ref Range Status   04/12/2023 15 5 - 45 U/L Final     Comment:     Specimen collection should occur prior to Sulfasalazine administration due to the potential for falsely depressed results.    11/11/2022 17 5 - 45 U/L Final     Comment:     Specimen collection should occur prior to Sulfasalazine administration due to the potential for falsely depressed results.    04/15/2018 22 14 - 36 U/L Final     ALT   Date Value Ref Range Status   04/12/2023 20 12 - 78 U/L Final     Comment:     Specimen collection should occur prior to Sulfasalazine and/or Sulfapyridine administration due to the potential for falsely depressed results.    11/11/2022 37 12 - 78 U/L Final     Comment:     Specimen collection should occur prior to Sulfasalazine and/or Sulfapyridine administration due to the potential for falsely depressed results.    04/15/2018 29 9 - 52 U/L Final          Evon Lowe MD  11/27/2024  6:01 AM

## 2024-11-27 NOTE — PLAN OF CARE
Problem: PAIN - ADULT  Goal: Verbalizes/displays adequate comfort level or baseline comfort level  Description: Interventions:  - Encourage patient to monitor pain and request assistance  - Assess pain using appropriate pain scale  - Administer analgesics based on type and severity of pain and evaluate response  - Implement non-pharmacological measures as appropriate and evaluate response  - Consider cultural and social influences on pain and pain management  - Notify physician/advanced practitioner if interventions unsuccessful or patient reports new pain  Outcome: Progressing     Problem: INFECTION - ADULT  Goal: Absence or prevention of progression during hospitalization  Description: INTERVENTIONS:  - Assess and monitor for signs and symptoms of infection  - Monitor lab/diagnostic results  - Monitor all insertion sites, i.e. indwelling lines, tubes, and drains  - Monitor endotracheal if appropriate and nasal secretions for changes in amount and color  - [address] appropriate cooling/warming therapies per order  - Administer medications as ordered  - Instruct and encourage patient and family to use good hand hygiene technique  - Identify and instruct in appropriate isolation precautions for identified infection/condition  Outcome: Progressing  Goal: Absence of fever/infection during neutropenic period  Description: INTERVENTIONS:  - Monitor WBC    Outcome: Progressing     Problem: SAFETY ADULT  Goal: Patient will remain free of falls  Description: INTERVENTIONS:  - Educate patient/family on patient safety including physical limitations  - Instruct patient to call for assistance with activity   - Consult OT/PT to assist with strengthening/mobility   - Keep Call bell within reach  - Keep bed low and locked with side rails adjusted as appropriate  - Keep care items and personal belongings within reach  - Initiate and maintain comfort rounds  - Make Fall Risk Sign visible to staff  - Offer Toileting every  Hours,  in advance of need  - Initiate/Maintain alarm  - Obtain necessary fall risk management equipment:   - Apply yellow socks and bracelet for high fall risk patients  - Consider moving patient to room near nurses station  Outcome: Progressing  Goal: Maintain or return to baseline ADL function  Description: INTERVENTIONS:  -  Assess patient's ability to carry out ADLs; assess patient's baseline for ADL function and identify physical deficits which impact ability to perform ADLs (bathing, care of mouth/teeth, toileting, grooming, dressing, etc.)  - Assess/evaluate cause of self-care deficits   - Assess range of motion  - Assess patient's mobility; develop plan if impaired  - Assess patient's need for assistive devices and provide as appropriate  - Encourage maximum independence but intervene and supervise when necessary  - Involve family in performance of ADLs  - Assess for home care needs following discharge   - Consider OT consult to assist with ADL evaluation and planning for discharge  - Provide patient education as appropriate  Outcome: Progressing  Goal: Maintains/Returns to pre admission functional level  Description: INTERVENTIONS:  - Perform AM-PAC 6 Click Basic Mobility/ Daily Activity assessment daily.  - Set and communicate daily mobility goal to care team and patient/family/caregiver.   - Collaborate with rehabilitation services on mobility goals if consulted  - Perform Range of Motion  times a day.  - Reposition patient every  hours.  - Dangle patient  times a day  - Stand patient  times a day  - Ambulate patient  times a day  - Out of bed to chair  times a day   - Out of bed for meals  times a day  - Out of bed for toileting  - Record patient progress and toleration of activity level   Outcome: Progressing     Problem: DISCHARGE PLANNING  Goal: Discharge to home or other facility with appropriate resources  Description: INTERVENTIONS:  - Identify barriers to discharge w/patient and caregiver  - Arrange for  needed discharge resources and transportation as appropriate  - Identify discharge learning needs (meds, wound care, etc.)  - Arrange for interpretive services to assist at discharge as needed  - Refer to Case Management Department for coordinating discharge planning if the patient needs post-hospital services based on physician/advanced practitioner order or complex needs related to functional status, cognitive ability, or social support system  Outcome: Progressing     Problem: POSTPARTUM  Goal: Experiences normal postpartum course  Description: INTERVENTIONS:  - Monitor maternal vital signs  - Assess uterine involution and lochia  Outcome: Progressing  Goal: Appropriate maternal -  bonding  Description: INTERVENTIONS:  - Identify family support  - Assess for appropriate maternal/infant bonding   -Encourage maternal/infant bonding opportunities  - Referral to  or  as needed  Outcome: Progressing  Goal: Establishment of infant feeding pattern  Description: INTERVENTIONS:  - Assess breast/bottle feeding  - Refer to lactation as needed  Outcome: Progressing  Goal: Incision(s), wounds(s) or drain site(s) healing without S/S of infection  Description: INTERVENTIONS  - Assess and document dressing, incision, wound bed, drain sites and surrounding tissue  - Provide patient and family education  - Perform skin care/dressing changes every   Outcome: Progressing

## 2024-11-29 NOTE — UTILIZATION REVIEW
"Mother and baby discharged on 2024    NOTIFICATION OF INPATIENT ADMISSION   MATERNITY/DELIVERY AUTHORIZATION REQUEST   SERVICING FACILITY:   [facility] - L&D, , NICU  [address] [place] [address]. [place] [address]  Tax ID: [id number]  NPI: [id number]   ATTENDING PROVIDER:  Attending Name and NPI#: Erica Murphy Md [[id number]]  Address: [address] [place] [address]. [place] [address]  Phone: [phone]   ADMISSION INFORMATION:  Place of Service: Inpatient [facility]  Place of Service Code: 21  Inpatient Admission Date/Time: 24  9:21 PM  Discharge Date/Time: 2024 12:54 PM  Admitting Diagnosis Code/Description:  Encounter for full-term uncomplicated delivery [O80]     Mother: Noy Palomo 1995 Estimated Date of Delivery: 24  Delivering clinician: Erica Murphy   OB History          1    Para   1    Term   1       0    AB   0    Living   1         SAB   0    IAB   0    Ectopic   0    Multiple   0    Live Births   1               [place] Name & MRN:   Information for the patient's :  Dewey Chivo Enrique [[id number]]    Delivery Information:  Sex: male  Delivered 2024 9:30 AM by Vaginal, Spontaneous; Gestational Age: 38w4d     Measurements:  Weight: 6 lb 11.8 oz (3055 g);  Height: 19\"    APGAR 1 minute 5 minutes 10 minutes   Totals: 9 9       UTILIZATION REVIEW CONTACT:  Yolanda Andre, Utilization   Network Utilization Review Department  Phone: [phone]  Fax [phone]  Email: [email or web address]  Contact for approvals/pending authorizations, clinical reviews, and discharge.     PHYSICIAN ADVISORY SERVICES:  Medical Necessity Denial & [place]qeu-hj-Akme Review  Phone: [phone]  Fax: [phone]  Email: [email or web address]     DISCHARGE SUPPORT TEAM:  For Patients Discharge Needs & Updates  Phone: [phone] opt. 2 Fax: " [phone]  Email: [email or web address]

## 2024-12-01 LAB — PLACENTA IN STORAGE: NORMAL

## 2024-12-12 ENCOUNTER — POSTPARTUM VISIT (OUTPATIENT)
Dept: OBGYN CLINIC | Facility: CLINIC | Age: 29
End: 2024-12-12

## 2024-12-12 VITALS — SYSTOLIC BLOOD PRESSURE: 106 MMHG | WEIGHT: 185.4 LBS | DIASTOLIC BLOOD PRESSURE: 50 MMHG | BODY MASS INDEX: 35.03 KG/M2

## 2024-12-12 PROCEDURE — 99024 POSTOP FOLLOW-UP VISIT: CPT | Performed by: OBSTETRICS & GYNECOLOGY

## 2024-12-12 NOTE — PROGRESS NOTES
Noy Palomo  1995    S:  29 y.o. female here for postpartum visit.  She is s/p Uncomplicated vaginal delivery on 11/25/2024.   Gender: male   Apgars: 9/9  Weight: 6 lbs 12 oz  Her lochia has almost resolved.    She is Breastfeeding - her son is cluster feeding, so her nipples are sore.   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  She denies postpartum blues/depression.  Clinton score was 4.    We discussed contraceptive options in detail including birth control pills, patches, NuvaRing, DepoProvera, Mirena/Kyleena IUD, Nexplanon in detail.  At this point she would like Nexplanon.  Discussed risks/expectations of use, abstinence until insertion.     O:   She appears well and in no distress  Abdomen is soft and nontender  External genitals are normal  Vagina is normal - newly healed laceration sensitive to touch but mucosa closed  Cervix, uterus and adnexa are nontender, no masses palpable.     A/P:  Postpartum visit.   She will return in 2 weeks for Nexplanon insertion, 2 months for her yearly exam.   Call if pain at introitus continues for estrogen vaginal cream   Postpartum pelvic PT referral given

## 2024-12-13 ENCOUNTER — TELEPHONE (OUTPATIENT)
Dept: OBGYN CLINIC | Facility: CLINIC | Age: 29
End: 2024-12-13

## 2024-12-13 NOTE — TELEPHONE ENCOUNTER
Called pt for PP assessment- Lmtcb.    POSTPARTUM PHONE CALL ASSESSMENT    Date of Delivery: 11/25/24  Delivering Provider: Dr CORADO  Mode: Vag phill.   Delivery Notes/Complications: no complications  PROM   Do you still have bleeding/pain? If so, how much/how severe? No pain , sm amt blding.    Regular BMs/Urination? yes  Breastfeeding/Formula/Both? yes  How are you doing emotionally? good   If struggling, obtain a EPDS Score:N/A  Do you have any other questions or concerns for us or your provider? Needs FMLA papers  before 12/28/24.  Have you scheduled the pediatrician appointment with pediatrician? Yes- 2nd appt 1/6/25  Do you have a postpartum visit scheduled? 1/2025 birth control Nexplanon   Date scheduled: 1/15/2025 Provider: Dr Zafar

## 2024-12-16 ENCOUNTER — TELEPHONE (OUTPATIENT)
Age: 29
End: 2024-12-16

## 2024-12-17 ENCOUNTER — TELEPHONE (OUTPATIENT)
Dept: OBGYN CLINIC | Facility: CLINIC | Age: 29
End: 2024-12-17

## 2024-12-17 NOTE — TELEPHONE ENCOUNTER
Returned patient's call. Left detailed message requesting a call back to schedule an appt with Aerial Damien. Left TA number. Sent scheduling ticket via [employer].

## 2024-12-29 ENCOUNTER — DOCUMENTATION (OUTPATIENT)
Dept: LABOR AND DELIVERY | Facility: HOSPITAL | Age: 29
End: 2024-12-29

## 2024-12-29 ENCOUNTER — NURSE TRIAGE (OUTPATIENT)
Dept: OTHER | Facility: OTHER | Age: 29
End: 2024-12-29

## 2024-12-29 DIAGNOSIS — B37.89 YEAST INFECTION OF NIPPLE, POSTPARTUM: Primary | ICD-10-CM

## 2024-12-29 RX ORDER — NYSTATIN 100000 U/G
OINTMENT TOPICAL 2 TIMES DAILY
Qty: 15 G | Refills: 0 | Status: SHIPPED | OUTPATIENT
Start: 2024-12-29 | End: 2025-01-06

## 2024-12-29 NOTE — TELEPHONE ENCOUNTER
"Answer Assessment - Initial Assessment Questions  1. SYMPTOM: \"What's the main symptom you're concerned about?\" (e.g., pain, redness, swelling)        Redness and itching, both breast    2. ONSET: \"When did the  redness/itching  start?\"        Yesterday    3. LOCATION: \"Which breast?\" (e.g., left, right, both) \"Is the pain in the breast or just the nipple?\"        B/l breast    4. PAIN: \"How bad is the pain?\"  (Scale 1-10; or mild, moderate, severe)     2-3 /10 with latching    5. REDNESS: \"Does the skin appear red? Does the breast feel hot to touch?\"         Guin    6. LUMP OR SWELLING: \"Does the breast feel hard or have lumps?\"        Denies    7. DELIVERY DATE: \"When was your delivery date?\" \"Vaginal delivery or ?\"        4 weeks post partum    8. BREASTFEEDING AND PUMPING: \"Did you breastfeed your baby or use a breast pump after delivery?\" If Yes, ask: \"When did you last breastfeed or pump your breasts?\"        Breastfeeding    9. FEVER: \"Do you have a fever?\" If Yes, ask: \"What is it, how was it measured, and when did it start?\"      Denies fever  Breasts slightly warm to the touch    10. OTHER SYMPTOMS: \"Do you have any other symptoms?\" (e.g., feeling sad or depressed, nipple symptoms)          Denies    Protocols used: Postpartum - Breast Pain and Engorgement-Adult-      On call provider ordered Nystatin.  Application instructions reviewed and advised to stop using lanolin at this time.  Patient verbalized understanding.  "

## 2024-12-29 NOTE — TELEPHONE ENCOUNTER
Reason for Disposition  • [1] Normal postpartum breast pain and engorgement AND [2] chose not to breastfeed and / or pump    Protocols used: Postpartum - Breast Pain and Engorgement-Adult-AH

## 2024-12-29 NOTE — TELEPHONE ENCOUNTER
"Regarding: Pt 2 Pink nipples/ itching/ pain  ----- Message from Vivienne CAPPS sent at 12/29/2024 10:17 AM EST -----  Pt stated, \" I think I am developing thrush on my breast, my son is breat fed and he has thrush. My nipples are red itching and painful.\"    "

## 2025-01-06 ENCOUNTER — TELEPHONE (OUTPATIENT)
Dept: POSTPARTUM | Facility: CLINIC | Age: 30
End: 2025-01-06

## 2025-01-06 ENCOUNTER — OFFICE VISIT (OUTPATIENT)
Dept: POSTPARTUM | Facility: CLINIC | Age: 30
End: 2025-01-06
Payer: COMMERCIAL

## 2025-01-06 PROCEDURE — 99404 PREV MED CNSL INDIV APPRX 60: CPT | Performed by: PEDIATRICS

## 2025-01-06 NOTE — TELEPHONE ENCOUNTER
Spoke with Erika - she has been breastfeeding Chivo (6w) since birth - she started to introduce 1 bottle a day to help with feedings - she is having difficulty with getting baby back to breast.      Scheduled for 1/13 - looking for tips /recommendations till she can be seen.

## 2025-01-06 NOTE — PROGRESS NOTES
"INITIAL BREAST FEEDING EVALUATION    Informant/Relationship: Erika    Discussion of General Lactation Issues: Erika feels that breastfeeding was going well since the beginning until he was bottle fed a couple of times in the same day.  Then he was fussy when she tried to .  She resorted to more bottle feeding initially but then did some prolonged STS until she started latching again.  Since then she feels his latch was frequently shallow but getting better in the last day or so.   Erika and Chivo are currently both being treated for thrush.  Erika's nipples are a little pink and sore and Chivo has thrush \"all over his mouth\"    Infant is 6 weeks old today.        History:  Fertility Problem:no  Breast changes:yes - breasts were tender and villagran, areolas were larger and darker.  : yes - labor augmented with pitocin, had an epidural  Full term:yes - 38 4/7 weeks   labor:no  First nursing/attempt < 1 hour after birth:yes - baby latched during STS in the delivery room  Skin to skin following delivery:yes - immediately in the delivery room  Breast changes after delivery:yes - breasts were villagran and milk was in by day 4  Rooming in (infant in room with mother with exception of procedures, eg. Circumcision:  yes  Blood sugar issues:no  NICU stay:no  Jaundice:no  Phototherapy:no  Supplement given: (list supplement and method used as well as reason(s):no    Past Medical History:   Diagnosis Date    Anemia     takes irondaly    Asthma     Chronic kidney disease     sm cyst on left kidney diag 10 yrs ago    Family history of Down syndrome 2024    FOB' maternal uncle with down syndrome  Low risk NIPT      Gastritis     GERD (gastroesophageal reflux disease)     Hx of gastritis     Migraine     Obesity     Seasonal allergies     Umbilical hernia     Varicella     had vaccines         Current Outpatient Medications:     acetaminophen (TYLENOL) 500 mg tablet, Take 500 mg by mouth every " 6 (six) hours as needed for mild pain, Disp: , Rfl:     benzocaine-menthol-lanolin-aloe (DERMOPLAST) 20-0.5 % topical spray, Apply 1 Application topically every 6 (six) hours as needed for irritation, Disp: , Rfl:     calcium carbonate (TUMS) 500 mg chewable tablet, Chew 1 tablet daily, Disp: , Rfl:     folic acid (KP Folic Acid) 1 mg tablet, Take 1 tablet (1 mg total) by mouth daily (Patient not taking: Reported on 12/12/2024), Disp: 90 tablet, Rfl: 3    hydrocortisone 1 % cream, Apply 1 Application topically daily as needed for irritation, Disp: , Rfl:     ibuprofen (MOTRIN) 600 mg tablet, Take 1 tablet (600 mg total) by mouth every 6 (six) hours as needed for mild pain or moderate pain, Disp: , Rfl:     Multiple Vitamins-Minerals (BARIATRIC MULTIVITAMINS/IRON PO), Take by mouth, Disp: , Rfl:     nystatin (MYCOSTATIN) ointment, Apply topically 2 (two) times a day for 7 days, Disp: 15 g, Rfl: 0    witch hazel-glycerin (TUCKS) topical pad, Apply 1 Pad topically every 4 (four) hours as needed for irritation (Patient not taking: Reported on 12/12/2024), Disp: , Rfl:     Allergies   Allergen Reactions    Cat Hair Extract Shortness Of Breath    Contrast [Iodinated Contrast Media] Anaphylaxis     throat closure    Fish-Derived Products - Food Allergy Anaphylaxis     Anaphylaxis    Pollen Extract Other (See Comments)     hives, throat closure    Shellfish-Derived Products - Food Allergy        Social History     Substance and Sexual Activity   Drug Use Never    Comment: flro uncles addiction drugs/etoh,  denies self, FOB denies,       Social History Never a smoker    Interval Breastfeeding History:  Frequency of breast feeding: On demand about 8 times a day  Does mother feel breastfeeding is effective: Yes, until further questioning. Since the beginning, Chivo pops off the breast frequently while feeding and if often fussy during and after feedings.  Does infant appear satisfied after nursing:Yes  Stooling pattern  normal: Yes  Urinating frequently:Yes  Using shield or shells: No    Alternative/Artificial Feedings:   Bottle: Yes, typically once a day. Recently a little more frequently when he is refusing to latch.  Using Brooklyn Tao and Daniel Enrique bottles.  Not familiar with paced feeding.  Cup: No  Syringe/Finger: No           Formula Type: none                     Amount: n/a            Breast Milk:                      Amount: 1-3 ounces            Frequency Q 2-4 Hr between feedings  Elimination Problems: No      Equipment:  Nipple Shield             Type: none             Size: n/a             Frequency of Use: n/a  Pump            Type: Spectra S1            Frequency of Use: currently twice a day to obtain extra milk and if a feeding at the breast is missed.  Expresses 3 ounces each time she pumps  Shells            Type: none            Frequency of use: n/a    Equipment Problems: no    Mom:  Breast: Medium sized symmetrical breasts.  Rounded shape.  Closely spaced.  No signs of satellite lesions, skin changes or color changes except for some very mild erythema at the base of the left nipple that appears to be pump trauma from a flange that is too large.  Nipple Assessment in General: Normal: elongated/eraser, no discoloration and no damage noted.  Mother's Awareness of Feeding Cues                 Recognizes: Yes                  Verbalizes: Yes  Support System: FOB, extended family  History of Breastfeeding: none  Changes/Stressors/Violence: Erika is concerned that recently Chivo is reluctant to nurse at times  Concerns/Goals: Erika desires to breastfeed for at least 3 months.    Problems with Mom: ? Sore nipples    Physical Exam  Constitutional:       Appearance: Normal appearance.   HENT:      Head: Normocephalic and atraumatic.      Nose: Nose normal.   Pulmonary:      Effort: Pulmonary effort is normal.   Musculoskeletal:         General: Normal range of motion.      Cervical back: Normal range of  motion and neck supple.   Neurological:      Mental Status: She is alert and oriented to person, place, and time.   Skin:     General: Skin is warm and dry.   Psychiatric:         Mood and Affect: Mood normal.         Behavior: Behavior normal.         Thought Content: Thought content normal.         Judgment: Judgment normal.         Infant:  Behaviors: Alert  Color: Pink  Birth weight: 3055 grams  Current weight: 3725 grams    Problems with infant: recent breast refusal, thrush, slow weight gain, restricted tongue movement and function, bilateral hearing loss      General Appearance:  Alert, active, no distress, thin                             Head:  Normocephalic, AFOF, sutures opposed                             Eyes:  Conjunctiva clear, no drainage                              Ears:  Normally placed, no anomolies                             Nose:  No drainage or erythema                           Mouth:  No lesions. High narrow palate. The tongue extends just barely to the lower lip, tips on it's side when lateralizing and the tongue remains completely flat when he cries.  Some effective cupping felt as he sucked on my finger but cupping was lost with only gentle pressure on the chin.  Some peristalsis felt as he sucked but frequent tongue retraction and biting was felt.  The lingual frenulum is thin, short, attached just posterior to the tip of the tongue and high on the lower alveolar ridge.  Numerous thick, white irregularly shaped lesions on the buccal mucosa, gums and tongue.                    Neck:  Supple, symmetrical, trachea midline                 Respiratory:  No grunting, flaring, retractions, breath sounds clear and equal            Cardiovascular:  Regular rate and rhythm. No murmur. Adequate perfusion/capillary refill.                    Abdomen:   Soft, non-tender, no masses, bowel sounds present, no HSM             Genitourinary:  Normal male, testes descended, no discharge, swelling, or  pain, anus patent                          Spine:   No abnormalities noted        Musculoskeletal:  Full range of motion          Skin/Hair/Nails:   Skin warm, dry, and intact, no rashes or abnormal dyspigmentation or lesions                Neurologic:   No abnormal movement, tone appropriate     Latch:  Efficiency:               Lips Flanged: the upper lip curls under slightly on the breast, the lower lip flanges              Depth of latch: very good              Audible Swallow: Yes, but sustained SSB was brief, only the first couple of minutes on each breast.  He then began popping off and appeared fussy.  At that point, he would refuse to relatch on the same breast              Visible Milk: Yes              Wide Open/ Asymmetrical: Yes              Suck Swallow Cycle: Breathing: unlabored, Coordinated: yes  Nipple Assessment after latch: Normal: elongated/eraser, no discoloration and no damage noted.  Latch Problems: Chivo was able to latch fairly well but could not transfer milk beyond the first ETIENNE. He was fussy and needed lots of encouragement to continue feeding. He transferred 25 grams of milk and continued to give clear feeding cues.  He was offered a bottle of expressed milk.  He struggled to transfer milk effectively from the bottle as well.  Chin support helped a little. He took 45ml of expressed milk and continued to give feeding cues.      Position:  Infant's Ergonomics/Body               Body Alignment: Yes               Head Supported: Yes               Close to Mom's body/ Lifted/ Supported: Yes               Mom's Ergonomics/Body: Yes                           Supported: Yes                           Sitting Back: Yes                           Brings Baby to her breast: Yes  Positioning Problems: Erika prefers to lean forward to latch Chivo.  I demonstrated how to relax back and transition to cradle hold for her comfort after latch      Handouts:   Paced bottle feeding, Latch Check  List, and Tongue Tie    Education:  Reviewed Latch: discussed that Chivo's restricted tongue movement is impacting his ability to effectively transfer milk while feeding at the breast.  Reviewed Positioning for Dyad: Demonstrated how to position mom comfortably and supported   Reviewed Frequency/Supply & Demand: discussed how poor milk transfer can impact milk production over time  Reviewed Infant:Cues and varied States of Awareness  Reviewed Alternative/Artificial Feedings: Discussed and demonstrated paced bottle feeding.  Reviewed Mom/Breast care: Discussed appropriate handling of the breasts to avoid pain, inflammation and trauma. Discussed symptoms of thrush and appropriate treatment if needed.  Reviewed Equipment: discussed the use and features of the spectra S1 and how to determine what size flange to use.      Plan:    Reassurance and support given.  I acknowledged Erika's concerns and her commitment to breastfeeding.  I encouraged her to continue breastfeeding as often as she desires.  I made some suggestions for positioning to improve her comfort.  I recommended that she pump if a feeding at the breast is missed.  We also discussed pumping occasionally after feeding to obtain some extra milk for bottle feeding. I encouraged her to not do this so frequently that she feels stressed or exhausted.  A follow up appointment was scheduled for ongoing evaluation and support.  I encouraged her to call with any questions or concerns.    I have spent 90 minutes with Patient and family today in which greater than 50% of this time was spent in counseling/coordination of care regarding Patient and family education and Counseling / Coordination of care.

## 2025-01-06 NOTE — PATIENT INSTRUCTIONS
"Continue breastfeeding as often as you desire.  Pump if a feeding at the breast is missed and as desired after feeding to obtain milk for bottle feeding.  Avoid pumping so frequently after feeding that you feel exhausted and stressed.  When pumping, cycle your pump through stimulation and expression mode several times in a session to stimulate several let downs until you have expressed enough milk to feed the baby and to achieve breast comfort.  There is no need to \"empty\" the breast completely. Use gentle hands on pumping and hand expression   Maintain your pump as recommended. Use flange that fits comfortably and allows the breast to empty effectively.    Discontinue using the nipple cream prescribed by your OB.  You have no symptoms of thrush at this time.    Follow up as scheduled.  Please call with any questions or concerns.  "

## 2025-01-09 ENCOUNTER — PROCEDURE VISIT (OUTPATIENT)
Dept: OBGYN CLINIC | Facility: CLINIC | Age: 30
End: 2025-01-09
Payer: COMMERCIAL

## 2025-01-09 VITALS — SYSTOLIC BLOOD PRESSURE: 106 MMHG | DIASTOLIC BLOOD PRESSURE: 68 MMHG | WEIGHT: 187 LBS | BODY MASS INDEX: 35.33 KG/M2

## 2025-01-09 DIAGNOSIS — N91.2 LACTATIONAL AMENORRHEA: Primary | ICD-10-CM

## 2025-01-09 DIAGNOSIS — Z30.017 INSERTION OF NEXPLANON: ICD-10-CM

## 2025-01-09 LAB — SL AMB POCT URINE HCG: NEGATIVE

## 2025-01-09 PROCEDURE — 81025 URINE PREGNANCY TEST: CPT | Performed by: OBSTETRICS & GYNECOLOGY

## 2025-01-09 PROCEDURE — 11981 INSERTION DRUG DLVR IMPLANT: CPT | Performed by: OBSTETRICS & GYNECOLOGY

## 2025-01-09 NOTE — PROGRESS NOTES
Universal Protocol:  Consent: Written consent obtained.  Risks and benefits: risks, benefits and alternatives were discussed  Consent given by: patient  Remove and insert drug implant    Date/Time: 1/9/2025 1:00 PM    Performed by: Adilene Morgan MD  Authorized by: Adilene Morgan MD    Indication:     Indication: Insertion of non-biodegradable drug delivery implant    Pre-procedure:     Prepped with: povidone-iodine      Local anesthetic:  Lidocaine with epinephrine and lidocaine 1%    The site was cleaned and prepped in a sterile fashion: yes    Procedure:     Procedure:  Insertion    Small stab incision was made in arm: no      Left/right:  Left    Preloaded contraceptive capsule trocar was placed subdermally: yes      Visualization of implant was obtained: yes      Contraceptive capsule was inserted and trocar removed: yes      Visualization of notch in stylet and palpation of device: yes      Palpation confirms placement by provider and patient: yes      Site was closed with steri-strips and pressure bandage applied: yes    Comments:      Bleeding, infection precautions given.  Reviewed back-up methods.  Return for annual or PRN.

## 2025-01-12 NOTE — PROGRESS NOTES
I have reviewed the notes, assessments, and/or procedures performed by Nan Contreras RN, IBCLC, I concur with her/his documentation of Noy Timmons MD 01/12/25

## 2025-01-13 ENCOUNTER — APPOINTMENT (OUTPATIENT)
Dept: PHYSICAL THERAPY | Facility: REHABILITATION | Age: 30
End: 2025-01-13
Payer: COMMERCIAL

## 2025-01-20 ENCOUNTER — EVALUATION (OUTPATIENT)
Dept: PHYSICAL THERAPY | Facility: REHABILITATION | Age: 30
End: 2025-01-20
Payer: COMMERCIAL

## 2025-01-20 DIAGNOSIS — M54.50 BILATERAL LOW BACK PAIN WITHOUT SCIATICA, UNSPECIFIED CHRONICITY: ICD-10-CM

## 2025-01-20 DIAGNOSIS — M54.9 UPPER BACK PAIN: ICD-10-CM

## 2025-01-20 DIAGNOSIS — R53.1 WEAKNESS: Primary | ICD-10-CM

## 2025-01-20 PROCEDURE — 97112 NEUROMUSCULAR REEDUCATION: CPT

## 2025-01-20 PROCEDURE — 97162 PT EVAL MOD COMPLEX 30 MIN: CPT

## 2025-01-20 PROCEDURE — 97530 THERAPEUTIC ACTIVITIES: CPT

## 2025-01-20 NOTE — PROGRESS NOTES
PT Evaluation     Today's date: 2025  Patient name: Noy Palomo  : 1995  MRN: [id number]  Referring provider: Adilene Morgan MD  Dx:   Encounter Diagnosis     ICD-10-CM    1. Weakness  R53.1       2. Upper back pain  M54.9       3. Bilateral low back pain without sciatica, unspecified chronicity  M54.50       4. Postpartum exam  Z39.2           Start Time: 1804  Stop Time: 1902  Total time in clinic (min): 58 minutes    Assessment  Impairments: abnormal muscle tone, activity intolerance, impaired physical strength, lacks appropriate home exercise program, pain with function and poor posture     Assessment details: Noy Palomo is a 29 y.o. female who is  and s/p  on 24 who presents with concerns of low back pain, upper back pain, decreased strength and stamina, and an inability to stop her urine stream with her pelvic floor muscles. Symptoms include: pain in her upper back when sitting up straight and feeding her baby, pain in her lower back with extended periods of sitting, being unable to stop her urine stream once it starts, and feeling fatigued quicker than she used to with certain activities than she did before pregnancy. Examination reveals poor posture, poor breath mechanics, tension and TTP bilateral upper trapezius, middle trapezius, and levator scapulae muscles. The examination also reveals thoracic spine hypomobility, pain with thoracic spine mobilization, decreased scapular stabilizer strength, decreased bilateral hip strength, and decreased TrA activation.    Pt deferred external and internal PFM exam today due to having her period, and stated that she would be okay having it performed at her next visit. Therefore, her PFM will be further assessed at a future visit.      The plan of care was discussed and included education regarding pelvic floor anatomy, explanation of exam technique, explanation of exam findings and discussion of treatment plan as well as the  importance of patient compliance and adherence to physical therapy visits.  Instructed the pt how to properly perform diaphragmatic breathing, and the role it plays in proper PFM functioning. Instructed pt to perform diaphragmatic breathing for 10 minutes each day for her HEP in order to improve her breath mechanics. Reviewed her HEP exercises with her. Educated the pt that the average recommended amount of water to consume when breast feeding is around 128 oz in order to assist in producing enough milk. Educated the pt that it is safe for her to begin retuning to walking longer distances, however she should start with shorter distances and slowly build up. Informed her that it is safe for her to begin performing some easy body weight exercises as well based on her examination, and she can begin to perform elevated planks as a modification to regular planks. Informed her that if any of these exercises cause her any pain that she should stop performing them and wait until she returns for her next physical therapy visit. Pt demonstrated a good verbal understanding of all of the information via teach back method. Patient would benefit from skilled physical therapy services  to address deficits and ultimately meet goal of independent self management of condition.     Patient provided written and verbal consent for pelvic floor muscle exam: yes       Understanding of Dx/Px/POC: good     Prognosis: good    Goals  Short term goals: to be met in 3-4 weeks  Pt independent with initial HEP, rationale, technique and frequency, for ROM and pain control.  Pt will report at least a 25% reduction in subjective pain complaints/symptoms to better manage ADLs and household chores.   Pt will be able to effectively isolate and recruit the TrA without Valsalva or global abdominal contraction to improve lumbopelvic stability.  Pt will manage 10-15 minutes of continuous activity for general conditioning and endorphin release for pain  management using bike or treadmill.  Pt presents with good understanding of pelvic floor protective strategies to reduce intra-abdominal pressure against pelvic organs.    Long term goals: to be met by discharge  Pt will report a 75% or > reduction in subjective pain complaints/symptoms to better manage ADLs and household chores.  Improve B hip abduction MMT to a 4/5 or greater for improved lumbopelvic stability.  Improve B scapular stabilizer MMTs to a 4/5 or greater for improved scapular stability.  Pt will be able to perform ADLs, iADLS, and work duties without pain or restriction indicating return to PLOF.  Pt independent with rationale, technique and plan for performance of advanced HEP to ensure independent self-management of symptoms upon discharge.    Plan  Patient would benefit from: skilled physical therapy  Planned modality interventions: low level laser therapy, cryotherapy and thermotherapy: hydrocollator packs    Planned therapy interventions: abdominal trunk stabilization, breathing training, functional ROM exercises, home exercise program, IASTM, joint mobilization, manual therapy, massage, neuromuscular re-education, patient/caregiver education, postural training, strengthening, stretching, therapeutic activities and therapeutic exercise    Frequency: 1-2x week  Duration in weeks: 12  Plan of Care beginning date: 2025  Plan of Care expiration date: 2025  Treatment plan discussed with: patient      SL AMB SF PT [facility]OM[facility] POSTPARTUM SUBJECTIVE EVAL:   History Of Present Illness:  Erika Palomo is a  who presents 8 weeks s/p vaginal delivery. Baby was born at 38 weeks gestation and weighed 6 lb 12 oz.     She reports that she can hold her pee if she has not started her urine stream even when her bladder is full. However, she cannot stop her stream if she does a kegel once her flow has started, which is something she used to be able to do so she feels that her pelvic floor is not as  strong.    She reports that her stamina has definitely decreased since she was pregnant and gave birth, and she does not feel as strong as she was prior to pregnancy. She needs to take a break after only doing a few things, and she used to be able to work for 12 hours without any issues.    She has been having some pain between her shoulders in her upper back. She feels like her muscles tighten a lot more when she sits up straight and her pain seems to wrosen when she is feeding. She has been working with a lactation consultant on her positioning with feeding. Pain - Current:1/10, Best: 0/10, Worst: 4/10. Tightness and sometimes radiates into her neck a little.    She has also been having a little low back pain, but she feels like this may be from sitting too long. If she cracks her back this usually improves her pain. Pain Current:0/10, Best: 0/10, Worst: 4/10. [place].  Labor position includes the following:  Supine lithotomy   epidural administered  Perineal tearing stgstrstastdstest:st st1st She had a very small tear with delivery, which required a few stitches. She was pushing for about 1 hour, and she was in labor for 12 hours.  Baby feeding:  Breast and bottle   He does have a tongue-tie so latching has been a little difficult. She has also been having some trouble producing enough milk. They did switch to bottle feeding, and he has been doing very well with that.  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 She usually works about 40 hours/week.  Planned RTW date:  2/17/25  Exercise/Fitness current activity and goals:  She has not started exercising yet. She likes to run outside. She was running/jogging/walking about 3x/week and would run for a distance of about 3.5 miles. She also likes to do weight lifting about 2x/week. She also did yoga.    She would like to know what activities she can begin doing  "again.  Postpartum depression screen / [place]  depression scales score (above 10 OB/GYN to be notified of score):  8  Lochia:  Ceased  Birth control:  Other   She did get her period again for the first time yesterday. She has nexplanon for her birth control.  Sexual activity:  Has not resumed intercourse   Was waiting to get her birth control and then she got her period. She reports that she tends to be on the drier side.  Daily urination/voiding frequency (times per day):  5-8  Painful urination: No    Daily hydration:  <40 ounces   Water: 24 oz, Coffee: 8 oz, Body Armor: 16 oz, Milk: 4oz.  Bowel frequency/regularity:  Daily and every 2 days  [place] type:  4  Patient Goals:     Patient goals for therapy:  Decreased difficulties at school and home, decreased pain, improved bladder or bowel function, improved comfort, improved pain management, increased strength, return to work and return to sport/leisure activities      Objective       Abdominal Assessment:      Abdominal Assessment: Observation: decreased ventral rib excursion in a superior and lateral direction    Palpation: no abdominal tension or tenderness    Diastatis   Diastasis recti present? no  3\" above umbilicus (# fingers): 0.5  Umbilicus (# fingers): 0.5  3\" below umbilicus (# fingers): 0  Connective tissue integrity at linea alba: firm  no tenderness at linea alba  unable to engage transverse abdominis                 MMT    Hip       L       R   Flex. 4 4   Extn. 4 4   Abd. 3- 3-         Pelvic Motor Control:  Transverse abdominis = Poor bilaterally           Segmental mobility: TS=  Hypomobile from T3-T10 with pain    Palpation: increased tension and tenderness of bilateral upper trapezius, middle trapezius and levator scapulae muscles     Posture: forward head and rounded shoulders        MMT    Shoulder       L       R   Mid Trap 4- 4-   Low Trap 3+ 3+   Latissimus dorsi 3+ 3+     Urinary Distress Inventory (SUSAN = 6.4 pt change)  Date: " "1/20/25  Score: 6.94          Precautions: GERD, Iron deficiency anemia, Adjustment disorder with mixed anxiety and depressed mood, H/o gastric sleeve surgery 2023, Umbilical hernia from when she was born      Manuals 1/20            Assessment *PFM exam nv                                                   Neuro Re-Ed             TrA contraction 2x10x5\" with breath            Scapular retractions 1x8x3\"            Diaphragmatic breathing 5'                                                                Ther Ex                                                                                                                     Ther Activity             Pt education Eval findings, pelvic floor anatomy, exam technique, POC, HEP, water intake for breast feeding, current exercise recommendations for walking and planks, HEP 16' - JY                         Gait Training                                       Modalities                                            "

## 2025-01-21 ENCOUNTER — OFFICE VISIT (OUTPATIENT)
Dept: POSTPARTUM | Facility: CLINIC | Age: 30
End: 2025-01-21
Payer: COMMERCIAL

## 2025-01-21 PROCEDURE — 99401 PREV MED CNSL INDIV APPRX 15: CPT | Performed by: PEDIATRICS

## 2025-01-21 PROCEDURE — 99215 OFFICE O/P EST HI 40 MIN: CPT | Performed by: PEDIATRICS

## 2025-01-21 NOTE — PROGRESS NOTES
BREAST FEEDING FOLLOW UP VISIT    Informant/Relationship: Erika and Leon/mom and dad    Discussion of General Lactation Issues: Breastfeeding went well initially, but Chivo started having some difficulty attaching around 5-6 weeks. He would start latching then get frustrated and come off. Erika expressed breast milk, collected 3 oz, and he took that easily from the bottle. Chivo continued to act hungry, so Erika continued to express breast milk and feed him via bottle. She was still offering the breast, but he wouldn't latch for long.    She never had any pain. She did feel that he was transferring some milk, but he didn't want to stay.    Erika says he always seemed to take breaks, but at that 6 week tee, he started to seem less settled and wanted more milk.    Erika got the depo implant this week.   Chivo does some occasional kissing noises at the bottle, but not at the breast. He does not click. He occasionally spills a small amount, but not routinely.        Infant is 57 days old today.    Interval Breastfeeding History:    Frequency of breast feeding: offered at every feeding, attaches for 2-3 minutes, then fed by bottle  Does mother feel breastfeeding is effective: If no, explain: does suck and swallow but releases the breast quickly  Does infant appear satisfied after nursing:If no, explain: only feeds for a short time then takes the bottle  Stooling pattern normal:Yes  Urinating frequently:Yes  Using shield or shells:No    Alternative/Artificial Feedings:   Bottle: Yes, Evenflo; using paced bottle feeding  Cup: No  Syringe/Finger: No           Formula Type: Similac Sensitive                     Amount: 4 oz (to 6 oz)            Breast Milk:                      Amount: 4 (to 6 oz)            Frequency Q 2-3 Hr between feedings (about every 2 hours during the day and every 4 hours at night)  Elimination Problems: No      Equipment:  Nipple Shield             Type: n/a             Size: n/a              Frequency of Use: n/a  Pump            Type: Spectra S1            Frequency of Use: currently 3-4 times/day; collects about 4 oz per pump each time  Shells            Type: n/a            Frequency of use: n/a    Equipment Problems: no      Mom:  Breast: Normal  Nipple Assessment in General: Normal: elongated/eraser, no discoloration and no damage noted.  Mother's Awareness of Feeding Cues                 Recognizes: Yes                  Verbalizes: Yes  Support System: FOB, extended family  History of Breastfeeding: none  Changes/Stressors/Violence: Biggest concern was Chivo's weight gain, Erika is less concerned now that he is gaining well.  Concerns/Goals: Erika wishes to continue breastfeeding, but just knowing that he is gaining well    Problems with Mom: low milk production; infrequent effective milk removal    Physical Exam  Constitutional:       Appearance: Normal appearance. She is well-developed and normal weight.   HENT:      Head: Normocephalic and atraumatic.   Eyes:      Extraocular Movements: Extraocular movements intact.   Neck:      Thyroid: No thyromegaly.   Cardiovascular:      Rate and Rhythm: Normal rate and regular rhythm.      Pulses: Normal pulses.      Heart sounds: Normal heart sounds. No murmur heard.  Pulmonary:      Effort: Pulmonary effort is normal.      Breath sounds: Normal breath sounds.   Musculoskeletal:      Cervical back: Normal range of motion and neck supple.   Lymphadenopathy:      Cervical: No cervical adenopathy.      Upper Body:      Right upper body: No pectoral adenopathy.      Left upper body: No pectoral adenopathy.   Neurological:      General: No focal deficit present.      Mental Status: She is alert and oriented to person, place, and time.   Psychiatric:         Mood and Affect: Mood normal.         Behavior: Behavior normal.         Thought Content: Thought content normal.         Judgment: Judgment normal.   Vitals and nursing note reviewed.  "        Infant:  Behaviors: Alert and eventually showing late hunger signs  Color: Healthy  Birth weight: 3.055 kg  Current weight: 4.785 kg    Problems with infant: Tongue tied, thrush, torticollis      General Appearance:  Alert, active, no distress                             Head:  Normocephalic, AFOF, sutures opposed                             Eyes:  Conjunctiva clear, no drainage                              Ears:  Normally placed, no anomolies                             Nose:  Septum intact, no drainage or erythema                           Mouth:  Multiple white plaques on buccal mucosa, gums, and palate; tongue extends just over the mandibular ridge but not over, does not lateralize, and forms a \"bowl\" with crying; there is little attempt to suck the examiner's finger but no cupping and only tandem movement up and down with the jaw when trying; frenulum inserts to the tongue just 2 mm posterior to the tip and at the crest of the mandibular ridge; frenulum is thin and has some but not a lot of elasticity                    Neck:  Supple, symmetrical, trachea midline, no adenopathy; thyroid: no enlargement, symmetric, no tenderness/mass/nodules                 Respiratory:  No grunting, flaring, retractions, breath sounds clear and equal            Cardiovascular:  Regular rate and rhythm. No murmur. Adequate perfusion/capillary refill. Femoral pulse present                    Abdomen:   Soft, non-tender, no masses, bowel sounds present, no HSM             Genitourinary:  Normal male, testes descended, no discharge, swelling, or pain, anus patent                          Spine:   No abnormalities noted        Musculoskeletal:  Full range of motion          Skin/Hair/Nails:   Skin warm, dry, and intact, no rashes or abnormal dyspigmentation or lesions                Neurologic:   No abnormal movement, tone appropriate for gestational age    [place] Latch:  Efficiency:               Lips Flanged: Yes, on " bottle after frenotomy              Depth of latch: Very good on bottle after frenotomy              Audible Swallow: Yes, sustained SSB on the bottle following the frenotomy              Visible Milk: Yes, on the bottle after the frenotomy              Wide Open/ Asymmetrical: Yes, on the bottle after the frenotomy              Suck Swallow Cycle: Breathing: Unlabored, Coordinated: Yes  Nipple Assessment after latch: n/a; baby only offered the bottle today  Latch Problems: None on the bottle, after the frenotomy; Chivo attached well and quickly developed a sustained and comfortable SSB.    Position:  Infant's Ergonomics/Body               Body Alignment: Yes               Head Supported: Yes               Close to Mom's body/ Lifted/ Supported: Yes               Mom's Ergonomics/Body: Yes                           Supported: Yes                           Sitting Back: Yes                           Brings Baby to her breast: No, baby only fed by bottle today  Positioning Problems: None for paced bottle feeding        Education:  Reviewed Latch: Reviewed how to gently compress the breast as if offering a sandwich to facilitate a deeper latch.    Reviewed Positioning for Dyad: Reviewed how to bring baby to the breast so that his lower lip and chin touch the breast with his nose just above the nipple to encourage a wider, more asymmetric latch.   Reviewed Frequency/Supply & Demand: Recommended feeding on demand: when the baby gives hunger cues, when the breasts feel full, every 3 hours during the day and every 5 hours at night counting from the beginning of one feeding to the beginning of the next; whichever comes first.    Reviewed Alternative/Artificial Feedings: Paced bottle feeding  Reviewed Mom/Breast care: Express breast milk whenever Chivo is fed by bottle and as needed for comfort. Additional breast milk may be expressed as feels feasible to allow for an increase in production.  Reviewed Equipment:  Reviewed how to fit flanges for best for best comfort with pumping.       Plan:  Discussed history and physical exams with Noy. Support given for her commitment to providing breast milk for her baby. Discussed the findings on the baby's exam consistent with tongue tie and reviewed how this may be the cause of nipple trauma, nipple pain, nipple damage, poor milk transfer, blocked ducts, mastitis, and loss of milk production. Discussed the science that supports performing the frenotomy to improve latch.   Recommended continued feeding on demand, offering the breast as often as comfortable. Recommended continuing to express breast milk whenever Chivo is fed by bottle as feels possible. Discussed how more frequent milk removal can lead to increased milk production. Reviewed how to determine best fit for a flange and recommended suction for the flange for most comfortable milk expression. Additional lactation support remains available.     I have spent 60 minutes with Patient and family today in which greater than 50% of this time was spent in counseling/coordination of care regarding Prognosis, Risks and benefits of tx options, Instructions for management, Patient and family education, Importance of tx compliance, Risk factor reductions, Impressions, Counseling / Coordination of care, Documenting in the medical record, Reviewing / ordering tests, medicine, procedures  , and Obtaining or reviewing history  .

## 2025-01-21 NOTE — PATIENT INSTRUCTIONS
Offer the breast on demand, as you feel comfortable.    Express breast milk as often as you feel you ae able. The more often you express, the more milk you are likely to make, but do what feels comfortable.    Your flange should fit so that your nipple moves freely in the tunnel with little to no areola joining it. Use the lowest effective suction.

## 2025-01-27 ENCOUNTER — OFFICE VISIT (OUTPATIENT)
Dept: PHYSICAL THERAPY | Facility: REHABILITATION | Age: 30
End: 2025-01-27
Payer: COMMERCIAL

## 2025-01-27 DIAGNOSIS — R53.1 WEAKNESS: Primary | ICD-10-CM

## 2025-01-27 DIAGNOSIS — M54.9 UPPER BACK PAIN: ICD-10-CM

## 2025-01-27 DIAGNOSIS — M54.50 BILATERAL LOW BACK PAIN WITHOUT SCIATICA, UNSPECIFIED CHRONICITY: ICD-10-CM

## 2025-01-27 PROCEDURE — 97112 NEUROMUSCULAR REEDUCATION: CPT

## 2025-01-27 PROCEDURE — 97140 MANUAL THERAPY 1/> REGIONS: CPT

## 2025-01-27 PROCEDURE — 97110 THERAPEUTIC EXERCISES: CPT

## 2025-01-27 PROCEDURE — 97530 THERAPEUTIC ACTIVITIES: CPT

## 2025-01-27 NOTE — PROGRESS NOTES
Daily Note     Today's date: 2025  Patient name: Noy Palomo  : 1995  MRN: [id number]  Referring provider: Adilene Morgan MD  Dx:   Encounter Diagnosis     ICD-10-CM    1. Weakness  R53.1       2. Upper back pain  M54.9       3. Bilateral low back pain without sciatica, unspecified chronicity  M54.50       4. Postpartum exam  Z39.2           Start Time: 1007  Stop Time: 1107  Total time in clinic (min): 60 minutes    Subjective: Pt reports that her symptoms have been about the same. She did have sex since her last visit and did not experience any discomfort.      Objective: See treatment diary below    Abdominal Assessment:       Position: supine exam  Abdominal Assessment: Observation: normal ventral rib excursion with normal movement in a superior and lateral direction          General Perineum Exam:   perineum intact.   Positive for perianal erythema.      General perineum exam comments: Palpation: increased tension of layer 1 and 2 of PFM from 1-11 o'clock, L IC, R IC, L OI, and R OI. 3/10 pain with palpation of layer 1 of PFM from 1-11 o'clock, R IC, L IC, R OI and L OI. 2/10 pain with palpation of layer 2 of PFM from 3-9 o'clock        Visual Inspection of Perineum:   Excursion of perineal body in cephalad direction with contraction of pelvic floor muscles (PFM): weak and unable to isolate without substitution  Excursion of perineal body in caudal direction with relaxation of pelvic floor muscles (PFM): poor to fair   Involuntary contraction with coughing: no (slight bearing down)  Involuntary relaxation with bearing down: yes  Cotton swab test: 3/10 pain from 4-8 o'clock of labia minora (sharp)  Cough reflex: no cough reflex (slight bearing down)  Sphincter Tone Resting: weak  Sphincter Tone Squeeze: weak  Sensation: intact     Pelvic Organ Prolapse   Position: hook-lying  At rest: none  With bearing down: mild (>1cm from hymenal remnants )  Location: anterior         Pelvic Floor  "Muscle Exam:     Muscle Contraction: overflow and substitution   Breathing pattern with contraction: holding breath   Pelvic floor muscle relaxation is incomplete.   50% pelvic floor relaxation          PERFECT Score   Power right: 2-/5   Power left: 2-/5   Endurance (seconds to max): 4   Repetitions (before fatigue): 4        pelvic floor exam consent given by patient    Pelvic exam completed: vaginally     Access Code: [email or web address]  URL: [email or web address]  Date: 01/27/2025  Prepared by: Vanna Ashton    Exercises  - Supine Diaphragmatic Breathing  - 1 x daily - 7 x weekly - 10 minutes hold  - Seated Scapular Retraction  - 2 x daily - 7 x weekly - 2 sets - 10 reps - 5 seconds hold  - Supine Transversus Abdominis Bracing - Hands on Stomach  - 2 x daily - 7 x weekly - 2 sets - 10 reps - 5 seconds hold  - Supine Butterfly Groin Stretch  - 2 x daily - 7 x weekly - 3 reps - 1 minute hold  - Supine Pelvic Floor Stretch - Hands on Knees  - 2 x daily - 7 x weekly - 3 reps - 1 minute hold      Assessment: Tolerated treatment well. Patient would benefit from continued PT      Plan: Continue per plan of care.  Progress treatment as tolerated.       Precautions: GERD, Iron deficiency anemia, Adjustment disorder with mixed anxiety and depressed mood, H/o gastric sleeve surgery 2023, Umbilical hernia from when she was born      Manuals 1/20 1/27           Assessment *PFM exam nv PFM internal and external 15' - JY           Bilateral upper and middle trapezius STM  5' - JY                                     Neuro Re-Ed             TrA contraction 2x10x5\" with breath 2x10x5\" with breath           Scapular retractions 1x8x3\" 2x10x5\"           Diaphragmatic breathing 5' 10'                                                               Ther Ex             Supine butterfly stretch  3x1'           Happy Baby Stretch  3x1'                                                                                         Ther " Activity             Pt education Eval findings, pelvic floor anatomy, exam technique, POC, HEP, water intake for breast feeding, current exercise recommendations for walking and planks, HEP 16' - JY Eval findings, breathing to reduce IAP, posture while feeding baby, HEP 8' - JY                        Gait Training                                       Modalities

## 2025-01-30 ENCOUNTER — TELEPHONE (OUTPATIENT)
Age: 30
End: 2025-01-30

## 2025-01-30 NOTE — TELEPHONE ENCOUNTER
The pt contacted the office, requesting to schedule a follow up appt. The writer transferred the call to the TA dept for assistance.

## 2025-02-03 ENCOUNTER — APPOINTMENT (OUTPATIENT)
Dept: PHYSICAL THERAPY | Facility: REHABILITATION | Age: 30
End: 2025-02-03
Payer: COMMERCIAL

## 2025-02-04 ENCOUNTER — TELEMEDICINE (OUTPATIENT)
Dept: PSYCHIATRY | Facility: CLINIC | Age: 30
End: 2025-02-04
Payer: COMMERCIAL

## 2025-02-04 DIAGNOSIS — F41.9 ANXIETY DISORDER, UNSPECIFIED TYPE: Primary | ICD-10-CM

## 2025-02-04 PROCEDURE — 90837 PSYTX W PT 60 MINUTES: CPT | Performed by: SOCIAL WORKER

## 2025-02-04 NOTE — PSYCH
"Behavioral Health Psychotherapy Progress Note    Psychotherapy Provided: Individual Psychotherapy     No diagnosis found.    Goals addressed in session: Goal 1     DATA: Clinician explored client's current stressors-- discussed the recent birth of her 10 week old baby. Clinician allowed a safe place for ventilation, worry, discussed grief-- the stages of bargaining, anger, acceptance, sadness, and denial. Clinician encouraged client to see the beauty and perfection in her baby, the preciousness. Clinician and client identified her support system-- learning sign language, benefits of being trilingual, and the resources that are available to her and her family.   During this session, this clinician used the following therapeutic modalities: Client-centered Therapy, Cognitive Behavioral Therapy, Solution-Focused Therapy, and Supportive Psychotherapy bereavement therapy    Substance Abuse was not addressed during this session. If the client is diagnosed with a co-occurring substance use disorder, please indicate any changes in the frequency or amount of use: NA. Stage of change for addressing substance use diagnoses: No substance use/Not applicable    ASSESSMENT:  Erika Palomo presents with a Euthymic/ normal mood.     her affect is Normal range and intensity, which is congruent, with her mood and the content of the session. The client has made progress on their goals.     Erika Palomo presents with a none risk of suicide, none risk of self-harm, and none risk of harm to others.    For any risk assessment that surpasses a \"low\" rating, a safety plan must be developed.    A safety plan was indicated: no  If yes, describe in detail NA    PLAN: Between sessions, Erika Palomo will continue to feel her grief. At the next session, the therapist will use Client-centered Therapy to address ongoing stressors.    Behavioral Health Treatment Plan and Discharge Planning: Erika Palomo is aware of and agrees to continue to " Spoke with the patient and this was NOT to come to Dr. Magana     work on their treatment plan. They have identified and are working toward their discharge goals. yes    Depression Follow-up Plan Completed: Not applicable    Visit start and stop times:    02/04/25  Start Time: 1005  Stop Time: 1114  Total Visit Time: 69 minutes

## 2025-02-05 ENCOUNTER — OFFICE VISIT (OUTPATIENT)
Dept: PHYSICAL THERAPY | Facility: REHABILITATION | Age: 30
End: 2025-02-05
Payer: COMMERCIAL

## 2025-02-05 DIAGNOSIS — M54.9 UPPER BACK PAIN: ICD-10-CM

## 2025-02-05 DIAGNOSIS — M54.50 BILATERAL LOW BACK PAIN WITHOUT SCIATICA, UNSPECIFIED CHRONICITY: ICD-10-CM

## 2025-02-05 DIAGNOSIS — R53.1 WEAKNESS: Primary | ICD-10-CM

## 2025-02-05 PROCEDURE — 97140 MANUAL THERAPY 1/> REGIONS: CPT

## 2025-02-05 PROCEDURE — 97110 THERAPEUTIC EXERCISES: CPT

## 2025-02-05 NOTE — PROGRESS NOTES
"Daily Note     Today's date: 2025  Patient name: Noy Palomo  : 1995  MRN: [id number]  Referring provider: Adilene Morgan MD  Dx:   Encounter Diagnosis     ICD-10-CM    1. Weakness  R53.1       2. Upper back pain  M54.9       3. Bilateral low back pain without sciatica, unspecified chronicity  M54.50       4. Postpartum exam  Z39.2             Start Time: 1707  Stop Time: 1802  Total time in clinic (min): 55 minutes  ~Pt arrived 7 minutes late but was accommodated.      Subjective: Pt reports that her back pain has been about the same. She has been able to stop her urine flow a little better than she was able to previously when she tried.      Objective: See treatment diary below          Access Code: [email or web address]  URL: [email or web address]  Date: 2025  Prepared by: Vanna Ashton    Exercises  - Supine Diaphragmatic Breathing  - 1 x daily - 7 x weekly - 10 minutes hold  - Seated Scapular Retraction  - 2 x daily - 7 x weekly - 2 sets - 10 reps - 5 seconds hold  - Supine Transversus Abdominis Bracing - Hands on Stomach  - 2 x daily - 7 x weekly - 2 sets - 10 reps - 5 seconds hold  - Supine Butterfly Groin Stretch  - 2 x daily - 7 x weekly - 3 reps - 1 minute hold  - Supine Pelvic Floor Stretch - Hands on Knees  - 2 x daily - 7 x weekly - 3 reps - 1 minute hold      Assessment: Tolerated treatment well. Patient would benefit from continued PT      Plan: Continue per plan of care.  Progress treatment as tolerated.       Precautions: GERD, Iron deficiency anemia, Adjustment disorder with mixed anxiety and depressed mood, H/o gastric sleeve surgery , Umbilical hernia from when she was born      Manuals           Assessment *PFM exam nv PFM internal and external 15' - JY           Bilateral upper and middle trapezius STM  5' - JY 10' - JY          PFM STM/MFR                          Neuro Re-Ed             TrA contraction 2x10x5\" with breath 2x10x5\" with breath    " "       Scapular retractions 1x8x3\" 2x10x5\" 2x10x5\"          Diaphragmatic breathing 5' 10'                                                               Ther Ex             Supine butterfly stretch  3x1' 3x1'          Happy Baby Stretch  3x1' 3x1'          TB rows   2x10 GTB          TB extension             Cervical spine retraction   Seated 2x10x5\"          Levator scapulae stretch   Seated 3x30\" ea          Bridges   10x with breath/TrA, 10x with PPT/breath/TrA                        Ther Activity             Pt education Eval findings, pelvic floor anatomy, exam technique, POC, HEP, water intake for breast feeding, current exercise recommendations for walking and planks, HEP 16' - JY Eval findings, breathing to reduce IAP, posture while feeding baby, HEP 8' - JY Deficits that could be leading to a feeling of muscle \"tightness,\" safe exercises to begin performing, HEP 8' - JY                       Gait Training                                       Modalities                                            "

## 2025-02-10 ENCOUNTER — OFFICE VISIT (OUTPATIENT)
Dept: PHYSICAL THERAPY | Facility: REHABILITATION | Age: 30
End: 2025-02-10
Payer: COMMERCIAL

## 2025-02-10 DIAGNOSIS — M54.50 BILATERAL LOW BACK PAIN WITHOUT SCIATICA, UNSPECIFIED CHRONICITY: ICD-10-CM

## 2025-02-10 DIAGNOSIS — M54.9 UPPER BACK PAIN: ICD-10-CM

## 2025-02-10 DIAGNOSIS — R53.1 WEAKNESS: Primary | ICD-10-CM

## 2025-02-10 PROCEDURE — 97110 THERAPEUTIC EXERCISES: CPT

## 2025-02-10 PROCEDURE — 97112 NEUROMUSCULAR REEDUCATION: CPT

## 2025-02-10 PROCEDURE — 97140 MANUAL THERAPY 1/> REGIONS: CPT

## 2025-02-10 NOTE — PROGRESS NOTES
Daily Note     Today's date: 2/10/2025  Patient name: Noy Palomo  : 1995  MRN: [id number]  Referring provider: Adilene Morgan MD  Dx:   Encounter Diagnosis     ICD-10-CM    1. Weakness  R53.1       2. Upper back pain  M54.9       3. Bilateral low back pain without sciatica, unspecified chronicity  M54.50       4. Postpartum exam  Z39.2               Start Time: 1004  Stop Time: 1104  Total time in clinic (min): 60 minutes      Subjective: She reports that she is not able to do all of her exercises all the time, but she has been doing them when she can. Her stretches have really seemed to improve her neck and upper back pain.    She also reports that her lower back pain has been a little bit less as well.      Objective: See treatment diary below          Access Code: [email or web address]  URL: [email or web address]  Date: 02/10/2025  Prepared by: Vanna Ashton    Exercises  - Supine Diaphragmatic Breathing  - 1 x daily - 7 x weekly - 10 minutes hold  - Supine Transversus Abdominis Bracing - Hands on Stomach  - 2 x daily - 7 x weekly - 2 sets - 10 reps - 5 seconds hold  - Supine Butterfly Groin Stretch  - 2 x daily - 7 x weekly - 3 reps - 1 minute hold  - Supine Pelvic Floor Stretch - Hands on Knees  - 2 x daily - 7 x weekly - 3 reps - 1 minute hold  - Seated Levator Scapulae Stretch  - 2-3 x daily - 7 x weekly - 3 reps - 30 seconds hold  - Standing Shoulder Row with Anchored Resistance  - 1 x daily - 7 x weekly - 2 sets - 10 reps  - Shoulder extension with resistance - Neutral  - 1 x daily - 7 x weekly - 2 sets - 10 reps      Assessment: Tolerated treatment well. Patient would benefit from continued PT      Plan: Continue per plan of care.  Progress treatment as tolerated.       Precautions: GERD, Iron deficiency anemia, Adjustment disorder with mixed anxiety and depressed mood, H/o gastric sleeve surgery , Umbilical hernia from when she was born      Manuals 1/20 1/27 2/5 2/10        "  Assessment *PFM exam nv PFM internal and external 15' - JY           Bilateral upper and middle trapezius STM  5' - JY 10' - JY 10' - JY         PFM STM/MFR                          Neuro Re-Ed             TrA contraction 2x10x5\" with breath 2x10x5\" with breath  Quadruped 2x10x5\" with breath         Scapular retractions 1x8x3\" 2x10x5\" 2x10x5\"          Diaphragmatic breathing 5' 10'           No moneys    2x10 BTB                                                Ther Ex             Supine butterfly stretch  3x1' 3x1' 3x1'         Happy Baby Stretch  3x1' 3x1' 3x1'         TB rows   2x10 GTB 2x10 BTB         TB extension    2x10 BTB         Cervical spine retraction   Seated 2x10x5\" Seated 2x10x5\"         Levator scapulae stretch   Seated 3x30\" ea Seated 3x30\" ea         Bridges   10x with breath/TrA, 10x with PPT/breath/TrA  2x10 with PPT/breath/TrA          Clamshells    2x10 BTB loop         Ther Activity             Pt education Eval findings, pelvic floor anatomy, exam technique, POC, HEP, water intake for breast feeding, current exercise recommendations for walking and planks, HEP 16' - JY Eval findings, breathing to reduce IAP, posture while feeding baby, HEP 8' - JY Deficits that could be leading to a feeling of muscle \"tightness,\" safe exercises to begin performing, HEP 8' - JY HEP - JY                      Gait Training                                       Modalities                                            "

## 2025-02-11 ENCOUNTER — TELEMEDICINE (OUTPATIENT)
Dept: PSYCHIATRY | Facility: CLINIC | Age: 30
End: 2025-02-11
Payer: COMMERCIAL

## 2025-02-11 DIAGNOSIS — F41.9 ANXIETY DISORDER, UNSPECIFIED TYPE: Primary | ICD-10-CM

## 2025-02-11 PROCEDURE — 90847 FAMILY PSYTX W/PT 50 MIN: CPT | Performed by: SOCIAL WORKER

## 2025-02-11 NOTE — PSYCH
"Behavioral Health Psychotherapy Progress Note    Psychotherapy Provided: Family Therapy    No diagnosis found.    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 Clinician and clients discussed the feelings regarding the inlaws-- having their support and love, inviting them over, offering an open door policy and cultivating the relationship. During this session, this clinician used the following therapeutic modalities: Bereavement Therapy, Family Therapy, Solution-Focused Therapy, and Supportive Psychotherapy    Substance Abuse was not addressed during this session. If the client is diagnosed with a co-occurring substance use disorder, please indicate any changes in the frequency or amount of use: NA. Stage of change for addressing substance use diagnoses: No substance use/Not applicable    ASSESSMENT:  Erika Palomo presents with a Euthymic/ normal mood.     her affect is Normal range and intensity, which is congruent, with her mood and the content of the session. The client has made progress on their goals.     Erika Palomo presents with a none risk of suicide, none risk of self-harm, and none risk of harm to others.    For any risk assessment that surpasses a \"low\" rating, a safety plan must be developed.    A safety plan was indicated: no  If yes, describe in detail NA    PLAN: Between sessions, Erika Palomo will continue to attempt to cultivate the relationship with her inlaws. At the next session, the therapist will use Client-centered Therapy to address ongoing stressors.    Behavioral Health Treatment Plan and Discharge Planning: Erika Palomo is aware of and agrees to continue to work on their treatment plan. They have identified and are working toward their discharge goals. yes    Depression Follow-up Plan Completed: " Not applicable    Visit start and stop times:    02/11/25  Start Time: 1700  Stop Time: 1804  Total Visit Time: 64 minutes

## 2025-02-17 ENCOUNTER — APPOINTMENT (OUTPATIENT)
Dept: PHYSICAL THERAPY | Facility: REHABILITATION | Age: 30
End: 2025-02-17
Payer: COMMERCIAL

## 2025-02-24 ENCOUNTER — TELEPHONE (OUTPATIENT)
Dept: PHYSICAL THERAPY | Facility: REHABILITATION | Age: 30
End: 2025-02-24

## 2025-02-24 NOTE — TELEPHONE ENCOUNTER
Pt no showed her appt today. Called to check in with her. Left a message requesting that the pt call back to confirm her appointment. Reminded her of the attendance policy.

## 2025-02-27 ENCOUNTER — TELEMEDICINE (OUTPATIENT)
Dept: PSYCHIATRY | Facility: CLINIC | Age: 30
End: 2025-02-27
Payer: COMMERCIAL

## 2025-02-27 DIAGNOSIS — F41.9 ANXIETY DISORDER, UNSPECIFIED TYPE: Primary | ICD-10-CM

## 2025-02-27 PROCEDURE — 90837 PSYTX W PT 60 MINUTES: CPT | Performed by: SOCIAL WORKER

## 2025-02-27 NOTE — BH TREATMENT PLAN
Outpatient Behavioral Health Psychotherapy Treatment Plan    Erika Palomo  1995     Date of Initial Psychotherapy Assessment: 11/14/2022   Date of Current Treatment Plan: 2/27/25  Treatment Plan Target Date: 2/27/26  Treatment Plan Expiration Date: 8/27/25    Diagnosis:   1. Anxiety disorder, unspecified type                  Area(s) of Need:  Right now I am having a lot of anxiety about the baby..   UPDATE 2/27/25 continuing to put my feelings aside, to make sure that I give Chivo what he needs. I dont want to shut down... I get overwhelmed with my thoughts, and the appts.. calling specialists and making sure Leon is coping as well..  he gets very quiet.. and he is shorthe shuts me out..   Long Term Goal 1 (in the client's own words): I want to better my relationship with my father COMPLETE 2/27/25  Stage of Change: Maintenance    Target Date for completion: 5/23/24     Anticipated therapeutic modalities: compassion focused;  solution focused; CBT; Emotion focused;      People identified to complete this goal: Client and clinician       Objective 1: (identify the means of measuring success in meeting the objective):    Client will explore the complexities of her relationship wit hehr father; client will identify at least 3 feelings she feels toward her father; client will identify the type of relationship that she wants to achieve with her father--will align with her intentions. Client will practice expressing her feelings to her father 5/5x   Objective 2: (identify the means of measuring success in meeting the objective): NA      Long Term Goal 2 (in the client's own words): I want to make sure I am putting my feelings aside and meeting the needs of my baby.     Stage of Change: Action    Target Date for completion: 5/23/24     Anticipated therapeutic modalities: solution focused; collaborative; mindfulness, strengths based;      People identified to complete this goal: Client and clinician        Objective 1: (identify the means of measuring success in meeting the objective): Client will continue to challenge her negative feelings-- assigning each feeling with a need. Client will continue to utilize her coping skills-- her resources, and her support system. Client will continue asking more questions, doing research, and relying on medicine as well as her intuition. Client will continue to maintain self care-- water, food, sunlight. Client will construct a routine to include her work, gym, hobbies, her spiritual commitments, time with her , and time with her family.       Objective 2: (identify the means of measuring success in meeting the objective): NA     Long Term Goal 3 (in the client's own words): I want to accept the infidelity as a whole.. I dont want to reopen it or relive it. Complete-- 6/3/24    I want to decrease my anxiety regarding the baby and the pregnancy.     Stage of Change: Action    Target Date for completion: 5/23/2024     Anticipated therapeutic modalities: breathwork; somatic therapy; trauma focused; attachment based      People identified to complete this goal: Client and clinician       Objective 1: (identify the means of measuring success in meeting the objective): Client will practice challenging worst case scenarios 5/5x; client will practice puttig her nelson in God-- trusting His plan for her and her family; client will exercise advocating for her preferences as the mother 5/5x; client will practice validating her emotions, decision making regarding her new baby at least once a week. Client will continue to utilize her support system for acts of service, encouragment, and emotional support.       Objective 2: (identify the means of measuring success in meeting the objective): NA     I am currently under the care of a [facility] psychiatric provider: no    My [facility] psychiatric provider is: NA    I am currently taking psychiatric medications: No    I feel that I  will be ready for discharge from mental health care when I reach the following : When I can manage my anxiety regarding my baby.     For children and adults who have a legal guardian:   Has there been any change to custody orders and/or guardianship status? NA. If yes, attach updated documentation.    I have created my Crisis Plan and have been offered a copy of this plan    Behavioral Health Treatment Plan [facility]: Diagnosis and Treatment Plan explained to Erika Palomo acknowledges an understanding of their diagnosis. Erika Palomo agrees to this treatment plan.    I have been offered a copy of this Treatment Plan. yes

## 2025-03-03 ENCOUNTER — OFFICE VISIT (OUTPATIENT)
Dept: PHYSICAL THERAPY | Facility: REHABILITATION | Age: 30
End: 2025-03-03
Payer: COMMERCIAL

## 2025-03-03 DIAGNOSIS — M54.9 UPPER BACK PAIN: ICD-10-CM

## 2025-03-03 DIAGNOSIS — R53.1 WEAKNESS: Primary | ICD-10-CM

## 2025-03-03 DIAGNOSIS — M54.50 BILATERAL LOW BACK PAIN WITHOUT SCIATICA, UNSPECIFIED CHRONICITY: ICD-10-CM

## 2025-03-03 PROCEDURE — 97112 NEUROMUSCULAR REEDUCATION: CPT

## 2025-03-03 PROCEDURE — 97110 THERAPEUTIC EXERCISES: CPT

## 2025-03-03 PROCEDURE — 97140 MANUAL THERAPY 1/> REGIONS: CPT

## 2025-03-03 NOTE — PROGRESS NOTES
Daily Note / PT Re-Evaluation     Today's date: 3/3/2025  Patient name: Noy Palomo  : 1995  MRN: [id number]  Referring provider: Adilene Morgan MD  Dx:   Encounter Diagnosis     ICD-10-CM    1. Weakness  R53.1       2. Upper back pain  M54.9       3. Bilateral low back pain without sciatica, unspecified chronicity  M54.50       4. Postpartum exam  Z39.2           Start Time: 1715  Stop Time: 1804  Total time in clinic (min): 49 minutes  ~Pt arrived 15 minutes late, but was accommodated.      Assessment/Plan    Goals  Short term goals: to be met in 3-4 weeks  Pt independent with initial HEP, rationale, technique and frequency, for ROM and pain control.  Pt will report at least a 25% reduction in subjective pain complaints/symptoms to better manage ADLs and household chores.   Pt will be able to effectively isolate and recruit the TrA without Valsalva or global abdominal contraction to improve lumbopelvic stability.  Pt will manage 10-15 minutes of continuous activity for general conditioning and endorphin release for pain management using bike or treadmill.  Pt presents with good understanding of pelvic floor protective strategies to reduce intra-abdominal pressure against pelvic organs.    Long term goals: to be met by discharge  Pt will report a 75% or > reduction in subjective pain complaints/symptoms to better manage ADLs and household chores.  Improve B hip abduction MMT to a 4/5 or greater for improved lumbopelvic stability.  Improve B scapular stabilizer MMTs to a 4/5 or greater for improved scapular stability.  Pt will be able to perform ADLs, iADLS, and work duties without pain or restriction indicating return to PLOF.  Pt independent with rationale, technique and plan for performance of advanced HEP to ensure independent self-management of symptoms upon discharge.    Plan  ()      Subjective  80%    She reports that she can hold her pee longer and she feels like her pelvic floor  "strength is slowly improving.    She reports that she feels like her stamina and strength have improved since starting physical therapy. She still feels like she has a little bit of weakness and decreased endurance. She just started work two weeks ago. 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    She still has some pain between her shoulders in her upper back, but when she does her exercises it helps a lot. She feels like her muscles tighten a lot more when she sits up straight. She no longer has pain when she is feeding her baby. Pain - Current:0/10, Best: 0/10, Worst: 3/10 (Only with lifting a pt at work). Tightness and sometimes radiates into her neck a little.    She no longer has low back pain when she sits for long periods of time. If she cracks her back this usually improves her pain. Pain Current:0/10, Best: 0/10, Worst: 0/10. Achey.    Pt reports that she did begin to go to therapy again a week ago because there has been a lot going on in her life.    Postpartum depression screen / Gleason  depression scales score (above 10 OB/GYN to be notified of score):  11  Sexual activity:  She has not had any junior with intercourse  Daily hydration:  <40 ounces   Water: 24 oz, Coffee: 8 oz, Body Armor: 16 oz, Milk: 4oz.    Pt deferred pelvic floor exam today due to having her period.      Objective    Abdominal Assessment: Observation: decreased ventral rib excursion in a superior and lateral direction    Diastatis   3\" above umbilicus (# fingers): 0.25  Umbilicus (# fingers): 0.5  3\" below umbilicus (# fingers): 0  unable to engage transverse abdominis                 MMT    Hip       L       R   Flex. 5 5   Extn. 4 4   Abd. 3- 3-         Pelvic Motor Control:  Transverse abdominis = Poor to fair bilaterally           Segmental mobility: TS=  Hypomobile from T3-T7 with pain    Palpation: " "increased tension and tenderness of R upper trapezius, middle trapezius and levator scapulae muscles     Posture: forward head and rounded shoulders        MMT    Shoulder       L       R   Mid Trap 4- 4+   Low Trap 4- 4-   Latissimus dorsi 4 4           Urinary Distress Inventory (SUSAN = 6.4 pt change)  Date: 1/20/25  Score: 6.94     Access Code: [email or web address]  URL: [email or web address]  Date: 03/03/2025  Prepared by: Vanna Ashton    Exercises  - Supine Diaphragmatic Breathing  - 1 x daily - 7 x weekly - 10 minutes hold  - Supine Transversus Abdominis Bracing - Hands on Stomach  - 2 x daily - 7 x weekly - 2 sets - 10 reps - 5 seconds hold  - Supine Butterfly Groin Stretch  - 2 x daily - 7 x weekly - 3 reps - 1 minute hold  - Supine Pelvic Floor Stretch - Hands on Knees  - 2 x daily - 7 x weekly - 3 reps - 1 minute hold  - Standing Shoulder Row with Anchored Resistance  - 1 x daily - 7 x weekly - 2 sets - 10 reps  - Shoulder extension with resistance - Neutral  - 1 x daily - 7 x weekly - 2 sets - 10 reps  - Clamshell with Resistance  - 1 x daily - 7 x weekly - 2 sets - 10 reps       Precautions: GERD, Iron deficiency anemia, Adjustment disorder with mixed anxiety and depressed mood, H/o gastric sleeve surgery 2023, Umbilical hernia from when she was born      Manuals 1/20 1/27 2/5 2/10 3/3        Assessment *PFM exam nv PFM internal and external 15' - JY   RE-JY        Bilateral upper and middle trapezius STM  5' - JY 10' - JY 10' - JY         PFM STM/MFR                          Neuro Re-Ed             TrA contraction 2x10x5\" with breath 2x10x5\" with breath  Quadruped 2x10x5\" with breath Quadruped 2x10x5\" with breath        Scapular retractions 1x8x3\" 2x10x5\" 2x10x5\"  2x10x5\"        Diaphragmatic breathing 5' 10'           No moneys    2x10 BTB                                                Ther Ex             Supine butterfly stretch  3x1' 3x1' 3x1'         Happy Baby Stretch  3x1' 3x1' 3x1'         TB " "rows   2x10 GTB 2x10 BTB         TB extension    2x10 BTB         Cervical spine retraction   Seated 2x10x5\" Seated 2x10x5\"         Levator scapulae stretch   Seated 3x30\" ea Seated 3x30\" ea         Bridges   10x with breath/TrA, 10x with PPT/breath/TrA  2x10 with PPT/breath/TrA  With hip abduction 2x10 with breath/TrA GTB loop        Clamshells    2x10 BTB loop Side lying 2x10 ea GTB loop        Ther Activity             Pt education Eval findings, pelvic floor anatomy, exam technique, POC, HEP, water intake for breast feeding, current exercise recommendations for walking and planks, HEP 16' - JY Eval findings, breathing to reduce IAP, posture while feeding baby, HEP 8' - JY Deficits that could be leading to a feeling of muscle \"tightness,\" safe exercises to begin performing, HEP 8' - JY HEP - JY HEP - JY                     Gait Training                                       Modalities                                            "

## 2025-03-03 NOTE — PSYCH
"Behavioral Health Psychotherapy Progress Note    Psychotherapy Provided: Individual Psychotherapy     1. Anxiety disorder, unspecified type            Goals addressed in session: Goal 1     DATA: Clinician explored client's current stressors--discussed her adjusting to her babies needs but also managing the personalities of her in laws-- as they invite them and they choose not to come. Clinician affirmed client's efforts of maintaining the relationships within her family.   During this session, this clinician used the following therapeutic modalities: Client-centered Therapy, Cognitive Behavioral Therapy, Solution-Focused Therapy, and Supportive Psychotherapy    Substance Abuse was not addressed during this session. If the client is diagnosed with a co-occurring substance use disorder, please indicate any changes in the frequency or amount of use: NA. Stage of change for addressing substance use diagnoses: No substance use/Not applicable    ASSESSMENT:  Erika Palomo presents with a Euthymic/ normal mood.     her affect is Normal range and intensity, which is congruent, with her mood and the content of the session. The client has made progress on their goals.     Erika Palomo presents with a none risk of suicide, none risk of self-harm, and none risk of harm to others.    For any risk assessment that surpasses a \"low\" rating, a safety plan must be developed.    A safety plan was indicated: no  If yes, describe in detail NA    PLAN: Between sessions, Erika Palomo will continue to utilize her coping skills. At the next session, the therapist will use Client-centered Therapy to address ongoing stressors.    Behavioral Health Treatment Plan and Discharge Planning: Erika Palomo is aware of and agrees to continue to work on their treatment plan. They have identified and are working toward their discharge goals. yes    Depression Follow-up Plan Completed: Not applicable    Visit start and stop " times:    02/27/25  Start Time: 1010  Stop Time: 1113  Total Visit Time: 63 minutes

## 2025-03-10 ENCOUNTER — TELEMEDICINE (OUTPATIENT)
Dept: PSYCHIATRY | Facility: CLINIC | Age: 30
End: 2025-03-10
Payer: COMMERCIAL

## 2025-03-10 ENCOUNTER — APPOINTMENT (OUTPATIENT)
Dept: PHYSICAL THERAPY | Facility: REHABILITATION | Age: 30
End: 2025-03-10
Payer: COMMERCIAL

## 2025-03-10 DIAGNOSIS — F41.9 ANXIETY DISORDER, UNSPECIFIED TYPE: Primary | ICD-10-CM

## 2025-03-10 PROCEDURE — 90834 PSYTX W PT 45 MINUTES: CPT | Performed by: SOCIAL WORKER

## 2025-03-10 NOTE — PSYCH
"Behavioral Health Psychotherapy Progress Note    Psychotherapy Provided: Individual Psychotherapy     No diagnosis found.    Goals addressed in session: Goal 1     DATA: Clinician explored client's current stressors-- discussed the difficulty for them to accept their son's needs. Clinician assisted client in recognizing the bargaining phase as well as how overwhelming it can be for a group of people to be grateful and accepting of their child verses another side grieving the loss of what they expected of their child. During this session, this clinician used the following therapeutic modalities: Bereavement Therapy, Client-centered Therapy, Cognitive Behavioral Therapy, Solution-Focused Therapy, and Supportive Psychotherapy    Substance Abuse was not addressed during this session. If the client is diagnosed with a co-occurring substance use disorder, please indicate any changes in the frequency or amount of use: NA. Stage of change for addressing substance use diagnoses: No substance use/Not applicable    ASSESSMENT:  Erika Palomo presents with a Euthymic/ normal and Dysthymic mood.     her affect is Normal range and intensity and Tearful, which is congruent, with her mood and the content of the session. The client has made progress on their goals.     Erika Palomo presents with a none risk of suicide, none risk of self-harm, and none risk of harm to others.    For any risk assessment that surpasses a \"low\" rating, a safety plan must be developed.    A safety plan was indicated: no  If yes, describe in detail NA    PLAN: Between sessions, Erika Palomo will continue to utilize her coping skills. At the next session, the therapist will use Client-centered Therapy to address ongoing stressors.    Behavioral Health Treatment Plan and Discharge Planning: Erika Palomo is aware of and agrees to continue to work on their treatment plan. They have identified and are working toward their discharge goals. " yes    Depression Follow-up Plan Completed: Not applicable    Visit start and stop times:    03/10/25  Start Time: 0815  Stop Time: 0859  Total Visit Time: 44 minutes

## 2025-03-17 ENCOUNTER — TELEMEDICINE (OUTPATIENT)
Dept: PSYCHIATRY | Facility: CLINIC | Age: 30
End: 2025-03-17
Payer: COMMERCIAL

## 2025-03-17 ENCOUNTER — OFFICE VISIT (OUTPATIENT)
Dept: PHYSICAL THERAPY | Facility: REHABILITATION | Age: 30
End: 2025-03-17
Payer: COMMERCIAL

## 2025-03-17 DIAGNOSIS — M54.50 BILATERAL LOW BACK PAIN WITHOUT SCIATICA, UNSPECIFIED CHRONICITY: ICD-10-CM

## 2025-03-17 DIAGNOSIS — R53.1 WEAKNESS: Primary | ICD-10-CM

## 2025-03-17 DIAGNOSIS — M54.9 UPPER BACK PAIN: ICD-10-CM

## 2025-03-17 DIAGNOSIS — F41.9 ANXIETY DISORDER, UNSPECIFIED TYPE: Primary | ICD-10-CM

## 2025-03-17 PROCEDURE — 97110 THERAPEUTIC EXERCISES: CPT

## 2025-03-17 PROCEDURE — 97140 MANUAL THERAPY 1/> REGIONS: CPT

## 2025-03-17 PROCEDURE — 90837 PSYTX W PT 60 MINUTES: CPT | Performed by: SOCIAL WORKER

## 2025-03-17 NOTE — PROGRESS NOTES
Daily Note     Today's date: 3/17/2025  Patient name: Noy Palomo  : 1995  MRN: [id number]  Referring provider: Adilene Morgan MD  Dx:   Encounter Diagnosis     ICD-10-CM    1. Weakness  R53.1       2. Upper back pain  M54.9       3. Bilateral low back pain without sciatica, unspecified chronicity  M54.50       4. Postpartum exam  Z39.2           Start Time: 1803  Stop Time: 190  Total time in clinic (min): 59 minutes    Subjective: Pt reports that she has been more stressed lately, so she has more pain in her upper back lately. Her lower back has been okay. She reports that her urinary symptoms have improved a little as well, and she is able to defer her urge for a long period of time.    She has not really been doing many of her exercises and has only been performing her levator scapulae stretch.      Objective: See treatment diary below      General Perineum Exam:   perineum intact.   Positive for perianal erythema.      General perineum exam comments: Palpation: increased tension of layer 1 and 2 of PFM from 1-11 o'clock, L IC, R IC, L OI, and R OI. 2/10 pain with palpation of layer 1 of PFM from at 2 o'clock. 4/10 pain with palpation of layer 2 of PFM at 2 o'clock        Visual Inspection of Perineum:   Excursion of perineal body in cephalad direction with contraction of pelvic floor muscles (PFM): weak and unable to isolate without substitution  Excursion of perineal body in caudal direction with relaxation of pelvic floor muscles (PFM): poor to fair   Involuntary contraction with coughing: no (slight bearing down)  Involuntary relaxation with bearing down: yes  Cough reflex: no cough reflex (slight bearing down)  Sphincter Tone Resting: weak to fair  Sphincter Tone Squeeze: weak to fair     Pelvic Organ Prolapse   Position: hook-lying  At rest: none  With bearing down: mild (>1cm from hymenal remnants )  Location: anterior         Pelvic Floor Muscle Exam:     Muscle Contraction:  "overflow and substitution   Breathing pattern with contraction: holding breath   Pelvic floor muscle relaxation is incomplete.   65% pelvic floor relaxation          PERFECT Score   Power right: 2/5   Power left: 2/5   Endurance (seconds to max): 5  Repetitions (before fatigue): 6        pelvic floor exam consent given by patient    Pelvic exam completed: vaginally       Assessment: Tolerated treatment well. Patient would benefit from continued PT      Plan: Continue per plan of care.  Progress treatment as tolerated.       Precautions: GERD, Iron deficiency anemia, Adjustment disorder with mixed anxiety and depressed mood, H/o gastric sleeve surgery 2023, Umbilical hernia from when she was born      Manuals 1/20 1/27 2/5 2/10 3/3 3/17       Assessment *PFM exam nv PFM internal and external 15' - JY   RE-JY 15' - JY       Bilateral upper and middle trapezius STM  5' - JY 10' - JY 10' - JY  9' - JY       PFM STM/MFR      10' - JY                    Neuro Re-Ed             TrA contraction 2x10x5\" with breath 2x10x5\" with breath  Quadruped 2x10x5\" with breath Quadruped 2x10x5\" with breath        Scapular retractions 1x8x3\" 2x10x5\" 2x10x5\"  2x10x5\"        Diaphragmatic breathing 5' 10'           No moneys    2x10 BTB         Supine marches             Quadruped hip extensions                          Ther Ex             Supine butterfly stretch  3x1' 3x1' 3x1'         Happy Baby Stretch  3x1' 3x1' 3x1'  3x1'       TB rows   2x10 GTB 2x10 BTB  2x10 BTB       TB extension    2x10 BTB  2x10 BTB       Cervical spine retraction   Seated 2x10x5\" Seated 2x10x5\"         Levator scapulae stretch   Seated 3x30\" ea Seated 3x30\" ea  Seated 3x30\" ea       Bridges   10x with breath/TrA, 10x with PPT/breath/TrA  2x10 with PPT/breath/TrA  With hip abduction 2x10 with breath/TrA GTB loop        Clamshells    2x10 BTB loop Side lying 2x10 ea GTB loop        Child's pose stretch      3x1'       Ther Activity             Pt education Eval " "findings, pelvic floor anatomy, exam technique, POC, HEP, water intake for breast feeding, current exercise recommendations for walking and planks, HEP 16' - JY Eval findings, breathing to reduce IAP, posture while feeding baby, HEP 8' - JY Deficits that could be leading to a feeling of muscle \"tightness,\" safe exercises to begin performing, HEP 8' - JY HEP - JY HEP - JY Exam findings, HEP - JY                    Gait Training                                       Modalities                                            "

## 2025-03-17 NOTE — PSYCH
"   Virtual Regular VisitName: Noy Palomo      : 1995      MRN: [id number]  Encounter Provider: Brie Quezada LCSW  Encounter Date: 3/17/2025   Encounter department: [facility]  :  Assessment & Plan  Anxiety disorder, unspecified type             Goals addressed in session: Goal 2     DATA: Clinician explored client's current stressors-- discussed her babies upcoming appts-- utilizing the resources around her and identifying the ways that this is different but still beautiful. Clinician and client explored relationships with the family-- affirmed her efforts in placing boundaries.   During this session, this clinician used the following therapeutic modalities: Bereavement Therapy, Client-centered Therapy, Cognitive Behavioral Therapy, Solution-Focused Therapy, and Supportive Psychotherapy    Substance Abuse was not addressed during this session. If the client is diagnosed with a co-occurring substance use disorder, please indicate any changes in the frequency or amount of use: NA. Stage of change for addressing substance use diagnoses: No substance use/Not applicable    ASSESSMENT:  Noy presents with a Euthymic/ normal mood. Noy's affect is Normal range and intensity and Tearful, which is congruent, with their mood and the content of the session. The client has made progress on their goals as evidenced by accepting her son's condition-- utilizing her resources.    Noy presents with a none risk of suicide, none risk of self-harm, and none risk of harm to others.    For any risk assessment that surpasses a \"low\" rating, a safety plan must be developed.    A safety plan was indicated: no  If yes, describe in detail AN    PLAN: Between sessions, Noy will  continue to support her --acknowledging the deficit and accepting what is being done that is different according to his needs. At the next session, the therapist will use Client-centered " Therapy to address ongoing stressors.    Behavioral Health Treatment Plan [facility]: Diagnosis and Treatment Plan explained to Noy, Noy relates understanding diagnosis and is agreeable to Treatment Plan. Yes     Depression Follow-up Plan Completed: Not applicable     Reason for visit is No chief complaint on file.     Recent Visits  Date Type Provider Dept   03/17/25 Telemedicine [facility] ERASMO Quezada [facility]   Showing recent visits within past 7 days and meeting all other requirements  Future Appointments  No visits were found meeting these conditions.  Showing future appointments within next 150 days and meeting all other requirements     History of Present Illness     HPI    Past Medical History   Past Medical History:   Diagnosis Date    Anemia     takes irondaly    Asthma     Chronic kidney disease     sm cyst on left kidney diag 10 yrs ago    Family history of Down syndrome 06/05/2024    FOB' maternal uncle with down syndrome  Low risk NIPT      Gastritis     GERD (gastroesophageal reflux disease)     Hx of gastritis     Migraine     Obesity     Seasonal allergies     Umbilical hernia     Varicella     had vaccines     Past Surgical History:   Procedure Laterality Date    INSERTION OF INTRAUTERINE DEVICE (IUD)      GA LAPS GSTRC RSTRICTIV PX LONGITUDINAL GASTRECTOMY N/A 01/09/2023    Procedure: GASTRECTOMY SLEEVE LAP & INTRAOPERATIVE EGD;  Surgeon: Leif Tolliver MD;  Location: [facility] OR;  Service: Bariatrics    WISDOM TOOTH EXTRACTION Bilateral 03/2020     Current Outpatient Medications   Medication Instructions    acetaminophen (TYLENOL) 500 mg, Every 6 hours PRN    benzocaine-menthol-lanolin-aloe (DERMOPLAST) 20-0.5 % topical spray 1 Application, Topical, Every 6 hours PRN    calcium carbonate (TUMS) 500 mg chewable tablet 1 tablet, Daily    folic acid (KP FOLIC ACID) 1 mg, Oral, Daily    hydrocortisone 1 % cream 1 Application, Topical, Daily PRN    ibuprofen (MOTRIN) 600  mg, Oral, Every 6 hours PRN    Multiple Vitamins-Minerals (BARIATRIC MULTIVITAMINS/IRON PO) Take by mouth    nystatin (MYCOSTATIN) ointment Topical, 2 times daily    witch hazel-glycerin (TUCKS) topical pad 1 Pad, Topical, Every 4 hours PRN     Allergies   Allergen Reactions    Cat Dander Shortness Of Breath    Contrast [Iodinated Contrast Media] Anaphylaxis     throat closure    Fish-Derived Products - Food Allergy Anaphylaxis     Anaphylaxis    Pollen Extract Other (See Comments)     hives, throat closure    Shellfish-Derived Products - Food Allergy        Objective   There were no vitals taken for this visit.    Video Exam  Physical Exam     Administrative Statements   Encounter provider Aerial ERASMO Quezada    The Patient is located at Home and in the following state in which I hold an active license [place].    The patient was identified by name and date of birth. Noy Palomo was informed that this is a telemedicine visit and that the visit is being conducted through the Epic Embedded platform. She agrees to proceed..  My office door was closed. No one else was in the room.  She acknowledged consent and understanding of privacy and security of the video platform. The patient has agreed to participate and understands they can discontinue the visit at any time.    Visit Time  Start Time: 0705  Stop Time: 0807  Total Visit Time: 62 minutes

## 2025-03-17 NOTE — BH CRISIS PLAN
Client Name: Erika Palomo       Client YOB: 1995    [facility] Safety Plan      Creation Date: 3/17/25 Update Date: 3/17/26   Created By: Brie Quezada LCSW Last Updated By: Brie Quezada LCSW      Step 1: Warning Signs:   Warning Signs   withdraw from the family..   over thinking   not sleeping well   too many things that are happening unexpected            Step 2: Internal Coping Strategies:   Internal Coping Strategies   taking deep breaths   cooking/meal prep   shower            Step 3: People and social settings that provide distraction:   Name Contact Information   Leon bush-- Mike/ Sergio    mom     Places   being outside in the fresh air..           Step 4: People whom I can ask for help during a crisis:      Name Contact Information    Leon Mckeon- brother Mckenzie       Step 5: Professionals or agencies I can contact during a crisis:      Clinican/Agency Name Phone Emergency Contact    [facility]          Crisis Phone Numbers:   Suicide Prevention Lifeline: Call or Text  988 Crisis Text Line: Text HOME to 944-346   Please note: Some [facility] do not have a separate number for Child/Adolescent specific crisis. If your county is not listed under Child/Adolescent, please call the adult number for your county      Adult Crisis Numbers: Child/Adolescent Crisis Numbers   [facility]: [phone] [facility]: [phone]   [facility]: [phone] [facility]: [phone]   [facility]: [phone] [place]: [phone]   [facility]: [phone] [place]: [phone]   [place]: [phone]   [facility]: [phone]   [facility]: [phone]   [facility]: [phone] (daytime) [phone] (after hours, weekends, holidays)      Step 6: Making the environment safer (plan for lethal means safety):      Optional: What is most important to me and worth living for?   Chivo-- my baby       [place] Safety Plan. Alana Driscoll and Abdirashid Jauregui. Used with permission of the authors.

## 2025-03-20 ENCOUNTER — ANNUAL EXAM (OUTPATIENT)
Dept: OBGYN CLINIC | Facility: CLINIC | Age: 30
End: 2025-03-20
Payer: COMMERCIAL

## 2025-03-20 VITALS
BODY MASS INDEX: 35.45 KG/M2 | WEIGHT: 187.8 LBS | DIASTOLIC BLOOD PRESSURE: 64 MMHG | SYSTOLIC BLOOD PRESSURE: 120 MMHG | HEIGHT: 61 IN

## 2025-03-20 DIAGNOSIS — Z01.419 ENCOUNTER FOR GYNECOLOGICAL EXAMINATION WITHOUT ABNORMAL FINDING: Primary | ICD-10-CM

## 2025-03-20 PROCEDURE — S0612 ANNUAL GYNECOLOGICAL EXAMINA: HCPCS | Performed by: OBSTETRICS & GYNECOLOGY

## 2025-03-20 NOTE — PROGRESS NOTES
Noy Palomo  1995    CC:  Yearly exam    S:  29 y.o. female here for yearly exam. Her cycles are regular, light on Nexplanon.  Aware of expectations.     EPDS is elevated at 13 today.  I had been notified of this about a week ago by her PT.  She is aware of the elevation and feels it is likely related to life stress as her  lost his job and her son is deaf.  He is undergoing a lot of evaluations which has been stressful particularly as she is going back to work.  He is scheduled to have his cochlear implants in September and is otherwise thriving.  Her milk supply decreased in response to this stress, so she stopped pumping which has helped her stress.  She armida SI/HI, contracts for safety.  She will let us know if she needs additional support but has a good support system at home.     She has been seeing PT and is doing considerably better with only one sore spot left.      Sexual activity: She is sexually active without pain, bleeding or dryness.     Contraception:  She uses Nexplanon for contraception.      Gardasil:  She has had the Gardasil series.     We reviewed ASCCP guidelines for Pap testing.     Last Pap 6/5/2024    Current Outpatient Medications:     acetaminophen (TYLENOL) 500 mg tablet, Take 500 mg by mouth every 6 (six) hours as needed for mild pain, Disp: , Rfl:     calcium carbonate (TUMS) 500 mg chewable tablet, Chew 1 tablet daily, Disp: , Rfl:     ibuprofen (MOTRIN) 600 mg tablet, Take 1 tablet (600 mg total) by mouth every 6 (six) hours as needed for mild pain or moderate pain, Disp: , Rfl:     folic acid (KP Folic Acid) 1 mg tablet, Take 1 tablet (1 mg total) by mouth daily (Patient not taking: Reported on 12/12/2024), Disp: 90 tablet, Rfl: 3    Multiple Vitamins-Minerals (BARIATRIC MULTIVITAMINS/IRON PO), Take by mouth (Patient not taking: Reported on 3/20/2025), Disp: , Rfl:     nystatin (MYCOSTATIN) ointment, Apply topically 2 (two) times a day for 7 days, Disp: 15 g,  Rfl: 0    Current Facility-Administered Medications:     etonogestrel (NEXPLANON) subdermal implant 68 mg, 68 mg, Subdermal, Once every 3 years, , 68 mg at 01/09/25 1338  Social History     Socioeconomic History    Marital status: /Civil Union     Spouse name: Not on file    Number of children: 0    Years of education: Not on file    Highest education level: Bachelor's degree (e.g., BA, AB, BS)   Occupational History    Occupation: Hospice RN     Employer: [employer]   Tobacco Use    Smoking status: Never    Smokeless tobacco: Never   Vaping Use    Vaping status: Never Used   Substance and Sexual Activity    Alcohol use: Not Currently     Alcohol/week: 1.0 standard drink of alcohol     Types: 1 Glasses of wine per week     Comment: Occasional    Drug use: Never     Comment: flro uncles addiction drugs/etoh,  denies self, FOB denies,    Sexual activity: Yes     Partners: Male     Birth control/protection: Implant     Comment: Nexplanon   Other Topics Concern    Not on file   Social History Narrative    Not on file     Social Drivers of Health     Financial Resource Strain: Not on file   Food Insecurity: No Food Insecurity (12/12/2024)    Hunger Vital Sign     Worried About Running Out of Food in the Last Year: Never true     Ran Out of Food in the Last Year: Never true   Transportation Needs: No Transportation Needs (12/12/2024)    PRAPARE - Transportation     Lack of Transportation (Medical): No     Lack of Transportation (Non-Medical): No   Physical Activity: Not on file   Stress: Not on file   Social Connections: Not on file   Intimate Partner Violence: Unknown (11/27/2024)    Nursing IPS     Feels Physically and Emotionally Safe: Not on file     Physically Hurt by Someone: Not on file     Humiliated or Emotionally Abused by Someone: Not on file     Physically Hurt by Someone: No     Hurt or Threatened by Someone: No   Housing Stability: Low Risk  (12/12/2024)    Housing Stability Vital Sign      Unable to Pay for Housing in the Last Year: No     Number of Times Moved in the Last Year: 0     Homeless in the Last Year: No   Recent Concern: Housing Stability - At Risk (2024)    Nursing: Inadequate Housing Risk Classification     Has Housing: Not on file     Worried About Losing Housing: Not on file     Unable to Get Utilities: Not on file     Unable to Pay for Housing in the Last Year: Yes     Has Housin     Family History   Problem Relation Age of Onset    Diabetes Mother     Allergic rhinitis Mother     Hypertension Mother     Hypertension Father     Hyperlipidemia Father     No Known Problems Half-Sister     No Known Problems Half-Sister     Asthma Brother     No Known Problems Half-Brother     No Known Problems Half-Brother     No Known Problems Half-Brother     No Known Problems Half-Brother     Breast cancer Maternal Grandmother         Passed away at 66 years of age    Stroke Maternal Grandfather     Diabetes Maternal Grandfather     Depression Paternal Grandmother     Lung cancer Paternal Grandfather     Asthma Paternal Grandfather     COPD Paternal Grandfather     Anxiety disorder Maternal Aunt     Depression Maternal Aunt     Bipolar disorder Maternal Aunt     Ovarian cancer Neg Hx      Past Medical History:   Diagnosis Date    Anemia     takes irondaly    Asthma     Chronic kidney disease     sm cyst on left kidney diag 10 yrs ago    Family history of Down syndrome 2024    FOB' maternal uncle with down syndrome  Low risk NIPT      Gastritis     GERD (gastroesophageal reflux disease)     Hx of gastritis     Migraine     Obesity     Seasonal allergies     Situational stress     Umbilical hernia     Varicella     had vaccines       Review of Systems   Respiratory: Negative.    Cardiovascular: Negative.    Gastrointestinal: Negative for constipation and diarrhea.   Genitourinary: Negative for difficulty urinating, pelvic pain, vaginal bleeding, vaginal discharge, itching or  "odor.    O:  Blood pressure 120/64, height 5' 1\" (1.549 m), weight 85.2 kg (187 lb 12.8 oz), last menstrual period 03/06/2025, not currently breastfeeding.    Patient appears well and is not in distress  Neck is supple without masses  Breasts are symmetrical without mass, tenderness, nipple discharge, skin changes or adenopathy.   Abdomen is soft and nontender without masses.   External genitals are normal without lesions or rashes.  Urethra and urethral meatus are normal  Bladder is normal to palpation  Vagina is normal without discharge or bleeding.   Cervix is normal without discharge or lesion.   Uterus is normal, mobile, nontender without palpable mass.  Adnexa are normal, nontender, without palpable mass.     A:   Yearly exam.     P:   Pap due 2027    RTO one year for yearly exam or sooner as needed.     "

## 2025-03-24 ENCOUNTER — OFFICE VISIT (OUTPATIENT)
Dept: PHYSICAL THERAPY | Facility: REHABILITATION | Age: 30
End: 2025-03-24
Payer: COMMERCIAL

## 2025-03-24 DIAGNOSIS — M54.50 BILATERAL LOW BACK PAIN WITHOUT SCIATICA, UNSPECIFIED CHRONICITY: ICD-10-CM

## 2025-03-24 DIAGNOSIS — R53.1 WEAKNESS: Primary | ICD-10-CM

## 2025-03-24 DIAGNOSIS — M54.9 UPPER BACK PAIN: ICD-10-CM

## 2025-03-24 PROCEDURE — 97140 MANUAL THERAPY 1/> REGIONS: CPT

## 2025-03-24 PROCEDURE — 97112 NEUROMUSCULAR REEDUCATION: CPT

## 2025-03-24 PROCEDURE — 97110 THERAPEUTIC EXERCISES: CPT

## 2025-03-24 NOTE — PROGRESS NOTES
"Daily Note     Today's date: 3/24/2025  Patient name: Noy Palomo  : 1995  MRN: [id number]  Referring provider: Adilene Morgan MD  Dx:   Encounter Diagnosis     ICD-10-CM    1. Weakness  R53.1       2. Upper back pain  M54.9       3. Bilateral low back pain without sciatica, unspecified chronicity  M54.50       4. Postpartum exam  Z39.2           Start Time: 1710  Stop Time: 1805  Total time in clinic (min): 55 minutes  ~Pt arrived 10 minutes late, but was accommodated.      Subjective: She reports that her upper back has been bothering her a lot. It is stress related. She has only been able to do her scapular squeezes and quadruped TrA contractions.      Objective: See treatment diary below      Assessment: Tolerated treatment well. Patient demonstrated fatigue post treatment and would benefit from continued PT      Plan: Continue per plan of care.  Progress treatment as tolerated.       Precautions: GERD, Iron deficiency anemia, Adjustment disorder with mixed anxiety and depressed mood, H/o gastric sleeve surgery , Umbilical hernia from when she was born      Manuals 1/20 1/27 2/5 2/10 3/3 3/17 3/24      Assessment *PFM exam nv PFM internal and external 15' - JY   RE-JY 15' - JY       Bilateral upper and middle trapezius STM  5' - JY 10' - JY 10' - JY  9' - JY 10' - JY      PFM STM/MFR      10' - JY                    Neuro Re-Ed             TrA contraction 2x10x5\" with breath 2x10x5\" with breath  Quadruped 2x10x5\" with breath Quadruped 2x10x5\" with breath        Scapular retractions 1x8x3\" 2x10x5\" 2x10x5\"  2x10x5\"  2x10x5\"      Diaphragmatic breathing 5' 10'           No moneys    2x10 BTB         Supine marches       2x10x5\" ea with breath/TrA      Quadruped hip extensions       10x ea                    Ther Ex             Supine butterfly stretch  3x1' 3x1' 3x1'         Happy Baby Stretch  3x1' 3x1' 3x1'  3x1' 3x1'      TB rows   2x10 GTB 2x10 BTB  2x10 BTB 2x10 BTB      TB extension   " " 2x10 BTB  2x10 BTB 2x10 BTB      Cervical spine retraction   Seated 2x10x5\" Seated 2x10x5\"   Seated 2x10x5\"      Levator scapulae stretch   Seated 3x30\" ea Seated 3x30\" ea  Seated 3x30\" ea       Bridges   10x with breath/TrA, 10x with PPT/breath/TrA  2x10 with PPT/breath/TrA  With hip abduction 2x10 with breath/TrA GTB loop  2x10 with PPT/breath/TrA       Clamshells    2x10 BTB loop Side lying 2x10 ea GTB loop  Side lying 2x10 ea GTB loop      Child's pose stretch      3x1' 3x1'      Ther Activity             Pt education Eval findings, pelvic floor anatomy, exam technique, POC, HEP, water intake for breast feeding, current exercise recommendations for walking and planks, HEP 16' - JY Eval findings, breathing to reduce IAP, posture while feeding baby, HEP 8' - JY Deficits that could be leading to a feeling of muscle \"tightness,\" safe exercises to begin performing, HEP 8' - JY HEP - JY HEP - JY Exam findings, HEP - JY HEP - JY                   Gait Training                                       Modalities                                              "

## 2025-03-27 ENCOUNTER — TELEMEDICINE (OUTPATIENT)
Dept: PSYCHIATRY | Facility: CLINIC | Age: 30
End: 2025-03-27
Payer: COMMERCIAL

## 2025-03-27 DIAGNOSIS — F41.9 ANXIETY DISORDER, UNSPECIFIED TYPE: Primary | ICD-10-CM

## 2025-03-27 PROCEDURE — 90837 PSYTX W PT 60 MINUTES: CPT | Performed by: SOCIAL WORKER

## 2025-03-27 NOTE — PSYCH
"Virtual Regular VisitName: Noy Palomo      : 1995      MRN: [id number]  Encounter Provider: Brie Quezada LCSW  Encounter Date: 3/27/2025   Encounter [facility]: [facility]  :  Assessment & Plan  Anxiety disorder, unspecified type             Goals addressed in session: Goal 1     DATA: Clinician explored client's current stressors-- discussed her managing her emotions as she continues to go to work, and continue with her son's appointments. Clinician and client discussed   During this session, this clinician used the following therapeutic modalities: Client-centered Therapy, Cognitive Behavioral Therapy, Solution-Focused Therapy, and Supportive Psychotherapy    Substance Abuse was not addressed during this session. If the client is diagnosed with a co-occurring substance use disorder, please indicate any changes in the frequency or amount of use: NA. Stage of change for addressing substance use diagnoses: No substance use/Not applicable    ASSESSMENT:  Noy presents with a Euthymic/ normal mood. Noy's affect is Normal range and intensity, which is congruent, with their mood and the content of the session. The client has made progress on their goals as evidenced by continue to push through her son's needs.    Noy presents with a none risk of suicide, none risk of self-harm, and none risk of harm to others.    For any risk assessment that surpasses a \"low\" rating, a safety plan must be developed.    A safety plan was indicated: no  If yes, describe in detail NA    PLAN: Between sessions, Noy will continue to utilize her coping skills. At the next session, the therapist will use Client-centered Therapy to address ongoing stressors.    Behavioral Health Treatment Plan [facility]: Diagnosis and Treatment Plan explained to Noy Villafuerte relates understanding diagnosis and is agreeable to Treatment Plan. Yes     Depression Follow-up Plan " Completed: Not applicable     Reason for visit is No chief complaint on file.     Recent Visits  Date Type Provider Dept   03/27/25 Telemedicine Aerial ERASMO Que[facility]   Showing recent visits within past 7 days and meeting all other requirements  Future Appointments  No visits were found meeting these conditions.  Showing future appointments within next 150 days and meeting all other requirements     History of Present Illness     HPI    Past Medical History   Past Medical History:   Diagnosis Date    Anemia     takes irondaly    Asthma     Chronic kidney disease     sm cyst on left kidney diag 10 yrs ago    Family history of Down syndrome 06/05/2024    FOB' maternal uncle with down syndrome  Low risk NIPT      Gastritis     GERD (gastroesophageal reflux disease)     Hx of gastritis     Migraine     Obesity     Seasonal allergies     Situational stress     Umbilical hernia     Varicella     had vaccines     Past Surgical History:   Procedure Laterality Date    INSERTION OF INTRAUTERINE DEVICE (IUD)      AL LAPS GSTRC RSTRICTIV PX LONGITUDINAL GASTRECTOMY N/A 01/09/2023    Procedure: GASTRECTOMY SLEEVE LAP & INTRAOPERATIVE EGD;  Surgeon: Leif Tolliver MD;  Location: [facility];  Service: Bariatrics    WISDOM TOOTH EXTRACTION Bilateral 03/2020     Current Outpatient Medications   Medication Instructions    acetaminophen (TYLENOL) 500 mg, Every 6 hours PRN    calcium carbonate (TUMS) 500 mg chewable tablet 1 tablet, Daily    folic acid (KP FOLIC ACID) 1 mg, Oral, Daily    ibuprofen (MOTRIN) 600 mg, Oral, Every 6 hours PRN    Multiple Vitamins-Minerals (BARIATRIC MULTIVITAMINS/IRON PO) Take by mouth    nystatin (MYCOSTATIN) ointment Topical, 2 times daily     Allergies   Allergen Reactions    Cat Dander Shortness Of Breath    Contrast [Iodinated Contrast Media] Anaphylaxis     throat closure    Fish-Derived Products - Food Allergy Anaphylaxis     Anaphylaxis    Pollen Extract Other  (See Comments)     hives, throat closure    Shellfish-Derived Products - Food Allergy        Objective   LMP 03/06/2025 (Approximate)     Video Exam  Physical Exam     Administrative Statements   Encounter provider Brie Quezada LCSW    The Patient is located at Home and in the following state in which I hold an active license [place].    The patient was identified by name and date of birth. Noy Palomo was informed that this is a telemedicine visit and that the visit is being conducted through the Epic Embedded platform. She agrees to proceed..  My office door was closed. No one else was in the room.  She acknowledged consent and understanding of privacy and security of the video platform. The patient has agreed to participate and understands they can discontinue the visit at any time.    Visit Time  Start Time: 0707  Stop Time: 0807  Total Visit Time: 60 minutes

## 2025-03-31 ENCOUNTER — APPOINTMENT (OUTPATIENT)
Dept: PHYSICAL THERAPY | Facility: REHABILITATION | Age: 30
End: 2025-03-31
Payer: COMMERCIAL

## 2025-03-31 ENCOUNTER — TELEMEDICINE (OUTPATIENT)
Dept: PSYCHIATRY | Facility: CLINIC | Age: 30
End: 2025-03-31
Payer: COMMERCIAL

## 2025-03-31 DIAGNOSIS — F41.1 GENERALIZED ANXIETY DISORDER: Primary | ICD-10-CM

## 2025-03-31 PROCEDURE — 90837 PSYTX W PT 60 MINUTES: CPT | Performed by: SOCIAL WORKER

## 2025-03-31 NOTE — PSYCH
"Virtual Regular VisitName: Noy Palomo      : 1995      MRN: [id number]  Encounter Provider: Brie Quezada LCSW  Encounter Date: 3/31/2025   Encounter department: [facility]  :  Assessment & Plan  Generalized anxiety disorder             Goals addressed in session: Goal 1     DATA: Clinician explored client's current stressors-- discussed the progress made within the last 2 years. Identified all that is not in her control and reinforced her coping skills and her resilience. Clinician and client discussed her concerns regarding her inlaws and their increasing dependence. During this session, this clinician used the following therapeutic modalities: Client-centered Therapy, Cognitive Behavioral Therapy, Solution-Focused Therapy, and Supportive Psychotherapy    Substance Abuse was not addressed during this session. If the client is diagnosed with a co-occurring substance use disorder, please indicate any changes in the frequency or amount of use: NA. Stage of change for addressing substance use diagnoses: No substance use/Not applicable    ASSESSMENT:  Noy presents with a Euthymic/ normal mood. Noy's affect is Normal range and intensity, which is congruent, with their mood and the content of the session. The client has made progress on their goals as evidenced by her allowing herself to cry it out, get up and continue on.    Noy presents with a none risk of suicide, none risk of self-harm, and none risk of harm to others.    For any risk assessment that surpasses a \"low\" rating, a safety plan must be developed.    A safety plan was indicated: no  If yes, describe in detail NA    PLAN: Between sessions, Noy will continue to utilize her coping skills. At the next session, the therapist will use Client-centered Therapy, Cognitive Behavioral Therapy, Solution-Focused Therapy, and Supportive Psychotherapy to address ongoing stressors.    Behavioral " Health Treatment Plan [facility]: Diagnosis and Treatment Plan explained to Noy, Noy relates understanding diagnosis and is agreeable to Treatment Plan. Yes     Depression Follow-up Plan Completed: Not applicable     Reason for visit is No chief complaint on file.     Recent Visits  Date Type Provider Dept   03/27/25 Telemedicine [facility]erial DamienERASMO [facility]   Showing recent visits within past 7 days and meeting all other requirements  Today's Visits  Date Type Provider Dept   03/31/25 Telemedicine [facility]AS[facility]   Showing today's visits and meeting all other requirements  Future Appointments  No visits were found meeting these conditions.  Showing future appointments within next 150 days and meeting all other requirements     History of Present Illness     HPI    Past Medical History   Past Medical History:   Diagnosis Date    Anemia     takes irondaly    Asthma     Chronic kidney disease     sm cyst on left kidney diag 10 yrs ago    Family history of Down syndrome 06/05/2024    FOB' maternal uncle with down syndrome  Low risk NIPT      Gastritis     GERD (gastroesophageal reflux disease)     Hx of gastritis     Migraine     Obesity     Seasonal allergies     Situational stress     Umbilical hernia     Varicella     had vaccines     Past Surgical History:   Procedure Laterality Date    INSERTION OF INTRAUTERINE DEVICE (IUD)      NM LAPS GSTRC RSTRICTIV PX LONGITUDINAL GASTRECTOMY N/A 01/09/2023    Procedure: GASTRECTOMY SLEEVE LAP & INTRAOPERATIVE EGD;  Surgeon: Leif Tolliver MD;  Location: [facility];  Service: Bariatrics    WISDOM TOOTH EXTRACTION Bilateral 03/2020     Current Outpatient Medications   Medication Instructions    acetaminophen (TYLENOL) 500 mg, Every 6 hours PRN    calcium carbonate (TUMS) 500 mg chewable tablet 1 tablet, Daily    folic acid (KP FOLIC ACID) 1 mg, Oral, Daily    ibuprofen (MOTRIN) 600 mg, Oral, Every 6 hours  PRN    Multiple Vitamins-Minerals (BARIATRIC MULTIVITAMINS/IRON PO) Take by mouth    nystatin (MYCOSTATIN) ointment Topical, 2 times daily     Allergies   Allergen Reactions    Cat Dander Shortness Of Breath    Contrast [Iodinated Contrast Media] Anaphylaxis     throat closure    Fish-Derived Products - Food Allergy Anaphylaxis     Anaphylaxis    Pollen Extract Other (See Comments)     hives, throat closure    Shellfish-Derived Products - Food Allergy        Objective   LMP 03/06/2025 (Approximate)     Video Exam  Physical Exam     Administrative Statements   Encounter provider Aerial Damien, LCSW    The Patient is located at Home and in the following state in which I hold an active license [place].    The patient was identified by name and date of birth. Noy Palomo was informed that this is a telemedicine visit and that the visit is being conducted through the Epic Embedded platform. She agrees to proceed..  My office door was closed. No one else was in the room.  She acknowledged consent and understanding of privacy and security of the video platform. The patient has agreed to participate and understands they can discontinue the visit at any time.      Visit Time  Start Time: 0707  Stop Time: 0808  Total Visit Time: 61 minutes

## 2025-04-07 ENCOUNTER — OFFICE VISIT (OUTPATIENT)
Dept: PHYSICAL THERAPY | Facility: REHABILITATION | Age: 30
End: 2025-04-07
Payer: COMMERCIAL

## 2025-04-07 DIAGNOSIS — M54.50 BILATERAL LOW BACK PAIN WITHOUT SCIATICA, UNSPECIFIED CHRONICITY: ICD-10-CM

## 2025-04-07 DIAGNOSIS — M54.9 UPPER BACK PAIN: ICD-10-CM

## 2025-04-07 DIAGNOSIS — R53.1 WEAKNESS: Primary | ICD-10-CM

## 2025-04-07 PROCEDURE — 97140 MANUAL THERAPY 1/> REGIONS: CPT

## 2025-04-07 PROCEDURE — 97110 THERAPEUTIC EXERCISES: CPT

## 2025-04-07 NOTE — PROGRESS NOTES
"Daily Note     Today's date: 2025  Patient name: Noy Palomo  : 1995  MRN: [id number]  Referring provider: Adilene Morgan MD  Dx:   Encounter Diagnosis     ICD-10-CM    1. Weakness  R53.1       2. Upper back pain  M54.9       3. Bilateral low back pain without sciatica, unspecified chronicity  M54.50       4. Postpartum exam  Z39.2             Start Time:   Stop Time:   Total time in clinic (min): 60 minutes        Subjective: Pt reports that her upper back has been feeling much better, which she feels is partially because they have gotten into a better routine with their appointments and her  has started working again which has decreased her stress levels. Her lower back has not been bothering her. She also reports that her pelvic floor function has been slowly improving.      Objective: See treatment diary below    Palpation: increased tension of layer 1 and 2 of PFM from 3-9 o'clock, L IC, R IC, L OI, and R OI. 1/10 pain with palpation of layer 1 and 2 of PFM from 3-6 o'clock. 3/10 pain with palpation of L OI        PERFECT Score   Power right: 2+/5   Power left: 2+/5       Assessment: Tolerated treatment well. Patient demonstrated fatigue post treatment and would benefit from continued PT      Plan: Continue per plan of care.  Progress treatment as tolerated.       Precautions: GERD, Iron deficiency anemia, Adjustment disorder with mixed anxiety and depressed mood, H/o gastric sleeve surgery , Umbilical hernia from when she was born      Manuals 1/20 1/27 2/5 2/10 3/3 3/17 3/24 4/7     Assessment *PFM exam nv PFM internal and external 15' - JY   RE-JY 15' - JY  6' - JY     Bilateral upper and middle trapezius STM  5' - JY 10' - JY 10' - JY  9' - JY 10' - JY      PFM STM/MFR      10' - JY  10' - JY     Pelvic drop        5' - JY     PFM contract/relax        5' - JY     Neuro Re-Ed             TrA contraction 2x10x5\" with breath 2x10x5\" with breath  Quadruped 2x10x5\" " "with breath Quadruped 2x10x5\" with breath        Scapular retractions 1x8x3\" 2x10x5\" 2x10x5\"  2x10x5\"  2x10x5\"      Diaphragmatic breathing 5' 10'           No moneys    2x10 BTB         Supine marches       2x10x5\" ea with breath/TrA      Quadruped hip extensions       10x ea                    Ther Ex             Supine butterfly stretch  3x1' 3x1' 3x1'    3x1'     Happy Baby Stretch  3x1' 3x1' 3x1'  3x1' 3x1' 3x1'     TB rows   2x10 GTB 2x10 BTB  2x10 BTB 2x10 BTB      TB extension    2x10 BTB  2x10 BTB 2x10 BTB      Cervical spine retraction   Seated 2x10x5\" Seated 2x10x5\"   Seated 2x10x5\"      Levator scapulae stretch   Seated 3x30\" ea Seated 3x30\" ea  Seated 3x30\" ea       Bridges   10x with breath/TrA, 10x with PPT/breath/TrA  2x10 with PPT/breath/TrA  With hip abduction 2x10 with breath/TrA GTB loop  2x10 with PPT/breath/TrA  With hip abduction 2x10 with breath/TrA GTB loop     Clamshells    2x10 BTB loop Side lying 2x10 ea GTB loop  Side lying 2x10 ea GTB loop Side lying 2x10 ea GTB loop     Child's pose stretch      3x1' 3x1' 3x1'     Pelvic drops        2x10x5\" with breath     Ther Activity             Pt education Eval findings, pelvic floor anatomy, exam technique, POC, HEP, water intake for breast feeding, current exercise recommendations for walking and planks, HEP 16' - JY Eval findings, breathing to reduce IAP, posture while feeding baby, HEP 8' - JY Deficits that could be leading to a feeling of muscle \"tightness,\" safe exercises to begin performing, HEP 8' - JY HEP - JY HEP - JY Exam findings, HEP - JY HEP - JY HEP - JY                  Gait Training                                       Modalities                                              " "planks, HEP 16' - JY Eval findings, breathing to reduce IAP, posture while feeding baby, HEP 8' - JY Deficits that could be leading to a feeling of muscle \"tightness,\" safe exercises to begin performing, HEP 8' - JY HEP - JY HEP - JY Exam findings, HEP - JY HEP - JY HEP - JY                  Gait Training                                       Modalities                                              "

## 2025-04-10 ENCOUNTER — TELEMEDICINE (OUTPATIENT)
Dept: PSYCHIATRY | Facility: CLINIC | Age: 30
End: 2025-04-10
Payer: COMMERCIAL

## 2025-04-10 DIAGNOSIS — F41.1 GENERALIZED ANXIETY DISORDER: Primary | ICD-10-CM

## 2025-04-10 PROCEDURE — 90837 PSYTX W PT 60 MINUTES: CPT | Performed by: SOCIAL WORKER

## 2025-04-10 NOTE — PSYCH
"Virtual Regular VisitName: Noy Palomo      : 1995      MRN: [id number]  Encounter Provider: rBie Quezada LCSW  Encounter Date: 4/10/2025   Encounter department: [facility]  :  Assessment & Plan  Generalized anxiety disorder             Goals addressed in session: Goal 1     DATA: Clinician explored client's current stressors-- discussed her many appointments, needing time for herself.. and discussed her asking for help-- encouraged client to utilize her support system. Clinician affirmed client's efforts in choosing herself, utilizing self care and time for herself. Clinician encouraged her to practice acknowledging her support system, so as to increase her acceptance of her natural limitations.   During this session, this clinician used the following therapeutic modalities: Client-centered Therapy, Cognitive Behavioral Therapy, Solution-Focused Therapy, and Supportive Psychotherapy    Substance Abuse was not addressed during this session. If the client is diagnosed with a co-occurring substance use disorder, please indicate any changes in the frequency or amount of use: NA. Stage of change for addressing substance use diagnoses: No substance use/Not applicable    ASSESSMENT:  Noy presents with a Euthymic/ normal mood. Noy's affect is Normal range and intensity, which is congruent, with their mood and the content of the session. The client has made progress on their goals as evidenced by her taking time for herself.    Noy presents with a none risk of suicide, none risk of self-harm, and none risk of harm to others.    For any risk assessment that surpasses a \"low\" rating, a safety plan must be developed.    A safety plan was indicated: no  If yes, describe in detail NA    PLAN: Between sessions, Noy will continue to utilize her resources. At the next session, the therapist will use Client-centered Therapy to address ongoing " stressors.    Behavioral Health Treatment Plan [facility]: Diagnosis and Treatment Plan explained to Noy, Noy relates understanding diagnosis and is agreeable to Treatment Plan. Yes     Depression Follow-up Plan Completed: Not applicable     Reason for visit is   Chief Complaint   Patient presents with    Virtual Regular Visit      Recent Visits  Date Type Provider Dept   04/10/25 Telemedicine [facility]   Showing recent visits within past 7 days and meeting all other requirements  Future Appointments  No visits were found meeting these conditions.  Showing future appointments within next 150 days and meeting all other requirements     History of Present Illness     HPI    Past Medical History   Past Medical History:   Diagnosis Date    Anemia     takes irondaly    Asthma     Chronic kidney disease     sm cyst on left kidney diag 10 yrs ago    Family history of Down syndrome 06/05/2024    FOB' maternal uncle with down syndrome  Low risk NIPT      Gastritis     GERD (gastroesophageal reflux disease)     Hx of gastritis     Migraine     Obesity     Seasonal allergies     Situational stress     Umbilical hernia     Varicella     had vaccines     Past Surgical History:   Procedure Laterality Date    INSERTION OF INTRAUTERINE DEVICE (IUD)      IL LAPS GSTRC RSTRICTIV PX LONGITUDINAL GASTRECTOMY N/A 01/09/2023    Procedure: GASTRECTOMY SLEEVE LAP & INTRAOPERATIVE EGD;  Surgeon: Leif Tolliver MD;  Location: [facility] OR;  Service: Bariatrics    WISDOM TOOTH EXTRACTION Bilateral 03/2020     Current Outpatient Medications   Medication Instructions    acetaminophen (TYLENOL) 500 mg, Every 6 hours PRN    calcium carbonate (TUMS) 500 mg chewable tablet 1 tablet, Daily    folic acid ( FOLIC ACID) 1 mg, Oral, Daily    ibuprofen (MOTRIN) 600 mg, Oral, Every 6 hours PRN    Multiple Vitamins-Minerals (BARIATRIC MULTIVITAMINS/IRON PO) Take by mouth    nystatin (MYCOSTATIN)  ointment Topical, 2 times daily     Allergies   Allergen Reactions    Cat Dander Shortness Of Breath    Contrast [Iodinated Contrast Media] Anaphylaxis     throat closure    Fish-Derived Products - Food Allergy Anaphylaxis     Anaphylaxis    Pollen Extract Other (See Comments)     hives, throat closure    Shellfish-Derived Products - Food Allergy        Objective   LMP 03/06/2025 (Approximate)     Video Exam  Physical Exam     Administrative Statements   Encounter provider Brie Quezada LCSW    The Patient is located at Home and in the following state in which I hold an active license [place].    The patient was identified by name and date of birth. Noy Palomo was informed that this is a telemedicine visit and that the visit is being conducted through the Epic Embedded platform. She agrees to proceed..  My office door was closed. No one else was in the room.  She acknowledged consent and understanding of privacy and security of the video platform. The patient has agreed to participate and understands they can discontinue the visit at any time.    I have spent a total time of 58 minutes in caring for this patient on the day of the visit/encounter including Counseling / Coordination of care, not including the time spent for establishing the audio/video connection.    Visit Time  Start Time: 0706  Stop Time: 0804  Total Visit Time: 58 minutes

## 2025-04-14 ENCOUNTER — TELEMEDICINE (OUTPATIENT)
Dept: PSYCHIATRY | Facility: CLINIC | Age: 30
End: 2025-04-14
Payer: COMMERCIAL

## 2025-04-14 ENCOUNTER — APPOINTMENT (OUTPATIENT)
Dept: PHYSICAL THERAPY | Facility: REHABILITATION | Age: 30
End: 2025-04-14
Payer: COMMERCIAL

## 2025-04-14 DIAGNOSIS — F41.1 GENERALIZED ANXIETY DISORDER: Primary | ICD-10-CM

## 2025-04-14 PROCEDURE — 90837 PSYTX W PT 60 MINUTES: CPT | Performed by: SOCIAL WORKER

## 2025-04-14 NOTE — PSYCH
"Virtual Regular VisitName: Noy Palomo      : 1995      MRN: [id number]  Encounter Provider: Brie Quezada LCSW  Encounter Date: 2025   Encounter department: [facility]  :  Assessment & Plan  Generalized anxiety disorder             Goals addressed in session: Goal 1     DATA: Clinician explored client's current stressors-- discussed her son having a sickness. Clinician affirmed her efforts in requesting time for herself and managing her own needs and utilizing her resources. Clinician assured client's ability to adapt to her son's needs.     During this session, this clinician used the following therapeutic modalities: Client-centered Therapy, Cognitive Behavioral Therapy, Solution-Focused Therapy, and Supportive Psychotherapy    Substance Abuse was not addressed during this session. If the client is diagnosed with a co-occurring substance use disorder, please indicate any changes in the frequency or amount of use: NA. Stage of change for addressing substance use diagnoses: No substance use/Not applicable    ASSESSMENT:  Noy presents with a Euthymic/ normal mood. Noy's affect is Normal range and intensity, which is congruent, with their mood and the content of the session. The client has made progress on their goals as evidenced by her utilizing her  and mother for support.    Noy presents with a none risk of suicide, none risk of self-harm, and none risk of harm to others.    For any risk assessment that surpasses a \"low\" rating, a safety plan must be developed.    A safety plan was indicated: no  If yes, describe in detail NA    PLAN: Between sessions, Noy will continue to utilize her resources. At the next session, the therapist will use Client-centered Therapy to address ongoing stressors.    Behavioral Health Treatment Plan [facility]: Diagnosis and Treatment Plan explained to Noy Villafuerte relates understanding diagnosis " and is agreeable to Treatment Plan. Yes     Depression Follow-up Plan Completed: Not applicable     Reason for visit is No chief complaint on file.     Recent Visits  Date Type Provider Dept   04/14/25 Telemedicine [facility]   04/10/25 Telemedicine [facility]   Showing recent visits within past 7 days and meeting all other requirements  Future Appointments  No visits were found meeting these conditions.  Showing future appointments within next 150 days and meeting all other requirements     History of Present Illness     HPI    Past Medical History   Past Medical History:   Diagnosis Date    Anemia     takes irondaly    Asthma     Chronic kidney disease     sm cyst on left kidney diag 10 yrs ago    Family history of Down syndrome 06/05/2024    FOB' maternal uncle with down syndrome  Low risk NIPT      Gastritis     GERD (gastroesophageal reflux disease)     Hx of gastritis     Migraine     Obesity     Seasonal allergies     Situational stress     Umbilical hernia     Varicella     had vaccines     Past Surgical History:   Procedure Laterality Date    INSERTION OF INTRAUTERINE DEVICE (IUD)      CA LAPS GSTRC RSTRICTIV PX LONGITUDINAL GASTRECTOMY N/A 01/09/2023    Procedure: GASTRECTOMY SLEEVE LAP & INTRAOPERATIVE EGD;  Surgeon: Leif Tolliver MD;  Location: [facility] OR;  Service: Bariatrics    WISDOM TOOTH EXTRACTION Bilateral 03/2020     Current Outpatient Medications   Medication Instructions    acetaminophen (TYLENOL) 500 mg, Every 6 hours PRN    calcium carbonate (TUMS) 500 mg chewable tablet 1 tablet, Daily    folic acid ( FOLIC ACID) 1 mg, Oral, Daily    ibuprofen (MOTRIN) 600 mg, Oral, Every 6 hours PRN    Multiple Vitamins-Minerals (BARIATRIC MULTIVITAMINS/IRON PO) Take by mouth    nystatin (MYCOSTATIN) ointment Topical, 2 times daily     Allergies   Allergen Reactions    Cat Dander Shortness Of Breath    Contrast  [Iodinated Contrast Media] Anaphylaxis     throat closure    Fish-Derived Products - Food Allergy Anaphylaxis     Anaphylaxis    Pollen Extract Other (See Comments)     hives, throat closure    Shellfish-Derived Products - Food Allergy        Objective   LMP 03/06/2025 (Approximate)     Video Exam  Physical Exam     Administrative Statements   Encounter provider Brie Quezada LCSW    The Patient is located at Home and in the following state in which I hold an active license [place].    The patient was identified by name and date of birth. Noy Palomo was informed that this is a telemedicine visit and that the visit is being conducted through the Epic Embedded platform. She agrees to proceed..  My office door was closed. No one else was in the room.  She acknowledged consent and understanding of privacy and security of the video platform. The patient has agreed to participate and understands they can discontinue the visit at any time.    I have spent a total time of 60 minutes in caring for this patient on the day of the visit/encounter including Counseling / Coordination of care, not including the time spent for establishing the audio/video connection.    Visit Time  Start Time: 0705  Stop Time: 0805  Total Visit Time: 60 minutes

## 2025-04-21 ENCOUNTER — APPOINTMENT (OUTPATIENT)
Dept: PHYSICAL THERAPY | Facility: REHABILITATION | Age: 30
End: 2025-04-21
Payer: COMMERCIAL

## 2025-04-22 ENCOUNTER — TELEPHONE (OUTPATIENT)
Dept: PSYCHIATRY | Facility: CLINIC | Age: 30
End: 2025-04-22

## 2025-04-28 ENCOUNTER — TELEMEDICINE (OUTPATIENT)
Dept: PSYCHIATRY | Facility: CLINIC | Age: 30
End: 2025-04-28
Payer: COMMERCIAL

## 2025-04-28 ENCOUNTER — APPOINTMENT (OUTPATIENT)
Dept: PHYSICAL THERAPY | Facility: REHABILITATION | Age: 30
End: 2025-04-28
Payer: COMMERCIAL

## 2025-04-28 DIAGNOSIS — F41.1 GENERALIZED ANXIETY DISORDER: Primary | ICD-10-CM

## 2025-04-28 PROCEDURE — 90837 PSYTX W PT 60 MINUTES: CPT | Performed by: SOCIAL WORKER

## 2025-04-28 NOTE — PSYCH
"Virtual Regular VisitName: Noy Palomo      : 1995      MRN: [id number]  Encounter Provider: Brie Quezada LCSW  Encounter Date: 2025   Encounter department: [facility]  :  Assessment & Plan  Generalized anxiety disorder             Goals addressed in session: Goal 1     DATA: Clinician explored client's current stressors discussed her increasing acceptance as she has been signing to everyone in her family-- helping them practice the language, and practicing herself. Clinician affirmed client's willingness to get a signlanguage name for him for his community, and discussed the support that she is giving to her  and that the family is providing for the both of them. During this session, this clinician used the following therapeutic modalities: Client-centered Therapy, Cognitive Behavioral Therapy, and Supportive Psychotherapy    Substance Abuse was not addressed during this session. If the client is diagnosed with a co-occurring substance use disorder, please indicate any changes in the frequency or amount of use: NA. Stage of change for addressing substance use diagnoses: No substance use/Not applicable    ASSESSMENT:  Noy presents with a Euthymic/ normal mood. Noy's affect is Normal range and intensity, which is congruent, with their mood and the content of the session. The client has made progress on their goals as evidenced by client's increasing acceptance and excitement over her son's life within the deaf community.    Noy presents with a none risk of suicide, none risk of self-harm, and none risk of harm to others.    For any risk assessment that surpasses a \"low\" rating, a safety plan must be developed.    A safety plan was indicated: no  If yes, describe in detail NA    PLAN: Between sessions, Noy will continue to delve into the deaf community. At the next session, the therapist will use Client-centered Therapy to address " ongoing stressors.    Behavioral Health Treatment Plan [facility]: Diagnosis and Treatment Plan explained to Noy, Noy relates understanding diagnosis and is agreeable to Treatment Plan. Yes     Depression Follow-up Plan Completed: Not applicable     Reason for visit is   Chief Complaint   Patient presents with    Virtual Regular Visit      Recent Visits  Date Type Provider Dept   04/28/25 Telemedicine [facility]   Showing recent visits within past 7 days and meeting all other requirements  Future Appointments  No visits were found meeting these conditions.  Showing future appointments within next 150 days and meeting all other requirements     History of Present Illness     HPI    Past Medical History   Past Medical History:   Diagnosis Date    Anemia     takes irondaly    Asthma     Chronic kidney disease     sm cyst on left kidney diag 10 yrs ago    Family history of Down syndrome 06/05/2024    FOB' maternal uncle with down syndrome  Low risk NIPT      Gastritis     GERD (gastroesophageal reflux disease)     Hx of gastritis     Migraine     Obesity     Seasonal allergies     Situational stress     Umbilical hernia     Varicella     had vaccines     Past Surgical History:   Procedure Laterality Date    INSERTION OF INTRAUTERINE DEVICE (IUD)      CA LAPS GSTRC RSTRICTIV PX LONGITUDINAL GASTRECTOMY N/A 01/09/2023    Procedure: GASTRECTOMY SLEEVE LAP & INTRAOPERATIVE EGD;  Surgeon: Leif Tolliver MD;  Location: [facility] OR;  Service: Bariatrics    WISDOM TOOTH EXTRACTION Bilateral 03/2020     Current Outpatient Medications   Medication Instructions    acetaminophen (TYLENOL) 500 mg, Every 6 hours PRN    calcium carbonate (TUMS) 500 mg chewable tablet 1 tablet, Daily    folic acid (KP FOLIC ACID) 1 mg, Oral, Daily    ibuprofen (MOTRIN) 600 mg, Oral, Every 6 hours PRN    Multiple Vitamins-Minerals (BARIATRIC MULTIVITAMINS/IRON PO) Take by mouth    nystatin (MYCOSTATIN)  ointment Topical, 2 times daily     Allergies   Allergen Reactions    Cat Dander Shortness Of Breath    Contrast [Iodinated Contrast Media] Anaphylaxis     throat closure    Fish-Derived Products - Food Allergy Anaphylaxis     Anaphylaxis    Pollen Extract Other (See Comments)     hives, throat closure    Shellfish-Derived Products - Food Allergy        Objective   There were no vitals taken for this visit.    Video Exam  Physical Exam     Administrative Statements   Encounter provider Brie Quezada LCSW    The Patient is located at Home and in the following state in which I [place] an [place] license [place].    The patient was identified by name and date of birth. Noy Palomo was informed that this is a telemedicine visit and that the visit is being conducted through the Epic Embedded platform. She agrees to proceed..  My office door was closed. No one else was in the room.  She acknowledged consent and understanding of privacy and security of the video platform. The patient has agreed to participate and understands they can discontinue the visit at any time.    I have spent a total time of 62 minutes in caring for this patient on the day of the visit/encounter including Counseling / Coordination of care, not including the time spent for establishing the audio/video connection.    Visit Time  Start Time: 0707  Stop Time: 0809  Total Visit Time: 62 minutes

## 2025-05-05 ENCOUNTER — EVALUATION (OUTPATIENT)
Dept: PHYSICAL THERAPY | Facility: REHABILITATION | Age: 30
End: 2025-05-05
Attending: OBSTETRICS & GYNECOLOGY
Payer: COMMERCIAL

## 2025-05-05 ENCOUNTER — TELEMEDICINE (OUTPATIENT)
Dept: PSYCHIATRY | Facility: CLINIC | Age: 30
End: 2025-05-05
Payer: COMMERCIAL

## 2025-05-05 DIAGNOSIS — M54.50 BILATERAL LOW BACK PAIN WITHOUT SCIATICA, UNSPECIFIED CHRONICITY: ICD-10-CM

## 2025-05-05 DIAGNOSIS — F41.1 GENERALIZED ANXIETY DISORDER: Primary | ICD-10-CM

## 2025-05-05 DIAGNOSIS — R53.1 WEAKNESS: Primary | ICD-10-CM

## 2025-05-05 DIAGNOSIS — M54.9 UPPER BACK PAIN: ICD-10-CM

## 2025-05-05 PROCEDURE — 97530 THERAPEUTIC ACTIVITIES: CPT

## 2025-05-05 PROCEDURE — 97140 MANUAL THERAPY 1/> REGIONS: CPT

## 2025-05-05 PROCEDURE — 90837 PSYTX W PT 60 MINUTES: CPT | Performed by: SOCIAL WORKER

## 2025-05-05 PROCEDURE — 97110 THERAPEUTIC EXERCISES: CPT

## 2025-05-05 NOTE — PROGRESS NOTES
Daily Note / PT Re-Evaluation     Today's date: 2025  Patient name: Noy Palomo  : 1995  MRN: [id number]  Referring provider: Adilene Morgan MD  Dx:   Encounter Diagnosis     ICD-10-CM    1. Weakness  R53.1       2. Upper back pain  M54.9       3. Bilateral low back pain without sciatica, unspecified chronicity  M54.50       4. Postpartum exam  Z39.2             Start Time: 1715  Stop Time: 1804  Total time in clinic (min): 49 minutes      Assessment/Plan  Pt presents for her 5th physical therapy visit and reassessment s/p  on 24 for low back pain, upper back pain, decreased strength and stamina, and an inability to stop her urine stream with her pelvic floor muscles. Since beginning physical therapy she has made improvements in breath mechanics, back pain, diastasis rectus abdominis, bilateral hip strength, TrA activation, bilateral scapular stabilizer strength and function. She also demonstrates improvements in tenderness and tension of her L upper and middle trapezius and levator scapulae muscles. Her pelvic floor muscles were unable to be assessed today because she had her period, so they will be further assessed at her next visit. Her pelvic floor function has improved slightly based on the slight improvement in her Urinary Distress Inventory score. She continues to present with poor posture and tension, thoracic spine hypomobility, pain with thoracic spine mobilization, decreased scapular stabilizer strength, decreased bilateral hip strength, decreased TrA activation and TTP of R upper trapezius, middle trapezius, and levator scapulae muscles. Based on her pelvic floor muscle examination objective measures from 25, she has increased PFM tension and tenderness, increased tenderness of her labia minora, poor PFM relaxation, an absent cough reflex, a mild anterior pelvic organ prolapse, decreased PFM strength, decreased PFM endurance, and substitution and overflow from  surrounding muscles with PFM contractions. These impairments are continuing to limit her ability to perform ADLs and work duties, and her ability to participate in recreational activities. These deficits can also potentially lead to a worsening of her pelvic organ prolapse and a worsening of her pelvic floor symptoms if they are not addressed now. At this time pt reports feeling an 80% improvement in her symptoms. Pt is making steady progress towards all of her goals. Pt will benefit from continued skilled outpatient PT to address therapy goals, to improve deficits, to improve function, and ultimately meet goal of independent self management of condition.    Her exercises continued to focus on improving her scapular stability and trunk stability. Added clamshells to her HEP to continue to improve her trunk stability and PFM strength through overflow. Informed the pt that although she is already seeing a therapist, that her physical therapist will be informing her OBGYN of her [place]  Depression Scale score because it is higher than 10 and indicates that she is at an increased risk for postpartum depression. She demonstrated a good verbal understanding of this and was in agreement with her physical therapist.        Goals  Short term goals: to be met in 3-4 weeks  Pt independent with initial HEP, rationale, technique and frequency, for ROM and pain control. MET  Pt will report at least a 25% reduction in subjective pain complaints/symptoms to better manage ADLs and household chores. MET  Pt will be able to effectively isolate and recruit the TrA without Valsalva or global abdominal contraction to improve lumbopelvic stability. PROGRESSING  Pt will manage 10-15 minutes of continuous activity for general conditioning and endorphin release for pain management using bike or treadmill. MET  Pt presents with good understanding of pelvic floor protective strategies to reduce intra-abdominal pressure against  pelvic organs. PROGRESSING    Long term goals: to be met by discharge  Pt will report a 75% or > reduction in subjective pain complaints/symptoms to better manage ADLs and household chores. PROGRESSING  Improve B hip abduction MMT to a 4/5 or greater for improved lumbopelvic stability. PROGRESSING  Improve B scapular stabilizer MMTs to a 4/5 or greater for improved scapular stability. PROGRESSING  Pt will be able to perform ADLs, iADLS, and work duties without pain or restriction indicating return to PLOF. PROGRESSING  Pt independent with rationale, technique and plan for performance of advanced HEP to ensure independent self-management of symptoms upon discharge. PROGRESSING  Demonstrates correct isolation and relaxation of pelvic floor to palpation without overflow from global stabilizers. (NEW GOAL)  Patient will use pelvic floor muscles correctly during functional ADLs such as coughing, sneezing, lifting and exercise activities to avoid excessive IAP and PFM strain. (NEW GOAL)      Plan  Continue to treat 1-2x/week for 6 weeks.      Subjective  Pt reports that her symptoms have improved about 80% since beginning physical therapy.    She feels like she is able to hold her urine better. She only has a little urinary leakage if she defers her urge for 1-2 hours.    She reports that she feels like her stamina and strength have improved since starting physical therapy. She does not really feel like she is weak or lacking endurance. She is not having any trouble with her work activities.    She no longer has pain between her shoulders. She no longer feels like she has any tightness in her upper back. Pain - Current:0/10, Best: 0/10, Worst: 0/10    She no longer has low back pain. Pain Current:0/10, Best: 0/10, Worst: 0/10. [place].    Pt reports that her regular therapy is going well as well.    She has not been to physical therapy in 3 weeks due to being sick and her schedule.    Sexual activity:  She has not had any  "pain with intercourse  Daily hydration:  <40 ounces   Water: 16 oz, Coffee: 8 oz, Body Armor: 16 oz, Milk: 4oz.    Pt deferred pelvic floor exam today due to having her period.      Objective    Abdominal Assessment: Observation: normal ventral rib excursion in a superior and lateral direction    Diastatis   3\" above umbilicus (# fingers): 0  Umbilicus (# fingers): 0  3\" below umbilicus (# fingers): 0  unable to engage transverse abdominis                 MMT    Hip       L       R   Flex. 5 5   Extn. 4+ 4+   Abd. 4- 4-         Pelvic Motor Control:  Transverse abdominis = Fair bilaterally           Segmental mobility: TS=  WNL from T3-T7 with no pain    Palpation: increased tension and tenderness of R upper trapezius, middle trapezius and levator scapulae muscles     Posture: forward head and rounded shoulders        MMT    Shoulder       L       R   Mid Trap 4+ 4+   Low Trap 4 4   Latissimus dorsi 4 4           Pelvic floor muscle objective measurements from 1/27/25:    General Perineum Exam:   perineum intact.   Positive for perianal erythema.      General perineum exam comments: Palpation: increased tension of layer 1 and 2 of PFM from 1-11 o'clock, L IC, R IC, L OI, and R OI. 3/10 pain with palpation of layer 1 of PFM from 1-11 o'clock, R IC, L IC, R OI and L OI. 2/10 pain with palpation of layer 2 of PFM from 3-9 o'clock        Visual Inspection of Perineum:   Excursion of perineal body in cephalad direction with contraction of pelvic floor muscles (PFM): weak and unable to isolate without substitution  Excursion of perineal body in caudal direction with relaxation of pelvic floor muscles (PFM): poor to fair   Involuntary contraction with coughing: no (slight bearing down)  Involuntary relaxation with bearing down: yes  Cotton swab test: 3/10 pain from 4-8 o'clock of labia minora (sharp)  Cough reflex: no cough reflex (slight bearing down)  Sphincter Tone Resting: weak  Sphincter Tone Squeeze: weak  Sensation: " "intact     Pelvic Organ Prolapse   Position: hook-lying  At rest: none  With bearing down: mild (>1cm from hymenal remnants )  Location: anterior         Pelvic Floor Muscle Exam:     Muscle Contraction: overflow and substitution   Breathing pattern with contraction: holding breath   Pelvic floor muscle relaxation is incomplete.   50% pelvic floor relaxation          PERFECT Score   Power right: 2-/5   Power left: 2-/5   Endurance (seconds to max): 4   Repetitions (before fatigue): 4       Access Code: [email or web address]  URL: [email or web address]  Date: 05/05/2025  Prepared by: Vanna Ashton    Exercises  - Supine Diaphragmatic Breathing  - 1 x daily - 7 x weekly - 10 minutes hold  - Supine Butterfly Groin Stretch  - 2 x daily - 7 x weekly - 3 reps - 1 minute hold  - Supine Pelvic Floor Stretch - Hands on Knees  - 2 x daily - 7 x weekly - 3 reps - 1 minute hold  - Clamshell with Resistance  - 1 x daily - 7 x weekly - 2 sets - 10 reps  - Supine Bridge with Resistance Band  - 1 x daily - 7 x weekly - 2 sets - 10 reps - 5 seconds hold  - Standing Hip Abduction with Resistance at Ankles and Counter Support  - 1 x daily - 7 x weekly - 2 sets - 10 reps  - Standing Hip Extension with Resistance at Ankles and Counter Support  - 1 x daily - 7 x weekly - 2 sets - 10 reps       Precautions: GERD, Iron deficiency anemia, Adjustment disorder with mixed anxiety and depressed mood, H/o gastric sleeve surgery 2023, Umbilical hernia from when she was born      Manuals 1/20 1/27 2/5 2/10 3/3 3/17 3/24 4/7 5/5    Assessment *PFM exam nv PFM internal and external 15' - JY   RE-JY 15' - JY  6' - JY RE-JY    Bilateral upper and middle trapezius STM  5' - JY 10' - JY 10' - JY  9' - JY 10' - JY  R side only 5' - JY    PFM STM/MFR      10' - JY  10' - JY     Pelvic drop        5' - JY     PFM contract/relax        5' - JY     Neuro Re-Ed             TrA contraction 2x10x5\" with breath 2x10x5\" with breath  Quadruped 2x10x5\" with breath " "Quadruped 2x10x5\" with breath        Scapular retractions 1x8x3\" 2x10x5\" 2x10x5\"  2x10x5\"  2x10x5\"      Diaphragmatic breathing 5' 10'           No moneys    2x10 BTB         Supine marches       2x10x5\" ea with breath/TrA      Quadruped hip extensions       10x ea                    Ther Ex             Supine butterfly stretch  3x1' 3x1' 3x1'    3x1'     Happy Baby Stretch  3x1' 3x1' 3x1'  3x1' 3x1' 3x1' 3x1'    TB rows   2x10 GTB 2x10 BTB  2x10 BTB 2x10 BTB      TB extension    2x10 BTB  2x10 BTB 2x10 BTB      Cervical spine retraction   Seated 2x10x5\" Seated 2x10x5\"   Seated 2x10x5\"      Levator scapulae stretch   Seated 3x30\" ea Seated 3x30\" ea  Seated 3x30\" ea       Bridges   10x with breath/TrA, 10x with PPT/breath/TrA  2x10 with PPT/breath/TrA  With hip abduction 2x10 with breath/TrA GTB loop  2x10 with PPT/breath/TrA  With hip abduction 2x10 with breath/TrA GTB loop Standing hip ext 2x10 ea with breath BTB loop    Clamshells    2x10 BTB loop Side lying 2x10 ea GTB loop  Side lying 2x10 ea GTB loop Side lying 2x10 ea GTB loop Standing hip abd 2x10 ea with breath BTB loop    Child's pose stretch      3x1' 3x1' 3x1' 3x1'    Pelvic drops        2x10x5\" with breath     Ther Activity             Pt education Eval findings, pelvic floor anatomy, exam technique, POC, HEP, water intake for breast feeding, current exercise recommendations for walking and planks, HEP 16' - JY Eval findings, breathing to reduce IAP, posture while feeding baby, HEP 8' - JY Deficits that could be leading to a feeling of muscle \"tightness,\" safe exercises to begin performing, HEP 8' - JY HEP - JY HEP - JY Exam findings, HEP - JY HEP - JY HEP - JY Eval findings, POC moving forward, HEP 8' - JY                 Gait Training                                       Modalities                                            "

## 2025-05-08 NOTE — PSYCH
"Virtual Regular VisitName: Noy Palomo      : 1995      MRN: [id number]  Encounter Provider: Brie Quezada LCSW  Encounter Date: 2025   Encounter department: [facility]  :  Assessment & Plan  Generalized anxiety disorder             Goals addressed in session: Goal 1     DATA: Clinician explored client's current stressors-- discussed the progress made with her 's family. Clinician affirmed client's approach with her in laws-- maintaining boundaries, and healthy communication. Clinician and client discussed conflict resolution, seeking clarity and understanding, and recognizing her in law's conflict avoidance.   During this session, this clinician used the following therapeutic modalities: Client-centered Therapy, Cognitive Behavioral Therapy, Solution-Focused Therapy, and Supportive Psychotherapy    Substance Abuse was not addressed during this session. If the client is diagnosed with a co-occurring substance use disorder, please indicate any changes in the frequency or amount of use: NA. Stage of change for addressing substance use diagnoses: No substance use/Not applicable    ASSESSMENT:  Noy presents with a Euthymic/ normal mood. Noy's affect is Normal range and intensity, which is congruent, with their mood and the content of the session. The client has made progress on their goals as evidenced by client advocating for herself.    Noy presents with a none risk of suicide, none risk of self-harm, and none risk of harm to others.    For any risk assessment that surpasses a \"low\" rating, a safety plan must be developed.    A safety plan was indicated: no  If yes, describe in detail NA    PLAN: Between sessions, Noy will continue to utilize her coping skills. At the next session, the therapist will use Client-centered Therapy to address ongoing stressors.    Behavioral Health Treatment Plan [facility]: Diagnosis and Treatment Plan " explained to Noy Villafuerte relates understanding diagnosis and is agreeable to Treatment Plan. Yes     Depression Follow-up Plan Completed: Not applicable     Reason for visit is No chief complaint on file.     Recent Visits  Date Type Provider Dept   05/05/25 Telemedicine [facility]   Showing recent visits within past 7 days and meeting all other requirements  Future Appointments  No visits were found meeting these conditions.  Showing future appointments within next 150 days and meeting all other requirements     History of Present Illness     HPI    Past Medical History   Past Medical History:   Diagnosis Date   • Anemia     takes irondaly   • Asthma    • Chronic kidney disease     sm cyst on left kidney diag 10 yrs ago   • Family history of Down syndrome 06/05/2024    FOB' maternal uncle with down syndrome  Low risk NIPT     • Gastritis    • GERD (gastroesophageal reflux disease)    • Hx of gastritis    • Migraine    • Obesity    • Seasonal allergies    • Situational stress    • Umbilical hernia    • Varicella     had vaccines     Past Surgical History:   Procedure Laterality Date   • INSERTION OF INTRAUTERINE DEVICE (IUD)     • PA LAPS GSTRC RSTRICTIV PX LONGITUDINAL GASTRECTOMY N/A 01/09/2023    Procedure: GASTRECTOMY SLEEVE LAP & INTRAOPERATIVE EGD;  Surgeon: Leif Tolliver MD;  Location: [facility];  Service: Bariatrics   • WISDOM TOOTH EXTRACTION Bilateral 03/2020     Current Outpatient Medications   Medication Instructions   • acetaminophen (TYLENOL) 500 mg, Every 6 hours PRN   • calcium carbonate (TUMS) 500 mg chewable tablet 1 tablet, Daily   • folic acid ( FOLIC ACID) 1 mg, Oral, Daily   • ibuprofen (MOTRIN) 600 mg, Oral, Every 6 hours PRN   • Multiple Vitamins-Minerals (BARIATRIC MULTIVITAMINS/IRON PO) Take by mouth   • nystatin (MYCOSTATIN) ointment Topical, 2 times daily     Allergies   Allergen Reactions   • Cat Dander Shortness Of Breath   •  Contrast [Iodinated Contrast Media] Anaphylaxis     throat closure   • Fish-Derived Products - Food Allergy Anaphylaxis     Anaphylaxis   • Pollen Extract Other (See Comments)     hives, throat closure   • Shellfish-Derived Products - Food Allergy        Objective   There were no vitals taken for this visit.    Video Exam  Physical Exam     Administrative Statements   Encounter provider Brie Quezada LCSW    The Patient is located at Home and in the following state in which I hold an active license [place].    The patient was identified by name and date of birth. Noy Palomo was informed that this is a telemedicine visit and that the visit is being conducted through the Epic Embedded platform. She agrees to proceed..  My office door was closed. No one else was in the room.  She acknowledged consent and understanding of privacy and security of the video platform. The patient has agreed to participate and understands they can discontinue the visit at any time.    I have spent a total time of 58 minutes in caring for this patient on the day of the visit/encounter including Counseling / Coordination of care, not including the time spent for establishing the audio/video connection.    Visit Time  Start Time: 0705  Stop Time: 0803  Total Visit Time: 58 minutes

## 2025-05-22 ENCOUNTER — TELEMEDICINE (OUTPATIENT)
Dept: PSYCHIATRY | Facility: CLINIC | Age: 30
End: 2025-05-22
Payer: COMMERCIAL

## 2025-05-22 ENCOUNTER — OFFICE VISIT (OUTPATIENT)
Dept: PHYSICAL THERAPY | Facility: REHABILITATION | Age: 30
End: 2025-05-22
Attending: OBSTETRICS & GYNECOLOGY
Payer: COMMERCIAL

## 2025-05-22 DIAGNOSIS — M54.9 UPPER BACK PAIN: ICD-10-CM

## 2025-05-22 DIAGNOSIS — R53.1 WEAKNESS: Primary | ICD-10-CM

## 2025-05-22 DIAGNOSIS — F41.1 GENERALIZED ANXIETY DISORDER: Primary | ICD-10-CM

## 2025-05-22 DIAGNOSIS — M54.50 BILATERAL LOW BACK PAIN WITHOUT SCIATICA, UNSPECIFIED CHRONICITY: ICD-10-CM

## 2025-05-22 PROCEDURE — 97140 MANUAL THERAPY 1/> REGIONS: CPT

## 2025-05-22 PROCEDURE — 97110 THERAPEUTIC EXERCISES: CPT

## 2025-05-22 PROCEDURE — 97112 NEUROMUSCULAR REEDUCATION: CPT

## 2025-05-22 PROCEDURE — 90837 PSYTX W PT 60 MINUTES: CPT | Performed by: SOCIAL WORKER

## 2025-05-22 NOTE — PSYCH
"Virtual Regular VisitName: Noy Palomo      : 1995      MRN: [id number]  Encounter Provider: Brie Quezada LCSW  Encounter Date: 2025   Encounter department: [facility]  :  Assessment & Plan  Generalized anxiety disorder             Goals addressed in session: Goal 1     DATA: Clinician explored client's current stressors-- discussed a need for self care as she is overwhelmed with all the appointments for her son. Clinician and client discussed the wins for her son with his hearing aids, as well as her support system continuing to follow through. Clinician and client discussed accepting her inlaws as they are, creating distance between them. During this session, this clinician used the following therapeutic modalities: Client-centered Therapy, Cognitive Behavioral Therapy, Solution-Focused Therapy, and Supportive Psychotherapy    Substance Abuse was not addressed during this session. If the client is diagnosed with a co-occurring substance use disorder, please indicate any changes in the frequency or amount of use: NA. Stage of change for addressing substance use diagnoses: No substance use/Not applicable    ASSESSMENT:  Noy presents with a  mood. Noy's affect is Normal range and intensity, which is congruent, with their mood and the content of the session. The client has made progress on their goals as evidenced by client continuing to utilize her support.    Noy presents with a none risk of suicide, none risk of self-harm, and none risk of harm to others.    For any risk assessment that surpasses a \"low\" rating, a safety plan must be developed.    A safety plan was indicated: no  If yes, describe in detail NA    PLAN: Between sessions, Noy will continue to utilize her self care. At the next session, the therapist will use Client-centered Therapy, Cognitive Behavioral Therapy, Solution-Focused Therapy, and Supportive Psychotherapy to " address ongoing stressors.    Behavioral Health Treatment Plan [facility]: Diagnosis and Treatment Plan explained to Noy Villafuerte relates understanding diagnosis and is agreeable to Treatment Plan. Yes     Depression Follow-up Plan Completed: Not applicable     Reason for visit is No chief complaint on file.     Recent Visits  Date Type Provider Dept   05/22/25 Telemedicine [facility]   Showing recent visits within past 7 days and meeting all other requirements  Future Appointments  No visits were found meeting these conditions.  Showing future appointments within next 150 days and meeting all other requirements     History of Present Illness     HPI    Past Medical History   Past Medical History[1]  Past Surgical History[2]  Current Outpatient Medications   Medication Instructions    acetaminophen (TYLENOL) 500 mg, Every 6 hours PRN    calcium carbonate (TUMS) 500 mg chewable tablet 1 tablet, Daily    folic acid ( FOLIC ACID) 1 mg, Oral, Daily    ibuprofen (MOTRIN) 600 mg, Oral, Every 6 hours PRN    Multiple Vitamins-Minerals (BARIATRIC MULTIVITAMINS/IRON PO) Take by mouth    nystatin (MYCOSTATIN) ointment Topical, 2 times daily     Allergies[3]    Objective   There were no vitals taken for this visit.    Video Exam  Physical Exam     Administrative Statements   Encounter provider Aerial ERASMO Quezada    The Patient is located at Other and in the following state in which I hold an active license [place].    The patient was identified by name and date of birth. Noy Palomo was informed that this is a telemedicine visit and that the visit is being conducted through the Epic Embedded platform. She agrees to proceed..  My office door was closed. No one else was in the room.  She acknowledged consent and understanding of privacy and security of the video platform. The patient has agreed to participate and understands they can discontinue the visit at any time.    I have  spent a total time of 61 minutes in caring for this patient on the day of the visit/encounter including Counseling / Coordination of care, not including the time spent for establishing the audio/video connection.    Visit Time  Start Time: 1302  Stop Time: 1403  Total Visit Time: 61 minutes       [1]   Past Medical History:  Diagnosis Date    Anemia     takes irondaly    Asthma     Chronic kidney disease     sm cyst on left kidney diag 10 yrs ago    Family history of Down syndrome 06/05/2024    FOB' maternal uncle with down syndrome  Low risk NIPT      Gastritis     GERD (gastroesophageal reflux disease)     Hx of gastritis     Migraine     Obesity     Seasonal allergies     Situational stress     Umbilical hernia     Varicella     had vaccines   [2]   Past Surgical History:  Procedure Laterality Date    INSERTION OF INTRAUTERINE DEVICE (IUD)      OR LAPS GSTRC RSTRICTIV PX LONGITUDINAL GASTRECTOMY N/A 01/09/2023    Procedure: GASTRECTOMY SLEEVE LAP & INTRAOPERATIVE EGD;  Surgeon: Leif Tolliver MD;  Location: [facility];  Service: Bariatrics    WISDOM TOOTH EXTRACTION Bilateral 03/2020   [3]   Allergies  Allergen Reactions    Cat Dander Shortness Of Breath    Contrast [Iodinated Contrast Media] Anaphylaxis     throat closure    Fish-Derived Products - Food Allergy Anaphylaxis     Anaphylaxis    Pollen Extract Other (See Comments)     hives, throat closure    Shellfish-Derived Products - Food Allergy

## 2025-05-22 NOTE — PROGRESS NOTES
"Daily Note     Today's date: 2025  Patient name: Noy Palomo  : 1995  MRN: [id number]  Referring provider: Adilene Morgan MD  Dx:   Encounter Diagnosis     ICD-10-CM    1. Weakness  R53.1       2. Upper back pain  M54.9       3. Bilateral low back pain without sciatica, unspecified chronicity  M54.50       4. Postpartum exam  Z39.2                      Subjective: Patient noted she lifted 55 lbs of rocks the other day with no pain. Patient noted R sided neck UT pain today.       Objective: See treatment diary below      Assessment: Tolerated treatment fair. Patient exhibited good technique with therapeutic exercises and would benefit from continued PT.  Patient performed exercises with appropriate level of challenge.       Plan: Continue per plan of care.      Precautions: GERD, Iron deficiency anemia, Adjustment disorder with mixed anxiety and depressed mood, H/o gastric sleeve surgery , Umbilical hernia from when she was born      Manuals 1/20 1/27 2/5 2/10 3/3 3/17 3/24 4/7 5/5 5/22   Assessment *PFM exam nv PFM internal and external 15' - JY   RE-JY 15' - JY  6' - JY RE-JY    Bilateral upper and middle trapezius STM  5' - JY 10' - JY 10' - JY  9' - JY 10' - JY  R side only 5' - JY R side only AC   PFM STM/MFR      10' - JY  10' - JY     Pelvic drop        5' - JY     PFM contract/relax        5' - JY     Neuro Re-Ed             TrA contraction 2x10x5\" with breath 2x10x5\" with breath  Quadruped 2x10x5\" with breath Quadruped 2x10x5\" with breath        Scapular retractions 1x8x3\" 2x10x5\" 2x10x5\"  2x10x5\"  2x10x5\"      Diaphragmatic breathing 5' 10'           No moneys    2x10 BTB         Supine marches       2x10x5\" ea with breath/TrA   2 x 10 5\" ea breath /tra    Quadruped hip extensions       10x ea                    Ther Ex             Supine butterfly stretch  3x1' 3x1' 3x1'    3x1'     Happy Baby Stretch  3x1' 3x1' 3x1'  3x1' 3x1' 3x1' 3x1' 3 x 1'   TB rows   2x10 GTB 2x10 BTB  " "2x10 BTB 2x10 BTB   2 x 10 BTB rows    TB extension    2x10 BTB  2x10 BTB 2x10 BTB   2 x 10 BTB   Cervical spine retraction   Seated 2x10x5\" Seated 2x10x5\"   Seated 2x10x5\"   Seated 2 x 10 5\"   Levator scapulae stretch   Seated 3x30\" ea Seated 3x30\" ea  Seated 3x30\" ea       Bridges   10x with breath/TrA, 10x with PPT/breath/TrA  2x10 with PPT/breath/TrA  With hip abduction 2x10 with breath/TrA GTB loop  2x10 with PPT/breath/TrA  With hip abduction 2x10 with breath/TrA GTB loop Standing hip ext 2x10 ea with breath BTB loop Standing hip ext 2x10 ea with breath BTB loop   Clamshells    2x10 BTB loop Side lying 2x10 ea GTB loop  Side lying 2x10 ea GTB loop Side lying 2x10 ea GTB loop Standing hip abd 2x10 ea with breath BTB loop Standing hip abd 2x10 ea with breath BTB loop   Child's pose stretch      3x1' 3x1' 3x1' 3x1' 3 x 1'   Pelvic drops        2x10x5\" with breath     Ther Activity             Pt education Eval findings, pelvic floor anatomy, exam technique, POC, HEP, water intake for breast feeding, current exercise recommendations for walking and planks, HEP 16' - JY Eval findings, breathing to reduce IAP, posture while feeding baby, HEP 8' - JY Deficits that could be leading to a feeling of muscle \"tightness,\" safe exercises to begin performing, HEP 8' - JY HEP - JY HEP - JY Exam findings, HEP - JY HEP - JY HEP - JY Eval findings, POC moving forward, HEP 8' - JY                 Gait Training                                       Modalities                                            "

## 2025-06-16 ENCOUNTER — TELEMEDICINE (OUTPATIENT)
Dept: PSYCHIATRY | Facility: CLINIC | Age: 30
End: 2025-06-16
Payer: COMMERCIAL

## 2025-06-16 DIAGNOSIS — F41.1 GENERALIZED ANXIETY DISORDER: Primary | ICD-10-CM

## 2025-06-16 PROCEDURE — 90837 PSYTX W PT 60 MINUTES: CPT | Performed by: SOCIAL WORKER

## 2025-06-18 NOTE — PSYCH
"Virtual Regular VisitName: Noy Palomo      : 1995      MRN: [id number]  Encounter Provider: Brie Quezada LCSW  Encounter Date: 2025   Encounter department: [facility]  :  Assessment & Plan  Generalized anxiety disorder             Goals addressed in session: Goal 1     DATA: Clinician explored client's current stressors-- discussed her job-- feeling as though she is in the best place. Clinician and client discussed her experiences as well as encouraging her partner to utilize her and his coping skills  During this session, this clinician used the following therapeutic modalities: Client-centered Therapy    Substance Abuse was not addressed during this session. If the client is diagnosed with a co-occurring substance use disorder, please indicate any changes in the frequency or amount of use: NA. Stage of change for addressing substance use diagnoses: No substance use/Not applicable    ASSESSMENT:  Noy presents with a Euthymic/ normal mood. Noy's affect is Normal range and intensity, which is congruent, with their mood and the content of the session. The client has made progress on their goals as evidenced by client's current coping skills.    Noy presents with a none risk of suicide, none risk of self-harm, and none risk of harm to others.    For any risk assessment that surpasses a \"low\" rating, a safety plan must be developed.    A safety plan was indicated: no  If yes, describe in detail NA    PLAN: Between sessions, Noy will continue to utilize her coping skills. At the next session, the therapist will use Client-centered Therapy to address ongoing stressors.    Behavioral Health Treatment Plan [facility]: Diagnosis and Treatment Plan explained to Noy, Noy relates understanding diagnosis and is agreeable to Treatment Plan. Yes     Depression Follow-up Plan Completed: Not applicable     Reason for visit is No chief complaint " on file.     Recent Visits  Date Type Provider Dept   06/16/25 Telemedicine [facility]   Showing recent visits within past 7 days and meeting all other requirements  Future Appointments  No visits were found meeting these conditions.  Showing future appointments within next 150 days and meeting all other requirements     History of Present Illness     HPI    Past Medical History   Past Medical History[1]  Past Surgical History[2]  Current Outpatient Medications   Medication Instructions    acetaminophen (TYLENOL) 500 mg, Every 6 hours PRN    calcium carbonate (TUMS) 500 mg chewable tablet 1 tablet, Daily    folic acid ( FOLIC ACID) 1 mg, Oral, Daily    ibuprofen (MOTRIN) 600 mg, Oral, Every 6 hours PRN    Multiple Vitamins-Minerals (BARIATRIC MULTIVITAMINS/IRON PO) Take by mouth    nystatin (MYCOSTATIN) ointment Topical, 2 times daily     Allergies[3]    Objective   There were no vitals taken for this visit.    Video Exam  Physical Exam     Administrative Statements   Encounter provider Brie Quezada LCSW    The Patient is located at Home and in the following state in which I hold an active license [place].    The patient was identified by name and date of birth. Noy Palomo was informed that this is a telemedicine visit and that the visit is being conducted through the Epic Embedded platform. She agrees to proceed..  My office door was closed. No one else was in the room.  She acknowledged consent and understanding of privacy and security of the video platform. The patient has agreed to participate and understands they can discontinue the visit at any time.    I have spent a total time of 63 minutes in caring for this patient on the day of the visit/encounter including Counseling / Coordination of care, not including the time spent for establishing the audio/video connection.    Visit Time  Start Time: 0705  Stop Time: 0808  Total Visit Time: 63 minutes       [1]   Past  Medical History:  Diagnosis Date    Anemia     takes irondaly    Asthma     Chronic kidney disease     sm cyst on left kidney diag 10 yrs ago    Family history of Down syndrome 06/05/2024    FOB' maternal uncle with down syndrome  Low risk NIPT      Gastritis     GERD (gastroesophageal reflux disease)     Hx of gastritis     Migraine     Obesity     Seasonal allergies     Situational stress     Umbilical hernia     Varicella     had vaccines   [2]   Past Surgical History:  Procedure Laterality Date    INSERTION OF INTRAUTERINE DEVICE (IUD)      CA LAPS GSTRC RSTRICTIV PX LONGITUDINAL GASTRECTOMY N/A 01/09/2023    Procedure: GASTRECTOMY SLEEVE LAP & INTRAOPERATIVE EGD;  Surgeon: Leif Tolliver MD;  Location: [facility];  Service: Bariatrics    WISDOM TOOTH EXTRACTION Bilateral 03/2020   [3]   Allergies  Allergen Reactions    Cat Dander Shortness Of Breath    Contrast [Iodinated Contrast Media] Anaphylaxis     throat closure    Fish-Derived Products - Food Allergy Anaphylaxis     Anaphylaxis    Pollen Extract Other (See Comments)     hives, throat closure    Shellfish-Derived Products - Food Allergy

## 2025-06-19 ENCOUNTER — EVALUATION (OUTPATIENT)
Dept: PHYSICAL THERAPY | Facility: REHABILITATION | Age: 30
End: 2025-06-19
Attending: OBSTETRICS & GYNECOLOGY
Payer: COMMERCIAL

## 2025-06-19 DIAGNOSIS — R53.1 WEAKNESS: Primary | ICD-10-CM

## 2025-06-19 DIAGNOSIS — M54.9 UPPER BACK PAIN: ICD-10-CM

## 2025-06-19 DIAGNOSIS — M54.50 BILATERAL LOW BACK PAIN WITHOUT SCIATICA, UNSPECIFIED CHRONICITY: ICD-10-CM

## 2025-06-19 PROCEDURE — 97110 THERAPEUTIC EXERCISES: CPT

## 2025-06-19 PROCEDURE — 97140 MANUAL THERAPY 1/> REGIONS: CPT

## 2025-06-19 PROCEDURE — 97530 THERAPEUTIC ACTIVITIES: CPT

## 2025-06-19 NOTE — PROGRESS NOTES
Daily Note / PT Re-Evaluation     Today's date: 2025  Patient name: Noy Palomo  : 1995  MRN: [id number]  Referring provider: Adilene Morgan MD  Dx:   Encounter Diagnosis     ICD-10-CM    1. Weakness  R53.1       2. Upper back pain  M54.9       3. Bilateral low back pain without sciatica, unspecified chronicity  M54.50       4. Postpartum exam  Z39.2               Start Time: 803  Stop Time: 905  Total time in clinic (min): 62 minutes      Assessment/Plan  Pt presents for her 9th physical therapy visit and reassessment s/p  on 24 for low back pain, upper back pain, decreased strength and stamina, and an inability to stop her urine stream with her pelvic floor muscles. Since her last re-evaluation, she has made improvements in breath mechanics, back pain, diastasis rectus abdominis, bilateral hip strength, TrA activation, thoracic mobility, bilateral scapular stabilizer strength and function. Her pelvic floor muscles were unable to be assessed today because she had her period, so they will be further assessed at a future next visit. Based on the objective measurements taken on 3/17/25, since beginning physical therapy she has made improvements in PFM tension and tenderness, PFM relaxation, PFM strength, and PFM endurance. She has met 4/5 short-term goals, 3/7 long-term goals, and is making slow and stead progress towards the rest of her goals. Her progress has likely been limited because of the decreased frequency of her physical therapy visits. She continues to present with poor posture, decreased bilateral hip strength, decreased TrA activation and increased tension and TTP of R upper trapezius, middle trapezius, and levator scapulae muscles. Based on her pelvic floor muscle examination objective measures from 3/17/25, she has increased PFM tension and tenderness, PFM relaxation, an absent cough reflex, a mild anterior pelvic organ prolapse, decreased PFM strength, decreased PFM  endurance, and substitution and overflow from surrounding muscles with PFM contractions. These impairments are continuing to limit her ability to participate in recreational and exercise activities because of her pelvic organ prolapse. These deficits can also potentially lead to a worsening of her pelvic organ prolapse and a worsening of her pelvic floor symptoms if they are not addressed now. At this time pt reports feeling an 80% improvement in her symptoms. Pt is making steady progress towards all of her goals. Pt will benefit from continued skilled outpatient PT to address therapy goals, to improve deficits, to improve function, and ultimately meet goal of independent self management of condition.    Continued to perform stretching to improve her PFM tension. Her exercises also continued to focus on improving her trunk stability. Added resisted standing hip abduction and extension to her HEP to continue to improve her trunk stability and PFM strength through overflow. Reviewed the examination findings, POC, and HEP with the pt. She demonstrated a good verbal understanding of this and was in agreement with her physical therapist.        Goals  Short term goals: to be met in 3-4 weeks  Pt independent with initial HEP, rationale, technique and frequency, for ROM and pain control. MET  Pt will report at least a 25% reduction in subjective pain complaints/symptoms to better manage ADLs and household chores. MET  Pt will be able to effectively isolate and recruit the TrA without Valsalva or global abdominal contraction to improve lumbopelvic stability. MET  Pt will manage 10-15 minutes of continuous activity for general conditioning and endorphin release for pain management using bike or treadmill. MET  Pt presents with good understanding of pelvic floor protective strategies to reduce intra-abdominal pressure against pelvic organs. PROGRESSING    Long term goals: to be met by discharge  Pt will report a 75% or >  reduction in subjective pain complaints/symptoms to better manage ADLs and household chores. MET  Improve B hip abduction MMT to a 4/5 or greater for improved lumbopelvic stability. PROGRESSING (90% MET)  Improve B scapular stabilizer MMTs to a 4/5 or greater for improved scapular stability. MET  Pt will be able to perform ADLs, iADLS, and work duties without pain or restriction indicating return to PLOF. MET  Pt independent with rationale, technique and plan for performance of advanced HEP to ensure independent self-management of symptoms upon discharge. PROGRESSING  Demonstrates correct isolation and relaxation of pelvic floor to palpation without overflow from global stabilizers. PROGRESSING  Patient will use pelvic floor muscles correctly during functional ADLs such as coughing, sneezing, lifting and exercise activities to avoid excessive IAP and PFM strain.PROGRESSING      Plan  Continue to treat 1x/week or every other week for 10 weeks.      Subjective  Pt reports that her symptoms have improved about 90% since beginning physical therapy.    She does not have any urinary urgency.    She has not been having any leakage at all. She doesn't even have to cross her legs when she sneezes or coughs.    She reports that she feels like her stamina and strength have improved since starting physical therapy. She does not really feel like she is weak or lacking endurance. She is not having any trouble with her work activities. She has been doing a lot of yard work and has not had any trouble.    She no longer has pain between her shoulders. Pain - Current:0/10, Best: 0/10, Worst: 5/10    She no longer has low back pain. Pain Current:0/10, Best: 0/10, Worst: 0/10. [place].    Pt reports that her regular therapy is going well as well.    She has not been to physical therapy in 3 weeks due to being sick and her schedule.    She did get a deep tissue massage, which seemed to help relieve her tension a lot. She feels that she is  ready to be discharged and will continue going to get massages to attempt to relieve her tension.    Sexual activity:  She has not had any pain with intercourse  Daily hydration:  <40 ounces   Water: 16 oz, Coffee: 8 oz, Body Armor: 16 oz, Milk: 4oz.        Objective              MMT    Hip       L       R   Flex. 5 5   Extn. 5 5   Abd. 4+ 4+         Pelvic Motor Control:  Transverse abdominis = Fair to good bilaterally          Palpation: increased tension and tenderness of bilateral upper trapezius and levator scapulae muscles     Posture: forward head and rounded shoulders        MMT    Shoulder       L       R   Mid Trap 4+ 4+   Low Trap 4+ 4+   Latissimus dorsi 4+ 4+           Pelvic floor muscle objective measurements from 3/17/25:    General Perineum Exam:   perineum intact.   Positive for perianal erythema.      General perineum exam comments: Palpation: increased tension of layer 1 and 2 of PFM from 1-11 o'clock, L IC, R IC, L OI, and R OI. 2/10 pain with palpation of layer 1 of PFM at 2 o'clock. 4/10 pain with palpation of layer 2 of PFM at 2 o'clock        Visual Inspection of Perineum:   Excursion of perineal body in cephalad direction with contraction of pelvic floor muscles (PFM): weak and unable to isolate without substitution  Excursion of perineal body in caudal direction with relaxation of pelvic floor muscles (PFM): poor to fair   Involuntary contraction with coughing: no (slight bearing down)  Involuntary relaxation with bearing down: yes  Cough reflex: no cough reflex (slight bearing down)  Sphincter Tone Resting: weak to fair  Sphincter Tone Squeeze: weak to fair     Pelvic Organ Prolapse   Position: hook-lying  At rest: none  With bearing down: none         Pelvic Floor Muscle Exam:     Muscle Contraction: no overflow and substitution   Breathing pattern with contraction: diaphragmatic breathing   Pelvic floor muscle relaxation is complete.   100% pelvic floor relaxation          PERFECT Score    Power right: 2/5   Power left: 2/5   Endurance (seconds to max): 5  Repetitions (before fatigue): 6        pelvic floor exam consent given by patient    Pelvic exam completed: vaginally       Access Code: [email or web address]  URL: [email or web address]  Date: 06/19/2025  Prepared by: Vanna Ashton    Exercises  - Supine Diaphragmatic Breathing  - 1 x daily - 7 x weekly - 10 minutes hold  - Supine Butterfly Groin Stretch  - 2 x daily - 7 x weekly - 3 reps - 1 minute hold  - Supine Pelvic Floor Stretch - Hands on Knees  - 2 x daily - 7 x weekly - 3 reps - 1 minute hold  - Clamshell with Resistance  - 1 x daily - 7 x weekly - 2 sets - 10 reps  - Supine Bridge with Resistance Band  - 1 x daily - 7 x weekly - 2 sets - 10 reps - 5 seconds hold  - Standing Hip Abduction with Resistance at Ankles and Counter Support  - 1 x daily - 7 x weekly - 2 sets - 10 reps  - Standing Hip Extension with Resistance at Ankles and Counter Support  - 1 x daily - 7 x weekly - 2 sets - 10 reps  - Supine Pelvic Floor Contraction  - 2-3 x daily - 7 x weekly - 1 sets - 10 reps - 5 seconds hold  - Seated Pelvic Floor Contraction  - 2-3 x daily - 7 x weekly - 1 sets - 10 reps - 5 seconds hold  - Pelvic Floor Contractions in Hooklying with Adduction  - 1 x daily - 7 x weekly - 2 sets - 10 reps - 5 seconds hold  - Supine Pelvic Floor Contraction  - 2-3 x daily - 7 x weekly - 1 sets - 10 reps - 10 secinds hold  - Standing Pelvic Floor Contraction  - 2-3 x daily - 7 x weekly - 1 sets - 10 reps - 5seconds hold       Precautions: GERD, Iron deficiency anemia, Adjustment disorder with mixed anxiety and depressed mood, H/o gastric sleeve surgery 2023, Umbilical hernia from when she was born      Manuals 6/19  2/5 2/10 3/3 3/17 3/24 4/7 5/5 5/22   Assessment RE-JY    RE-JY 15' - JY  6' - JY RE-JY    Bilateral upper and middle trapezius STM   10' - JY 10' - JY  9' - JY 10' - JY  R side only 5' - JY R side only AC   PFM STM/MFR      10' - JY  10' - JY    "  Pelvic drop        5' - JY     PFM contract/relax        5' - JY     Neuro Re-Ed             TrA contraction    Quadruped 2x10x5\" with breath Quadruped 2x10x5\" with breath        Scapular retractions   2x10x5\"  2x10x5\"  2x10x5\"      Diaphragmatic breathing             No moneys    2x10 BTB         Supine marches       2x10x5\" ea with breath/TrA   2 x 10 5\" ea breath /tra    Quadruped hip extensions       10x ea       PFM contractions Supine 10x5\", Seated 10x5\"            Ther Ex             Supine butterfly stretch   3x1' 3x1'    3x1'     Happy Baby Stretch   3x1' 3x1'  3x1' 3x1' 3x1' 3x1' 3 x 1'   TB rows   2x10 GTB 2x10 BTB  2x10 BTB 2x10 BTB   2 x 10 BTB rows    TB extension    2x10 BTB  2x10 BTB 2x10 BTB   2 x 10 BTB   Cervical spine retraction   Seated 2x10x5\" Seated 2x10x5\"   Seated 2x10x5\"   Seated 2 x 10 5\"   Levator scapulae stretch   Seated 3x30\" ea Seated 3x30\" ea  Seated 3x30\" ea       Bridges 10x5\" with breath/PFM, Standing hip ext 10x ea with breath/PFM BTB loop  10x with breath/TrA, 10x with PPT/breath/TrA  2x10 with PPT/breath/TrA  With hip abduction 2x10 with breath/TrA GTB loop  2x10 with PPT/breath/TrA  With hip abduction 2x10 with breath/TrA GTB loop Standing hip ext 2x10 ea with breath BTB loop Standing hip ext 2x10 ea with breath BTB loop   Clamshells Standing hip abd 10x ea with breath/PFM BTB loop   2x10 BTB loop Side lying 2x10 ea GTB loop  Side lying 2x10 ea GTB loop Side lying 2x10 ea GTB loop Standing hip abd 2x10 ea with breath BTB loop Standing hip abd 2x10 ea with breath BTB loop   Child's pose stretch      3x1' 3x1' 3x1' 3x1' 3 x 1'   Pelvic drops        2x10x5\" with breath     Supine hip adduction Supine 10x5\" with breath/PFM with ball            Ther Activity             Pt education Exam findings, How to progress HEP, frequency of performing exercises, water intake, breathing to reduce IAP, signs/symptoms during exercise to return to PFPT - JY  Deficits that could be leading to a " "feeling of muscle \"tightness,\" safe exercises to begin performing, HEP 8' - JY HEP - JY HEP - JY Exam findings, HEP - JY HEP - JY HEP - JY Eval findings, POC moving forward, HEP 8' - JY                 Gait Training                                       Modalities                                            "

## 2025-06-30 ENCOUNTER — TELEMEDICINE (OUTPATIENT)
Dept: PSYCHIATRY | Facility: CLINIC | Age: 30
End: 2025-06-30
Payer: COMMERCIAL

## 2025-06-30 DIAGNOSIS — F41.1 GENERALIZED ANXIETY DISORDER: Primary | ICD-10-CM

## 2025-06-30 PROCEDURE — 90834 PSYTX W PT 45 MINUTES: CPT | Performed by: SOCIAL WORKER

## 2025-06-30 NOTE — PSYCH
"Virtual Regular VisitName: Noy Palomo      : 1995      MRN: [id number]  Encounter Provider: Brie Quezada LCSW  Encounter Date: 2025   Encounter department: [facility]  :  Assessment & Plan  Generalized anxiety disorder             Goals addressed in session: Goal 1     DATA: Clinician explored client's current stressors-- discussed her family-- introducing the family to her child. Clinician affirmed client's coping skills and spending time with her loved ones.  During this session, this clinician used the following therapeutic modalities: Client-centered Therapy, Cognitive Behavioral Therapy, Solution-Focused Therapy, and Supportive Psychotherapy    Substance Abuse was not addressed during this session. If the client is diagnosed with a co-occurring substance use disorder, please indicate any changes in the frequency or amount of use: NA. Stage of change for addressing substance use diagnoses: No substance use/Not applicable    ASSESSMENT:  Noy presents with a Euthymic/ normal mood. Noy's affect is Normal range and intensity, which is congruent, with their mood and the content of the session. The client has made progress on their goals as evidenced by client's utilization of her coping skills.    Noy presents with a none risk of suicide, none risk of self-harm, and none risk of harm to others.    For any risk assessment that surpasses a \"low\" rating, a safety plan must be developed.    A safety plan was indicated: no  If yes, describe in detail NA    PLAN: Between sessions, Noy will continue to utilize her coping skills. At the next session, the therapist will use Client-centered Therapy to address ongoing stressors.    Behavioral Health Treatment Plan [facility]: Diagnosis and Treatment Plan explained to Noy Villafuerte relates understanding diagnosis and is agreeable to Treatment Plan. Yes     Depression Follow-up Plan Completed: Not " applicable     Reason for visit is No chief complaint on file.     Recent Visits  No visits were found meeting these conditions.  Showing recent visits within past 7 days and meeting all other requirements  Today's Visits  Date Type Provider Dept   06/30/25 Telemedicine Brie Quezada LCSW [facility]   Showing today's visits and meeting all other requirements  Future Appointments  No visits were found meeting these conditions.  Showing future appointments within next 150 days and meeting all other requirements     History of Present Illness     HPI    Past Medical History   Past Medical History[1]  Past Surgical History[2]  Current Outpatient Medications   Medication Instructions    acetaminophen (TYLENOL) 500 mg, Every 6 hours PRN    calcium carbonate (TUMS) 500 mg chewable tablet 1 tablet, Daily    folic acid ( FOLIC ACID) 1 mg, Oral, Daily    ibuprofen (MOTRIN) 600 mg, Oral, Every 6 hours PRN    Multiple Vitamins-Minerals (BARIATRIC MULTIVITAMINS/IRON PO) Take by mouth    nystatin (MYCOSTATIN) ointment Topical, 2 times daily     Allergies[3]    Objective   There were no vitals taken for this visit.    Video Exam  Physical Exam     Administrative Statements   Encounter provider Brie Quezada LCSW    The Patient is located at Home and in the following state in which I hold an active license [place].    The patient was identified by name and date of birth. Noy Palomo was informed that this is a telemedicine visit and that the visit is being conducted through the Epic Embedded platform. She agrees to proceed..  My office door was closed. No one else was in the room.  She acknowledged consent and understanding of privacy and security of the video platform. The patient has agreed to participate and understands they can discontinue the visit at any time.    I have spent a total time of 50 minutes in caring for this patient on the day of the visit/encounter including Counseling / Coordination of  care, not including the time spent for establishing the audio/video connection.    Visit Time  Start Time: 0717  Stop Time: 0807  Total Visit Time: 50 minutes       [1]   Past Medical History:  Diagnosis Date    Anemia     takes irondaly    Asthma     Chronic kidney disease     sm cyst on left kidney diag 10 yrs ago    Family history of Down syndrome 06/05/2024    FOB' maternal uncle with down syndrome  Low risk NIPT      Gastritis     GERD (gastroesophageal reflux disease)     Hx of gastritis     Migraine     Obesity     Seasonal allergies     Situational stress     Umbilical hernia     Varicella     had vaccines   [2]   Past Surgical History:  Procedure Laterality Date    INSERTION OF INTRAUTERINE DEVICE (IUD)      [place] LAPS GSTRC RSTRICTIV PX LONGITUDINAL GASTRECTOMY N/A 01/09/2023    Procedure: GASTRECTOMY SLEEVE LAP & INTRAOPERATIVE EGD;  Surgeon: Leif Tolliver MD;  Location: [facility];  Service: Bariatrics    WISDOM TOOTH EXTRACTION Bilateral 03/2020   [3]   Allergies  Allergen Reactions    Cat Dander Shortness Of Breath    Contrast [Iodinated Contrast Media] Anaphylaxis     throat closure    Fish-Derived Products - Food Allergy Anaphylaxis     Anaphylaxis    Pollen Extract Other (See Comments)     hives, throat closure    Shellfish-Derived Products - Food Allergy

## 2025-07-21 ENCOUNTER — TELEMEDICINE (OUTPATIENT)
Dept: PSYCHIATRY | Facility: CLINIC | Age: 30
End: 2025-07-21
Payer: COMMERCIAL

## 2025-07-21 DIAGNOSIS — F41.1 GENERALIZED ANXIETY DISORDER: Primary | ICD-10-CM

## 2025-07-21 PROCEDURE — 90837 PSYTX W PT 60 MINUTES: CPT | Performed by: SOCIAL WORKER

## 2025-07-21 NOTE — PSYCH
"Virtual Regular VisitName: Noy Palomo      : 1995      MRN: [id number]  Encounter Provider: Brie Quezada LCSW  Encounter Date: 2025   Encounter department: [facility]  :  Assessment & Plan  Generalized anxiety disorder             Goals addressed in session: Goal 1     DATA: Clinician explored client's current stressors-- discussed her mom's recent cancer scare-- her nelson's contribution in regard to her anxiety and fear regarding her mother's illness. Clinician and client discussed ways that she  can assist her family as they deal with medical problems with her parents.   During this session, this clinician used the following therapeutic modalities: Bereavement Therapy, Client-centered Therapy, Solution-Focused Therapy, and Supportive Psychotherapy    Substance Abuse was not addressed during this session. If the client is diagnosed with a co-occurring substance use disorder, please indicate any changes in the frequency or amount of use: NA. Stage of change for addressing substance use diagnoses: No substance use/Not applicable    ASSESSMENT:  Noy presents with a Euthymic/ normal mood. Noy's affect is Normal range and intensity, which is congruent, with their mood and the content of the session. The client has made progress on their goals as evidenced by client continuing to utilize her coping skills and support system.    Noy presents with a none risk of suicide, none risk of self-harm, and none risk of harm to others.    For any risk assessment that surpasses a \"low\" rating, a safety plan must be developed.    A safety plan was indicated: no  If yes, describe in detail NA    PLAN: Between sessions, Noy will continue to utilize her coping skills. At the next session, the therapist will use Client-centered Therapy to address ongoing stressors.    Behavioral Health Treatment Plan [facility]: Diagnosis and Treatment Plan explained to " Noy Villafuerte relates understanding diagnosis and is agreeable to Treatment Plan. Yes     Depression Follow-up Plan Completed: Not applicable     Reason for visit is No chief complaint on file.     Recent Visits  Date Type Provider Dept   07/21/25 Telemedicine Brie Quezada LCSW [facility]   Showing recent visits within past 7 days and meeting all other requirements  Future Appointments  No visits were found meeting these conditions.  Showing future appointments within next 150 days and meeting all other requirements     History of Present Illness     HPI    Past Medical History   Past Medical History[1]  Past Surgical History[2]  Current Outpatient Medications   Medication Instructions    acetaminophen (TYLENOL) 500 mg, Every 6 hours PRN    calcium carbonate (TUMS) 500 mg chewable tablet 1 tablet, Daily    folic acid ( FOLIC ACID) 1 mg, Oral, Daily    ibuprofen (MOTRIN) 600 mg, Oral, Every 6 hours PRN    Multiple Vitamins-Minerals (BARIATRIC MULTIVITAMINS/IRON PO) Take by mouth    nystatin (MYCOSTATIN) ointment Topical, 2 times daily     Allergies[3]    Objective   There were no vitals taken for this visit.    Video Exam  Physical Exam     Administrative Statements   Encounter provider Brie Quezada LCSW    The Patient is located at Home and in the following state in which I hold an active license [place].    The patient was identified by name and date of birth. Noy Palomo was informed that this is a telemedicine visit and that the visit is being conducted through the Epic Embedded platform. She agrees to proceed..  My office door was closed. No one else was in the room.  She acknowledged consent and understanding of privacy and security of the video platform. The patient has agreed to participate and understands they can discontinue the visit at any time.    I have spent a total time of 58 minutes in caring for this patient on the day of the visit/encounter including Counseling /  Coordination of care, not including the time spent for establishing the audio/video connection.    Visit Time  Start Time: 0705  Stop Time: 0803  Total Visit Time: 58 minutes         [1]   Past Medical History:  Diagnosis Date    Anemia     takes irondaly    Asthma     Chronic kidney disease     sm cyst on left kidney diag 10 yrs ago    Family history of Down syndrome 06/05/2024    FOB' maternal uncle with down syndrome  Low risk NIPT      Gastritis     GERD (gastroesophageal reflux disease)     Hx of gastritis     Migraine     Obesity     Seasonal allergies     Situational stress     Umbilical hernia     Varicella     had vaccines   [2]   Past Surgical History:  Procedure Laterality Date    INSERTION OF INTRAUTERINE DEVICE (IUD)      [place] LAPS GSTRC RSTRICTIV PX LONGITUDINAL GASTRECTOMY N/A 01/09/2023    Procedure: GASTRECTOMY SLEEVE LAP & INTRAOPERATIVE EGD;  Surgeon: Leif Tolliver MD;  Location: [facility];  Service: Bariatrics    WISDOM TOOTH EXTRACTION Bilateral 03/2020   [3]   Allergies  Allergen Reactions    Cat Dander Shortness Of Breath    Contrast [Iodinated Contrast Media] Anaphylaxis     throat closure    Fish-Derived Products - Food Allergy Anaphylaxis     Anaphylaxis    Pollen Extract Other (See Comments)     hives, throat closure    Shellfish-Derived Products - Food Allergy

## 2025-08-04 ENCOUNTER — TELEMEDICINE (OUTPATIENT)
Dept: PSYCHIATRY | Facility: CLINIC | Age: 30
End: 2025-08-04
Payer: COMMERCIAL

## 2025-08-04 DIAGNOSIS — F41.1 GENERALIZED ANXIETY DISORDER: Primary | ICD-10-CM

## 2025-08-04 PROCEDURE — 90837 PSYTX W PT 60 MINUTES: CPT | Performed by: SOCIAL WORKER

## 2025-08-14 ENCOUNTER — OFFICE VISIT (OUTPATIENT)
Dept: FAMILY MEDICINE CLINIC | Facility: CLINIC | Age: 30
End: 2025-08-14
Payer: COMMERCIAL

## 2025-08-18 ENCOUNTER — TELEMEDICINE (OUTPATIENT)
Dept: PSYCHIATRY | Facility: CLINIC | Age: 30
End: 2025-08-18
Payer: COMMERCIAL

## 2025-08-18 DIAGNOSIS — F41.1 GENERALIZED ANXIETY DISORDER: Primary | ICD-10-CM

## 2025-08-18 PROCEDURE — 90837 PSYTX W PT 60 MINUTES: CPT | Performed by: SOCIAL WORKER

## (undated) DEVICE — DRAPE EQUIPMENT RF WAND

## (undated) DEVICE — TROCAR: Brand: KII FIOS FIRST ENTRY

## (undated) DEVICE — SUT MONOCRYL 4-0 PS-2 27 IN Y426H

## (undated) DEVICE — TIBURON LAPAROSCOPIC ABDOMINAL DRAPE: Brand: CONVERTORS

## (undated) DEVICE — COVIDIEN ENDO GIA PURPLE (MED) RELOAD 60MM

## (undated) DEVICE — LAPAROSCOPIC TROCAR SLEEVE/SINGLE USE: Brand: KII® SLEEVE

## (undated) DEVICE — URETERAL CATHETER ADAPTOR TIP

## (undated) DEVICE — VIOLET BRAIDED (POLYGLACTIN 910), SYNTHETIC ABSORBABLE SUTURE: Brand: COATED VICRYL

## (undated) DEVICE — VISUALIZATION SYSTEM: Brand: CLEARIFY

## (undated) DEVICE — WEBRIL 6 IN UNSTERILE

## (undated) DEVICE — GLOVE SRG BIOGEL 8

## (undated) DEVICE — STAPLE ENDO TRI-STAPLE 2.0 BLCK RELOAD XTRA THICK

## (undated) DEVICE — POWER SHELL SIGNIA

## (undated) DEVICE — SEALANT FIBRIN VISTASEAL 10ML

## (undated) DEVICE — SYRINGE 30ML LL

## (undated) DEVICE — INTENDED FOR TISSUE SEPARATION, AND OTHER PROCEDURES THAT REQUIRE A SHARP SURGICAL BLADE TO PUNCTURE OR CUT.: Brand: BARD-PARKER SAFETY BLADES SIZE 15, STERILE

## (undated) DEVICE — 3000CC GUARDIAN II: Brand: GUARDIAN

## (undated) DEVICE — HARMONIC 1100 SHEARS, 36CM SHAFT LENGTH: Brand: HARMONIC

## (undated) DEVICE — CHLORAPREP HI-LITE 26ML ORANGE

## (undated) DEVICE — IRRIG ENDO FLO TUBING

## (undated) DEVICE — ENDOPATH 5MM CURVED SCISSORS WITH MONOPOLAR CAUTERY: Brand: ENDOPATH

## (undated) DEVICE — SCD SEQUENTIAL COMPRESSION COMFORT SLEEVE MEDIUM KNEE LENGTH: Brand: KENDALL SCD

## (undated) DEVICE — VISIGI 3D®  CALIBRATION SYSTEM  SIZE 36FR SLEEVE/STD: Brand: BOEHRINGER® VISIGI 3D™ SLEEVE GASTRECTOMY CALIBRATION SYSTEM, SIZE 36FR

## (undated) DEVICE — ENDOPATH PNEUMONEEDLE INSUFFLATION NEEDLES WITH LUER LOCK CONNECTORS 120MM: Brand: ENDOPATH

## (undated) DEVICE — TROCARS: Brand: KII® OPTICAL ACCESS SYSTEM

## (undated) DEVICE — NEEDLE SPINAL18G X 3.5 IN QUINCKE

## (undated) DEVICE — PMI DISPOSABLE PUNCTURE CLOSURE DEVICE / SUTURE GRASPER: Brand: PMI

## (undated) DEVICE — ADHESIVE SKIN CLSR DERMABOND NX

## (undated) DEVICE — SYRINGE 20ML LL

## (undated) DEVICE — TRAVELKIT CONTAINS FIRST STEP KIT (200ML EP-4 KIT) AND SOILED SCOPE BAG - 1 KIT: Brand: TRAVELKIT CONTAINS FIRST STEP KIT AND SOILED SCOPE BAG

## (undated) DEVICE — [HIGH FLOW INSUFFLATOR,  DO NOT USE IF PACKAGE IS DAMAGED,  KEEP DRY,  KEEP AWAY FROM SUNLIGHT,  PROTECT FROM HEAT AND RADIOACTIVE SOURCES.]: Brand: PNEUMOSURE

## (undated) DEVICE — TUBING SUCTION 5MM X 12 FT

## (undated) DEVICE — TROCAR VISIPORT

## (undated) DEVICE — ALLENTOWN LAP CHOLE APP PACK: Brand: CARDINAL HEALTH

## (undated) DEVICE — LAPAROSCOPIC DUAL RIGID APPLICATOR: Brand: ETHICON

## (undated) DEVICE — TUBING SMOKE EVAC W/FILTRATION DEVICE PLUMEPORT ACTIV

## (undated) DEVICE — ASTOUND STANDARD SURGICAL GOWN, XL: Brand: CONVERTORS

## (undated) DEVICE — PLUMEPEN PRO 10FT